# Patient Record
Sex: FEMALE | Race: WHITE | NOT HISPANIC OR LATINO | Employment: OTHER | URBAN - METROPOLITAN AREA
[De-identification: names, ages, dates, MRNs, and addresses within clinical notes are randomized per-mention and may not be internally consistent; named-entity substitution may affect disease eponyms.]

---

## 2017-09-28 ENCOUNTER — OFFICE VISIT (OUTPATIENT)
Dept: FAMILY MEDICINE | Facility: CLINIC | Age: 77
End: 2017-09-28
Payer: MEDICARE

## 2017-09-28 VITALS
HEART RATE: 111 BPM | SYSTOLIC BLOOD PRESSURE: 114 MMHG | TEMPERATURE: 98.1 F | WEIGHT: 205 LBS | DIASTOLIC BLOOD PRESSURE: 70 MMHG | OXYGEN SATURATION: 90 %

## 2017-09-28 DIAGNOSIS — R29.898 WEAKNESS OF BOTH LOWER EXTREMITIES: ICD-10-CM

## 2017-09-28 DIAGNOSIS — Z23 NEED FOR PROPHYLACTIC VACCINATION AND INOCULATION AGAINST INFLUENZA: Primary | ICD-10-CM

## 2017-09-28 PROBLEM — E89.0 POSTOPERATIVE HYPOTHYROIDISM: Status: ACTIVE | Noted: 2017-09-28

## 2017-09-28 PROBLEM — M81.0 OSTEOPOROSIS: Status: ACTIVE | Noted: 2017-09-28

## 2017-09-28 PROBLEM — I10 HYPERTENSION GOAL BP (BLOOD PRESSURE) < 140/90: Status: ACTIVE | Noted: 2017-09-28

## 2017-09-28 PROCEDURE — 99203 OFFICE O/P NEW LOW 30 MIN: CPT | Mod: 25 | Performed by: FAMILY MEDICINE

## 2017-09-28 PROCEDURE — G0008 ADMIN INFLUENZA VIRUS VAC: HCPCS | Performed by: FAMILY MEDICINE

## 2017-09-28 PROCEDURE — 90662 IIV NO PRSV INCREASED AG IM: CPT | Performed by: FAMILY MEDICINE

## 2017-09-28 RX ORDER — ALENDRONATE SODIUM 70 MG/1
70 TABLET ORAL
COMMUNITY
End: 2018-08-30

## 2017-09-28 RX ORDER — ATORVASTATIN CALCIUM 40 MG/1
40 TABLET, FILM COATED ORAL DAILY
COMMUNITY
End: 2018-12-07

## 2017-09-28 RX ORDER — GABAPENTIN 300 MG/1
600 CAPSULE ORAL 2 TIMES DAILY
COMMUNITY
End: 2018-09-28

## 2017-09-28 RX ORDER — LEVOTHYROXINE SODIUM 50 UG/1
50 TABLET ORAL DAILY
COMMUNITY
End: 2019-05-01

## 2017-09-28 RX ORDER — LISINOPRIL 40 MG/1
40 TABLET ORAL DAILY
COMMUNITY
End: 2018-11-05

## 2017-09-28 RX ORDER — TIOTROPIUM BROMIDE 18 UG/1
18 CAPSULE ORAL; RESPIRATORY (INHALATION) DAILY
COMMUNITY
End: 2020-04-06

## 2017-09-28 RX ORDER — FUROSEMIDE 20 MG
20 TABLET ORAL DAILY
COMMUNITY
End: 2019-05-01

## 2017-09-28 RX ORDER — AMLODIPINE BESYLATE 10 MG/1
10 TABLET ORAL DAILY
COMMUNITY
End: 2018-10-29

## 2017-09-28 RX ORDER — TRAZODONE HYDROCHLORIDE 100 MG/1
100 TABLET ORAL AT BEDTIME
COMMUNITY
End: 2018-12-14

## 2017-09-28 RX ORDER — DULOXETIN HYDROCHLORIDE 30 MG/1
90 CAPSULE, DELAYED RELEASE ORAL DAILY
COMMUNITY
End: 2018-09-28

## 2017-09-28 RX ORDER — BUSPIRONE HYDROCHLORIDE 15 MG/1
15 TABLET ORAL DAILY
COMMUNITY
End: 2018-08-27

## 2017-09-28 NOTE — NURSING NOTE
Chief Complaint   Patient presents with     Fatigue       Initial /70  Pulse 111  Temp 98.1  F (36.7  C) (Oral)  Wt 205 lb (93 kg)  SpO2 90% There is no height or weight on file to calculate BMI.  Medication Reconciliation: niranjan Howard M.A.

## 2017-09-28 NOTE — PROGRESS NOTES
Injectable Influenza Immunization Documentation    1.  Is the person to be vaccinated sick today?   No    2. Does the person to be vaccinated have an allergy to a component   of the vaccine?   No    3. Has the person to be vaccinated ever had a serious reaction   to influenza vaccine in the past?   No    4. Has the person to be vaccinated ever had Guillain-Barré syndrome?   No    Form completed by Janis Howard M.A.      --------------------------------------------------------------------------------------------------------------------------------------  SUBJECTIVE:  Gilma Pace is a 77 year old female who scheduled an appointment to discuss the following issues:  She lived in [place] and just moved up here with in the last year.    She had a compression fracture in 2010 at T11. It healed without the vertebroplasty.    She has been feeling weak in her legs for about a year.   She had an MRI scan showing some stenosis in the lumbar area in 2012.    She reports that for the last 3-4 month(s). She has been seen at [facility].  She is doing physical therapy for 9 month(s)     She has fallen 6-7 times in the last 3-4 month(s).  She reports that before she falls she has pain in her left lower back, then her legs go numb from the knees down and then her legs give out. This whole process takes about 10 seconds.     She has difficulty getting up from a low position.  She is using a walker.     Past Medical, social, family histories, medications, and allergies reviewed and updated   ROS: other than that noted above all other review of systems was negative    ROS:       Current Outpatient Prescriptions:      levothyroxine (SYNTHROID/LEVOTHROID) 50 MCG tablet, Take 50 mcg by mouth daily, Disp: , Rfl:      alendronate (FOSAMAX) 70 MG tablet, Take 70 mg by mouth every 7 days, Disp: , Rfl:      amLODIPine (NORVASC) 10 MG tablet, Take 10 mg by mouth daily, Disp: , Rfl:      atorvastatin (LIPITOR) 40 MG tablet, Take 40 mg by mouth daily,  Disp: , Rfl:      busPIRone (BUSPAR) 15 MG tablet, Take 15 mg by mouth daily, Disp: , Rfl:      DULoxetine (CYMBALTA) 30 MG EC capsule, Take 90 mg by mouth daily, Disp: , Rfl:      furosemide (LASIX) 20 MG tablet, Take 20 mg by mouth daily, Disp: , Rfl:      lisinopril (PRINIVIL/ZESTRIL) 40 MG tablet, Take 40 mg by mouth daily, Disp: , Rfl:      tiotropium (SPIRIVA HANDIHALER) 18 MCG capsule, Inhale 18 mcg into the lungs daily, Disp: , Rfl:      traZODone (DESYREL) 100 MG tablet, Take 100 mg by mouth At Bedtime, Disp: , Rfl:      gabapentin (NEURONTIN) 300 MG capsule, Take 600 mg by mouth 2 times daily, Disp: , Rfl:     OBJECTIVE:  /70  Pulse 111  Temp 98.1  F (36.7  C) (Oral)  Wt 205 lb (93 kg)  SpO2 90%    EXAM:  GENERAL APPEARANCE: healthy, alert and no distress      DTR's  Right knee 2+  Right ankle Abscent  Left knee 2+  Left ankleAbscent  Strength was +5 globally in the lower extremities except knee extension and hip flexion was 4-4.5.    With my mechanical table I was able to see if she could stand up from a sitting position at different heights.   Lower or equal to 90 she can not get up.     No results found for this or any previous visit.      ASSESSMENT/PLAN:    I think she has lumbar spinal stenosis and needs imaging to further clarify this.   If the imaging is not clear cut would consider a neurology evaluation.       (Z23) Need for prophylactic vaccination and inoculation against influenza  (primary encounter diagnosis)  Comment:   Plan: FLU VACCINE, INCREASED ANTIGEN, PRESV FREE, AGE        65+ [87236], Vaccine Administration, Initial         [17672]          Problem List updated

## 2017-09-28 NOTE — MR AVS SNAPSHOT
"              After Visit Summary   9/28/2017    Gilma Pace    MRN: [id number]           Patient Information     Date Of Birth          1940        Visit Information        Provider Department      9/28/2017 6:30 PM Gucci Wu MD [facility]        Today's Diagnoses     Need for prophylactic vaccination and inoculation against influenza    -  1    Weakness of both lower extremities          Care Instructions    Please call our [facility] Scheduling Line at [phone] to schedule your:  MRI                  Follow-ups after your visit        Future tests that were ordered for you today     Open Future Orders        Priority Expected Expires Ordered    MR Lumbar Spine w/o Contrast Routine  9/28/2018 9/28/2017            Who to contact     If you have questions or need follow up information about today's clinic visit or your schedule please contact [facility] directly at [phone].  Normal or non-critical lab and imaging results will be communicated to you by [email or web address], letter or phone within 4 business days after the clinic has received the results. If you do not hear from us within 7 days, please contact the clinic through [email or web address] or phone. If you have a critical or abnormal lab result, we will notify you by phone as soon as possible.  Submit refill requests through [employer] or call your pharmacy and they will forward the refill request to us. Please allow 3 business days for your refill to be completed.          Additional Information About Your Visit        MyChart Information     [employer] lets you send messages to your doctor, view your test results, renew your prescriptions, schedule appointments and more. To sign up, go to [email or web address] . Click on \"Log in\" on the left side of the screen, which will take you to the Welcome page. Then click on \"Sign up Now\" on the right side of the page.     You will be asked to enter the access code listed below, as " well as some personal information. Please follow the directions to create your username and password.     Your access code is: [email or web address]  Expires: 2017  7:21 PM     Your access code will  in 90 days. If you need help or a new code, please call your [place] clinic or [phone].        Care EveryWhere ID     This is your Care EveryWhere ID. This could be used by other organizations to access your [facility] medical records  [id number]        Your Vitals Were     Pulse Temperature Pulse Oximetry             111 98.1  F (36.7  C) (Oral) 90%          Blood Pressure from Last 3 Encounters:   17 114/70    Weight from Last 3 Encounters:   17 205 lb (93 kg)              We Performed the Following     FLU VACCINE, INCREASED ANTIGEN, PRESV FREE, AGE 65+ [86106]     Vaccine Administration, Initial [[id number]]        Primary Care Provider Office Phone # Fax #    [facility] [phone] [address] [facility] [address]        Equal Access to Services     [facility]: Hadii aad ku hadasho Soomaali, waaxda luqadaha, qaybta kaalmada adeegyada, waxángel way haytate pulido . So [facility] [phone].    ATENCIÓN: Si habla español, tiene a flood disposición servicios gratuitos de asistencia lingüística. Llame al [phone].    We comply with applicable federal civil rights laws and [place] laws. We do not discriminate on the basis of race, color, national origin, age, disability sex, sexual orientation or gender identity.            Thank you!     Thank you for choosing [facility]  [facility] your [facility]. Our goal is always to provide you with excellent care. Hearing back from our patients is one way we can continue to improve our services. Please take a few minutes to complete the written survey that you may receive in the mail after your visit with us. Thank you!             Your Updated Medication List - Protect others around you: Learn how to safely  use, store and throw away your medicines at www.disposemymeds.org.          This list is accurate as of: 9/28/17  7:21 PM.  Always use your most recent med list.                   Brand Name Dispense Instructions for use Diagnosis    alendronate 70 MG tablet    FOSAMAX     Take 70 mg by mouth every 7 days    Need for prophylactic vaccination and inoculation against influenza, Weakness of both lower extremities       amLODIPine 10 MG tablet    NORVASC     Take 10 mg by mouth daily    Need for prophylactic vaccination and inoculation against influenza, Weakness of both lower extremities       atorvastatin 40 MG tablet    LIPITOR     Take 40 mg by mouth daily    Need for prophylactic vaccination and inoculation against influenza, Weakness of both lower extremities       busPIRone 15 MG tablet    BUSPAR     Take 15 mg by mouth daily    Need for prophylactic vaccination and inoculation against influenza, Weakness of both lower extremities       CYMBALTA 30 MG EC capsule   Generic drug:  DULoxetine      Take 90 mg by mouth daily    Need for prophylactic vaccination and inoculation against influenza, Weakness of both lower extremities       furosemide 20 MG tablet    LASIX     Take 20 mg by mouth daily    Need for prophylactic vaccination and inoculation against influenza, Weakness of both lower extremities       gabapentin 300 MG capsule    NEURONTIN     Take 600 mg by mouth 2 times daily    Need for prophylactic vaccination and inoculation against influenza, Weakness of both lower extremities       levothyroxine 50 MCG tablet    SYNTHROID/LEVOTHROID     Take 50 mcg by mouth daily    Need for prophylactic vaccination and inoculation against influenza, Weakness of both lower extremities       lisinopril 40 MG tablet    PRINIVIL/ZESTRIL     Take 40 mg by mouth daily    Need for prophylactic vaccination and inoculation against influenza, Weakness of both lower extremities       SPIRIVA HANDIHALER 18 MCG capsule   Generic  drug:  tiotropium      Inhale 18 mcg into the lungs daily    Need for prophylactic vaccination and inoculation against influenza, Weakness of both lower extremities       traZODone 100 MG tablet    DESYREL     Take 100 mg by mouth At Bedtime    Need for prophylactic vaccination and inoculation against influenza, Weakness of both lower extremities

## 2017-10-04 ENCOUNTER — RADIANT APPOINTMENT (OUTPATIENT)
Dept: MRI IMAGING | Facility: CLINIC | Age: 77
End: 2017-10-04
Attending: FAMILY MEDICINE
Payer: MEDICARE

## 2017-10-04 DIAGNOSIS — R29.898 WEAKNESS OF BOTH LOWER EXTREMITIES: ICD-10-CM

## 2017-10-04 DIAGNOSIS — Z23 NEED FOR PROPHYLACTIC VACCINATION AND INOCULATION AGAINST INFLUENZA: ICD-10-CM

## 2017-10-04 DIAGNOSIS — R29.898 WEAKNESS OF BOTH LOWER EXTREMITIES: Primary | ICD-10-CM

## 2017-10-04 PROCEDURE — 72148 MRI LUMBAR SPINE W/O DYE: CPT | Mod: TC

## 2017-10-31 ENCOUNTER — OFFICE VISIT (OUTPATIENT)
Dept: NEUROLOGY | Facility: CLINIC | Age: 77
End: 2017-10-31
Payer: MEDICARE

## 2017-10-31 VITALS
DIASTOLIC BLOOD PRESSURE: 67 MMHG | BODY MASS INDEX: 38.51 KG/M2 | SYSTOLIC BLOOD PRESSURE: 103 MMHG | HEART RATE: 67 BPM | WEIGHT: 204 LBS | RESPIRATION RATE: 14 BRPM | HEIGHT: 61 IN

## 2017-10-31 DIAGNOSIS — M48.062 SPINAL STENOSIS OF LUMBAR REGION WITH NEUROGENIC CLAUDICATION: Primary | ICD-10-CM

## 2017-10-31 PROBLEM — E53.8 FOLATE DEFICIENCY: Status: ACTIVE | Noted: 2017-06-09

## 2017-10-31 PROBLEM — R29.6 RECURRENT FALLS: Status: ACTIVE | Noted: 2017-06-01

## 2017-10-31 PROBLEM — R73.09 OTHER ABNORMAL GLUCOSE: Status: ACTIVE | Noted: 2017-06-09

## 2017-10-31 PROCEDURE — 99205 OFFICE O/P NEW HI 60 MIN: CPT | Performed by: PSYCHIATRY & NEUROLOGY

## 2017-10-31 ASSESSMENT — PAIN SCALES - GENERAL: PAINLEVEL: MILD PAIN (3)

## 2017-10-31 NOTE — LETTER
10/31/2017         RE: Gilma Pace  [address] [place] [address]        Dear Colleague,    Thank you for referring your patient, Gilma Pace, to the [facility]. Please see a copy of my visit note below.     INITIAL NEUROLOGY CONSULTATION    LOCATION: [facility]  DATE OF VISIT: 10/31/2017  MRN: [id number]  NAME: Ms. Gilma Pace  :  1940 (77 year old)    PRIMARY/REFERRING PROVIDER: Dr. Gucci Wu    REASON FOR CONSULTATION: Weakness of lower limbs    HISTORY OF PRESENT ILLNESS ([place]): Ms. Pace is seen at the request of Dr. Gucci Wu. I may add that details of the history is provided by her .  77-year-old woman relates that she is here because of abnormal MRI spine studies. She relates that her main symptom is her inability to walk more than 20 feet without stopping. It has going on for last 1-1/2 years. Tendency to fall. She is unsteady walking. She has history of chronic low back pain for last 10 years. She relates that she needed to sit down before she starts walking at least for about 15 minutes. She finds difficult to describe the nature  problems occur that she needed to sit down before she can start walking again. She denies any discomfort in the calf muscles.  She discontinued smoking in . She denies any previous diagnosis of peripheral artery disease affecting the lower limbs she does have chronic obstructive lung disease.    She has been using walker for last 4 years. She relates that she is using walker because of the chronic low back pain. Denies tendency to fall 4 years ago.    Chronic low back pain is localized. It does not radiate to the left leg. She has been to the pain clinic in 2016. She has seen 4 spine specialists in [place]. Her  relates that 3 of them indicated that no surgery can be done.. Her  relates that one surgeon mentioned that surgery can be done from the incision over the abdominal wall. She denies seeing any  spine specialist here in [place].    She was in the preoperative room for spine surgery in [place], [place] in 2007. Surgeon canceled the surgery indicating that she has some other problem. It was mentioned to her that  problem with her elevated blood count. Further evaluation showed that she had a thyroid nodule. She was moved to a different hospital later and she had thyroid surgery. She and her  conforms that it was a benign mass.  On direct questioning she denies any feet paresthesias.    Previous Relevant Diagnostic Studies Reviewed:  Imaging: MR LUMBAR SPINE WITHOUT CONTRAST 10/4/2017 10:51 AM     HISTORY:  Encounter for immunization. Other symptoms and signs  involving the musculoskeletal system.     TECHNIQUE: Sagittal T1 and T2, sagittal IR, and transverse proton  density and T2-weighted pulse sequences.     FINDINGS: Five lumbar vertebrae are assumed. There is a T11 vertebral  body fracture with near-complete central loss of vertebral body height  and moderate posterosuperior cortical retropulsion which does not  contact or efface the distal thoracic cord. Given lack of marrow  edema, this is likely subacute or chronic. Fat-containing hemangiomas  noted on the edge of the scan within the T10 vertebral body.  Apophyseal joint degenerative arthrosis with minimal degenerative  grade 1 spondylolisthesis is noted at L4-L5. Degenerative loss of disc  signal is noted throughout the lumbar spine. Disc degeneration is  greatest at L3-L4 where there is moderate loss of disc height. The  conus medullaris is unremarkable in appearance on the sagittal images.        L1-L2: Minimal if any annular bulge. No central or foraminal stenosis.     L2-L3: Diffuse annular bulge with only borderline narrowing of the  central canal and mild foraminal stenosis below the exiting nerve  roots.     L3-L4: Diffuse annular bulge with borderline narrowing of the central  canal and mild bilateral foraminal stenosis below the  exiting nerve  roots.     L4-L5: Mild annular bulge without central stenosis. Mild bilateral  foraminal stenosis, right greater than left.     L5-S1: Mild annular bulge without indentation upon the thecal sac. No  central stenosis or displacement of either S1 nerve root.  Mild-to-moderate left foraminal stenosis. The right neural foramen  appears to be patent.         IMPRESSION:  1. Multilevel degenerative disc disease, most advanced at L3-L4.  2. Apophyseal joint degenerative arthrosis with minimal degenerative  spondylolisthesis at L4-L5. No periarticular reactive marrow edema.  3. Chronic-appearing T11 body fracture.  4. Multilevel foraminal stenosis, greatest on the left at L5-S1.  5. Borderline central stenosis at L2-L3 and L3-L4.     LEANDRO LYNN MD    Review of Systems: All negative except as noted in the [place] and Identifying information.    Allergies   Allergen Reactions     Codeine Unknown     nausea     Sulfa Drugs GI Disturbance     GI upset     Tetracyclines GI Disturbance     Gi upset     Current Outpatient Prescriptions   Medication Sig     IBUPROFEN PO Take 200 mg by mouth once     levothyroxine (SYNTHROID/LEVOTHROID) 50 MCG tablet Take 50 mcg by mouth daily     alendronate (FOSAMAX) 70 MG tablet Take 70 mg by mouth every 7 days     amLODIPine (NORVASC) 10 MG tablet Take 10 mg by mouth daily     atorvastatin (LIPITOR) 40 MG tablet Take 40 mg by mouth daily     busPIRone (BUSPAR) 15 MG tablet Take 15 mg by mouth daily     DULoxetine (CYMBALTA) 30 MG EC capsule Take 90 mg by mouth daily     furosemide (LASIX) 20 MG tablet Take 20 mg by mouth daily     lisinopril (PRINIVIL/ZESTRIL) 40 MG tablet Take 40 mg by mouth daily     tiotropium (SPIRIVA HANDIHALER) 18 MCG capsule Inhale 18 mcg into the lungs daily     traZODone (DESYREL) 100 MG tablet Take 100 mg by mouth At Bedtime     gabapentin (NEURONTIN) 300 MG capsule Take 600 mg by mouth 2 times daily     No current facility-administered medications for  "this visit.      Past Medical History:   Diagnosis Date     Anxiety 10/11/2006    Last visit with Mental health specialist in 2002.      Chronic low back pain without sciatica 10/31/2016     Compression fracture of thoracic vertebra (H) 6/13/2011    Overview:  T  11  compression  fx  on  xrays  from  6/13/2011  . Ongoing pain  x  3  months   Get  eval  and  treat  with  spine  specialists  as discussed  Initially  treated  in [place]  .  Pain  is  controlled   with  advil  as needed  . Reviewed   with  patient      Congestive heart failure (H) 10/11/2006    Overview:  Epic      COPD (chronic obstructive pulmonary disease) (H) 9/28/2017     Folate deficiency 6/9/2017    Overview:  Formatting of this note may be different from the original. Hemoglobin (g/dl)  Date Value  06/05/2017 15.6  10/31/2016 13.8  11/04/2015 11.3 (L)   Iron (mcg/dl)  Date Value  06/05/2017 49 (L)   TIBC, Calculated (mcg/dl)  Date Value  06/05/2017 319   % Saturation, calc. (%)  Date Value  06/05/2017 15   Vitamin B12 (pg/ml)  Date Value  06/05/2017 253   Folate (ng/ml)  Date Value  06/05/20     Hypertension goal BP (blood pressure) < 140/90 9/28/2017     Mitral regurgitation 2/11/2014    Overview:  Needs dental prophylaxis      Mixed hyperlipidemia 10/11/2006     Osteoporosis 9/28/2017     Postoperative hypothyroidism 9/28/2017     Past Surgical History:   Procedure Laterality Date     APPENDECTOMY OPEN      71 years      CATARACT IOL, RT/LT       Social History   Substance Use Topics     Smoking status: Former Smoker     Quit date: 2007     Smokeless tobacco: Never Used     Alcohol use Yes      Comment: Rarely       GENERAL EXAMINATION:    General appearance: Pleasant female sitting comfortably in a chair  /67 (BP Location: Left arm, Patient Position: Chair, Cuff Size: Adult Large)  Pulse 67  Resp 14  Ht 1.549 m (5' 1\")  Wt 92.5 kg (204 lb)  BMI 38.55 kg/m2    NEUROLOGICAL EXAMINATION:    Head & Neck:  Neck supple  No carotid " bruit  Mental Status:    Alert and oriented to time, place and person    Recent and remote memory intact    Attention span and concentration normal    Adequate fund of knowledge  Speech: Normal  Cranial Nerves:  Cranial Nerve 2:    Visual acuity normal to finger counting    Pupils equal and reacting to light    No field defect by confrontation    Fundus reveals normal disc margins  Cranial Nerves 3, 4 and 6:    Eye movements normal in all directions of gaze  Cranial Nerve 5:     Normal facial sensory and motor functions  Cranial Nerve 7:     Symmetrical face without motor weakness   Cranial Nerve 8:    Normal hearing to whispered sounds  Cranial Nerves 9, 10:    Normal palate and uvula movements  Cranial Nerve 11:    Shoulder shrug symmetrical  Cranial Nerve 12:    Tongue midline with normal movements  Motor:    Tone and bulk: Normal in both upper and lower limbs    Power: No drift of the outstretched arms          Normal strength in all muscle groups of both upper and lower limbs   Coordination:    Finger nose test and rapid alternate movements normal bilaterally    Heel-shin test normal bilaterally  Deep Tendon Reflexes:    Upper limbs: Equal and symmetrical    Lower limbs: Equal and symmetrical with Right ankle jerk absent, Left ankle jerk present.    Down going plantars  Sensations:    Touch/Pin prick: Normal at both and upper and lower limbs    Vibration (128 Hz): Diminished at both ankles    Position sense: Normal at both big toes  Gait:    Walks hesitantlywith a normal stride length and a normal arm swing    Finds difficult to walk on heels and toes         Tandem walking not done  Romberg Sign: Negative    IMPRESSION:   Encounter Diagnoses   Name Primary?     Spinal stenosis of lumbar region with neurogenic claudication Yes     COMMENTS: The clinical and imaging features of lumbar canal stenosis with neurogenic claudication over a background of multilevel lumbar degenerative disease seen on MRI lumbar  spine.No definite weakness of lower limbs except for her being limited with walking because of her chronic low back pain and related issues.     PLANS:   Patient Instructions     AFTER VISIT SUMMARY (AVS)   Spine [phone]  Orders Placed This Encounter   Procedures     ORTHOPEDICS ADULT REFERRAL     Diagnostic possibilities reviewed and reasons for work-up explained    Preventive Neurology: Encouraged to keep physically and mentally active with particular emphasis on daily stretching exercises, walking, and healthy eating.    To call us for follow-up neurology appointment in next 6 week(s) or earlier if needed. If any difficulty with having neurology appointment, please coordinate care with Dr. Wu    Thanks to Dr. Gucci Wu for allowing me to participate in Ms. Pace's care. Please feel free to call me with any questions or concerns.     Time with patient 60 minutes, greater than 50% of which was counseling and coordination of care.    *Chart documentation was completed in part with Dragon voice-recognition software. Even though reviewed, some grammatical, spelling, and word errors may remain.     Jai Jennings MD, [facility]  Neurologist    cc: Dr. Gucci Wu                      Again, thank you for allowing me to participate in the care of your patient.        Sincerely,        Jai Jennings MD

## 2017-10-31 NOTE — NURSING NOTE
"Chief Complaint   Patient presents with     Consult     Lumbar pain for about 10 years      Results     MRI LUMBAR SPINE        Initial /67 (BP Location: Left arm, Patient Position: Chair, Cuff Size: Adult Large)  Pulse 67  Resp 14  Ht 1.549 m (5' 1\")  Wt 92.5 kg (204 lb)  BMI 38.55 kg/m2 Estimated body mass index is 38.55 kg/(m^2) as calculated from the following:    Height as of this encounter: 1.549 m (5' 1\").    Weight as of this encounter: 92.5 kg (204 lb).  Medication Reconciliation: complete   Sharan Lester MA      "

## 2017-10-31 NOTE — Clinical Note
Please send her consultation note to take to spine specialist-Dr. Dsouza. Also call [facility] to send CD of MRI lumbar spine directly to her to hand carry it.

## 2017-10-31 NOTE — PROGRESS NOTES
INITIAL NEUROLOGY CONSULTATION    LOCATION: [facility]  DATE OF VISIT: 10/31/2017  MRN: [id number]  NAME: Ms. Gilma Pace  :  1940 (77 year old)    PRIMARY/REFERRING PROVIDER: Dr. Gucci Wu    REASON FOR CONSULTATION: Weakness of lower limbs    HISTORY OF PRESENT ILLNESS ([place]): Ms. Pace is seen at the request of Dr. Gucci Wu. I may add that details of the history is provided by her .  77-year-old woman relates that she is here because of abnormal MRI spine studies. She relates that her main symptom is her inability to walk more than 20 feet without stopping. It has going on for last 1-1/2 years. Tendency to fall. She is unsteady walking. She has history of chronic low back pain for last 10 years. She relates that she needed to sit down before she starts walking at least for about 15 minutes. She finds difficult to describe the nature  problems occur that she needed to sit down before she can start walking again. She denies any discomfort in the calf muscles.  She discontinued smoking in . She denies any previous diagnosis of peripheral artery disease affecting the lower limbs she does have chronic obstructive lung disease.    She has been using walker for last 4 years. She relates that she is using walker because of the chronic low back pain. Denies tendency to fall 4 years ago.    Chronic low back pain is localized. It does not radiate to the left leg. She has been to the pain clinic in 2016. She has seen 4 spine specialists in [place]. Her  relates that 3 of them indicated that no surgery can be done.. Her  relates that one surgeon mentioned that surgery can be done from the incision over the abdominal wall. She denies seeing any spine specialist here in [place].    She was in the preoperative room for spine surgery in [place], [place] in . Surgeon canceled the surgery indicating that she has some other problem. It was mentioned to her that  problem with  her elevated blood count. Further evaluation showed that she had a thyroid nodule. She was moved to a different hospital later and she had thyroid surgery. She and her  conforms that it was a benign mass.  On direct questioning she denies any feet paresthesias.    Previous Relevant Diagnostic Studies Reviewed:  Imaging: MR LUMBAR SPINE WITHOUT CONTRAST 10/4/2017 10:51 AM     HISTORY:  Encounter for immunization. Other symptoms and signs  involving the musculoskeletal system.     TECHNIQUE: Sagittal T1 and T2, sagittal IR, and transverse proton  density and T2-weighted pulse sequences.     FINDINGS: Five lumbar vertebrae are assumed. There is a T11 vertebral  body fracture with near-complete central loss of vertebral body height  and moderate posterosuperior cortical retropulsion which does not  contact or efface the distal thoracic cord. Given lack of marrow  edema, this is likely subacute or chronic. Fat-containing hemangiomas  noted on the edge of the scan within the T10 vertebral body.  Apophyseal joint degenerative arthrosis with minimal degenerative  grade 1 spondylolisthesis is noted at L4-L5. Degenerative loss of disc  signal is noted throughout the lumbar spine. Disc degeneration is  greatest at L3-L4 where there is moderate loss of disc height. The  conus medullaris is unremarkable in appearance on the sagittal images.        L1-L2: Minimal if any annular bulge. No central or foraminal stenosis.     L2-L3: Diffuse annular bulge with only borderline narrowing of the  central canal and mild foraminal stenosis below the exiting nerve  roots.     L3-L4: Diffuse annular bulge with borderline narrowing of the central  canal and mild bilateral foraminal stenosis below the exiting nerve  roots.     L4-L5: Mild annular bulge without central stenosis. Mild bilateral  foraminal stenosis, right greater than left.     L5-S1: Mild annular bulge without indentation upon the thecal sac. No  central stenosis or  displacement of either S1 nerve root.  Mild-to-moderate left foraminal stenosis. The right neural foramen  appears to be patent.         IMPRESSION:  1. Multilevel degenerative disc disease, most advanced at L3-L4.  2. Apophyseal joint degenerative arthrosis with minimal degenerative  spondylolisthesis at L4-L5. No periarticular reactive marrow edema.  3. Chronic-appearing T11 body fracture.  4. Multilevel foraminal stenosis, greatest on the left at L5-S1.  5. Borderline central stenosis at L2-L3 and L3-L4.     LEANDRO LYNN MD    Review of Systems: All negative except as noted in the [employer] and Identifying information.    Allergies   Allergen Reactions     Codeine Unknown     nausea     Sulfa Drugs GI Disturbance     GI upset     Tetracyclines GI Disturbance     Gi upset     Current Outpatient Prescriptions   Medication Sig     IBUPROFEN PO Take 200 mg by mouth once     levothyroxine (SYNTHROID/LEVOTHROID) 50 MCG tablet Take 50 mcg by mouth daily     alendronate (FOSAMAX) 70 MG tablet Take 70 mg by mouth every 7 days     amLODIPine (NORVASC) 10 MG tablet Take 10 mg by mouth daily     atorvastatin (LIPITOR) 40 MG tablet Take 40 mg by mouth daily     busPIRone (BUSPAR) 15 MG tablet Take 15 mg by mouth daily     DULoxetine (CYMBALTA) 30 MG EC capsule Take 90 mg by mouth daily     furosemide (LASIX) 20 MG tablet Take 20 mg by mouth daily     lisinopril (PRINIVIL/ZESTRIL) 40 MG tablet Take 40 mg by mouth daily     tiotropium (SPIRIVA HANDIHALER) 18 MCG capsule Inhale 18 mcg into the lungs daily     traZODone (DESYREL) 100 MG tablet Take 100 mg by mouth At Bedtime     gabapentin (NEURONTIN) 300 MG capsule Take 600 mg by mouth 2 times daily     No current facility-administered medications for this visit.      Past Medical History:   Diagnosis Date     Anxiety 10/11/2006    Last visit with Mental health specialist in 2002.      Chronic low back pain without sciatica 10/31/2016     Compression fracture of thoracic vertebra  "(H) 6/13/2011    Overview:  T  11  compression  fx  on  xrays  from  6/13/2011  . Ongoing pain  x  3  months   Get  eval  and  treat  with  spine  specialists  as discussed  Initially  treated  in [place]  .  Pain  is  controlled   with  advil  as needed  . Reviewed   with  patient      Congestive heart failure (H) 10/11/2006    Overview:  Epic      COPD (chronic obstructive pulmonary disease) (H) 9/28/2017     Folate deficiency 6/9/2017    Overview:  Formatting of this note may be different from the original. Hemoglobin (g/dl)  Date Value  06/05/2017 15.6  10/31/2016 13.8  11/04/2015 11.3 (L)   Iron (mcg/dl)  Date Value  06/05/2017 49 (L)   TIBC, Calculated (mcg/dl)  Date Value  06/05/2017 319   % Saturation, calc. (%)  Date Value  06/05/2017 15   Vitamin B12 (pg/ml)  Date Value  06/05/2017 253   Folate (ng/ml)  Date Value  06/05/20     Hypertension goal BP (blood pressure) < 140/90 9/28/2017     Mitral regurgitation 2/11/2014    Overview:  Needs dental prophylaxis      Mixed hyperlipidemia 10/11/2006     Osteoporosis 9/28/2017     Postoperative hypothyroidism 9/28/2017     Past Surgical History:   Procedure Laterality Date     APPENDECTOMY OPEN      71 years      CATARACT IOL, RT/LT       Social History   Substance Use Topics     Smoking status: Former Smoker     Quit date: 2007     Smokeless tobacco: Never Used     Alcohol use Yes      Comment: Rarely       GENERAL EXAMINATION:    General appearance: Pleasant female sitting comfortably in a chair  /67 (BP Location: Left arm, Patient Position: Chair, Cuff Size: Adult Large)  Pulse 67  Resp 14  Ht 1.549 m (5' 1\")  Wt 92.5 kg (204 lb)  BMI 38.55 kg/m2    NEUROLOGICAL EXAMINATION:    Head & Neck:  Neck supple  No carotid bruit  Mental Status:    Alert and oriented to time, place and person    Recent and remote memory intact    Attention span and concentration normal    Adequate fund of knowledge  Speech: Normal  Cranial Nerves:  Cranial Nerve 2:    Visual " acuity normal to finger counting    Pupils equal and reacting to light    No field defect by confrontation    Fundus reveals normal disc margins  Cranial Nerves 3, 4 and 6:    Eye movements normal in all directions of gaze  Cranial Nerve 5:     Normal facial sensory and motor functions  Cranial Nerve 7:     Symmetrical face without motor weakness   Cranial Nerve 8:    Normal hearing to whispered sounds  Cranial Nerves 9, 10:    Normal palate and uvula movements  Cranial Nerve 11:    Shoulder shrug symmetrical  Cranial Nerve 12:    Tongue midline with normal movements  Motor:    Tone and bulk: Normal in both upper and lower limbs    Power: No drift of the outstretched arms          Normal strength in all muscle groups of both upper and lower limbs   Coordination:    Finger nose test and rapid alternate movements normal bilaterally    Heel-shin test normal bilaterally  Deep Tendon Reflexes:    Upper limbs: Equal and symmetrical    Lower limbs: Equal and symmetrical with Right ankle jerk absent, Left ankle jerk present.    Down going plantars  Sensations:    Touch/Pin prick: Normal at both and upper and lower limbs    Vibration (128 Hz): Diminished at both ankles    Position sense: Normal at both big toes  Gait:    Walks hesitantlywith a normal stride length and a normal arm swing    Finds difficult to walk on heels and toes         Tandem walking not done  Romberg Sign: Negative    IMPRESSION:   Encounter Diagnoses   Name Primary?     Spinal stenosis of lumbar region with neurogenic claudication Yes     COMMENTS: The clinical and imaging features of lumbar canal stenosis with neurogenic claudication over a background of multilevel lumbar degenerative disease seen on MRI lumbar spine.No definite weakness of lower limbs except for her being limited with walking because of her chronic low back pain and related issues.     PLANS:   Patient Instructions     AFTER VISIT SUMMARY (AVS)   Spine [phone]  Orders  Placed This Encounter   Procedures     ORTHOPEDICS ADULT REFERRAL     Diagnostic possibilities reviewed and reasons for work-up explained    Preventive Neurology: Encouraged to keep physically and mentally active with particular emphasis on daily stretching exercises, walking, and healthy eating.    To call us for follow-up neurology appointment in next 6 week(s) or earlier if needed. If any difficulty with having neurology appointment, please coordinate care with Dr. Wu    Thanks to Dr. Gucci Wu for allowing me to participate in Ms. Pace's care. Please feel free to call me with any questions or concerns.     Time with patient 60 minutes, greater than 50% of which was counseling and coordination of care.    *Chart documentation was completed in part with Dragon voice-recognition software. Even though reviewed, some grammatical, spelling, and word errors may remain.     Jai Jennings MD, [facility]  Neurologist    cc: Dr. Gucci Wu

## 2017-10-31 NOTE — MR AVS SNAPSHOT
After Visit Summary   10/31/2017    Gilma Pace    MRN: [id number]           Patient Information     Date Of Birth          1940        Visit Information        Provider Department      10/31/2017 9:00 AM Jai Jennings MD [facility]        Today's Diagnoses     Spinal stenosis of lumbar region with neurogenic claudication    -  1      Care Instructions    AFTER VISIT SUMMARY (AVS)   Spine [phone]  Orders Placed This Encounter   Procedures     ORTHOPEDICS ADULT REFERRAL     Diagnostic possibilities reviewed and reasons for work-up explained    Preventive Neurology: Encouraged to keep physically and mentally active with particular emphasis on daily stretching exercises, walking, and healthy eating.    To call us for follow-up neurology appointment in next 6 week(s) or earlier if needed. If any difficulty with having neurology appointment, please coordinate care with Dr. Wu                    Follow-ups after your visit        Additional Services     ORTHOPEDICS ADULT REFERRAL       Please call to Schedule Appointment:  Javier Dsouza MD  Spine specialist  Orthopedics Clinic  Tel. [phone]    REASON: Lumbar canal stenosis with neurogenic claudication.       Would appreciate your help with the diagnosis and further management.    Please send your consultation note to Clinic, Dr. Gucci Wu also.    Thanks,    Jai Jennings MD, [facility]  Neurologist        Coverage of these services is subject to the terms and limitations of your health insurance plan.  Please call  member services at your health plan with any benefit or coverage questions.        [facility] referral to Dr. Dsouza.  Any CT, MRI or procedures ordered by your specialist must be performed at a [place] facility OR coordinated by your clinic referral office.    If X-rays, CTs or MRIs have been performed, please contact the facility where they were done, to arrange for  prior to  "your scheduled appointment.  Please bring this referral request to your appointment and present it to your specialist.                  Follow-up notes from your care team     Return in about 6 weeks (around 12/12/2017) for Follow-up.      Who to contact     If you have questions or need follow up information about today's clinic visit or your schedule please contact [facility] directly at [phone].  Normal or non-critical lab and imaging results will be communicated to you by [employer], letter or phone within 4 business days after the clinic has received the results. If you do not hear from us within 7 days, please contact the clinic through [employer] or phone. If you have a critical or abnormal lab result, we will notify you by phone as soon as possible.  Submit refill requests through [employer] or call your pharmacy and they will forward the refill request to us. Please allow 3 business days for your refill to be completed.          Additional Information About Your Visit        [employer] Information     [employer] gives you secure access to your electronic health record. If you see a primary care provider, you can also send messages to your care team and make appointments. If you have questions, please call your primary care clinic.  If you do not have a primary care provider, please call [phone] and they will assist you.        Care EveryWhere ID     This is your Care EveryWhere ID. This could be used by other organizations to access your [place] medical records  [id number]        Your Vitals Were     Pulse Respirations Height BMI (Body Mass Index)          67 14 1.549 m (5' 1\") 38.55 kg/m2         Blood Pressure from Last 3 Encounters:   10/31/17 103/67   09/28/17 114/70    Weight from Last 3 Encounters:   10/31/17 92.5 kg (204 lb)   09/28/17 93 kg (205 lb)              We Performed the Following     ORTHOPEDICS ADULT REFERRAL        Primary Care Provider Office Phone # Fax #    Paola Segura " [facility] [phone]       [address] [facility] [address]        Equal Access to Services     LEMUEL CARLIN : Hadii aad ku hadfabricio Sokvng, wajanda luqadaha, qaybta kaalmada kalya, vandana lopezmacey marty. So [facility] [phone].    ATENCIÓN: Si habla español, tiene a flood disposición servicios gratuitos de asistencia lingüística. Llame al [phone].    We comply with applicable federal civil rights laws and [place] laws. We do not discriminate on the basis of race, color, national origin, age, disability, sex, sexual orientation, or gender identity.            Thank you!     Thank you for choosing [facility] [place]  for your care. Our goal is always to provide you with excellent care. Hearing back from our patients is one way we can continue to improve our services. Please take a few minutes to complete the written survey that you may receive in the mail after your visit with us. Thank you!             Your Updated Medication List - Protect others around you: Learn how to safely use, store and throw away your medicines at www.disposemymeds.org.          This list is accurate as of: 10/31/17 11:12 AM.  Always use your most recent med list.                   Brand Name Dispense Instructions for use Diagnosis    alendronate 70 MG tablet    FOSAMAX     Take 70 mg by mouth every 7 days    Need for prophylactic vaccination and inoculation against influenza, Weakness of both lower extremities       amLODIPine 10 MG tablet    NORVASC     Take 10 mg by mouth daily    Need for prophylactic vaccination and inoculation against influenza, Weakness of both lower extremities       atorvastatin 40 MG tablet    LIPITOR     Take 40 mg by mouth daily    Need for prophylactic vaccination and inoculation against influenza, Weakness of both lower extremities       busPIRone 15 MG tablet    BUSPAR     Take 15 mg by mouth daily    Need for prophylactic vaccination and inoculation against  influenza, Weakness of both lower extremities       CYMBALTA 30 MG EC capsule   Generic drug:  DULoxetine      Take 90 mg by mouth daily    Need for prophylactic vaccination and inoculation against influenza, Weakness of both lower extremities       furosemide 20 MG tablet    LASIX     Take 20 mg by mouth daily    Need for prophylactic vaccination and inoculation against influenza, Weakness of both lower extremities       gabapentin 300 MG capsule    NEURONTIN     Take 600 mg by mouth 2 times daily    Need for prophylactic vaccination and inoculation against influenza, Weakness of both lower extremities       IBUPROFEN PO      Take 200 mg by mouth once        levothyroxine 50 MCG tablet    SYNTHROID/LEVOTHROID     Take 50 mcg by mouth daily    Need for prophylactic vaccination and inoculation against influenza, Weakness of both lower extremities       lisinopril 40 MG tablet    PRINIVIL/ZESTRIL     Take 40 mg by mouth daily    Need for prophylactic vaccination and inoculation against influenza, Weakness of both lower extremities       SPIRIVA HANDIHALER 18 MCG capsule   Generic drug:  tiotropium      Inhale 18 mcg into the lungs daily    Need for prophylactic vaccination and inoculation against influenza, Weakness of both lower extremities       traZODone 100 MG tablet    DESYREL     Take 100 mg by mouth At Bedtime    Need for prophylactic vaccination and inoculation against influenza, Weakness of both lower extremities

## 2017-10-31 NOTE — PATIENT INSTRUCTIONS
AFTER VISIT SUMMARY (AVS)   Spine [phone]  Orders Placed This Encounter   Procedures     ORTHOPEDICS ADULT REFERRAL     Diagnostic possibilities reviewed and reasons for work-up explained    Preventive Neurology: Encouraged to keep physically and mentally active with particular emphasis on daily stretching exercises, walking, and healthy eating.    To call us for follow-up neurology appointment in next 6 week(s) or earlier if needed. If any difficulty with having neurology appointment, please coordinate care with Dr. Wu

## 2017-11-03 NOTE — PROGRESS NOTES
Patient consultation note send today at her home address.   Also call [facility] and spoke with Haylee to send CD of MRI lumbar spine directly to patient home so she can hand carry to her appointment with Dr. Dsouza. Thank you    Sharan Lester MA

## 2017-11-13 ENCOUNTER — TRANSFERRED RECORDS (OUTPATIENT)
Dept: HEALTH INFORMATION MANAGEMENT | Facility: CLINIC | Age: 77
End: 2017-11-13

## 2017-11-13 LAB — PHQ9 SCORE: 3

## 2017-11-15 ENCOUNTER — OFFICE VISIT (OUTPATIENT)
Dept: FAMILY MEDICINE | Facility: CLINIC | Age: 77
End: 2017-11-15
Payer: MEDICARE

## 2017-11-15 VITALS
BODY MASS INDEX: 37.79 KG/M2 | HEART RATE: 74 BPM | TEMPERATURE: 96.6 F | WEIGHT: 200 LBS | OXYGEN SATURATION: 74 % | DIASTOLIC BLOOD PRESSURE: 63 MMHG | SYSTOLIC BLOOD PRESSURE: 103 MMHG

## 2017-11-15 DIAGNOSIS — R29.898 WEAKNESS OF BOTH LOWER EXTREMITIES: Primary | ICD-10-CM

## 2017-11-15 PROCEDURE — 99214 OFFICE O/P EST MOD 30 MIN: CPT | Performed by: FAMILY MEDICINE

## 2017-11-15 NOTE — PROGRESS NOTES
SUBJECTIVE:  Gilma Pace is a 77 year old female who scheduled an appointment to discuss the following issues:  #1 - Weakness of both lower extremities      She has had 4 falls in the last week.     She reports that today as she was getting into the car her legs leg gave out and she fell onto her buttocks.   She denies any injury,   She is unable to get into the care without help    She can not get up the 3 steps into the house any longer. She also has fallen multiple times trying to do this.     She fell last week in the kitchen and was unable to get back up.     Please refer to my last note regarding the details of her falls.       Past Medical, social, family histories, medications, and allergies reviewed and updated   ROS: other than that noted above all other review of systems was negative    ROS:       Current Outpatient Prescriptions:      tiZANidine (ZANAFLEX) 4 MG tablet, Take 4 mg by mouth At Bedtime, Disp: , Rfl:      IBUPROFEN PO, Take 200 mg by mouth once, Disp: , Rfl:      levothyroxine (SYNTHROID/LEVOTHROID) 50 MCG tablet, Take 50 mcg by mouth daily, Disp: , Rfl:      alendronate (FOSAMAX) 70 MG tablet, Take 70 mg by mouth every 7 days, Disp: , Rfl:      amLODIPine (NORVASC) 10 MG tablet, Take 10 mg by mouth daily, Disp: , Rfl:      atorvastatin (LIPITOR) 40 MG tablet, Take 40 mg by mouth daily, Disp: , Rfl:      busPIRone (BUSPAR) 15 MG tablet, Take 15 mg by mouth daily, Disp: , Rfl:      DULoxetine (CYMBALTA) 30 MG EC capsule, Take 90 mg by mouth daily, Disp: , Rfl:      furosemide (LASIX) 20 MG tablet, Take 20 mg by mouth daily, Disp: , Rfl:      lisinopril (PRINIVIL/ZESTRIL) 40 MG tablet, Take 40 mg by mouth daily, Disp: , Rfl:      tiotropium (SPIRIVA HANDIHALER) 18 MCG capsule, Inhale 18 mcg into the lungs daily, Disp: , Rfl:      traZODone (DESYREL) 100 MG tablet, Take 100 mg by mouth At Bedtime, Disp: , Rfl:      gabapentin (NEURONTIN) 300 MG capsule, Take 600 mg by mouth 2 times daily,  Disp: , Rfl:     OBJECTIVE:  /63  Pulse 74  Temp 96.6  F (35.9  C) (Oral)  Wt 200 lb (90.7 kg)  SpO2 (!) 74%  BMI 37.79 kg/m2    EXAM:  GENERAL APPEARANCE: healthy, alert and no distress      UPPER EXTREMITY(IES): +5/5 strength    LOWER EXTREMITY(IES):   RIGHT LOWER EXTREMITY strength +5/5    Left lower extremity:+4/5 dorsi flexion, knee extension, hip flexion    DP pulse +1/2 bilateral(ly), capillary refill was <2 seconds in the toes bilateral(ly)     Results for orders placed or performed in visit on 11/13/17   PHQ-9 DEPRESSION SCREENING ORDER   Result Value Ref Range    PHQ9 SCORE 3     [facility] ORTHOPEDICS     MR LUMBAR SPINE WITHOUT CONTRAST 10/4/2017 10:51 AM     HISTORY:  Encounter for immunization. Other symptoms and signs  involving the musculoskeletal system.     TECHNIQUE: Sagittal T1 and T2, sagittal IR, and transverse proton  density and T2-weighted pulse sequences.     FINDINGS: Five lumbar vertebrae are assumed. There is a T11 vertebral  body fracture with near-complete central loss of vertebral body height  and moderate posterosuperior cortical retropulsion which does not  contact or efface the distal thoracic cord. Given lack of marrow  edema, this is likely subacute or chronic. Fat-containing hemangiomas  noted on the edge of the scan within the T10 vertebral body.  Apophyseal joint degenerative arthrosis with minimal degenerative  grade 1 spondylolisthesis is noted at L4-L5. Degenerative loss of disc  signal is noted throughout the lumbar spine. Disc degeneration is  greatest at L3-L4 where there is moderate loss of disc height. The  conus medullaris is unremarkable in appearance on the sagittal images.        L1-L2: Minimal if any annular bulge. No central or foraminal stenosis.     L2-L3: Diffuse annular bulge with only borderline narrowing of the  central canal and mild foraminal stenosis below the exiting nerve  roots.     L3-L4: Diffuse annular bulge with borderline  narrowing of the central  canal and mild bilateral foraminal stenosis below the exiting nerve  roots.     L4-L5: Mild annular bulge without central stenosis. Mild bilateral  foraminal stenosis, right greater than left.     L5-S1: Mild annular bulge without indentation upon the thecal sac. No  central stenosis or displacement of either S1 nerve root.  Mild-to-moderate left foraminal stenosis. The right neural foramen  appears to be patent.         IMPRESSION:  1. Multilevel degenerative disc disease, most advanced at L3-L4.  2. Apophyseal joint degenerative arthrosis with minimal degenerative  spondylolisthesis at L4-L5. No periarticular reactive marrow edema.  3. Chronic-appearing T11 body fracture.  4. Multilevel foraminal stenosis, greatest on the left at L5-S1.  5. Borderline central stenosis at L2-L3 and L3-L4.    ASSESSMENT/PLAN:    Spells of lower extremity(ies) weakness in the setting of lumbar spinal stenosis.     I am going to have the patient seen by one of the [facility] neurologists for further evaluation.     I introduced our  Celsa to the patient and her family. They discussed short term placement options as the patient is really struggling to get around her home.

## 2017-11-15 NOTE — MR AVS SNAPSHOT
After Visit Summary   11/15/2017    Gilma Pace    MRN: [id number]           Patient Information     Date Of Birth          1940        Visit Information        Provider Department      11/15/2017 1:30 PM Gucci Wu MD [facility]        Today's Diagnoses     Weakness of both lower extremities    -  1      Care Instructions    Consult at [facility]: [facility] [phone]   [email or web address]/  [facility]: Neurology [facility] [phone]   [email or web address]/neurology-[email or web address]/  General Neurology            Follow-ups after your visit        Additional Services     NEUROLOGY ADULT REFERRAL       Your provider has referred you for the following:   Consult at [facility]: [facility] [phone]   [email or web address]/  [facility]: Neurology [facility] [phone]   [email or web address]/  General Neurology    Please be aware that coverage of these services is subject to the terms and limitations of your health insurance plan.  Call member services at your health plan with any benefit or coverage questions.      Please bring the following with you to your appointment:    (1) Any X-Rays, CTs or MRIs which have been performed.  Contact the facility where they were done to arrange for  prior to your scheduled appointment.    (2) List of current medications  (3) This referral request   (4) Any documents/labs given to you for this referral                  Who to contact     If you have questions or need follow up information about today's clinic visit or your schedule please contact [facility] directly at [phone].  Normal or non-critical lab and imaging results will be communicated to you by MyChart, letter or phone within 4 business days after  the clinic has received the results. If you do not hear from us within 7 days, please contact the clinic through [employer] or phone. If you have a critical or abnormal lab result, we will notify you by phone as soon as possible.  Submit refill requests through [employer] or call your pharmacy and they will forward the refill request to us. Please allow 3 business days for your refill to be completed.          Additional Information About Your Visit        [employer] Information     [employer] gives you secure access to your electronic health record. If you see a primary care provider, you can also send messages to your care team and make appointments. If you have questions, please call your primary care clinic.  If you do not have a primary care provider, please call [phone] and they will assist you.        Care EveryWhere ID     This is your Care EveryWhere ID. This could be used by other organizations to access your [place] medical records  [id number]        Your Vitals Were     Pulse Temperature Pulse Oximetry BMI (Body Mass Index)          74 96.6  F (35.9  C) (Oral) 74% 37.79 kg/m2         Blood Pressure from Last 3 Encounters:   11/15/17 103/63   10/31/17 103/67   09/28/17 114/70    Weight from Last 3 Encounters:   11/15/17 200 lb (90.7 kg)   10/31/17 204 lb (92.5 kg)   09/28/17 205 lb (93 kg)              We Performed the Following     NEUROLOGY ADULT REFERRAL        Primary Care Provider Office Phone # Fax #    [facility] [phone] [address] [facility] [address]        Equal Access to Services     LEMUEL CARLIN : Hadii aad ku hadasho Soomaali, waaxda luqadaha, qaybta kaalmada adeegyada, vandana ferguson. So [facility] [phone].    ATENCIÓN: Si habla español, tiene a flood disposición servicios gratuitos de asistencia lingüística. Llame al [phone].    We comply with applicable federal civil rights laws and [place] laws. We do not discriminate  on the basis of race, color, national origin, age, disability, sex, sexual orientation, or gender identity.            Thank you!     Thank you for choosing [facility]  for your [facility]. Our goal is always to provide you with excellent care. Hearing back from our patients is one way we can continue to improve our services. Please take a few minutes to complete the written survey that you may receive in the mail after your visit with us. Thank you!             Your Updated Medication List - Protect others around you: Learn how to safely use, store and throw away your medicines at www.disposemymeds.org.          This list is accurate as of: 11/15/17  2:20 PM.  Always use your most recent med list.                   Brand Name Dispense Instructions for use Diagnosis    alendronate 70 MG tablet    FOSAMAX     Take 70 mg by mouth every 7 days    Need for prophylactic vaccination and inoculation against influenza, Weakness of both lower extremities       amLODIPine 10 MG tablet    NORVASC     Take 10 mg by mouth daily    Need for prophylactic vaccination and inoculation against influenza, Weakness of both lower extremities       atorvastatin 40 MG tablet    LIPITOR     Take 40 mg by mouth daily    Need for prophylactic vaccination and inoculation against influenza, Weakness of both lower extremities       busPIRone 15 MG tablet    BUSPAR     Take 15 mg by mouth daily    Need for prophylactic vaccination and inoculation against influenza, Weakness of both lower extremities       CYMBALTA 30 MG EC capsule   Generic drug:  DULoxetine      Take 90 mg by mouth daily    Need for prophylactic vaccination and inoculation against influenza, Weakness of both lower extremities       furosemide 20 MG tablet    LASIX     Take 20 mg by mouth daily    Need for prophylactic vaccination and inoculation against influenza, Weakness of both lower extremities       gabapentin 300 MG capsule    NEURONTIN     Take 600 mg by mouth 2  times daily    Need for prophylactic vaccination and inoculation against influenza, Weakness of both lower extremities       IBUPROFEN PO      Take 200 mg by mouth once        levothyroxine 50 MCG tablet    SYNTHROID/LEVOTHROID     Take 50 mcg by mouth daily    Need for prophylactic vaccination and inoculation against influenza, Weakness of both lower extremities       lisinopril 40 MG tablet    PRINIVIL/ZESTRIL     Take 40 mg by mouth daily    Need for prophylactic vaccination and inoculation against influenza, Weakness of both lower extremities       SPIRIVA HANDIHALER 18 MCG capsule   Generic drug:  tiotropium      Inhale 18 mcg into the lungs daily    Need for prophylactic vaccination and inoculation against influenza, Weakness of both lower extremities       tiZANidine 4 MG tablet    ZANAFLEX     Take 4 mg by mouth At Bedtime    Weakness of both lower extremities       traZODone 100 MG tablet    DESYREL     Take 100 mg by mouth At Bedtime    Need for prophylactic vaccination and inoculation against influenza, Weakness of both lower extremities

## 2017-11-15 NOTE — PATIENT INSTRUCTIONS
Consult at [facility]: [facility] [place] [facility] [phone]   [email or web address].org/Clinics/[facility]/  [facility] [place] [phone]   [email or web address]  General Neurology

## 2017-11-15 NOTE — NURSING NOTE
"Chief Complaint   Patient presents with     Fall     frequent falls, looking for rehab placement       Initial /63  Pulse 74  Temp 96.6  F (35.9  C) (Oral)  Wt 200 lb (90.7 kg)  SpO2 (!) 74%  BMI 37.79 kg/m2 Estimated body mass index is 37.79 kg/(m^2) as calculated from the following:    Height as of 10/31/17: 5' 1\" (1.549 m).    Weight as of this encounter: 200 lb (90.7 kg).  Medication Reconciliation: complete     Pebbles Posadas, chai    "

## 2017-11-20 ENCOUNTER — TELEPHONE (OUTPATIENT)
Dept: FAMILY MEDICINE | Facility: CLINIC | Age: 77
End: 2017-11-20

## 2017-11-20 NOTE — TELEPHONE ENCOUNTER
I have discussed this patient's issue with the RN involved and we have come up with this plan.  Gucci Wu MD

## 2017-11-20 NOTE — TELEPHONE ENCOUNTER
Information below is reviewed with  Dr Wu, this will be okay.  [facility] is given Verbal Orders.  Verbalized good understanding.     Per protocol, will route encounter to be cosigned by provider for Verbal Orders.  Shantelle Harris RN

## 2017-11-20 NOTE — TELEPHONE ENCOUNTER
Requesting a delay in start of Home Care services due to staffing. Start of service for tomorrow 11/21/17. Ok to leave a message.

## 2017-11-21 ENCOUNTER — DOCUMENTATION ONLY (OUTPATIENT)
Dept: HOME HOSPICE | Facility: OTHER | Age: 77
End: 2017-11-21

## 2017-11-24 ENCOUNTER — TELEPHONE (OUTPATIENT)
Dept: FAMILY MEDICINE | Facility: CLINIC | Age: 77
End: 2017-11-24

## 2017-11-24 ENCOUNTER — DOCUMENTATION ONLY (OUTPATIENT)
Dept: CARE COORDINATION | Facility: CLINIC | Age: 77
End: 2017-11-24

## 2017-11-24 PROBLEM — I51.7 MILD CONCENTRIC LEFT VENTRICULAR HYPERTROPHY (LVH): Status: ACTIVE | Noted: 2017-11-24

## 2017-11-24 NOTE — TELEPHONE ENCOUNTER
Called DAVID Boswell back and gave her providers message.   She will call patient now and inform her of this.     Zoila Whittaker RN

## 2017-11-24 NOTE — PROGRESS NOTES
[facility] now requests orders and shares plan of care/discharge summaries for some patients through [employer].  Please REPLY TO THIS MESSAGE in order to give authorization for orders when needed.  This is considered a verbal order, you will still receive a faxed copy of orders for signature.  Thank you for your assistance in improving collaboration for our patients.    ORDER   Requesting to cont home PT for gait training and therapeutic exercise, to reduce falls risk and improve mobility. Once a week for 5 weeks.    Thank you, Julisa Hughes, PT  [phone]

## 2017-11-24 NOTE — TELEPHONE ENCOUNTER
That is not really a good idea. Her oxygen is not at a safe level. We need to have her assessed at the emergency department as to why the Oxygen level is so low.   Gucci Wu MD

## 2017-11-24 NOTE — TELEPHONE ENCOUNTER
[facility] is calling to let provider know fyi: Discontinued lasix, patient oesn't want to take it, O2 was 84,87, Thank you.

## 2017-11-29 ENCOUNTER — TELEPHONE (OUTPATIENT)
Dept: FAMILY MEDICINE | Facility: CLINIC | Age: 77
End: 2017-11-29

## 2017-11-29 NOTE — TELEPHONE ENCOUNTER
home care is calling to let provider know: Patient had a fall yesterday, onto her back and 12cm by 5cm bruise to lower back, did not hit head, denies any pain. Thank you.

## 2017-11-30 ENCOUNTER — DOCUMENTATION ONLY (OUTPATIENT)
Dept: CARE COORDINATION | Facility: CLINIC | Age: 77
End: 2017-11-30

## 2017-11-30 NOTE — PROGRESS NOTES
[facility] now requests orders and shares plan of care/discharge summaries for some patients through [employer].  Please REPLY TO THIS MESSAGE in order to give authorization for orders when needed.  This is considered a verbal order, you will still receive a faxed copy of orders for signature.  Thank you for your assistance in improving collaboration for our patients.    ORDER    OT eval and treat 2w3  for UE function, home safety and equipment needs.  The plan will also include falls prevention plan, monitor and treat pain,  continence management, to achieve the following goals,  in 3 weeks  Pt will demo progressive aerobic Home Exercise Program up to 10 minutes to increase functional endurance for performing ADLs/IADLs    Pt will demo safety with tub/shower transfer using appropriate equipment w/stand by assist     Pt will demo correct positioning of body and coordination of breathing with hip rotation for maximum benefit of muscle strengthening and increased continence control for safe and independent toileting.  Pt will demo ability to dress L/E w/o difficulty

## 2017-12-01 NOTE — PROGRESS NOTES
I have reviewed the message from Home Care/Hospice and I authorize these orders.  Gucci Wu MD

## 2017-12-07 ENCOUNTER — OFFICE VISIT (OUTPATIENT)
Dept: NEUROLOGY | Facility: CLINIC | Age: 77
End: 2017-12-07
Attending: FAMILY MEDICINE
Payer: MEDICARE

## 2017-12-07 ENCOUNTER — PRE VISIT (OUTPATIENT)
Dept: NEUROLOGY | Facility: CLINIC | Age: 77
End: 2017-12-07

## 2017-12-07 VITALS
OXYGEN SATURATION: 94 % | BODY MASS INDEX: 36.57 KG/M2 | SYSTOLIC BLOOD PRESSURE: 116 MMHG | HEIGHT: 61 IN | HEART RATE: 115 BPM | WEIGHT: 193.7 LBS | DIASTOLIC BLOOD PRESSURE: 77 MMHG

## 2017-12-07 DIAGNOSIS — R29.898 LIMB WEAKNESS: Primary | ICD-10-CM

## 2017-12-07 DIAGNOSIS — E03.9 HYPOTHYROIDISM, UNSPECIFIED TYPE: ICD-10-CM

## 2017-12-07 LAB
CK SERPL-CCNC: 152 U/L (ref 30–225)
TSH SERPL DL<=0.005 MIU/L-ACNC: 2.1 MU/L (ref 0.4–4)

## 2017-12-07 PROCEDURE — 83520 IMMUNOASSAY QUANT NOS NONAB: CPT | Mod: 90 | Performed by: PSYCHIATRY & NEUROLOGY

## 2017-12-07 PROCEDURE — 83519 RIA NONANTIBODY: CPT | Mod: 90 | Performed by: PSYCHIATRY & NEUROLOGY

## 2017-12-07 PROCEDURE — 86255 FLUORESCENT ANTIBODY SCREEN: CPT | Mod: 90 | Performed by: PSYCHIATRY & NEUROLOGY

## 2017-12-07 PROCEDURE — 82550 ASSAY OF CK (CPK): CPT | Performed by: PSYCHIATRY & NEUROLOGY

## 2017-12-07 PROCEDURE — 36415 COLL VENOUS BLD VENIPUNCTURE: CPT | Performed by: PSYCHIATRY & NEUROLOGY

## 2017-12-07 PROCEDURE — 99000 SPECIMEN HANDLING OFFICE-LAB: CPT | Performed by: PSYCHIATRY & NEUROLOGY

## 2017-12-07 PROCEDURE — 84443 ASSAY THYROID STIM HORMONE: CPT | Performed by: PSYCHIATRY & NEUROLOGY

## 2017-12-07 PROCEDURE — 99203 OFFICE O/P NEW LOW 30 MIN: CPT | Performed by: PSYCHIATRY & NEUROLOGY

## 2017-12-07 PROCEDURE — 83516 IMMUNOASSAY NONANTIBODY: CPT | Mod: 90 | Performed by: PSYCHIATRY & NEUROLOGY

## 2017-12-07 ASSESSMENT — PAIN SCALES - GENERAL: PAINLEVEL: NO PAIN (0)

## 2017-12-07 NOTE — LETTER
12/7/2017         RE: Marce Pace  [id number] [address] [place] [id number]        Dear Colleague,    Thank you for referring your patient, Marce Pace, to the [facility]. Please see a copy of my visit note below.    HISTORY OF PRESENT ILLNESS:  Marce Pace is a 77-year-old female, here for evaluation of leg weakness and falls.      She tells me about 10 years ago she suffered a T11 vertebral fracture while she was living in [place].  It does not sound like she developed any acute leg weakness or numbness subsequent to that fall.  At some point, she began losing her ability to ambulate normally.  This has particularly gotten worse over the last month or so.  She tells me her legs feel very weak and will collapse.  She does not feel dizzy or faint when this happens, it is just that her legs can not hold her up.  There is no pain in her legs.  She denies any upper extremity symptoms.  She denies any cranial nerve symptoms, but does report a dry mouth.  She has chronic back pain, but denies neck pain.      She does have COPD and was hospitalized at [facility] from 11/24-11/25 with low O2 saturations.  On blood gas, her O2 saturation was 75%.  She has been started on supplemental oxygen.  This has not really helped her weakness.  While hospitalized she was also found to have a small pneumonia.  CBC was done and her white count was 13,800.  Her basic metabolic panel was within normal limits.  BNP was normal at 25.      She has been on Lipitor for about 15 years.  She denies myalgias.      In June of this year she had a normal B12 level, TSH, T4 and liver functions.      PAST MEDICAL HISTORY:  Notable for COPD, hypertension, hypothyroidism, hyperlipidemia and anxiety.      CURRENT MEDICATIONS:  Tizanidine 4 mg at bedtime, Levothyroxine, Fosamax, amlodipine, Lipitor, BuSpar, Cymbalta, furosemide, lisinopril, Spiriva, trazodone and Gabapentin 600 mg twice a day.      ALLERGIES:  She is allergic to  codeine, sulfa and tetracycline.      FAMILY HISTORY:  There is no family history of neuromuscular disease.      SOCIAL HISTORY:  She is a former smoker and now uses E-cigarettes.  She does not use alcohol.      PHYSICAL EXAMINATION:   GENERAL:  Reveals a patient who had to be examined in a chair because she could not step up on to the exam table.   VITAL SIGNS:  Heart rate 115.  Blood pressure 116/77.   CRANIAL NERVES:  Pupils are equal, round and react well to light.  She has full extraocular motility.  Her facial strength is normal.  Speech is clear.   MOTOR:  Examination of the neck reveals mild neck flexor weakness.  Motor examination of the extremities reveals proximal weakness in the arms with shoulder abduction 4 and elbow flexion 4.  In the lower extremities while seated hip flexion is 3-, knee flexion 4, ankle dorsiflexion 4.  Knee extension and ankle plantar flexion are normal.   SENSORY:  She has a reduction of vibratory sense in the toes.  Pinprick is intact, as is position sense.   Finger-to-nose is done well.   REFLEXES:  2 to -3+ in the upper extremities with slightly brisk finger flexor reflexes.  Knee jerks 2+ (checked after muscle testing) and ankle jerks absent.  Plantar responses are flexor.      IMPRESSION:  Limb weakness and falls.      PLAN:  The patient did have a lumbar MRI scan done on 10/4/17 that I reviewed with her.  There is no significant central spinal canal stenosis and although there is some foraminal stenosis I do not think it is sufficient to explain her weakness.  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      For further evaluation, I am going to check a cervical MRI scan to make certain we were not dealing with a myelopathy, although my suspicion is this may be a peripheral neuromuscular process.      She is going to have a CK checked as well as a TSH, free T4, acetylcholine receptor antibodies, and a MUSK     antibody.      She is a former smoker.  She reports a dry mouth.  Lambert-Eaton syndrome is a possibility.  She is going to have a paraneoplastic antibody panel checked through the [facility], so I can see the results of P/Q voltage gated calcium channel antibody test.      I am going to set her up for EMG and nerve conduction studies as well as repetitive nerve stimulation.      I have asked her to hold the Lipitor for now as that can cause a progressive myopathy and weakness.      I will communicate test results to her as they become available and also plan to see her back following the completion of her electrodiagnostic testing.         RAJ VIGIL MD             D: 2017 14:09   T: 2017 22:57   MT: [id number]      Name:     ALEKS CORONEL   MRN:      [id number]        Account:      [id number]   :      1940           Visit Date:   2017      Document: [id number]        Again, thank you for allowing me to participate in the care of your patient.        Sincerely,        Raj Vigil MD

## 2017-12-07 NOTE — MR AVS SNAPSHOT
After Visit Summary   12/7/2017    Gilma Pace    MRN: [id number]           Patient Information     Date Of Birth          1940        Visit Information        Provider Department      12/7/2017 1:00 PM Raj Vigil MD [facility]        Today's Diagnoses     Limb weakness    -  1    Hypothyroidism, unspecified type          Care Instructions    Hold on taking Lipitor (atorvastatin)      Having EMG and NCS Tests  You will be having electromyography (EMG) and nerve conduction studies (NCS) to measure muscle and nerve function. In most cases, both tests are done. NCS is most often done first. You will be asked to lie on an exam table with a blanket over you. You may have one or both of the following:    Nerve conduction study (NCS)  During NCS, mild electrical currents are used to test how fast impulses move along your nerves. The healthcare provider will put small metal disks (electrodes) on your skin on the area of your body being tested. This will be done using water-based gel or paste. A doctor or technologist will apply mild electrical currents to your skin. Your muscles will twitch, but the test won t harm you. Currents are usually applied to the same area several times. Usually the intensity of the electrical stimulation is increased on each body part. Despite some increasing discomfort that varies from person to person, the electrical shock is not dangerous. The test may continue on other parts of your body unless the reason for doing the test is limited to a small part of the body.  Electromyography (EMG)  Most of the electrodes will be removed for EMG. The doctor will clean the area being tested with alcohol. A very fine needle will be put into the muscles in this region. When the needle is inserted, you may feel as if your skin is being pinched. Try to relax and do as instructed, since you will be asked to relax and contract the muscle being tested. Following  instructions will allow your doctor to interpret the test results.  Let the technologist know  For your safety and for the success of your test, tell the technologist if you:    Have any bleeding problems.    Take blood thinners (anticoagulants) or other medications, including aspirin.    Have any immune system problems.    Have had neck or back surgery.  You may also be asked questions about your overall health.  Before the test  Prepare for your test as instructed. Shower or bathe, but don't use powder, oil, or lotion. Your skin should be clean and free of excess oil. Wear loose clothes. But know that you may be asked to change into a hospital gown. The entire test will take about 60 minutes. Be sure to allow extra time to check in.  After your test  Before you leave, all electrodes will be removed. You can then get right back to your normal routine. If you feel tired or have some discomfort, take it easy. If you were told to stop taking any medicines for your test, ask when you can start taking them again. Your doctor will let you know when your test results are ready.  Date Last Reviewed: 9/12/2015 2000-2017 [employer]. [address] [place] [address]. All rights reserved. This information is not intended as a substitute for professional medical care. Always follow your healthcare professional's instructions.                Follow-ups after your visit        Follow-up notes from your care team     Discussed this visit Return in about 4 weeks (around 1/4/2018).      Your next 10 appointments already scheduled     Dec 12, 2017  5:45 PM CST   (Arrive by 5:30 PM)   MR CERVICAL SPINE W/O & W CONTRAST with MGMR1   [facility] ([facility])    [id number] [place] [address] [phone]           Take your medicines as usual, unless your doctor tells you not to. Bring a list of your current medicines to your exam (including vitamins,  minerals and over-the-counter drugs).  You will be given intravenous contrast for this exam. To prepare:   The day before your exam, drink extra fluids at least six 8-ounce glasses (unless your doctor tells you to restrict your fluids).   Have a blood test (creatinine test) within 30 days of your exam. Go to your clinic or Diagnostic Imaging Department for this test.  The MRI machine uses a strong magnet. Please wear clothes without metal (snaps, zippers). A sweatsuit works well, or we may give you a hospital gown.  Please remove any body piercings and hair extensions before you arrive. You will also remove watches, jewelry, hairpins, wallets, dentures, partial dental plates and hearing aids. You may wear contact lenses, and you may be able to wear your rings. We have a safe place to keep your personal items, but it is safer to leave them at home.   **IMPORTANT** THE INSTRUCTIONS BELOW ARE ONLY FOR THOSE PATIENTS WHO HAVE BEEN TOLD THEY WILL RECEIVE SEDATION OR GENERAL ANESTHESIA DURING THEIR MRI PROCEDURE:  IF YOU WILL RECEIVE SEDATION (take medicine to help you relax during your exam):   You must get the medicine from your doctor before you arrive. Bring the medicine to the exam. Do not take it at home.   Arrive one hour early. Bring someone who can take you home after the test. Your medicine will make you sleepy. After the exam, you may not drive, take a bus or take a taxi by yourself.   No eating 8 hours before your exam. You may have clear liquids up until 4 hours before your exam. (Clear liquids include water, clear tea, black coffee and fruit juice without pulp.)  IF YOU WILL RECEIVE ANESTHESIA (be asleep for your exam):   Arrive 1 1/2 hours early. Bring someone who can take you home after the test. You may not drive, take a bus or take a taxi by yourself.   No eating 8 hours before your exam. You may have clear liquids up until 4 hours before your exam. (Clear liquids include water, clear tea, black coffee  and fruit juice without pulp.)  Please call the Imaging Department at your exam site with any questions.            Jan 08, 2018  3:00 PM CST   (Arrive by 2:45 PM)   EMG with Melvin Agosto MD   [facility] ([facility])    [address] [facility] [address]  [address] [facility] [address] [phone]           Do not use lotions or creams on the area to be tested. If you are on blood thinners (Warfarin, Coumadin, Lovenox, etc), please contact your primary care physician to check if it is safe to stop them 3 days prior to testing. If you have anxiety, please check with your referring physician to obtain anti-anxiety medication for the procedure.            Vince 10, 2018  4:00 PM CST   Return Visit with Raj Vigil MD   [facility]    [id number] [place] [address] [phone]              Future tests that were ordered for you today     Open Future Orders        Priority Expected Expires Ordered    EMG Routine 12/18/2017 1/29/2018 12/7/2017    MR Cervical Spine w/o & w Contrast Routine  12/7/2018 12/7/2017            Who to contact     If you have questions or need follow up information about today's clinic visit or your schedule please contact [facility] directly at [phone].  Normal or non-critical lab and imaging results will be communicated to you by MyChart, letter or phone within 4 business days after the clinic has received the results. If you do not hear from us within 7 days, please contact the clinic through "[employer]hart or phone. If you have a critical or abnormal lab result, we will notify you by phone as soon as possible.  Submit refill requests through [employer] or call your pharmacy and they will forward the refill request to us. Please allow 3 business days for your refill to be completed.          Additional Information About Your Visit        [employer] Information     [employer] gives you secure  "access to your electronic health record. If you see a primary care provider, you can also send messages to your care team and make appointments. If you have questions, please call your primary care clinic.  If you do not have a primary care provider, please call [phone] and they will assist you.      [employer] is an electronic gateway that provides easy, online access to your medical records. With [employer], you can request a clinic appointment, read your test results, renew a prescription or communicate with your care team.     To access your existing account, please contact your [facility] or call [phone] for assistance.        Care EveryWhere ID     This is your Care EveryWhere ID. This could be used by other organizations to access your [facility] medical records  [id number]        Your Vitals Were     Pulse Height Pulse Oximetry BMI (Body Mass Index)          115 1.549 m (5' 1\") 94% 36.6 kg/m2         Blood Pressure from Last 3 Encounters:   12/07/17 116/77   11/15/17 103/63   10/31/17 103/67    Weight from Last 3 Encounters:   12/07/17 87.9 kg (193 lb 11.2 oz)   11/15/17 90.7 kg (200 lb)   10/31/17 92.5 kg (204 lb)              We Performed the Following     ACETYLCHOLINE MODULATING ANTIBODY     ACETYLCHOLINE RECEPTOR BINDING     ACETYLCHOLINE RECEPTOR BLOCKING ENA     CK total     MuSK Antibody     Paraneoplastic antibody     TSH with free T4 reflex        Primary Care Provider Office Phone # Fax #    Gucci Wu [phone] [address] [facility] [address]        Equal Access to Services     LEMUEL CARLIN : Hadii aad ku hadasho Soomaali, waaxda luqadaha, qaybta kaalmada adeegyada, vandana ferguson. So [facility] [phone].    ATENCIÓN: Si habla español, tiene a flood disposición servicios gratuitos de asistencia lingüística. Llame al [phone].    We comply with applicable federal civil rights laws and [place] " laws. We do not discriminate on the basis of race, color, national origin, age, disability, sex, sexual orientation, or gender identity.            Thank you!     Thank you for choosing [facility]  for your care. Our goal is always to provide you with excellent care. Hearing back from our patients is one way we can continue to improve our services. Please take a few minutes to complete the written survey that you may receive in the mail after your visit with us. Thank you!             Your Updated Medication List - Protect others around you: Learn how to safely use, store and throw away your medicines at www.disposemymeds.org.          This list is accurate as of: 12/7/17  2:04 PM.  Always use your most recent med list.                   Brand Name Dispense Instructions for use Diagnosis    alendronate 70 MG tablet    FOSAMAX     Take 70 mg by mouth every 7 days    Need for prophylactic vaccination and inoculation against influenza, Weakness of both lower extremities       amLODIPine 10 MG tablet    NORVASC     Take 10 mg by mouth daily    Need for prophylactic vaccination and inoculation against influenza, Weakness of both lower extremities       atorvastatin 40 MG tablet    LIPITOR     Take 40 mg by mouth daily    Need for prophylactic vaccination and inoculation against influenza, Weakness of both lower extremities       busPIRone 15 MG tablet    BUSPAR     Take 15 mg by mouth daily    Need for prophylactic vaccination and inoculation against influenza, Weakness of both lower extremities       CYMBALTA 30 MG EC capsule   Generic drug:  DULoxetine      Take 90 mg by mouth daily    Need for prophylactic vaccination and inoculation against influenza, Weakness of both lower extremities       furosemide 20 MG tablet    LASIX     Take 20 mg by mouth daily    Need for prophylactic vaccination and inoculation against influenza, Weakness of both lower extremities       gabapentin 300 MG capsule     NEURONTIN     Take 600 mg by mouth 2 times daily    Need for prophylactic vaccination and inoculation against influenza, Weakness of both lower extremities       IBUPROFEN PO      Take 200 mg by mouth as needed        levothyroxine 50 MCG tablet    SYNTHROID/LEVOTHROID     Take 50 mcg by mouth daily    Need for prophylactic vaccination and inoculation against influenza, Weakness of both lower extremities       lisinopril 40 MG tablet    PRINIVIL/ZESTRIL     Take 40 mg by mouth daily    Need for prophylactic vaccination and inoculation against influenza, Weakness of both lower extremities       SPIRIVA HANDIHALER 18 MCG capsule   Generic drug:  tiotropium      Inhale 18 mcg into the lungs daily    Need for prophylactic vaccination and inoculation against influenza, Weakness of both lower extremities       tiZANidine 4 MG tablet    ZANAFLEX     Take 4 mg by mouth At Bedtime    Weakness of both lower extremities       traZODone 100 MG tablet    DESYREL     Take 100 mg by mouth At Bedtime    Need for prophylactic vaccination and inoculation against influenza, Weakness of both lower extremities

## 2017-12-07 NOTE — TELEPHONE ENCOUNTER
PREVISIT INFORMATION                                                    Gilma Pace scheduled for future visit at [facility] specialty clinics.    Patient is scheduled to see Dr. Vigil on 12/7  Reason for visit: Hx of T11 fracture 10 years ago, has been loosing strength since, in the last month has been falling frequently and says she doesn't have any strength in her lower legs, 2 weeks ago was hospitalized for lack of oxygen, is now on oxygen. Is getting PT.   Referring provider Dr. Wu   Has patient seen previous specialist? No  Medical Records:  Available in chart.  Patient was previously seen at a [place] or [facility] facility. Lumbar MRI done in Oct 2017     REVIEW                                                      New patient packet mailed to patient: Yes  Medication reconciliation complete: Yes      Current Outpatient Prescriptions   Medication Sig Dispense Refill     tiZANidine (ZANAFLEX) 4 MG tablet Take 4 mg by mouth At Bedtime       IBUPROFEN PO Take 200 mg by mouth as needed        levothyroxine (SYNTHROID/LEVOTHROID) 50 MCG tablet Take 50 mcg by mouth daily       alendronate (FOSAMAX) 70 MG tablet Take 70 mg by mouth every 7 days       amLODIPine (NORVASC) 10 MG tablet Take 10 mg by mouth daily       atorvastatin (LIPITOR) 40 MG tablet Take 40 mg by mouth daily       busPIRone (BUSPAR) 15 MG tablet Take 15 mg by mouth daily       DULoxetine (CYMBALTA) 30 MG EC capsule Take 90 mg by mouth daily       furosemide (LASIX) 20 MG tablet Take 20 mg by mouth daily       lisinopril (PRINIVIL/ZESTRIL) 40 MG tablet Take 40 mg by mouth daily       tiotropium (SPIRIVA HANDIHALER) 18 MCG capsule Inhale 18 mcg into the lungs daily       traZODone (DESYREL) 100 MG tablet Take 100 mg by mouth At Bedtime       gabapentin (NEURONTIN) 300 MG capsule Take 600 mg by mouth 2 times daily         Allergies: Codeine; Sulfa drugs; and Tetracyclines        PLAN/FOLLOW-UP NEEDED                                                       Previsit review complete.  Patient will see provider at future scheduled appointment.     Patient Reminders Given:  Please, make sure you bring an updated list of your medications.   If you are having a procedure, please, present 15 minutes early.  If you need to cancel or reschedule,please call [phone].    Silvia Ojeda

## 2017-12-07 NOTE — NURSING NOTE
"Gilma Pace's goals for this visit include: consult  She requests these members of her care team be copied on today's visit information:     PCP: Gucci Wu    Referring Provider:  Gucci Wu MD  [address] [place] [address]    No chief complaint on file.      Initial /77  Pulse 115  Ht 1.549 m (5' 1\")  Wt 87.9 kg (193 lb 11.2 oz)  SpO2 94%  BMI 36.6 kg/m2 Estimated body mass index is 36.6 kg/(m^2) as calculated from the following:    Height as of this encounter: 1.549 m (5' 1\").    Weight as of this encounter: 87.9 kg (193 lb 11.2 oz).  Medication Reconciliation: complete    Do you need any medication refills at today's visit? n  "

## 2017-12-07 NOTE — PATIENT INSTRUCTIONS
Hold on taking Lipitor (atorvastatin)      Having EMG and NCS Tests  You will be having electromyography (EMG) and nerve conduction studies (NCS) to measure muscle and nerve function. In most cases, both tests are done. NCS is most often done first. You will be asked to lie on an exam table with a blanket over you. You may have one or both of the following:    Nerve conduction study (NCS)  During NCS, mild electrical currents are used to test how fast impulses move along your nerves. The healthcare provider will put small metal disks (electrodes) on your skin on the area of your body being tested. This will be done using water-based gel or paste. A doctor or technologist will apply mild electrical currents to your skin. Your muscles will twitch, but the test won t harm you. Currents are usually applied to the same area several times. Usually the intensity of the electrical stimulation is increased on each body part. Despite some increasing discomfort that varies from person to person, the electrical shock is not dangerous. The test may continue on other parts of your body unless the reason for doing the test is limited to a small part of the body.  Electromyography (EMG)  Most of the electrodes will be removed for EMG. The doctor will clean the area being tested with alcohol. A very fine needle will be put into the muscles in this region. When the needle is inserted, you may feel as if your skin is being pinched. Try to relax and do as instructed, since you will be asked to relax and contract the muscle being tested. Following instructions will allow your doctor to interpret the test results.  Let the technologist know  For your safety and for the success of your test, tell the technologist if you:    Have any bleeding problems.    Take blood thinners (anticoagulants) or other medications, including aspirin.    Have any immune system problems.    Have had neck or back surgery.  You may also be asked questions about  your overall health.  Before the test  Prepare for your test as instructed. Shower or bathe, but don't use powder, oil, or lotion. Your skin should be clean and free of excess oil. Wear loose clothes. But know that you may be asked to change into a hospital gown. The entire test will take about 60 minutes. Be sure to allow extra time to check in.  After your test  Before you leave, all electrodes will be removed. You can then get right back to your normal routine. If you feel tired or have some discomfort, take it easy. If you were told to stop taking any medicines for your test, ask when you can start taking them again. Your doctor will let you know when your test results are ready.  Date Last Reviewed: 9/12/2015 2000-2017 [employer]. [address] [place] [address], [place] [address]. All rights reserved. This information is not intended as a substitute for professional medical care. Always follow your healthcare professional's instructions.

## 2017-12-08 ENCOUNTER — DOCUMENTATION ONLY (OUTPATIENT)
Dept: CARE COORDINATION | Facility: CLINIC | Age: 77
End: 2017-12-08

## 2017-12-08 NOTE — PROGRESS NOTES
[facility] utilizes an encounter to take the place of a direct phone call to your office. Please take a moment to review the below request. Your reply to this encounter will act as a verbal OK of orders if requested below. Thank you.    ORDER    SN 1 x week for 3 weeks with 2 prn    For ongoing oxygen assessment/teaching, home safety, medication management.    Char Antonio RN    [phone]  [email or web address]

## 2017-12-09 LAB — ACHR BIND AB SER-SCNC: 0 NMOL/L (ref 0–0.4)

## 2017-12-09 NOTE — PROGRESS NOTES
HISTORY OF PRESENT ILLNESS:  Marce Pace is a 77-year-old female, here for evaluation of leg weakness and falls.      She tells me about 10 years ago she suffered a T11 vertebral fracture while she was living in [place].  It does not sound like she developed any acute leg weakness or numbness subsequent to that fall.  At some point, she began losing her ability to ambulate normally.  This has particularly gotten worse over the last month or so.  She tells me her legs feel very weak and will collapse.  She does not feel dizzy or faint when this happens, it is just that her legs can not hold her up.  There is no pain in her legs.  She denies any upper extremity symptoms.  She denies any cranial nerve symptoms, but does report a dry mouth.  She has chronic back pain, but denies neck pain.      She does have COPD and was hospitalized at [facility] from 11/24-11/25 with low O2 saturations.  On blood gas, her O2 saturation was 75%.  She has been started on supplemental oxygen.  This has not really helped her weakness.  While hospitalized she was also found to have a small pneumonia.  CBC was done and her white count was 13,800.  Her basic metabolic panel was within normal limits.  BNP was normal at 25.      She has been on Lipitor for about 15 years.  She denies myalgias.      In June of this year she had a normal B12 level, TSH, T4 and liver functions.      PAST MEDICAL HISTORY:  Notable for COPD, hypertension, hypothyroidism, hyperlipidemia and anxiety.      CURRENT MEDICATIONS:  Tizanidine 4 mg at bedtime, Levothyroxine, Fosamax, amlodipine, Lipitor, BuSpar, Cymbalta, furosemide, lisinopril, Spiriva, trazodone and Gabapentin 600 mg twice a day.      ALLERGIES:  She is allergic to codeine, sulfa and tetracycline.      FAMILY HISTORY:  There is no family history of neuromuscular disease.      SOCIAL HISTORY:  She is a former smoker and now uses E-cigarettes.  She does not use alcohol.      PHYSICAL EXAMINATION:   GENERAL:   Reveals a patient who had to be examined in a chair because she could not step up on to the exam table.   VITAL SIGNS:  Heart rate 115.  Blood pressure 116/77.   CRANIAL NERVES:  Pupils are equal, round and react well to light.  She has full extraocular motility.  Her facial strength is normal.  Speech is clear.   MOTOR:  Examination of the neck reveals mild neck flexor weakness.  Motor examination of the extremities reveals proximal weakness in the arms with shoulder abduction 4 and elbow flexion 4.  In the lower extremities while seated hip flexion is 3-, knee flexion 4, ankle dorsiflexion 4.  Knee extension and ankle plantar flexion are normal.   SENSORY:  She has a reduction of vibratory sense in the toes.  Pinprick is intact, as is position sense.   Finger-to-nose is done well.   REFLEXES:  2 to -3+ in the upper extremities with slightly brisk finger flexor reflexes.  Knee jerks 2+ (checked after muscle testing) and ankle jerks absent.  Plantar responses are flexor.      IMPRESSION:  Limb weakness and falls.      PLAN:  The patient did have a lumbar MRI scan done on 10/4/17 that I reviewed with her.  There is no significant central spinal canal stenosis and although there is some foraminal stenosis I do not think it is sufficient to explain her weakness.  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      For further evaluation, I am going to check a cervical MRI scan to make certain we were not dealing with a myelopathy, although my suspicion is this may be a peripheral neuromuscular process.      She is going to have a CK checked as well as a TSH, free T4, acetylcholine receptor antibodies, and a MUSK    antibody.      She is a former smoker.  She reports a dry mouth.  Lambert-Eaton syndrome is a possibility.  She is going to have a paraneoplastic antibody panel checked through the [facility], so I can see the results of P/Q voltage gated calcium  channel antibody test.      I am going to set her up for EMG and nerve conduction studies as well as repetitive nerve stimulation.      I have asked her to hold the Lipitor for now as that can cause a progressive myopathy and weakness.      I will communicate test results to her as they become available and also plan to see her back following the completion of her electrodiagnostic testing.     ADDENDUM 12/15/17: Above labs normal/negative. Cervical MRI unremarkable-cord normal. Discussed with patient. EMG pending        YON TEMPLE MD             D: 2017 14:09   T: 2017 22:57   MT: EM#[id number]      Name:     ALEKS CORONEL   MRN:      [id number]        Account:      [id number]   :      1940           Visit Date:   2017      Document: [id number]

## 2017-12-10 LAB — ACHR BLOCK AB/ACHR TOTAL SFR SER: 22 % (ref 0–26)

## 2017-12-11 LAB — ACHR MOD AB/ACHR TOTAL SFR SER: 3 %

## 2017-12-12 ENCOUNTER — DOCUMENTATION ONLY (OUTPATIENT)
Dept: CARE COORDINATION | Facility: CLINIC | Age: 77
End: 2017-12-12

## 2017-12-12 DIAGNOSIS — B37.0 THRUSH: Primary | ICD-10-CM

## 2017-12-12 LAB — MUSK AB SER-SCNC: 0 NMOL/L (ref 0–0.02)

## 2017-12-12 RX ORDER — NYSTATIN 100000/ML
500000 SUSPENSION, ORAL (FINAL DOSE FORM) ORAL 4 TIMES DAILY
Qty: 60 ML | Refills: 0 | Status: SHIPPED | OUTPATIENT
Start: 2017-12-12 | End: 2018-08-27

## 2017-12-12 NOTE — PROGRESS NOTES
[facility] utilizes an encounter to take the place of a direct phone call to your office. Please take a moment to review the below request. Your reply to this encounter will act as a verbal OK of orders if requested below. Thank you.    Client reporting onset of thrush to mouth/tongue after starting the Breo inhaler. She has stopped this inhaler and pulmonology has been notified. Client continues to have the thrush present. Can an rx for Nystatin be sent to [facility] in [place]?    Please Advise.    Char Antonio RN Case Manager  [phone]  [email or web address]

## 2017-12-15 ENCOUNTER — RADIANT APPOINTMENT (OUTPATIENT)
Dept: MRI IMAGING | Facility: CLINIC | Age: 77
End: 2017-12-15
Attending: PSYCHIATRY & NEUROLOGY
Payer: MEDICARE

## 2017-12-15 DIAGNOSIS — E03.9 HYPOTHYROIDISM, UNSPECIFIED TYPE: ICD-10-CM

## 2017-12-15 DIAGNOSIS — R29.898 LIMB WEAKNESS: ICD-10-CM

## 2017-12-15 LAB
CREAT BLD-MCNC: 0.7 MG/DL (ref 0.52–1.04)
GFR SERPL CREATININE-BSD FRML MDRD: 81 ML/MIN/1.7M2
PNP ABY SERUM: NORMAL

## 2017-12-15 PROCEDURE — 82565 ASSAY OF CREATININE: CPT | Performed by: INTERNAL MEDICINE

## 2017-12-15 PROCEDURE — A9585 GADOBUTROL INJECTION: HCPCS | Mod: JW | Performed by: PSYCHIATRY & NEUROLOGY

## 2017-12-15 PROCEDURE — 72156 MRI NECK SPINE W/O & W/DYE: CPT | Performed by: RADIOLOGY

## 2017-12-15 PROCEDURE — 36415 COLL VENOUS BLD VENIPUNCTURE: CPT | Performed by: INTERNAL MEDICINE

## 2017-12-15 RX ORDER — GADOBUTROL 604.72 MG/ML
10 INJECTION INTRAVENOUS ONCE
Status: COMPLETED | OUTPATIENT
Start: 2017-12-15 | End: 2017-12-15

## 2017-12-15 RX ADMIN — GADOBUTROL 9 ML: 604.72 INJECTION INTRAVENOUS at 12:43

## 2018-01-08 ENCOUNTER — OFFICE VISIT (OUTPATIENT)
Dept: NEUROLOGY | Facility: CLINIC | Age: 78
End: 2018-01-08
Payer: MEDICARE

## 2018-01-08 DIAGNOSIS — R29.898 LIMB WEAKNESS: ICD-10-CM

## 2018-01-08 DIAGNOSIS — E03.9 HYPOTHYROIDISM, UNSPECIFIED TYPE: ICD-10-CM

## 2018-01-08 NOTE — LETTER
2018       RE: Gilma Pace  [id number] [address] [place] [address]     Dear Colleague,    Thank you for referring your patient, Gilma Pace, to the [facility] EMG at [facility]. Please see a copy of my visit note below.        [facility]  Electrodiagnostic Laboratory    Nerve Conduction & EMG Report          Patient:       Gilma Pace  Patient ID:    [id number]  Gender:        Female  YOB: 1940  Age:           77 Years 6 Months        History & Examination:  77 year old woman with about a year of gait decline and leg weakness. Denies weakness in arms. Eval for neuropathic or myopathic disorder. Also eval for neuromuscular junction disorder.     Techniques: Motor and sensory conduction studies were done with surface recording electrodes. EMG was done with a concentric needle electrode.     Results:  Nerve conduction studies:  1. Right SP sensory response is absent.   2. Bilateral sural and right ulnar-D5 sensory responses are normal.   3. Right peroneal-EDB motor response shows normal DL, normal amplitude, and CV slowing across the elbow.   4. Bilateral tibial-AH and right ulnar-ADM motor responses are normal.   5. Right ulnar-ADM and facial nasalis RNS are normal.     Needle EM. Fibrillation potentials and positive sharp waves were seen in the right VL and gastrocnemius muscles.   2. CRDs were seen in the right gastrocnemius and thoracic PS muscles.   3. A mixture of motor unit potentials with increased polyphasia and long durations and normal appearing MUPs were seen in the right VL, gastrocnemius, biceps, and FPL.  4. Early recruitment of MUPs was seen in the right VL, gastrocnemius, FPL, biceps, and FPL.    Interpretation:  This is an abnormal but non-diagnostic study. 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  Although no definite  "\"myopathic\" appearing motor unit potentials were appreciated, the recruitment pattern most favored muscle localization. Furthermore, chronic myopathies (for example, inclusion body myositis) can sometimes have neurogenic appearing motor unit potential morphology when muscle fiber hypertrophy and fiber splitting has occurred. Alternatively, these electrophysiologic findings could be seen in the context of co-existing cervical and lumbosacral polyradiculopathies. Generalized and evolving disorders of motor neurons also can not be excluded on the basis of this study. Finally, note is made of a right-sided peroneal neuropathy across the knee superimposed upon the generalized myopathic, or less likely, neuropathic process. There is no evidence of a disorder of neuromuscular junction transmission on this study. Clinical correlation is recommended.     Melvin Agosto MD  Department of Neurology          Sensory NCS      Nerve / Sites Rec. Site Onset Peak NP Amp Ref. PP Amp Dist Javier Ref. Temp     ms ms  V  V  V cm m/s m/s  C   R ULNAR - Dig V Anti      Wrist Dig V 2.24 3.07 25.9 8.0 48.4 12.5 55.8 48.0 32   R SURAL - Lat Mall 60      Calf Ankle 2.40 3.23 13.0 5.0 12.2 14 58.4 38.0 31.5   L SURAL - Lat Mall 60      Calf Ankle 2.86 3.75 11.5 5.0 10.3 14 48.9 38.0 30.6   R SUP PERONEAL      Lat Leg Briones NR NR NR  NR 12.5 NR 38.0 29.9       Motor NCS      Nerve / Sites Rec. Site Lat Ref. Amp Ref. Rel Amp Dist Javier Ref. Dur. Area Temp.     ms ms mV mV % cm m/s m/s ms %  C   R ULNAR - ADM      Wrist ADM 3.07 3.50 9.7 5.0 100 8   6.20 100 32      B.Elbow ADM 6.72  9.1  93.2 21 57.6 48.0 6.46 94 32      A.Elbow ADM 8.13  8.9  91.3 8 56.9 48.0 6.46 91 32   R DEEP PERONEAL - EDB 60      Ankle EDB 5.57 6.00 2.2 2.0 100 8   7.66 100 32.7      FibHead EDB 11.25  1.7  76.8 30 52.8 38.0 7.40 91.7 32.4      Pop Fos EDB 14.27  1.5  67.4 8 26.5 38.0 7.55 91.5 32   R TIBIAL - AH      Ankle AH 5.26 6.00 5.9 4.0 100 8   4.84 100 30.4      Pop Fos " AH 13.91  3.7  62.9 40 46.3 38.0 5.68 397 30.3   L TIBIAL - AH      Ankle AH 4.27 6.00 4.3 4.0 100 8   4.64 100 30.8      Pop Fos AH 13.85  2.4  54.8 38 39.7 38.0 5.68 257 30.8   R PERONEAL - Tib Ant      Fib Head Tib Ant 3.39  2.4  100 14   14.38 100 30.1      Knee Tib Ant 5.42  2.5  102 8 39.4  13.70 52.9 30.1       F  Wave      Nerve Min F Lat Max F Lat Mean FLat Temp.    ms ms ms  C   R TIBIAL 50.94 57.50 52.92 30.2       Rep Nerve Stim 6      Muscle / Train Time Rate Amp 4-1 Fac Ampl Area 4-1 Fac Area     pps mV % % mVms % %   L ABD DIG MIN (UL)   Baseline  3 8.6 2.2 100 29.9 -8 100   60 sec exercise 0 min 3 9.6 -6.6 111 31.3 -7.4 105   60 sec exercise 1 min 3 8.4 -0.6 97.8 33.9 -5.6 113   R NASALIS   Baseline  3 0.6 -2.6 100 1.5 -3.1 100   60 sec exercise 0 min 3 0.5 -2.7 86.1 1.1 10 74.4   60 sec exercise 1 min 3 0.4 2.8 72.9 2.1 -32.3 143   60 sec exercise 2 min 3 0.6 -3 102 1.8 -1.1 126       EMG Summary Table     Spontaneous MUAP Recruitment    IA Fib PSW Fasc H.F. Amp Dur. PPP Pattern   R. VAST LATERALIS Inc 2+ 2+ None None N 1+ 2+ Early   R. TIB ANTERIOR N None None None None N N N N   R. GASTROCN (MED) Inc 1+ 1+ None CRD N 1+ 1+ Early   R. GLUT MED N None None None None N N N N   R. LS PSP (Low) N None None None None       R. DELTOID N None None None None N N N N   R. BICEPS N None None None None N 1+ 1+ Early   R. FLEX CARPI RAD N None None None None N 1+ 1+ Early   R. FLEX POLL LONG N None None None None N N N Early   R. FIRST D INTEROSS N None None None None N N N N   R. FIGUEROA PSP (M) N None None None CRD                                          Again, thank you for allowing me to participate in the care of your patient.      Sincerely,    Melvin Agosto MD

## 2018-01-08 NOTE — MR AVS SNAPSHOT
After Visit Summary   1/8/2018    Gilma Pace    MRN: [id number]           Patient Information     Date Of Birth          1940        Visit Information        Provider Department      1/8/2018 3:00 PM Melvin Agosto MD [facility] EMG        Today's Diagnoses     Limb weakness        Hypothyroidism, unspecified type           Follow-ups after your visit        Your next 10 appointments already scheduled     Vince 10, 2018  4:00 PM CST   Return Visit with Raj Vigil MD   [facility] ([facility])    [address] [place] [id number] [phone]              Who to contact     Please call your clinic at [phone] to:    Ask questions about your health    Make or cancel appointments    Discuss your medicines    Learn about your test results    Speak to your doctor   If you have compliments or concerns about an experience at your clinic, or if you wish to file a complaint, please contact [facility] Patient Relations at [phone] or email us at [email or web address]         Additional Information About Your Visit        MyChart Information     [employer] gives you secure access to your electronic health record. If you see a primary care provider, you can also send messages to your care team and make appointments. If you have questions, please call your primary care clinic.  If you do not have a primary care provider, please call [phone] and they will assist you.      Suite101 is an electronic gateway that provides easy, online access to your medical records. With Suite[employer], you can request a clinic appointment, read your test results, renew a prescription or communicate with your care team.     To access your existing account, please contact your [facility] or call [phone] for assistance.        Care EveryWhere ID     This is your Care EveryWhere ID. This could be used  by other organizations to access your [facility] medical records  [id number]         Blood Pressure from Last 3 Encounters:   12/07/17 116/77   11/15/17 103/63   10/31/17 103/67    Weight from Last 3 Encounters:   12/07/17 87.9 kg (193 lb 11.2 oz)   11/15/17 90.7 kg (200 lb)   10/31/17 92.5 kg (204 lb)              We Performed the Following     EMG Thoracic Paraspinal Muscles (476[address])     EMG     HC NCS MOTOR W OR W/O F-WAVE, 9 OR 10     HC NEEDLE EMG EA EXTREMTY W/PARASPINAL AREA COMPLETE     NEUROMUSCULAR JUNCTION TEST, EACH NERVE        Primary Care Provider Office Phone # Fax #    Gucci Wu [phone] [address] [facility] [address]        Equal Access to Services     [facility] RAEGAN : Hadii lydia sosao Sokvng, waaxda luqadaha, qaybta kaalmada kayla, vandana pulido . So [facility] [phone].    ATENCIÓN: Si habla español, tiene a flood disposición servicios gratuitos de asistencia lingüística. [facility] [phone].    We comply with applicable federal civil rights laws and [place] laws. We do not discriminate on the basis of race, color, national origin, age, disability, sex, sexual orientation, or gender identity.            Thank you!     Thank you for choosing [facility]  for your care. Our goal is always to provide you with excellent care. Hearing back from our patients is one way we can continue to improve our services. Please take a few minutes to complete the written survey that you may receive in the mail after your visit with us. Thank you!             Your Updated Medication List - Protect others around you: Learn how to safely use, store and throw away your medicines at www.disposemymeds.org.          This list is accurate as of: 1/8/18  4:55 PM.  Always use your most recent med list.                   Brand Name Dispense Instructions for use Diagnosis    alendronate 70 MG tablet    FOSAMAX     Take 70 mg by mouth every 7 days     Need for prophylactic vaccination and inoculation against influenza, Weakness of both lower extremities       amLODIPine 10 MG tablet    NORVASC     Take 10 mg by mouth daily    Need for prophylactic vaccination and inoculation against influenza, Weakness of both lower extremities       atorvastatin 40 MG tablet    LIPITOR     Take 40 mg by mouth daily    Need for prophylactic vaccination and inoculation against influenza, Weakness of both lower extremities       busPIRone 15 MG tablet    BUSPAR     Take 15 mg by mouth daily    Need for prophylactic vaccination and inoculation against influenza, Weakness of both lower extremities       CYMBALTA 30 MG EC capsule   Generic drug:  DULoxetine      Take 90 mg by mouth daily    Need for prophylactic vaccination and inoculation against influenza, Weakness of both lower extremities       furosemide 20 MG tablet    LASIX     Take 20 mg by mouth daily    Need for prophylactic vaccination and inoculation against influenza, Weakness of both lower extremities       gabapentin 300 MG capsule    NEURONTIN     Take 600 mg by mouth 2 times daily    Need for prophylactic vaccination and inoculation against influenza, Weakness of both lower extremities       IBUPROFEN PO      Take 200 mg by mouth as needed        levothyroxine 50 MCG tablet    SYNTHROID/LEVOTHROID     Take 50 mcg by mouth daily    Need for prophylactic vaccination and inoculation against influenza, Weakness of both lower extremities       lisinopril 40 MG tablet    PRINIVIL/ZESTRIL     Take 40 mg by mouth daily    Need for prophylactic vaccination and inoculation against influenza, Weakness of both lower extremities       nystatin 477886 UNIT/ML suspension    MYCOSTATIN    60 mL    Take 5 mLs (500,000 Units) by mouth 4 times daily    Thrush       SPIRIVA HANDIHALER 18 MCG capsule   Generic drug:  tiotropium      Inhale 18 mcg into the lungs daily    Need for prophylactic vaccination and inoculation against influenza,  Weakness of both lower extremities       tiZANidine 4 MG tablet    ZANAFLEX     Take 4 mg by mouth At Bedtime    Weakness of both lower extremities       traZODone 100 MG tablet    DESYREL     Take 100 mg by mouth At Bedtime    Need for prophylactic vaccination and inoculation against influenza, Weakness of both lower extremities

## 2018-01-08 NOTE — PROGRESS NOTES
"    [facility]  Electrodiagnostic Laboratory    Nerve Conduction & EMG Report          Patient:       Gilma Pace  Patient ID:    [id number]  Gender:        Female  YOB: 1940  Age:           77 Years 6 Months        History & Examination:  77 year old woman with about a year of gait decline and leg weakness. Denies weakness in arms. Eval for neuropathic or myopathic disorder. Also eval for neuromuscular junction disorder.     Techniques: Motor and sensory conduction studies were done with surface recording electrodes. EMG was done with a concentric needle electrode.     Results:  Nerve conduction studies:  1. Right SP sensory response is absent.   2. Bilateral sural and right ulnar-D5 sensory responses are normal.   3. Right peroneal-EDB motor response shows normal DL, normal amplitude, and CV slowing across the elbow.   4. Bilateral tibial-AH and right ulnar-ADM motor responses are normal.   5. Right ulnar-ADM and facial nasalis RNS are normal.     Needle EM. Fibrillation potentials and positive sharp waves were seen in the right VL and gastrocnemius muscles.   2. CRDs were seen in the right gastrocnemius and thoracic PS muscles.   3. A mixture of motor unit potentials with increased polyphasia and long durations and normal appearing MUPs were seen in the right VL, gastrocnemius, biceps, and FPL.  4. Early recruitment of MUPs was seen in the right VL, gastrocnemius, FPL, biceps, and FPL.    Interpretation:  This is an abnormal but non-diagnostic study. 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  Although no definite \"myopathic\" appearing motor unit potentials were appreciated, the recruitment pattern most favored muscle localization. Furthermore, chronic myopathies (for example, inclusion body myositis) can sometimes have neurogenic appearing motor unit potential morphology when muscle " fiber hypertrophy and fiber splitting has occurred. Alternatively, these electrophysiologic findings could be seen in the context of co-existing cervical and lumbosacral polyradiculopathies. Generalized and evolving disorders of motor neurons also can not be excluded on the basis of this study. Finally, note is made of a right-sided peroneal neuropathy across the knee superimposed upon the generalized myopathic, or less likely, neuropathic process. There is no evidence of a disorder of neuromuscular junction transmission on this study. Clinical correlation is recommended.     Melvin Agosto MD  Department of Neurology          Sensory NCS      Nerve / Sites Rec. Site Onset Peak NP Amp Ref. PP Amp Dist Javier Ref. Temp     ms ms  V  V  V cm m/s m/s  C   R ULNAR - Dig V Anti      Wrist Dig V 2.24 3.07 25.9 8.0 48.4 12.5 55.8 48.0 32   R SURAL - Lat Mall 60      Calf Ankle 2.40 3.23 13.0 5.0 12.2 14 58.4 38.0 31.5   L SURAL - Lat Mall 60      Calf Ankle 2.86 3.75 11.5 5.0 10.3 14 48.9 38.0 30.6   R SUP PERONEAL      Lat Leg Briones NR NR NR  NR 12.5 NR 38.0 29.9       Motor NCS      Nerve / Sites Rec. Site Lat Ref. Amp Ref. Rel Amp Dist Javier Ref. Dur. Area Temp.     ms ms mV mV % cm m/s m/s ms %  C   R ULNAR - ADM      Wrist ADM 3.07 3.50 9.7 5.0 100 8   6.20 100 32      B.Elbow ADM 6.72  9.1  93.2 21 57.6 48.0 6.46 94 32      A.Elbow ADM 8.13  8.9  91.3 8 56.9 48.0 6.46 91 32   R DEEP PERONEAL - EDB 60      Ankle EDB 5.57 6.00 2.2 2.0 100 8   7.66 100 32.7      FibHead EDB 11.25  1.7  76.8 30 52.8 38.0 7.40 91.7 32.4      Pop Fos EDB 14.27  1.5  67.4 8 26.5 38.0 7.55 91.5 32   R TIBIAL - AH      Ankle AH 5.26 6.00 5.9 4.0 100 8   4.84 100 30.4      Pop Fos AH 13.91  3.7  62.9 40 46.3 38.0 5.68 397 30.3   L TIBIAL - AH      Ankle AH 4.27 6.00 4.3 4.0 100 8   4.64 100 30.8      Pop Fos AH 13.85  2.4  54.8 38 39.7 38.0 5.68 257 30.8   R PERONEAL - Tib Ant      Fib Head Tib Ant 3.39  2.4  100 14   14.38 100 30.1      Knee Tib Ant  5.42  2.5  102 8 39.4  13.70 52.9 30.1       F  Wave      Nerve Min F Lat Max F Lat Mean FLat Temp.    ms ms ms  C   R TIBIAL 50.94 57.50 52.92 30.2       Rep Nerve Stim 6      Muscle / Train Time Rate Amp 4-1 Fac Ampl Area 4-1 Fac Area     pps mV % % mVms % %   L ABD DIG MIN (UL)   Baseline  3 8.6 2.2 100 29.9 -8 100   60 sec exercise 0 min 3 9.6 -6.6 111 31.3 -7.4 105   60 sec exercise 1 min 3 8.4 -0.6 97.8 33.9 -5.6 113   R NASALIS   Baseline  3 0.6 -2.6 100 1.5 -3.1 100   60 sec exercise 0 min 3 0.5 -2.7 86.1 1.1 10 74.4   60 sec exercise 1 min 3 0.4 2.8 72.9 2.1 -32.3 143   60 sec exercise 2 min 3 0.6 -3 102 1.8 -1.1 126       EMG Summary Table     Spontaneous MUAP Recruitment    IA Fib PSW Fasc H.F. Amp Dur. PPP Pattern   R. VAST LATERALIS Inc 2+ 2+ None None N 1+ 2+ Early   R. TIB ANTERIOR N None None None None N N N N   R. GASTROCN (MED) Inc 1+ 1+ None CRD N 1+ 1+ Early   R. GLUT MED N None None None None N N N N   R. LS PSP (Low) N None None None None       R. DELTOID N None None None None N N N N   R. BICEPS N None None None None N 1+ 1+ Early   R. FLEX CARPI RAD N None None None None N 1+ 1+ Early   R. FLEX POLL LONG N None None None None N N N Early   R. FIRST D INTEROSS N None None None None N N N N   R. THOR PSP (M) N None None None CRD

## 2018-01-10 ENCOUNTER — OFFICE VISIT (OUTPATIENT)
Dept: NEUROLOGY | Facility: CLINIC | Age: 78
End: 2018-01-10
Payer: MEDICARE

## 2018-01-10 VITALS
HEART RATE: 108 BPM | DIASTOLIC BLOOD PRESSURE: 65 MMHG | OXYGEN SATURATION: 90 % | HEIGHT: 61 IN | SYSTOLIC BLOOD PRESSURE: 104 MMHG

## 2018-01-10 DIAGNOSIS — G72.9 MYOPATHY: Primary | ICD-10-CM

## 2018-01-10 PROCEDURE — 99213 OFFICE O/P EST LOW 20 MIN: CPT | Performed by: PSYCHIATRY & NEUROLOGY

## 2018-01-10 ASSESSMENT — PAIN SCALES - GENERAL: PAINLEVEL: NO PAIN (0)

## 2018-01-10 NOTE — LETTER
1/10/2018         RE: Gilma Pace  [id number] [address] [place] [id number]        Dear Colleague,    Thank you for referring your patient, Gilma Pace, to the [facility]. Please see a copy of my visit note below.    Kaylah Pace was seen back.  She is a patient who has developed progressive weakness.  She is here today to review testing.      Laboratory work included a normal CK, TSH.  She had negative acetylcholine receptor antibodies negative MuSK antibodies.  She had a negative paraneoplastic antibody panel which included a P/Q voltage-gated calcium channel antibody test.      A cervical MRI scan revealed some minor spondylitic changes but no high-grade central canal or foraminal stenosis, and the spinal cord looked normal.  She previously had had a lumbar MRI scan that revealed some degenerative changes, but I did not think they were sufficient to explain her leg weakness.  She also has upper extremity weakness.      EMG examination was done, and the results were reviewed with the patient and her .  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  This could be seen for example in conditions such as inclusion body myositis.      I did reexamine the patient again today.  She does have mild neck flexor weakness.  She has normal facial strength.  She has 4 range weakness of shoulder abduction and elbow flexion as well as less than antigravity hip flexion.  She has 4 range weakness of knee flexion and ankle dorsiflexion.  This is a symmetric process.  On close examination today, she does have some mild finger flexor weakness bilaterally, and I am able to overcome her knee extensors.      IMPRESSION:  Irritable myopathy.      PLAN:  I now want her to get an opinion from one of the myopathy experts at the [facility].  This could be [employer].  I am going to ask her to see Dr. Dey or   Sarahy.      While I doubt that this is a myopathy related to Lipitor, I have asked her to hold on restarting the drug for now.         RAJ VIGIL MD             D: 01/10/2018 16:30   T: 2018 21:16   MT:       Name:     ALEKS CORONEL   MRN:      [id number]        Account:      [id number]   :      1940           Visit Date:   01/10/2018      Document: [id number]       cc: Gucci Wu MD       Again, thank you for allowing me to participate in the care of your patient.        Sincerely,        Raj Vigil MD

## 2018-01-10 NOTE — MR AVS SNAPSHOT
After Visit Summary   1/10/2018    Gilma Pace    MRN: [id number]           Patient Information     Date Of Birth          1940        Visit Information        Provider Department      1/10/2018 4:00 PM Raj Vigil MD [facility]        Today's Diagnoses     Myopathy    -  1       Follow-ups after your visit        Additional Services     NEUROLOGY ADULT REFERRAL       Your provider has referred you for the following:   Consult at [facility]: Neurology Clinic - [place] [phone]   [email or web address]  Neuromuscular  Dr Dey or Dr Weathers-Dx: Irritable myopathy  Please be aware that coverage of these services is subject to the terms and limitations of your health insurance plan.  Call member services at your health plan with any benefit or coverage questions.      Please bring the following with you to your appointment:    (1) Any X-Rays, CTs or MRIs which have been performed.  Contact the facility where they were done to arrange for  prior to your scheduled appointment.    (2) List of current medications  (3) This referral request   (4) Any documents/labs given to you for this referral                  Follow-up notes from your care team     Discussed this visit Return if symptoms worsen or fail to improve.      Your next 10 appointments already scheduled     Vince 15, 2018  9:20 AM CST   (Arrive by 9:05 AM)   New Muscular Dystrophy with John Dey MD   [facility] ([facility])    [address] [place] [id number] [phone]              Who to contact     If you have questions or need follow up information about today's clinic visit or your schedule please contact [facility] directly at [phone].  Normal or non-critical lab and imaging results will be communicated to you by MyChart, letter or phone within 4 business days  "after the clinic has received the results. If you do not hear from us within 7 days, please contact the clinic through clipkit or phone. If you have a critical or abnormal lab result, we will notify you by phone as soon as possible.  Submit refill requests through clipkit or call your pharmacy and they will forward the refill request to us. Please allow 3 business days for your refill to be completed.          Additional Information About Your Visit        clipkit Information     clipkit gives you secure access to your electronic health record. If you see a primary care provider, you can also send messages to your care team and make appointments. If you have questions, please call your primary care clinic.  If you do not have a primary care provider, please call [phone] and they will assist you.      clipkit is an electronic gateway that provides easy, online access to your medical records. With clipkit, you can request a clinic appointment, read your test results, renew a prescription or communicate with your care team.     To access your existing account, please contact your [facility] or call [phone] for assistance.        Care EveryWhere ID     This is your Care EveryWhere ID. This could be used by other organizations to access your [facility] medical records  [id number]        Your Vitals Were     Pulse Height Pulse Oximetry             108 1.549 m (5' 1\") 90%          Blood Pressure from Last 3 Encounters:   01/10/18 104/65   12/07/17 116/77   11/15/17 103/63    Weight from Last 3 Encounters:   12/07/17 87.9 kg (193 lb 11.2 oz)   11/15/17 90.7 kg (200 lb)   10/31/17 92.5 kg (204 lb)              We Performed the Following     NEUROLOGY ADULT REFERRAL        Primary Care Provider Office Phone # Fax #    Gucci Wu [phone] [address] [address] [facility] [address]        Equal Access to Services     LEMUEL MCMILLAN: Jessica carballo " Soomaali, waaxda luqadaha, qaybta kaalmada kayla, vandana allen melvinlloyd lopezmacey marty. So [facility] [phone].    ATENCIÓN: Si efrem bhandari, tiene a flood disposición servicios gratuitos de asistencia lingüística. Arvin al [phone].    We comply with applicable federal civil rights laws and [place] laws. We do not discriminate on the basis of race, color, national origin, age, disability, sex, sexual orientation, or gender identity.            Thank you!     Thank you for choosing [facility]  for your care. Our goal is always to provide you with excellent care. Hearing back from our patients is one way we can continue to improve our services. Please take a few minutes to complete the written survey that you may receive in the mail after your visit with us. Thank you!             Your Updated Medication List - Protect others around you: Learn how to safely use, store and throw away your medicines at www.disposemymeds.org.          This list is accurate as of: 1/10/18  4:32 PM.  Always use your most recent med list.                   Brand Name Dispense Instructions for use Diagnosis    alendronate 70 MG tablet    FOSAMAX     Take 70 mg by mouth every 7 days    Need for prophylactic vaccination and inoculation against influenza, Weakness of both lower extremities       amLODIPine 10 MG tablet    NORVASC     Take 10 mg by mouth daily    Need for prophylactic vaccination and inoculation against influenza, Weakness of both lower extremities       atorvastatin 40 MG tablet    LIPITOR     Take 40 mg by mouth daily    Need for prophylactic vaccination and inoculation against influenza, Weakness of both lower extremities       busPIRone 15 MG tablet    BUSPAR     Take 15 mg by mouth daily    Need for prophylactic vaccination and inoculation against influenza, Weakness of both lower extremities       CYMBALTA 30 MG EC capsule   Generic drug:  DULoxetine      Take 90 mg by mouth daily    Need for  prophylactic vaccination and inoculation against influenza, Weakness of both lower extremities       furosemide 20 MG tablet    LASIX     Take 20 mg by mouth daily    Need for prophylactic vaccination and inoculation against influenza, Weakness of both lower extremities       gabapentin 300 MG capsule    NEURONTIN     Take 600 mg by mouth 2 times daily    Need for prophylactic vaccination and inoculation against influenza, Weakness of both lower extremities       IBUPROFEN PO      Take 200 mg by mouth as needed        levothyroxine 50 MCG tablet    SYNTHROID/LEVOTHROID     Take 50 mcg by mouth daily    Need for prophylactic vaccination and inoculation against influenza, Weakness of both lower extremities       lisinopril 40 MG tablet    PRINIVIL/ZESTRIL     Take 40 mg by mouth daily    Need for prophylactic vaccination and inoculation against influenza, Weakness of both lower extremities       nystatin 087531 UNIT/ML suspension    MYCOSTATIN    60 mL    Take 5 mLs (500,000 Units) by mouth 4 times daily    Thrush       SPIRIVA HANDIHALER 18 MCG capsule   Generic drug:  tiotropium      Inhale 18 mcg into the lungs daily    Need for prophylactic vaccination and inoculation against influenza, Weakness of both lower extremities       tiZANidine 4 MG tablet    ZANAFLEX     Take 4 mg by mouth At Bedtime    Weakness of both lower extremities       traZODone 100 MG tablet    DESYREL     Take 100 mg by mouth At Bedtime    Need for prophylactic vaccination and inoculation against influenza, Weakness of both lower extremities

## 2018-01-10 NOTE — NURSING NOTE
"Gilma Pace's goals for this visit include: Return for results  She requests these members of her care team be copied on today's visit information:     PCP: Gucci Wu    Referring Provider:  No referring provider defined for this encounter.    Chief Complaint   Patient presents with     RECHECK       Initial /65  Pulse 108  Ht 5' 1\" (1.549 m)  SpO2 90% Estimated body mass index is 36.6 kg/(m^2) as calculated from the following:    Height as of 12/7/17: 5' 1\" (1.549 m).    Weight as of 12/7/17: 193 lb 11.2 oz (87.9 kg).  Medication Reconciliation: complete  "

## 2018-01-11 NOTE — TELEPHONE ENCOUNTER
APPT INFO    Date /Time: 1/15/18, 9:20am    Reason for Appt: Muscular Dystrophy    Ref Provider/Clinic: YON TEMPLE   Are there internal records? Yes/No?  IF YES, list clinic names: [facility]  [facility]   Are there outside records? Yes/No? no   Patient Contact (Y/N) & Call Details: No, referred    Action:

## 2018-01-12 NOTE — PROGRESS NOTES
Kaylah Pace was seen back.  She is a patient who has developed progressive weakness.  She is here today to review testing.      Laboratory work included a normal CK, TSH.  She had negative acetylcholine receptor antibodies negative MuSK antibodies.  She had a negative paraneoplastic antibody panel which included a P/Q voltage-gated calcium channel antibody test.      A cervical MRI scan revealed some minor spondylitic changes but no high-grade central canal or foraminal stenosis, and the spinal cord looked normal.  She previously had had a lumbar MRI scan that revealed some degenerative changes, but I did not think they were sufficient to explain her leg weakness.  She also has upper extremity weakness.      EMG examination was done, and the results were reviewed with the patient and her .  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  This could be seen for example in conditions such as inclusion body myositis.      I did reexamine the patient again today.  She does have mild neck flexor weakness.  She has normal facial strength.  She has 4 range weakness of shoulder abduction and elbow flexion as well as less than antigravity hip flexion.  She has 4 range weakness of knee flexion and ankle dorsiflexion.  This is a symmetric process.  On close examination today, she does have some mild finger flexor weakness bilaterally, and I am able to overcome her knee extensors.      IMPRESSION:  Irritable myopathy.      PLAN:  I now want her to get an opinion from one of the myopathy experts at the [facility].  This could be [facility].  I am going to ask her to see Dr. Dey or Dr. Weathers.      While I doubt that this is a myopathy related to Lipitor, I have asked her to hold on restarting the drug for now.         YON TEMPLE MD             D: 01/10/2018 16:30   T: 01/11/2018 21:16   MT:       Name:     BERNA  ALEKS   MRN:      [id number]        Account:      [id number]   :      1940           Visit Date:   01/10/2018      Document: [id number]       cc: Gucci Wu MD

## 2018-01-15 ENCOUNTER — PRE VISIT (OUTPATIENT)
Dept: NEUROLOGY | Facility: CLINIC | Age: 78
End: 2018-01-15

## 2018-01-31 DIAGNOSIS — M62.81 GENERALIZED MUSCLE WEAKNESS: Primary | ICD-10-CM

## 2018-03-19 ENCOUNTER — HOSPITAL ENCOUNTER (OUTPATIENT)
Facility: AMBULATORY SURGERY CENTER | Age: 78
End: 2018-03-19
Attending: PSYCHIATRY & NEUROLOGY
Payer: MEDICARE

## 2018-03-19 ENCOUNTER — SURGERY (OUTPATIENT)
Age: 78
End: 2018-03-19

## 2018-03-19 VITALS
DIASTOLIC BLOOD PRESSURE: 83 MMHG | RESPIRATION RATE: 16 BRPM | WEIGHT: 190 LBS | OXYGEN SATURATION: 92 % | HEIGHT: 62 IN | HEART RATE: 111 BPM | SYSTOLIC BLOOD PRESSURE: 131 MMHG | BODY MASS INDEX: 34.96 KG/M2 | TEMPERATURE: 98.4 F

## 2018-03-19 NOTE — DISCHARGE INSTRUCTIONS
Muscle Biopsy Discharge Instructions      1.  Wound care:  Remove the gauze dressing 24 hours after procedure but leave the Steri-Strips in place. You may remove the Steri-Strips after one week.   Please wash your hands before touching your incisions or removing dressings.    2.  You may resume all of your home medications after surgery.  Exception: Please do not start Aspirin or blood thinners until the day after surgery.    3.  You may shower 24 hours after surgery.  Do not submerge yourself in water for 7 days (bath, whirlpool, hot tub, pool, lake, etc).      4.  Rest today. Resume normal activity tomorrow.    5.  Pain management:  Apply ice pack to incision area for 24 hours protecting the skin with a cloth. You may use Tylenol (acetaminophen) every 4 to 6 hours, or other pain medicines as directed by your physician.    6.  Watch for signs of infection:  Redness of the wound, drainage, increasing pain, and/or fever/chills (greater than 101 degrees F).       7.  Diet:  You may resume your regular diet.    8. If you have questions or concerns,  please contact our office Monday through Friday during regular office hours at [phone].  If you are calling during nonbusiness hours, the weekend, or holiday;  please call the hospital  at [phone] and ask for the resident on call for Neurology.    For emergency care, call the:  [facility]: [phone] (TTY for hearing impaired: [phone]).

## 2018-03-19 NOTE — OP NOTE
SURGEON: Melvin Agosto MD      PREOPERATIVE DIAGNOSIS: Weakness      POSTOPERATIVE DIAGNOSIS: Weakness       OPERATION: Right vastus lateralis muscle biopsy      INDICATIONS FOR PROCEDURE: Rule out myopathy      DESCRIPTION OF PROCEDURE:  Informed consent was obtained on the patient to do a right vastus lateralis muscle biopsy. The right thigh was prepped and sterilely draped. Lidocaine 1% about 25 mL total was injected locally during the procedure. A 1.5-inch incision was made over the anterior lateral thigh and the vastus lateralis muscle was exposed. Two small pieces were biopsied for routine histochemistry, electron microscopy, and metabolic studies as needed. The fascia and subcutaneous tissues were closed with 3-0 Vicryl and the skin with 4-0 Vicryl. Steri-Strips and pressure dressing were applied over the wound site. There were no complications. Blood loss was minimal. Wound care instructions were given to the patient. She was told that results of the biopsy would be available in 2-3 weeks.

## 2018-03-19 NOTE — IP AVS SNAPSHOT
[facility]    [address] [place] [id number]    Phone:  [phone]    Fax:  [phone]                                       After Visit Summary   3/19/2018    Gilma Pace    MRN: [id number]           After Visit Summary Signature Page     I have received my discharge instructions, and my questions have been answered. I have discussed any challenges I see with this plan with the nurse or doctor.    ..........................................................................................................................................  Patient/Patient Representative Signature      ..........................................................................................................................................  Patient Representative Print Name and Relationship to Patient    ..................................................               ................................................  Date                                            Time    ..........................................................................................................................................  Reviewed by Signature/Title    ...................................................              ..............................................  Date                                                            Time

## 2018-03-19 NOTE — IP AVS SNAPSHOT
MRN:[id number]                      After Visit Summary   3/19/2018    Gilma Pace    MRN: [id number]           Thank you!     Thank you for choosing [facility] for your care. Our goal is always to provide you with excellent care. Hearing back from our patients is one way we can continue to improve our services. Please take a few minutes to complete the written survey that you may receive in the mail after you visit with us. Thank you!        Patient Information     Date Of Birth          1940        About your hospital stay     You were admitted on:  March 19, 2018 You last received care in the:  [facility]    You were discharged on:  March 19, 2018       Who to Call     For medical emergencies, please call 911.  For non-urgent questions about your medical care, please call your primary care provider or clinic, [phone]  For questions related to your surgery, please call your surgery clinic        Attending Provider     Provider Specialty    Melvin Agosto MD Neurology       Primary Care Provider Office Phone # Fax #    Gucci Wu [phone]      Your next 10 appointments already scheduled     Apr 11, 2018  8:50 AM CDT   (Arrive by 8:35 AM)   Return Neuropathy Visit with John Dey MD   [facility] ([facility])    [address] [place] [address] [phone]              Further instructions from your care team       Muscle Biopsy Discharge Instructions      1.  Wound care:  Remove the gauze dressing 24 hours after procedure but leave the Steri-Strips in place. You may remove the Steri-Strips after one week.   Please wash your hands before touching your incisions or removing dressings.    2.  You may resume all of your home medications after surgery.  Exception: Please do not start Aspirin or blood thinners until the day after surgery.    3.  You may  "shower 24 hours after surgery.  Do not submerge yourself in water for 7 days (bath, whirlpool, hot tub, pool, lake, etc).      4.  Rest today. Resume normal activity tomorrow.    5.  Pain management:  Apply ice pack to incision area for 24 hours protecting the skin with a cloth. You may use Tylenol (acetaminophen) every 4 to 6 hours, or other pain medicines as directed by your physician.    6.  Watch for signs of infection:  Redness of the wound, drainage, increasing pain, and/or fever/chills (greater than 101 degrees F).       7.  Diet:  You may resume your regular diet.    8. If you have questions or concerns,  please contact our office Monday through Friday during regular office hours at [phone].  If you are calling during nonbusiness hours, the weekend, or holiday;  please call the hospital  at [phone] and ask for the resident on call for Neurology.    For emergency care, call the:  [facility]: [phone] (TTY for hearing impaired: [phone]).    Pending Results     No orders found from 3/17/2018 to 3/20/2018.            Admission Information     Date & Time Provider Department Dept. Phone    3/19/2018 Melvin Agosto MD [facility] [phone]      Your Vitals Were     Blood Pressure Pulse Temperature Respirations Height Weight    139/75 111 97.7  F (36.5  C) (Oral) 18 1.575 m (5' 2\") 86.2 kg (190 lb)    Pulse Oximetry BMI (Body Mass Index)                94% 34.75 kg/m2          [facility] Information     [facility] gives you secure access to your electronic health record. If you see a primary care provider, you can also send messages to your care team and make appointments. If you have questions, please call your primary care clinic.  If you do not have a primary care provider, please call [phone] and they will assist you.      [facility] is an electronic gateway that provides easy, online access to your medical records. With [facility], you can " request a clinic appointment, read your test results, renew a prescription or communicate with your care team.     To access your existing account, please contact your [facility] or call [phone] for assistance.        Care EveryWhere ID     This is your Care EveryWhere ID. This could be used by other organizations to access your [facility] medical records  [id number]        Equal Access to Services     LEMUEL CARLIN : Hadii lydia warren hadasho Sorossali, waaxda luqadaha, qaybta kaalmada adeegyada, vandana way haytate charlesliannejoseline pulido . So [facility] [phone].    ATENCIÓN: Si habla español, tiene a flood disposición servicios gratuitos de asistencia lingüística. Llame al [phone].    We comply with applicable federal civil rights laws and [place] laws. We do not discriminate on the basis of race, color, national origin, age, disability, sex, sexual orientation, or gender identity.               Review of your medicines      UNREVIEWED medicines. Ask your doctor about these medicines        Dose / Directions    alendronate 70 MG tablet   Commonly known as:  FOSAMAX   Used for:  Need for prophylactic vaccination and inoculation against influenza, Weakness of both lower extremities        Dose:  70 mg   Take 70 mg by mouth every 7 days   Refills:  0       amLODIPine 10 MG tablet   Commonly known as:  NORVASC   Used for:  Need for prophylactic vaccination and inoculation against influenza, Weakness of both lower extremities        Dose:  10 mg   Take 10 mg by mouth daily   Refills:  0       atorvastatin 40 MG tablet   Commonly known as:  LIPITOR   Used for:  Need for prophylactic vaccination and inoculation against influenza, Weakness of both lower extremities        Dose:  40 mg   Take 40 mg by mouth daily   Refills:  0       busPIRone 15 MG tablet   Commonly known as:  BUSPAR   Used for:  Need for prophylactic vaccination and inoculation against influenza, Weakness of both lower  extremities        Dose:  15 mg   Take 15 mg by mouth daily   Refills:  0       CYMBALTA 30 MG EC capsule   Used for:  Need for prophylactic vaccination and inoculation against influenza, Weakness of both lower extremities   Generic drug:  DULoxetine        Dose:  90 mg   Take 90 mg by mouth daily   Refills:  0       furosemide 20 MG tablet   Commonly known as:  LASIX   Used for:  Need for prophylactic vaccination and inoculation against influenza, Weakness of both lower extremities        Dose:  20 mg   Take 20 mg by mouth daily   Refills:  0       gabapentin 300 MG capsule   Commonly known as:  NEURONTIN   Used for:  Need for prophylactic vaccination and inoculation against influenza, Weakness of both lower extremities        Dose:  600 mg   Take 600 mg by mouth 2 times daily   Refills:  0       IBUPROFEN PO        Dose:  200 mg   Take 200 mg by mouth as needed   Refills:  0       levothyroxine 50 MCG tablet   Commonly known as:  SYNTHROID/LEVOTHROID   Used for:  Need for prophylactic vaccination and inoculation against influenza, Weakness of both lower extremities        Dose:  50 mcg   Take 50 mcg by mouth daily   Refills:  0       lisinopril 40 MG tablet   Commonly known as:  PRINIVIL/ZESTRIL   Used for:  Need for prophylactic vaccination and inoculation against influenza, Weakness of both lower extremities        Dose:  40 mg   Take 40 mg by mouth daily   Refills:  0       nystatin 186673 UNIT/ML suspension   Commonly known as:  MYCOSTATIN   Used for:  Thrush        Dose:  489161 Units   Take 5 mLs (500,000 Units) by mouth 4 times daily   Quantity:  60 mL   Refills:  0       SPIRIVA HANDIHALER 18 MCG capsule   Used for:  Need for prophylactic vaccination and inoculation against influenza, Weakness of both lower extremities   Generic drug:  tiotropium        Dose:  18 mcg   Inhale 18 mcg into the lungs daily   Refills:  0       tiZANidine 4 MG tablet   Commonly known as:  ZANAFLEX   Used for:  Weakness of both  lower extremities        Dose:  4 mg   Take 4 mg by mouth At Bedtime   Refills:  0       traZODone 100 MG tablet   Commonly known as:  DESYREL   Used for:  Need for prophylactic vaccination and inoculation against influenza, Weakness of both lower extremities        Dose:  100 mg   Take 100 mg by mouth At Bedtime   Refills:  0                Protect others around you: Learn how to safely use, store and throw away your medicines at www.disposemymeds.org.             Medication List: This is a list of all your medications and when to take them. Check marks below indicate your daily home schedule. Keep this list as a reference.      Medications           Morning Afternoon Evening Bedtime As Needed    alendronate 70 MG tablet   Commonly known as:  FOSAMAX   Take 70 mg by mouth every 7 days                                amLODIPine 10 MG tablet   Commonly known as:  NORVASC   Take 10 mg by mouth daily                                atorvastatin 40 MG tablet   Commonly known as:  LIPITOR   Take 40 mg by mouth daily                                busPIRone 15 MG tablet   Commonly known as:  BUSPAR   Take 15 mg by mouth daily                                CYMBALTA 30 MG EC capsule   Take 90 mg by mouth daily   Generic drug:  DULoxetine                                furosemide 20 MG tablet   Commonly known as:  LASIX   Take 20 mg by mouth daily                                gabapentin 300 MG capsule   Commonly known as:  NEURONTIN   Take 600 mg by mouth 2 times daily                                IBUPROFEN PO   Take 200 mg by mouth as needed                                levothyroxine 50 MCG tablet   Commonly known as:  SYNTHROID/LEVOTHROID   Take 50 mcg by mouth daily                                lisinopril 40 MG tablet   Commonly known as:  PRINIVIL/ZESTRIL   Take 40 mg by mouth daily                                nystatin [id number] UNIT/ML suspension   Commonly known as:  MYCOSTATIN   Take 5 mLs (500,000 Units) by  mouth 4 times daily                                SPIRIVA HANDIHALER 18 MCG capsule   Inhale 18 mcg into the lungs daily   Generic drug:  tiotropium                                tiZANidine 4 MG tablet   Commonly known as:  ZANAFLEX   Take 4 mg by mouth At Bedtime                                traZODone 100 MG tablet   Commonly known as:  DESYREL   Take 100 mg by mouth At Bedtime

## 2018-03-23 ENCOUNTER — OFFICE VISIT (OUTPATIENT)
Dept: NEUROLOGY | Facility: CLINIC | Age: 78
End: 2018-03-23

## 2018-03-23 DIAGNOSIS — M62.81 PROXIMAL MUSCLE WEAKNESS: Primary | ICD-10-CM

## 2018-03-23 NOTE — MR AVS SNAPSHOT
After Visit Summary   3/23/2018    Gilma Pace    MRN: [id number]           Patient Information     Date Of Birth          1940        Visit Information        Provider Department      3/23/2018 10:50 AM John Dey MD [facility]        Today's Diagnoses     Proximal muscle weakness    -  1       Follow-ups after your visit        Your next 10 appointments already scheduled     Apr 11, 2018  8:50 AM CDT   (Arrive by 8:35 AM)   Return Neuropathy Visit with John Dey MD   [facility] ([facility])    [address] [place] [address] [phone]              Who to contact     Please call your clinic at [phone] to:    Ask questions about your health    Make or cancel appointments    Discuss your medicines    Learn about your test results    Speak to your doctor            Additional Information About Your Visit        MyChart Information     [facility] gives you secure access to your electronic health record. If you see a primary care provider, you can also send messages to your care team and make appointments. If you have questions, please call your primary care clinic.  If you do not have a primary care provider, please call [phone] and they will assist you.      [facility] is an electronic gateway that provides easy, online access to your medical records. With [facility], you can request a clinic appointment, read your test results, renew a prescription or communicate with your care team.     To access your existing account, please contact your [facility] or call [phone] for assistance.        Care EveryWhere ID     This is your Care EveryWhere ID. This could be used by other organizations to access your [facility] medical records  [id number]         Blood Pressure from Last 3 Encounters:   03/19/18 131/83   01/10/18 104/65   12/07/17 116/77    Weight from  Last 3 Encounters:   03/19/18 86.2 kg (190 lb)   12/07/17 87.9 kg (193 lb 11.2 oz)   11/15/17 90.7 kg (200 lb)              Today, you had the following     No orders found for display       Primary Care Provider Office Phone # Fax #    Gucci Wu [phone] [address] [facility] [address]        Equal Access to Services     [facility] Hospital[facility] : Hadii aad ku hadasho Soomaali, waaxda luqadaha, qaybta kaalmada adeegyada, waxay idiin hayaan adeeg kharajoseline lamiko . So [facility] [phone].    ATENCIÓN: Si wilmala elisabet, tiene a flood disposición servicios gratuitos de asistencia lingüística. Llame al [phone].    We comply with applicable federal civil rights laws and [place] laws. We do not discriminate on the basis of race, color, national origin, age, disability, sex, sexual orientation, or gender identity.            Thank you!     Thank you for choosing [facility]  for your care. Our goal is always to provide you with excellent care. Hearing back from our patients is one way we can continue to improve our services. Please take a few minutes to complete the written survey that you may receive in the mail after your visit with us. Thank you!             Your Updated Medication List - Protect others around you: Learn how to safely use, store and throw away your medicines at www.disposemymeds.org.          This list is accurate as of 3/23/18 10:52 AM.  Always use your most recent med list.                   Brand Name Dispense Instructions for use Diagnosis    alendronate 70 MG tablet    FOSAMAX     Take 70 mg by mouth every 7 days    Need for prophylactic vaccination and inoculation against influenza, Weakness of both lower extremities       amLODIPine 10 MG tablet    NORVASC     Take 10 mg by mouth daily    Need for prophylactic vaccination and inoculation against influenza, Weakness of both lower extremities       atorvastatin 40 MG tablet    LIPITOR     Take 40 mg by mouth daily     Need for prophylactic vaccination and inoculation against influenza, Weakness of both lower extremities       busPIRone 15 MG tablet    BUSPAR     Take 15 mg by mouth daily    Need for prophylactic vaccination and inoculation against influenza, Weakness of both lower extremities       CYMBALTA 30 MG EC capsule   Generic drug:  DULoxetine      Take 90 mg by mouth daily    Need for prophylactic vaccination and inoculation against influenza, Weakness of both lower extremities       furosemide 20 MG tablet    LASIX     Take 20 mg by mouth daily    Need for prophylactic vaccination and inoculation against influenza, Weakness of both lower extremities       gabapentin 300 MG capsule    NEURONTIN     Take 600 mg by mouth 2 times daily    Need for prophylactic vaccination and inoculation against influenza, Weakness of both lower extremities       IBUPROFEN PO      Take 200 mg by mouth as needed        levothyroxine 50 MCG tablet    SYNTHROID/LEVOTHROID     Take 50 mcg by mouth daily    Need for prophylactic vaccination and inoculation against influenza, Weakness of both lower extremities       lisinopril 40 MG tablet    PRINIVIL/ZESTRIL     Take 40 mg by mouth daily    Need for prophylactic vaccination and inoculation against influenza, Weakness of both lower extremities       nystatin [id number] UNIT/ML suspension    MYCOSTATIN    60 mL    Take 5 mLs (500,000 Units) by mouth 4 times daily    Thrush       SPIRIVA HANDIHALER 18 MCG capsule   Generic drug:  tiotropium      Inhale 18 mcg into the lungs daily    Need for prophylactic vaccination and inoculation against influenza, Weakness of both lower extremities       tiZANidine 4 MG tablet    ZANAFLEX     Take 4 mg by mouth At Bedtime    Weakness of both lower extremities       traZODone 100 MG tablet    DESYREL     Take 100 mg by mouth At Bedtime    Need for prophylactic vaccination and inoculation against influenza, Weakness of both lower extremities

## 2018-03-23 NOTE — PROGRESS NOTES
[facility] PHYSICIANS   NEUROMUSCULAR PATHOLOGY REPORT     NEUROMUSCULAR LABORATORY [phone] / [phone]  [address] [place] [address],  [place] [address]     MUSCLE BIOPSY LIGHT MICROSCOPY REPORT    NAME: Gilma Pace  MR#: [id number]  : 1940  DATE OF BIOPSY: 2018  DATE OF REPORT: 2018  SPECIMEN NO:   SURGEON: Dr. Agosto  REFERRING PHYSICIAN: Dr Vigil    CLINICAL INFORMATION:  This 77 year-old woman had a muscle biopsy performed to investigate the possibility of having a myopathy. She has a history of proximal muscle weakness for years, myopathic appearing EMG study, and normal CK.    RIGHT QUADRICEPS MUSCLE BIOPSY:  Two pieces of muscle were quick frozen for light microscopy and histochemistry. Additional pieces were quick frozen for biochemical testing.    LIGHT MICROSCOPY:  Frozen sections stained with H&E, PAS, oil red O, Congo red and trichrome, were available for review. There was normal or mildly increased at most fiber size variation with diameters ranging from 20-80 . There were no degenerating or necrotic fibers, and no fibers with vacuoles or unusual aggregates. Muscle fiber nuclei were appropriately peripheral in location with very few fibers showing internal nuclei. Endomysial and perimysial connective tissue and blood vessels were normal. There was no definite perivascular, perimysial or endomysial inflammation. There were no ragged-red fibers, rods, or other characteristic abnormalities identified on the trichrome stain. Glycogen and lipid content were normal. There was no abnormal congophilic staining.    HISTOCHEMISTRY:  Frozen sections stained with ATPase (pH 4.35, 4.5 and 9.4), metachromatic ATPase, NADH, SDH, modified SDH, COLEY, a-GP, Fast Blue B, phosphorylase, myoadenylate deaminase (MAD) and nonspecific esterase were available for review. ATPase staining identified fibers of types 1, 2a, 2b. There was a normal fiber type distribution. There was  perhaps a tendency towards fiber type grouping in 2-3 regions. There was no consistent difference in fiber size between fibers of different types. Oxidative enzyme stain deposition was normal. There were 4-5 ragged blue fibers per high power field, and rather infrequent COLEY negative fibers. Esterase and Fast Blue B staining were normal. Phosphorylase and MAD staining were normal.    DIAGNOSIS:  Normal or non-diagnostic muscle biopsy. Please see comment.    COMMENT:    There was no evidence on this biopsy of any myopathic process. Tendency towards fiber type grouping may suggest mild chronic denervation with reinnervation of muscle. However, this finding was rather subtle, and given the absence of other histological signs of denervation (such as angular atrophic fibers, esterase overreactive fibers, targets, etc) it was considered insufficient to make a diagnosis. Few ragged blue and/or COLEY negative fibers can be seen with normal aging. Tissue remains for biochemical or genetic testing if clinically indicated.    John Dey MD   of Neurology

## 2018-03-23 NOTE — LETTER
3/23/2018       RE: Gilma Pace  [address] [address] [place] [id number]     Dear Colleague,    Thank you for referring your patient, Gilma Pace, to the [facility] EMG at [facility]. Please see a copy of my visit note below.      [facility] PHYSICIANS   NEUROMUSCULAR PATHOLOGY REPORT     NEUROMUSCULAR LABORATORY [phone] / [phone]  [address] [place] [address],  [place] [address]     MUSCLE BIOPSY LIGHT MICROSCOPY REPORT    NAME: Gilma Pace  MR#: [id number]  : 1940  DATE OF BIOPSY: 2018  DATE OF REPORT: 2018  SPECIMEN NO:   SURGEON: Dr. Agosto  REFERRING PHYSICIAN: Dr Vigil    CLINICAL INFORMATION:  This 77 year-old woman had a muscle biopsy performed to investigate the possibility of having a myopathy. She has a history of proximal muscle weakness for years, myopathic appearing EMG study, and normal CK.    RIGHT QUADRICEPS MUSCLE BIOPSY:  Two pieces of muscle were quick frozen for light microscopy and histochemistry. Additional pieces were quick frozen for biochemical testing.    LIGHT MICROSCOPY:  Frozen sections stained with H&E, PAS, oil red O, Congo red and trichrome, were available for review. There was normal or mildly increased at most fiber size variation with diameters ranging from 20-80 . There were no degenerating or necrotic fibers, and no fibers with vacuoles or unusual aggregates. Muscle fiber nuclei were appropriately peripheral in location with very few fibers showing internal nuclei. Endomysial and perimysial connective tissue and blood vessels were normal. There was no definite perivascular, perimysial or endomysial inflammation. There were no ragged-red fibers, rods, or other characteristic abnormalities identified on the trichrome stain. Glycogen and lipid content were normal. There was no abnormal congophilic staining.    HISTOCHEMISTRY:  Frozen sections stained with ATPase (pH 4.35, 4.5 and 9.4),  metachromatic ATPase, NADH, SDH, modified SDH, COLEY, a-GP, Fast Blue B, phosphorylase, myoadenylate deaminase (MAD) and nonspecific esterase were available for review. ATPase staining identified fibers of types 1, 2a, 2b. There was a normal fiber type distribution. There was perhaps a tendency towards fiber type grouping in 2-3 regions. There was no consistent difference in fiber size between fibers of different types. Oxidative enzyme stain deposition was normal. There were 4-5 ragged blue fibers per high power field, and rather infrequent COLEY negative fibers. Esterase and Fast Blue B staining were normal. Phosphorylase and MAD staining were normal.    DIAGNOSIS:  Normal or non-diagnostic muscle biopsy. Please see comment.    COMMENT:    There was no evidence on this biopsy of any myopathic process. Tendency towards fiber type grouping may suggest mild chronic denervation with reinnervation of muscle. However, this finding was rather subtle, and given the absence of other histological signs of denervation (such as angular atrophic fibers, esterase overreactive fibers, targets, etc) it was considered insufficient to make a diagnosis. Few ragged blue and/or COLEY negative fibers can be seen with normal aging. Tissue remains for biochemical or genetic testing if clinically indicated.    Again, thank you for allowing me to participate in the care of your patient.      Sincerely,    John Dey MD

## 2018-03-28 ENCOUNTER — OFFICE VISIT (OUTPATIENT)
Dept: NEUROLOGY | Facility: CLINIC | Age: 78
End: 2018-03-28
Payer: MEDICARE

## 2018-03-28 ENCOUNTER — RADIANT APPOINTMENT (OUTPATIENT)
Dept: CT IMAGING | Facility: CLINIC | Age: 78
End: 2018-03-28
Attending: PSYCHIATRY & NEUROLOGY
Payer: MEDICARE

## 2018-03-28 VITALS
SYSTOLIC BLOOD PRESSURE: 115 MMHG | BODY MASS INDEX: 37.37 KG/M2 | OXYGEN SATURATION: 93 % | TEMPERATURE: 99.1 F | HEART RATE: 108 BPM | DIASTOLIC BLOOD PRESSURE: 65 MMHG | HEIGHT: 61 IN | RESPIRATION RATE: 24 BRPM | WEIGHT: 197.9 LBS

## 2018-03-28 DIAGNOSIS — G72.9 MYOPATHY: Primary | ICD-10-CM

## 2018-03-28 DIAGNOSIS — G72.9 MYOPATHY: ICD-10-CM

## 2018-03-28 DIAGNOSIS — J43.9 PULMONARY EMPHYSEMA, UNSPECIFIED EMPHYSEMA TYPE (H): ICD-10-CM

## 2018-03-28 PROBLEM — J18.9 PNEUMONIA: Status: ACTIVE | Noted: 2017-11-24

## 2018-03-28 PROBLEM — J96.90 RESPIRATORY FAILURE (H): Status: ACTIVE | Noted: 2017-11-24

## 2018-03-28 PROBLEM — J96.11 CHRONIC RESPIRATORY FAILURE WITH HYPOXIA (H): Status: ACTIVE | Noted: 2017-11-28

## 2018-03-28 PROBLEM — R29.898 LEG WEAKNESS: Status: ACTIVE | Noted: 2017-11-24

## 2018-03-28 PROBLEM — E66.9 OBESITY (BMI 35.0-39.9 WITHOUT COMORBIDITY): Status: ACTIVE | Noted: 2017-11-28

## 2018-03-28 PROBLEM — J44.9 COPD (CHRONIC OBSTRUCTIVE PULMONARY DISEASE) (H): Status: ACTIVE | Noted: 2017-09-28

## 2018-03-28 PROBLEM — J96.00 ACUTE RESPIRATORY FAILURE (H): Status: ACTIVE | Noted: 2017-11-25

## 2018-03-28 LAB
CREAT BLD-MCNC: 0.7 MG/DL (ref 0.52–1.04)
GFR SERPL CREATININE-BSD FRML MDRD: 81 ML/MIN/1.7M2
TSH SERPL DL<=0.005 MIU/L-ACNC: 2.1 MU/L (ref 0.4–4)

## 2018-03-28 PROCEDURE — 82565 ASSAY OF CREATININE: CPT | Performed by: RADIOLOGY

## 2018-03-28 PROCEDURE — 82784 ASSAY IGA/IGD/IGG/IGM EACH: CPT | Performed by: PSYCHIATRY & NEUROLOGY

## 2018-03-28 PROCEDURE — 71260 CT THORAX DX C+: CPT | Performed by: RADIOLOGY

## 2018-03-28 PROCEDURE — 86334 IMMUNOFIX E-PHORESIS SERUM: CPT | Performed by: PSYCHIATRY & NEUROLOGY

## 2018-03-28 RX ORDER — IOPAMIDOL 755 MG/ML
96 INJECTION, SOLUTION INTRAVASCULAR ONCE
Status: COMPLETED | OUTPATIENT
Start: 2018-03-28 | End: 2018-03-28

## 2018-03-28 RX ADMIN — IOPAMIDOL 96 ML: 755 INJECTION, SOLUTION INTRAVASCULAR at 15:40

## 2018-03-28 ASSESSMENT — PAIN SCALES - GENERAL: PAINLEVEL: MODERATE PAIN (5)

## 2018-03-28 NOTE — PROGRESS NOTES
Service Date: 2018      Raj Vigil MD   [facility]    [address]   [place] [address]      RE: Marce Pace   MRN: [id number]   : 1940      Dear Doctors:      I had the pleasure to see Ms. Pace at the [facility] for an evaluation of the cause of her weakness.    She is a 77-year-old woman who has a long-standing history of what appears like COPD although I do not have adequate documentation about this diagnosis available on the chart. She tells me that about 7-8 years ago she broke her T11 vertebra.  This was probably an osteoporotic fracture while she was living in [place].  She gradually lost her ability to ambulate normally after this, and the problem has been accentuated in the last 2 years.  Her right leg intermittently will go numb and weak.  She does not have similar numbness in the left leg.  She does not acknowledge any weakness of her upper extremities such as raising her arms overhead, reaching, grasping or manipulating things with her fingers.  She does, however, notice some weakness of her neck. Especially later during her day her head will drop and she will have to manually lift it.  She does not acknowledge any ptosis, diplopia, dysphagia, difficulty chewing, sialorrhea, dysphonia or other cranial nerve symptoms except for intermittent head drop.  She does not describe any problem with muscle cramps or pain and never had any myoglobinuria.  She does not have any family history of a similar condition.  She has chronic back pain.  She has been at times hospitalized for low oxygen saturations down to 75% related to her COPD.  Her  told me that she had discontinued her oxygen supply last year, and when she resumed it 4 months ago, he noticed a gradual improvement of her ability to arise from a chair again, although the patient herself is not so sure about it.   Her lung doctor is Dr. Pace from [facility] in [place].   She has been on Lipitor for many years; it was stopped by Dr Vigil 2 months ago but no change in her strength was observed. She had a CK that was normal at 152 a few months ago and a negative paraneoplastic antibody panel including calcium channel antibodies, negative acetylcholine receptor binding, blocking and modulating and MuSK antibodies. Cervical and lumbar spine MRI showed some arthritic changes but there was nothing that would explain this degree and pattern of weakness.  An EMG done by Dr. Agosto 01/08/2018 showed myopathic changes, especially at the right vastus and medial gastrocnemius with membrane irritability and early recruitment of small short duration polyphasic MUPS.  There was also some early recruitment of polyphasic long duration MAPS at the right biceps, flexor carpi radialis and FPL without abnormal spontaneous activity.  Sensory nerve conduction studies were overall normal.  Right superficial peroneal sensory response was absent but sural was normal.  Motor nerve conduction studies showed normal CMAP amplitudes, latencies and velocities except for slow right peroneal motor nerve conduction study across the knee, possibly consistent with a peroneal entrapment there.  Repetitive stimulation from the left ulnar and right facial nerves was normal making myasthenia unlikely.      Unfortunately, a muscle biopsy from the vastus lateralis (the same muscle that showed 2+ fibrillations and early recruitment on EMG), which I read a week ago, was nondiagnostic. There was no definite myopathic process.  There was possibly a tendency towards fiber type grouping, maybe suggestive of mild chronic denervation with reinnervation, but this finding was extremely subtle to be diagnostic.  There were a few mitochondrial abnormalities that may be consistent with normal aging and did not impress me.        PAST MEDICAL HISTORY, ALLERGIES, FAMILY HISTORY, SOCIAL HISTORY, REVIEW OF SYSTEMS AND CURRENT MEDICATIONS:   As  outlined in Dr. Vigil's note.       PHYSICAL EXAMINATION:   VITAL SIGNS:   Blood pressure is 115/65.  Pulse 108 and regular.  Respiratory rate 24.  Temperature 99.1 Fahrenheit.  O2 sat 93% on room air.  Weight 89.8 kilos.  Height is 154.  She endorsed moderate pain in her upper leg, 5/10.  BMI 37.39.     Overall, she is a frail woman sitting in a chair, who seems to be breathing comfortably while on oxygen.  There is no ptosis or ophthalmoparesis.  Pupil reactions to light and accommodation are normal.  There is no weakness of orbicularis oculi on the left.  There is very minimal weakness on the right.  Orbicularis oris, uvula, jaw, palate and tongue strength are normal.  There is no dysphonia or dysarthria.  Her neck flexion is mildly weak at 4/5.  Neck extension is probably normal.  Shoulder shrug is normal. There are no facial or tongue fasciculations.  Strength is 4/5 for deltoid, biceps and triceps symmetrically.  Wrist extensors are normal.  Finger extensors are bilaterally 4.  FDI and APB and  are 5.  Hip flexion is 3 to 4- bilaterally.  Knee extension and knee flexion are 5.  Foot dorsiflexion in my opinion was 5.  Tone was normal.  Reflexes absent at the ankles, 2+ at the knees and 2+ and symmetric at the upper extremities.  Plantar responses were neutral or flexor.  Vibration was absent at the toes.  It was about 4 seconds at the medial malleoli, normal at the upper extremities.  Position sensation was normal.  She could not rise easily from a chair without assistance.  She a little wide base and unsteady gait. I see some waddling suggestive of hip abductor weakness.  There was no hand  myotonia.      IMPRESSION:   Proximal muscle weakness of unknown cause.        I spent about 35-40 minutes with the patient, of which more than half was counseling.  She is a challenging case.  Her history sounds like a person who is suffering from muscle deconditioning/disuse, following vertebral fractures, chronic  hypoxemia related to COPD, etc.  However, her needle EMG studies do not suggest deconditioning since there was  spontaneous activity (fibrillations) on the vastus and gastrocnemius and early recruitment there.  We were suspecting initially myositis but this was definitely not seen on the muscle biopsy. Inclusion body myositis, in particular, is out of the question-she does not have the typical striking weakness of finger flexors and quadriceps that one should see with this disease.     I wonder what we may be missing here.  One possibility is a sampling error (which means that the biopsy would have to be repeated). Another possibility is type 2 myotonic dystrophy, although her EMG and clinical exam did not show any myotonia, and there is no family history, but the presence of neck flexor weakness is somewhat suggestive. Another possibility is a paraneoplastic condition (if she has lung cancer, given her chronic smoking history). Myasthenia has been effectively ruled out here (negative antibodies and repetitive stimulation).   I will check a serum immunofixation to make sure we are not dealing with amyloidosis myopathy. This is a difficult diagnosis that can sometimes be missed on the biopsy, and CK can be normal. I will repeat her EMG; if there are myopathic changes in other muscles besides the quadriceps, a repeat biopsy may be necessary. I also would like to have her to have a chest CT with contrast to make sure she does not have lung cancer. If none of the above are helpful, I would consider getting genetic testing for type 2 myotonic dystrophy.     I do not think this is necrotizing myopathy related to chronic statin use because her CK was normal and the biopsy did not show necrotizing myopathy.    Lastly, I would like to repeat her PFTs.  I am actually curious whether the results will be indicative of an obstructive process like COPD or whether there is a coexisting restrictive component/diaphragmatic weakness  which could be related to underlying myopathy.  This could help in the differential diagnosis.  I will see the patient in 3 months for follow up.      Sincerely,       John Foy MD      cc:   Gucci Wu MD   [facility]    [address] [place] [address]      Melvin Agosto MD   [facility]    [address] [place] [id number]         JOHN FOY MD             D: 2018   T: 2018   MT: CRIS      Name:     ALEKS CORONEL   MRN:      [id number]        Account:      [id number]   :      1940           Service Date: 2018      Document: [id number]

## 2018-03-28 NOTE — PATIENT INSTRUCTIONS
I still do not know the cause of your weakness with certainty.  We will have to repeat your EMG test, get a couple of blood tests today, repeat your breathing tests, and get a chest CT to rule out lung cancer.    I will see you back in 3 months but will let you know about the blood test results sooner. Please call  with any questions.

## 2018-03-28 NOTE — LETTER
3/28/2018       RE: Marce Pace  [id number] [address] [place] [id number]     Dear Colleague,    Thank you for referring your patient, Marce Pace, to the [facility] at [facility]. Please see a copy of my visit note below.    Service Date: 2018        RE: Marce Pace   MRN: [id number]   : 1940      Dear Doctors:      I had the pleasure to see Ms. Pace at the [facility] for an evaluation of the cause of her weakness.    She is a 77-year-old woman who has a long-standing history of what appears like COPD although I do not have adequate documentation about this diagnosis available on the chart. She tells me that about 7-8 years ago she broke her T11 vertebra.  This was probably an osteoporotic fracture while she was living in [place].  She gradually lost her ability to ambulate normally after this, and the problem has been accentuated in the last 2 years.  Her right leg intermittently will go numb and weak.  She does not have similar numbness in the left leg.  She does not acknowledge any weakness of her upper extremities such as raising her arms overhead, reaching, grasping or manipulating things with her fingers.  She does, however, notice some weakness of her neck. Especially later during her day her head will drop and she will have to manually lift it.  She does not acknowledge any ptosis, diplopia, dysphagia, difficulty chewing, sialorrhea, dysphonia or other cranial nerve symptoms except for intermittent head drop.  She does not describe any problem with muscle cramps or pain and never had any myoglobinuria.  She does not have any family history of a similar condition.  She has chronic back pain.  She has been at times hospitalized for low oxygen saturations down to 75% related to her COPD.  Her  told me that she had discontinued her oxygen supply last year, and when she resumed it 4 months ago, he  noticed a gradual improvement of her ability to arise from a chair again, although the patient herself is not so sure about it.   Her lung doctor is Dr. Pace from [facility] in [place].  She has been on Lipitor for many years; it was stopped by Dr Vigil 2 months ago but no change in her strength was observed. She had a CK that was normal at 152 a few months ago and a negative paraneoplastic antibody panel including calcium channel antibodies, negative acetylcholine receptor binding, blocking and modulating and MuSK antibodies. Cervical and lumbar spine MRI showed some arthritic changes but there was nothing that would explain this degree and pattern of weakness.  An EMG done by Dr. Agosto 01/08/2018 showed myopathic changes, especially at the right vastus and medial gastrocnemius with membrane irritability and early recruitment of small short duration polyphasic MUPS.  There was also some early recruitment of polyphasic long duration MAPS at the right biceps, flexor carpi radialis and FPL without abnormal spontaneous activity.  Sensory nerve conduction studies were overall normal.  Right superficial peroneal sensory response was absent but sural was normal.  Motor nerve conduction studies showed normal CMAP amplitudes, latencies and velocities except for slow right peroneal motor nerve conduction study across the knee, possibly consistent with a peroneal entrapment there.  Repetitive stimulation from the left ulnar and right facial nerves was normal making myasthenia unlikely.      Unfortunately, a muscle biopsy from the vastus lateralis (the same muscle that showed 2+ fibrillations and early recruitment on EMG), which I read a week ago, was nondiagnostic. There was no definite myopathic process.  There was possibly a tendency towards fiber type grouping, maybe suggestive of mild chronic denervation with reinnervation, but this finding was extremely subtle to be diagnostic.  There were a few mitochondrial  abnormalities that may be consistent with normal aging and did not impress me.        PAST MEDICAL HISTORY, ALLERGIES, FAMILY HISTORY, SOCIAL HISTORY, REVIEW OF SYSTEMS AND CURRENT MEDICATIONS:   As outlined in Dr. Vigil's note.       PHYSICAL EXAMINATION:   VITAL SIGNS:   Blood pressure is 115/65.  Pulse 108 and regular.  Respiratory rate 24.  Temperature 99.1 Fahrenheit.  O2 sat 93% on room air.  Weight 89.8 kilos.  Height is 154.  She endorsed moderate pain in her upper leg, 5/10.  BMI 37.39.     Overall, she is a frail woman sitting in a chair, who seems to be breathing comfortably while on oxygen.  There is no ptosis or ophthalmoparesis.  Pupil reactions to light and accommodation are normal.  There is no weakness of orbicularis oculi on the left.  There is very minimal weakness on the right.  Orbicularis oris, uvula, jaw, palate and tongue strength are normal.  There is no dysphonia or dysarthria.  Her neck flexion is mildly weak at 4/5.  Neck extension is probably normal.  Shoulder shrug is normal. There are no facial or tongue fasciculations.  Strength is 4/5 for deltoid, biceps and triceps symmetrically.  Wrist extensors are normal.  Finger extensors are bilaterally 4.  FDI and APB and  are 5.  Hip flexion is 3 to 4- bilaterally.  Knee extension and knee flexion are 5.  Foot dorsiflexion in my opinion was 5.  Tone was normal.  Reflexes absent at the ankles, 2+ at the knees and 2+ and symmetric at the upper extremities.  Plantar responses were neutral or flexor.  Vibration was absent at the toes.  It was about 4 seconds at the medial malleoli, normal at the upper extremities.  Position sensation was normal.  She could not rise easily from a chair without assistance.  She a little wide base and unsteady gait. I see some waddling suggestive of hip abductor weakness.  There was no hand  myotonia.      IMPRESSION:   Proximal muscle weakness of unknown cause.        I spent about 35-40 minutes with the  patient, of which more than half was counseling.  She is a challenging case.  Her history sounds like a person who is suffering from muscle deconditioning/disuse, following vertebral fractures, chronic hypoxemia related to COPD, etc.  However, her needle EMG studies do not suggest deconditioning since there was  spontaneous activity (fibrillations) on the vastus and gastrocnemius and early recruitment there.  We were suspecting initially myositis but this was definitely not seen on the muscle biopsy. Inclusion body myositis, in particular, is out of the question-she does not have the typical striking weakness of finger flexors and quadriceps that one should see with this disease.     I wonder what we may be missing here.  One possibility is a sampling error (which means that the biopsy would have to be repeated). Another possibility is type 2 myotonic dystrophy, although her EMG and clinical exam did not show any myotonia, and there is no family history, but the presence of neck flexor weakness is somewhat suggestive. Another possibility is a paraneoplastic condition (if she has lung cancer, given her chronic smoking history). Myasthenia has been effectively ruled out here (negative antibodies and repetitive stimulation).   I will check a serum immunofixation to make sure we are not dealing with amyloidosis myopathy. This is a difficult diagnosis that can sometimes be missed on the biopsy, and CK can be normal. I will repeat her EMG; if there are myopathic changes in other muscles besides the quadriceps, a repeat biopsy may be necessary. I also would like to have her to have a chest CT with contrast to make sure she does not have lung cancer. If none of the above are helpful, I would consider getting genetic testing for type 2 myotonic dystrophy.     I do not think this is necrotizing myopathy related to chronic statin use because her CK was normal and the biopsy did not show necrotizing myopathy.    Lastly, I would  like to repeat her PFTs.  I am actually curious whether the results will be indicative of an obstructive process like COPD or whether there is a coexisting restrictive component/diaphragmatic weakness which could be related to underlying myopathy.  This could help in the differential diagnosis.  I will see the patient in 3 months for follow up.         D: 2018   T: 2018   MT: CRIS      Name:     ALEKS CORONEL   MRN:      [id number]        Account:      [id number]   :      1940           Service Date: 2018      Document: [id number]        Again, thank you for allowing me to participate in the care of your patient.      Sincerely,    John Dey MD    cc:   Gucci Wu MD   [facility]    [address] [place] [address]      Melvin Agosto MD   [facility]    [address] [place] [address]       Raj Vigil MD   [facility]    [address]   [place] [address]

## 2018-03-28 NOTE — MR AVS SNAPSHOT
After Visit Summary   3/28/2018    Gilma Pace    MRN: [id number]           Patient Information     Date Of Birth          1940        Visit Information        Provider Department      3/28/2018 11:40 AM John Dey MD [facility] Neurology        Today's Diagnoses     Myopathy    -  1      Care Instructions    I still do not know the cause of your weakness with certainty.  We will have to repeat your EMG test, get a couple of blood tests today, repeat your breathing tests, and get a chest CT to rule out lung cancer.    I will see you back in 3 months but will let you know about the blood test results sooner. Please call  with any questions.                 Follow-ups after your visit        Your next 10 appointments already scheduled     Mar 28, 2018 12:45 PM CDT   LAB with  LAB   [facility] ([facility])    [address] [facility] [address] [phone]           Please do not eat 10-12 hours before your appointment if you are coming in fasting for labs on lipids, cholesterol, or glucose (sugar). This does not apply to pregnant women. Water, hot tea and black coffee (with nothing added) are okay. Do not drink other fluids, diet soda or chew gum.            Mar 28, 2018  1:30 PM CDT   PFT VISIT with  PFL NATHANIEL   [facility] Pulmonary Function Testing ([facility])    [address] [facility] [address] [phone]            Mar 28, 2018  3:30 PM CDT   (Arrive by 3:15 PM)   CT CHEST W CONTRAST with MGCT1   [facility]    [address] [place] [address] [phone]           Please bring any scans or X-rays taken at other hospitals, if similar tests were done. Also bring a list of your medicines, including vitamins, minerals and over-the-counter drugs. It is safest to leave personal items at home.  Be  sure to tell your doctor:   If you have any allergies.   If there s any chance you are pregnant.   If you are breastfeeding.    If you have diabetes as your medication may need to be adjusted for this exam.  You will have contrast for this exam. To prepare:   Do not eat or drink for 2 hours before your exam. If you need to take medicine, you may take it with small sips of water. (We may ask you to take liquid medicine as well.)   The day before your exam, drink extra fluids at least six 8-ounce glasses (unless your doctor tells you to restrict your fluids).  Patients over 70 or patients with diabetes or kidney problems:   If you haven t had a blood test (creatinine test) within the last 30 days, the Cardiologist/Radiologist may require you to get this test prior to your exam.  Please wear loose clothing, such as a sweat suit or jogging clothes. Avoid snaps, zippers and other metal. We may ask you to undress and put on a hospital gown.  If you have any questions, please call the Imaging Department where you will have your exam.            Jun 27, 2018 10:20 AM CDT   (Arrive by 10:05 AM)   Return Neuropathy Visit with John Dey MD   [facility]rology ([facility])    [address] [place], [place] [address] [phone]              Future tests that were ordered for you today     Open Future Orders        Priority Expected Expires Ordered    CT Chest w Contrast Routine  3/28/2019 3/28/2018    PFT General Lab Testing Routine  3/28/2019 3/28/2018    TSH with free T4 reflex Routine  3/28/2019 3/28/2018    EMG Routine  3/28/2019 3/28/2018    Protein Immunofixation Serum Routine  3/29/2019 3/28/2018            Who to contact     Please call your clinic at [phone] to:    Ask questions about your health    Make or cancel appointments    Discuss your medicines    Learn about your test results    Speak to your doctor            Additional Information About  "Your Visit        MyChart Information     [employer] gives you secure access to your electronic health record. If you see a primary care provider, you can also send messages to your care team and make appointments. If you have questions, please call your primary care clinic.  If you do not have a primary care provider, please call [phone] and they will assist you.      [employer] is an electronic gateway that provides easy, online access to your medical records. With [employer], you can request a clinic appointment, read your test results, renew a prescription or communicate with your care team.     To access your existing account, please contact your [facility] or call [phone] for assistance.        Care EveryWhere ID     This is your Care EveryWhere ID. This could be used by other organizations to access your [place] medical records  [id number]        Your Vitals Were     Pulse Temperature Respirations Height Pulse Oximetry Breastfeeding?    108 99.1  F (37.3  C) (Oral) 24 1.549 m (5' 1\") 93% No    BMI (Body Mass Index)                   37.39 kg/m2            Blood Pressure from Last 3 Encounters:   03/28/18 115/65   03/19/18 131/83   01/10/18 104/65    Weight from Last 3 Encounters:   03/28/18 89.8 kg (197 lb 14.4 oz)   03/19/18 86.2 kg (190 lb)   12/07/17 87.9 kg (193 lb 11.2 oz)               Primary Care Provider Office Phone # Fax #    Gucci Wu [phone]       [address] [facility] [address]        Equal Access to Services     LEMUEL CARLIN : Hadii aad ku hadasho Soomaali, waaxda luqadaha, qaybta kaalmada adeegyasonja, vandana pulido . So [facility] [phone].    ATENCIÓN: Si habla español, tiene a flood disposición servicios gratuitos de asistencia lingüística. Llame al [phone].    We comply with applicable federal civil rights laws and [place] laws. We do not discriminate on the basis of race, color, national " origin, age, disability, sex, sexual orientation, or gender identity.            Thank you!     Thank you for choosing [facility] NEUROLOG[facility]  for your care. Our goal is always to provide you with excellent care. Hearing back from our patients is one way we can continue to improve our services. Please take a few minutes to complete the written survey that you may receive in the mail after your visit with us. Thank you!             Your Updated Medication List - Protect others around you: Learn how to safely use, store and throw away your medicines at www.disposemymeds.org.          This list is accurate as of 3/28/18 12:41 PM.  Always use your most recent med list.                   Brand Name Dispense Instructions for use Diagnosis    alendronate 70 MG tablet    FOSAMAX     Take 70 mg by mouth every 7 days    Need for prophylactic vaccination and inoculation against influenza, Weakness of both lower extremities       amLODIPine 10 MG tablet    NORVASC     Take 10 mg by mouth daily    Need for prophylactic vaccination and inoculation against influenza, Weakness of both lower extremities       atorvastatin 40 MG tablet    LIPITOR     Take 40 mg by mouth daily    Need for prophylactic vaccination and inoculation against influenza, Weakness of both lower extremities       busPIRone 15 MG tablet    BUSPAR     Take 15 mg by mouth daily    Need for prophylactic vaccination and inoculation against influenza, Weakness of both lower extremities       CYMBALTA 30 MG EC capsule   Generic drug:  DULoxetine      Take 90 mg by mouth daily    Need for prophylactic vaccination and inoculation against influenza, Weakness of both lower extremities       furosemide 20 MG tablet    LASIX     Take 20 mg by mouth daily    Need for prophylactic vaccination and inoculation against influenza, Weakness of both lower extremities       gabapentin 300 MG capsule    NEURONTIN     Take 600 mg by mouth 2 times daily    Need for prophylactic  vaccination and inoculation against influenza, Weakness of both lower extremities       IBUPROFEN PO      Take 200 mg by mouth as needed        levothyroxine 50 MCG tablet    SYNTHROID/LEVOTHROID     Take 50 mcg by mouth daily    Need for prophylactic vaccination and inoculation against influenza, Weakness of both lower extremities       lisinopril 40 MG tablet    PRINIVIL/ZESTRIL     Take 40 mg by mouth daily    Need for prophylactic vaccination and inoculation against influenza, Weakness of both lower extremities       nystatin 490110 UNIT/ML suspension    MYCOSTATIN    60 mL    Take 5 mLs (500,000 Units) by mouth 4 times daily    Thrush       SPIRIVA HANDIHALER 18 MCG capsule   Generic drug:  tiotropium      Inhale 18 mcg into the lungs daily    Need for prophylactic vaccination and inoculation against influenza, Weakness of both lower extremities       tiZANidine 4 MG tablet    ZANAFLEX     Take 4 mg by mouth At Bedtime    Weakness of both lower extremities       traZODone 100 MG tablet    DESYREL     Take 100 mg by mouth At Bedtime    Need for prophylactic vaccination and inoculation against influenza, Weakness of both lower extremities

## 2018-03-28 NOTE — NURSING NOTE
Chief Complaint   Patient presents with     RECHECK     [facility]- NEUROPATHY F/U     Sandip Bryson, CMA

## 2018-03-29 LAB
IGA SERPL-MCNC: 347 MG/DL (ref 70–380)
IGG SERPL-MCNC: 826 MG/DL (ref 695–1620)
IGM SERPL-MCNC: 46 MG/DL (ref 60–265)
PROT PATTERN SERPL IFE-IMP: ABNORMAL

## 2018-04-17 LAB
EXPTIME-PRE: 8.35 SEC
FEF2575-%PRED-PRE: 47 %
FEF2575-PRE: 0.73 L/SEC
FEF2575-PRED: 1.54 L/SEC
FEFMAX-%PRED-PRE: 77 %
FEFMAX-PRE: 3.6 L/SEC
FEFMAX-PRED: 4.64 L/SEC
FEV1-%PRED-PRE: 57 %
FEV1-PRE: 1.05 L
FEV1FEV6-PRE: 73 %
FEV1FEV6-PRED: 78 %
FEV1FVC-PRE: 74 %
FEV1FVC-PRED: 74 %
FIFMAX-PRE: 2.9 L/SEC
FVC-%PRED-PRE: 60 %
FVC-PRE: 1.42 L
FVC-PRED: 2.37 L
MEP-PRE: 50 CMH2O
MIP-PRE: -55 CMH2O

## 2018-05-01 ENCOUNTER — OFFICE VISIT (OUTPATIENT)
Dept: NEUROLOGY | Facility: CLINIC | Age: 78
End: 2018-05-01
Payer: MEDICARE

## 2018-05-01 DIAGNOSIS — G72.9 MYOPATHY: Primary | ICD-10-CM

## 2018-05-01 NOTE — LETTER
5/1/2018       RE: Gilma Pace  [address] [address] [place] [address]     Dear Colleague,    Thank you for referring your patient, Gilma Pace, to the [facility] EMG at [facility]. Please see a copy of my visit note below.        [facility]  Electrodiagnostic Laboratory    Nerve Conduction & EMG Report          Patient:       Gilma Pace  Patient ID:    [id number]  Gender:        Female  YOB: 1940  Age:           77 Years 10 Months      History & Examination:    77 year old woman with slowly progressive generalized muscle weakness over the last few years, generally worse in proximal muscles of the legs (hip flexion/extension). CK is normal. 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 In addition, fibrillations/PSWs indicative of membrane irritability were appreciated at the right vastus lateralis and gastrocnemius. However, a recently done muscle biopsy of the right vastus lateralis showed no diagnostic abnormalities. Because of this discordance between the EMG and the biopsy findings, a repeat needle EMG study was requested.     Techniques: EMG was done with a concentric needle electrode.     Results:    EMG of right medial gastrocnemius showed 1+ fibs/PSWs and a CRD. EMG of right tibialis anterior (TA) showed increased insertional, but no sustained abnormal spontaneous activity. EMG of right vastus lateralis, iliopsoas, FDI, triceps, deltoid, and biceps showed no abnormal spontaneous activity. Right vastus, TA, deltoid, and biceps, showed several small, short duration, and frequently polyphasic MUPs, with either early (deltoid, biceps) or normal (vastus, TA) recruitment patterns. All other muscles sampled showed normal MUP morphology and recruitment patterns as  tabulated.    Interpretation:    Mildly abnormal study. There is electrodiagnostic evidence of myopathic changes, overall WITHOUT significant membrane irritability.This pattern can be seen in cases of severe disuse, or non-inflammatory myopathies (such as metabolic, endocrine, etc). It is generally not consistent with untreated active inflammatory or necrotizing myopathy. The only muscle with abnormal spontaneous activity was the right medial gastrocnemius; this finding is more commonly related to neurogenic conditions, such as S1 radiculopathy, tibial neuropathy, etc, rather than myopathy. Clinical correlation is recommended.    EMG Physician:    John Dey MD         EMG Summary Table     Spontaneous MUAP Recruitment    IA Fib PSW Fasc H.F. Amp Dur. PPP Pattern   R. VAST LATERALIS N None None None None 1- 1- 2+ N   R. TIB ANTERIOR Increased None None None None 1- N 2+ N   R. GASTROCN (MED) N 1+ 1+ None 1+ N N N N   R. ILIOPSOAS N None None None None N N N N   R. FIRST D INTEROSS N None None None None N N N N   R. TRICEPS N None None None None N N N N   R. DELTOID N None None None None 1- 1- 2+ 1+   R. BICEPS N None None None None 1- 1- 2+ 1+            Again, thank you for allowing me to participate in the care of your patient.      Sincerely,    John Dey MD

## 2018-05-01 NOTE — PROGRESS NOTES
[facility]  Electrodiagnostic Laboratory    Nerve Conduction & EMG Report          Patient:       Gilma Pace  Patient ID:    [id number]  Gender:        Female  YOB: 1940  Age:           77 Years 10 Months      History & Examination:    77 year old woman with slowly progressive generalized muscle weakness over the last few years, generally worse in proximal muscles of the legs (hip flexion/extension). CK is normal. 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 In addition, fibrillations/PSWs indicative of membrane irritability were appreciated at the right vastus lateralis and gastrocnemius. However, a recently done muscle biopsy of the right vastus lateralis showed no diagnostic abnormalities. Because of this discordance between the EMG and the biopsy findings, a repeat needle EMG study was requested.     Techniques: EMG was done with a concentric needle electrode.     Results:    EMG of right medial gastrocnemius showed 1+ fibs/PSWs and a CRD. EMG of right tibialis anterior (TA) showed increased insertional, but no sustained abnormal spontaneous activity. EMG of right vastus lateralis, iliopsoas, FDI, triceps, deltoid, and biceps showed no abnormal spontaneous activity. Right vastus, TA, deltoid, and biceps, showed several small, short duration, and frequently polyphasic MUPs, with either early (deltoid, biceps) or normal (vastus, TA) recruitment patterns. All other muscles sampled showed normal MUP morphology and recruitment patterns as tabulated.    Interpretation:    Mildly abnormal study. There is electrodiagnostic evidence of myopathic changes, overall WITHOUT significant membrane irritability.This pattern can be seen in cases of severe disuse, or non-inflammatory myopathies (such as metabolic, endocrine, etc). It is  generally not consistent with untreated active inflammatory or necrotizing myopathy. The only muscle with abnormal spontaneous activity was the right medial gastrocnemius; this finding is more commonly related to neurogenic conditions, such as S1 radiculopathy, tibial neuropathy, etc, rather than myopathy. Clinical correlation is recommended.    EMG Physician:    John Dey MD         EMG Summary Table     Spontaneous MUAP Recruitment    IA Fib PSW Fasc H.F. Amp Dur. PPP Pattern   R. VAST LATERALIS N None None None None 1- 1- 2+ N   R. TIB ANTERIOR Increased None None None None 1- N 2+ N   R. GASTROCN (MED) N 1+ 1+ None 1+ N N N N   R. ILIOPSOAS N None None None None N N N N   R. FIRST D INTEROSS N None None None None N N N N   R. TRICEPS N None None None None N N N N   R. DELTOID N None None None None 1- 1- 2+ 1+   R. BICEPS N None None None None 1- 1- 2+ 1+

## 2018-05-01 NOTE — MR AVS SNAPSHOT
After Visit Summary   5/1/2018    Gilma Pace    MRN: [id number]           Patient Information     Date Of Birth          1940        Visit Information        Provider Department      5/1/2018 11:00 AM John Dey MD [facility] EMG        Today's Diagnoses     Myopathy    -  1       Follow-ups after your visit        Your next 10 appointments already scheduled     Jun 27, 2018 10:20 AM CDT   (Arrive by 10:05 AM)   Return Neuropathy Visit with John Dey MD   [facility] Neurology ([facility])    [address] [place] [address] [phone]              Who to contact     Please call your clinic at [phone] to:    Ask questions about your health    Make or cancel appointments    Discuss your medicines    Learn about your test results    Speak to your doctor            Additional Information About Your Visit        MyChart Information     [employer] gives you secure access to your electronic health record. If you see a primary care provider, you can also send messages to your care team and make appointments. If you have questions, please call your primary care clinic.  If you do not have a primary care provider, please call [phone] and they will assist you.      [employer] is an electronic gateway that provides easy, online access to your medical records. With [employer], you can request a clinic appointment, read your test results, renew a prescription or communicate with your care team.     To access your existing account, please contact your [facility] or call [phone] for assistance.        Care EveryWhere ID     This is your Care EveryWhere ID. This could be used by other organizations to access your [facility] medical records  [id number]         Blood Pressure from Last 3 Encounters:   03/28/18 115/65   03/19/18 131/83   01/10/18 104/65    Weight from Last 3 Encounters:    03/28/18 89.8 kg (197 lb 14.4 oz)   03/19/18 86.2 kg (190 lb)   12/07/17 87.9 kg (193 lb 11.2 oz)              We Performed the Following     NEEDLE EMG LIMITED 1 EXTREMITY/NON-LIMB MUSCLES ([address])        Primary Care Provider Office Phone # Fax #    Gucci Wu [phone] [address] [facility] [address]        Equal Access to Services     [facility] Park[facility]UN[facility] : Hadii aad ku hadasho Soomaali, waaxda luqadaha, qaybta kaalmada adeegyada, waxay idiin hayaan adeeg kharajoseline lamiko . So [facility] [phone].    ATENCIÓN: Si habla español, tiene a flood disposición servicios gratuitos de asistencia lingüística. [facility] [phone].    We comply with applicable federal civil rights laws and [place] laws. We do not discriminate on the basis of race, color, national origin, age, disability, sex, sexual orientation, or gender identity.            Thank you!     Thank you for choosing [facility]  for your care. Our goal is always to provide you with excellent care. Hearing back from our patients is one way we can continue to improve our services. Please take a few minutes to complete the written survey that you may receive in the mail after your visit with us. Thank you!             Your Updated Medication List - Protect others around you: Learn how to safely use, store and throw away your medicines at www.disposemymeds.org.          This list is accurate as of 5/1/18 12:25 PM.  Always use your most recent med list.                   Brand Name Dispense Instructions for use Diagnosis    alendronate 70 MG tablet    FOSAMAX     Take 70 mg by mouth every 7 days    Need for prophylactic vaccination and inoculation against influenza, Weakness of both lower extremities       amLODIPine 10 MG tablet    NORVASC     Take 10 mg by mouth daily    Need for prophylactic vaccination and inoculation against influenza, Weakness of both lower extremities       atorvastatin 40 MG tablet    LIPITOR     Take 40 mg by  mouth daily    Need for prophylactic vaccination and inoculation against influenza, Weakness of both lower extremities       busPIRone 15 MG tablet    BUSPAR     Take 15 mg by mouth daily    Need for prophylactic vaccination and inoculation against influenza, Weakness of both lower extremities       CYMBALTA 30 MG EC capsule   Generic drug:  DULoxetine      Take 90 mg by mouth daily    Need for prophylactic vaccination and inoculation against influenza, Weakness of both lower extremities       furosemide 20 MG tablet    LASIX     Take 20 mg by mouth daily    Need for prophylactic vaccination and inoculation against influenza, Weakness of both lower extremities       gabapentin 300 MG capsule    NEURONTIN     Take 600 mg by mouth 2 times daily    Need for prophylactic vaccination and inoculation against influenza, Weakness of both lower extremities       IBUPROFEN PO      Take 200 mg by mouth as needed        levothyroxine 50 MCG tablet    SYNTHROID/LEVOTHROID     Take 50 mcg by mouth daily    Need for prophylactic vaccination and inoculation against influenza, Weakness of both lower extremities       lisinopril 40 MG tablet    PRINIVIL/ZESTRIL     Take 40 mg by mouth daily    Need for prophylactic vaccination and inoculation against influenza, Weakness of both lower extremities       nystatin [id number] UNIT/ML suspension    MYCOSTATIN    60 mL    Take 5 mLs (500,000 Units) by mouth 4 times daily    Thrush       SPIRIVA HANDIHALER 18 MCG capsule   Generic drug:  tiotropium      Inhale 18 mcg into the lungs daily    Need for prophylactic vaccination and inoculation against influenza, Weakness of both lower extremities       tiZANidine 4 MG tablet    ZANAFLEX     Take 4 mg by mouth At Bedtime    Weakness of both lower extremities       traZODone 100 MG tablet    DESYREL     Take 100 mg by mouth At Bedtime    Need for prophylactic vaccination and inoculation against influenza, Weakness of both lower extremities

## 2018-08-27 ENCOUNTER — OFFICE VISIT (OUTPATIENT)
Dept: FAMILY MEDICINE | Facility: CLINIC | Age: 78
End: 2018-08-27
Payer: MEDICARE

## 2018-08-27 ENCOUNTER — OFFICE VISIT (OUTPATIENT)
Dept: BEHAVIORAL HEALTH | Facility: CLINIC | Age: 78
End: 2018-08-27

## 2018-08-27 VITALS
HEART RATE: 109 BPM | DIASTOLIC BLOOD PRESSURE: 64 MMHG | BODY MASS INDEX: 39.11 KG/M2 | RESPIRATION RATE: 22 BRPM | TEMPERATURE: 97.1 F | OXYGEN SATURATION: 92 % | WEIGHT: 207 LBS | SYSTOLIC BLOOD PRESSURE: 97 MMHG

## 2018-08-27 DIAGNOSIS — Z63.0 MARITAL PROBLEM: ICD-10-CM

## 2018-08-27 DIAGNOSIS — R29.898 WEAKNESS OF BOTH LOWER EXTREMITIES: ICD-10-CM

## 2018-08-27 DIAGNOSIS — K13.0 ANGULAR CHEILITIS: ICD-10-CM

## 2018-08-27 DIAGNOSIS — E66.01 MORBID OBESITY (H): ICD-10-CM

## 2018-08-27 DIAGNOSIS — Z78.0 ASYMPTOMATIC POSTMENOPAUSAL STATUS: Primary | ICD-10-CM

## 2018-08-27 DIAGNOSIS — E03.9 HYPOTHYROIDISM, UNSPECIFIED TYPE: ICD-10-CM

## 2018-08-27 DIAGNOSIS — Z23 NEED FOR PROPHYLACTIC VACCINATION AND INOCULATION AGAINST INFLUENZA: ICD-10-CM

## 2018-08-27 DIAGNOSIS — F41.1 GAD (GENERALIZED ANXIETY DISORDER): ICD-10-CM

## 2018-08-27 DIAGNOSIS — F43.23 ADJUSTMENT DISORDER WITH MIXED ANXIETY AND DEPRESSED MOOD: Primary | ICD-10-CM

## 2018-08-27 DIAGNOSIS — F32.1 MODERATE MAJOR DEPRESSION (H): ICD-10-CM

## 2018-08-27 LAB — TSH SERPL DL<=0.005 MIU/L-ACNC: 2.33 MU/L (ref 0.4–4)

## 2018-08-27 PROCEDURE — 36415 COLL VENOUS BLD VENIPUNCTURE: CPT | Performed by: FAMILY MEDICINE

## 2018-08-27 PROCEDURE — 99214 OFFICE O/P EST MOD 30 MIN: CPT | Performed by: FAMILY MEDICINE

## 2018-08-27 PROCEDURE — 84443 ASSAY THYROID STIM HORMONE: CPT | Performed by: FAMILY MEDICINE

## 2018-08-27 RX ORDER — BUSPIRONE HYDROCHLORIDE 15 MG/1
15 TABLET ORAL 2 TIMES DAILY
Qty: 60 TABLET | Refills: 1 | Status: SHIPPED | OUTPATIENT
Start: 2018-08-27 | End: 2018-12-10

## 2018-08-27 RX ORDER — CLOTRIMAZOLE AND BETAMETHASONE DIPROPIONATE 10; .64 MG/G; MG/G
CREAM TOPICAL 2 TIMES DAILY
Qty: 15 G | Refills: 1 | Status: SHIPPED | OUTPATIENT
Start: 2018-08-27 | End: 2020-01-14

## 2018-08-27 RX ORDER — BUSPIRONE HYDROCHLORIDE 15 MG/1
15 TABLET ORAL 2 TIMES DAILY
Qty: 60 TABLET | Refills: 1 | Status: SHIPPED | OUTPATIENT
Start: 2018-08-27 | End: 2018-08-27

## 2018-08-27 SDOH — SOCIAL STABILITY - SOCIAL INSECURITY: PROBLEMS IN RELATIONSHIP WITH SPOUSE OR PARTNER: Z63.0

## 2018-08-27 ASSESSMENT — PATIENT HEALTH QUESTIONNAIRE - PHQ9: 5. POOR APPETITE OR OVEREATING: NEARLY EVERY DAY

## 2018-08-27 ASSESSMENT — ANXIETY QUESTIONNAIRES
6. BECOMING EASILY ANNOYED OR IRRITABLE: MORE THAN HALF THE DAYS
2. NOT BEING ABLE TO STOP OR CONTROL WORRYING: NEARLY EVERY DAY
3. WORRYING TOO MUCH ABOUT DIFFERENT THINGS: NEARLY EVERY DAY
5. BEING SO RESTLESS THAT IT IS HARD TO SIT STILL: SEVERAL DAYS
GAD7 TOTAL SCORE: 15
7. FEELING AFRAID AS IF SOMETHING AWFUL MIGHT HAPPEN: NOT AT ALL
IF YOU CHECKED OFF ANY PROBLEMS ON THIS QUESTIONNAIRE, HOW DIFFICULT HAVE THESE PROBLEMS MADE IT FOR YOU TO DO YOUR WORK, TAKE CARE OF THINGS AT HOME, OR GET ALONG WITH OTHER PEOPLE: VERY DIFFICULT
1. FEELING NERVOUS, ANXIOUS, OR ON EDGE: NEARLY EVERY DAY

## 2018-08-27 ASSESSMENT — PAIN SCALES - GENERAL: PAINLEVEL: NO PAIN (0)

## 2018-08-27 NOTE — LETTER
My COPD Action Plan     Name: Gilma Pace    YOB: 1940   Date: 8/27/2018    My doctor: Gucci Wu MD   My clinic: [address] [address] [phone]  My Controller Medicine: { :273405}   Dose: ***     My Rescue Medicine: { :444011}   Dose: ***     My Flare Up Medicine: { :048366}   Dose: ***     My COPD Severity: { :396345}      Use of Oxygen: { :836902}     Make sure you've had your pneumonia   vaccines.          GREEN ZONE       Doing well today      Usual level of activity and exercise    Usual amount of cough and mucus    No shortness of breath    Usual level of health (thinking clearly, sleeping well, feel like eating) Actions:      Take daily medicines    Use oxygen as prescribed    Follow regular exercise and diet plan    Avoid cigarette smoke and other irritants that harm the lungs           YELLOW ZONE          Having a bad day or flare up      Short of breath more than usual    A lot more sputum (mucus) than usual    Sputum looks yellow, green, tan, brown or bloody    More coughing or wheezing    Fever or chills    Less energy; trouble completing activities    Trouble thinking or focusing    Using quick relief inhaler or nebulizer more often    Poor sleep; symptoms wake me up    Do not feel like eating Actions:      Get plenty of rest    Take daily medicines    Use quick relief inhaler every *** hours    If you use oxygen, call you doctor to see if you should adjust your oxygen    Do breathing exercises or other things to help you relax    Let a loved one, friend or neighbor know you are feeling worse    Call your care team if you have 2 or more symptoms.  Start taking steroids or antibiotics if directed by your care team           RED ZONE       Need medical care now      Severe shortness of breath (feel you can't breathe)    Fever, chills    Not enough breath to do any activity    Trouble coughing up mucus, walking or  talking    Blood in mucus    Frequent coughing   Rescue medicines are not working    Not able to sleep because of breathing    Feel confused or drowsy    Chest pain    Actions:      Call your health care team.  If you cannot reach your care team, call 911 or go to the emergency room.        Annual Reminders:  Meet with Care Team, Flu Shot every Fall  Pharmacy:    [facility] [address] [place], [place] - [address] [address] AT [place] [address] [address]  [facility] [address] [place], [place] - [address] [facility]

## 2018-08-27 NOTE — LETTER
"My Heart Failure Action Plan   Name: Gilma Pace    YOB: 1940   Date: 8/27/2018    My doctor: Gucci Wu     [place] [address] [address] [facility] [address] [phone]  My Diagnosis: {HF TYPE:577933::\"Systolic Heart Failure\"}   My Ejection Fraction: {EF%:208585}    My Exercise Goal: 30 minutes daily  .     My Weight Goal: ***  Wt Readings from Last 2 Encounters:   03/28/18 197 lb 14.4 oz (89.8 kg)   03/19/18 190 lb (86.2 kg)     Weigh yourself daily using the same scale. If you gain more than 2 pounds in 24 hours or 5 pounds in a week {HF WEIGHT GOAL:354261}    My Diet Goal: {HF DIET GOAL:058150::\"No added salt\"}    Emergency Room Visits:    Our goal is to improve your quality of life and help you avoid a visit to the emergency room or hospital.  If we work together, we can achieve this goal. But, if you feel you need to call 911 or go to the emergency room, please do so.  If you go to the emergency room, please bring your list of medicines and your daily weight chart with you.       GREEN ZONE     Doing well today    Weight gained is no more than 2 pounds a day or 5 pounds a week.    No swelling in feet, ankles, legs or stomach.    No more swelling than usual.    No more trouble breathing than usual.    No change in my sleep.    No other problems. Actions:    I am doing fine.  I will take my medicine, follow my diet, see my doctor, exercise, and watch for symptoms.           YELLOW ZONE         Having a bad day or flare up    Weight gain of more than 2 pounds in one day or 5 pounds in one week.    New swelling in ankle, leg, knee or thigh.    Bloating in belly, pants feel tighter.    Swelling in hands or face.    Coughing or trouble breathing while walking or talking.    Harder to breathe last night.    Have trouble sleeping, wake up short of breath.    Much more tired than usual.    Not eating.    Pain in my chest or bad leg cramps.    Feel weak or dizzy. " Actions:    I need to take action and call my doctor or nurse today.                 RED ZONE         Need medical care now    Weight gain of 5 pounds overnight.    Chest pain or pressure that does not go away.    Feel less alert.    Wheezing or have trouble breathing when at rest.    Cannot sleep lying down.    Cannot take my water pill.    Pass out or faint. Actions:    I need to call my doctor or nurse now!    Call 911 if I have chest pain or cannot breathe.

## 2018-08-27 NOTE — MR AVS SNAPSHOT
After Visit Summary   8/27/2018    Gilma Pace    MRN: [id number]           Patient Information     Date Of Birth          1940        Visit Information        Provider Department      8/27/2018 1:45 PM Gucci Wu MD [facility]        Today's Diagnoses     Asymptomatic postmenopausal status    -  1    Need for prophylactic vaccination and inoculation against influenza        Weakness of both lower extremities        Morbid obesity (H)        Moderate major depression (H)        ARISTIDES (generalized anxiety disorder)        Hypothyroidism, unspecified type           Follow-ups after your visit        Follow-up notes from your care team     Return in about 3 weeks (around 9/17/2018).      Who to contact     If you have questions or need follow up information about today's clinic visit or your schedule please contact [facility] directly at [phone].  Normal or non-critical lab and imaging results will be communicated to you by MyChart, letter or phone within 4 business days after the clinic has received the results. If you do not hear from us within 7 days, please contact the clinic through [employer] or phone. If you have a critical or abnormal lab result, we will notify you by phone as soon as possible.  Submit refill requests through [employer] or call your pharmacy and they will forward the refill request to us. Please allow 3 business days for your refill to be completed.          Additional Information About Your Visit        MyChart Information     [employer] gives you secure access to your electronic health record. If you see a primary care provider, you can also send messages to your care team and make appointments. If you have questions, please call your primary care clinic.  If you do not have a primary care provider, please call [phone] and they will assist you.        Care EveryWhere ID     This is your Care EveryWhere ID. This could be used by other  organizations to access your [place] medical records  [id number]        Your Vitals Were     Pulse Temperature Respirations Pulse Oximetry BMI (Body Mass Index)       109 97.1  F (36.2  C) (Oral) 22 92% 39.11 kg/m2        Blood Pressure from Last 3 Encounters:   08/27/18 97/64   03/28/18 115/65   03/19/18 131/83    Weight from Last 3 Encounters:   08/27/18 207 lb (93.9 kg)   03/28/18 197 lb 14.4 oz (89.8 kg)   03/19/18 190 lb (86.2 kg)              We Performed the Following     TSH with free T4 reflex          Today's Medication Changes          These changes are accurate as of 8/27/18  2:14 PM.  If you have any questions, ask your nurse or doctor.               These medicines have changed or have updated prescriptions.        Dose/Directions    busPIRone 15 MG tablet   Commonly known as:  BUSPAR   This may have changed:  when to take this   Used for:  ARISTIDES (generalized anxiety disorder)   Changed by:  Gucci Wu MD        Dose:  15 mg   Take 1 tablet (15 mg) by mouth 2 times daily   Quantity:  60 tablet   Refills:  1         Stop taking these medicines if you haven't already. Please contact your care team if you have questions.     nystatin 888320 UNIT/ML suspension   Commonly known as:  MYCOSTATIN   Stopped by:  Gucci Wu MD                Where to get your medicines      These medications were sent to [facility] [address] [place] [address] [facility] AT [facility] [address]  [address] [facility], [facility] [address]     Phone:  [phone]     busPIRone 15 MG tablet                Primary Care Provider Office Phone # Fax #    Gucci Wu [phone] [address] [facility] [address]        Equal Access to Services     SULTANA CARLIN AH: Hadii lydia Breen, waaxda luqadaha, qaybta kaalmada adejavieryada, vandana ferguson. So [facility] [phone].    ATENCIÓN: Si habla español, tiene a flood disposición servicios  mila de asistencia lingüística. Arvin rodriguez [phone].    We comply with applicable federal civil rights laws and [place] laws. We do not discriminate on the basis of race, color, national origin, age, disability, sex, sexual orientation, or gender identity.            Thank you!     Thank you for choosing [facility]  [facility]. Our goal is always to provide you with excellent care. Hearing back from our patients is one way we can continue to improve our services. Please take a few minutes to complete the written survey that you may receive in the mail after your visit with us. Thank you!             Your Updated Medication List - Protect others around you: Learn how to safely use, store and throw away your medicines at www.disposemymeds.org.          This list is accurate as of 8/27/18  2:14 PM.  Always use your most recent med list.                   Brand Name Dispense Instructions for use Diagnosis    alendronate 70 MG tablet    FOSAMAX     Take 70 mg by mouth every 7 days    Need for prophylactic vaccination and inoculation against influenza, Weakness of both lower extremities       amLODIPine 10 MG tablet    NORVASC     Take 10 mg by mouth daily    Need for prophylactic vaccination and inoculation against influenza, Weakness of both lower extremities       atorvastatin 40 MG tablet    LIPITOR     Take 40 mg by mouth daily    Need for prophylactic vaccination and inoculation against influenza, Weakness of both lower extremities       busPIRone 15 MG tablet    BUSPAR    60 tablet    Take 1 tablet (15 mg) by mouth 2 times daily    ARISTIDES (generalized anxiety disorder)       CYMBALTA 30 MG EC capsule   Generic drug:  DULoxetine      Take 90 mg by mouth daily    Need for prophylactic vaccination and inoculation against influenza, Weakness of both lower extremities       furosemide 20 MG tablet    LASIX     Take 20 mg by mouth daily    Need for prophylactic vaccination and inoculation against  influenza, Weakness of both lower extremities       gabapentin 300 MG capsule    NEURONTIN     Take 600 mg by mouth 2 times daily    Need for prophylactic vaccination and inoculation against influenza, Weakness of both lower extremities       IBUPROFEN PO      Take 200 mg by mouth as needed        levothyroxine 50 MCG tablet    SYNTHROID/LEVOTHROID     Take 50 mcg by mouth daily    Need for prophylactic vaccination and inoculation against influenza, Weakness of both lower extremities       lisinopril 40 MG tablet    PRINIVIL/ZESTRIL     Take 40 mg by mouth daily    Need for prophylactic vaccination and inoculation against influenza, Weakness of both lower extremities       SPIRIVA HANDIHALER 18 MCG capsule   Generic drug:  tiotropium      Inhale 18 mcg into the lungs daily    Need for prophylactic vaccination and inoculation against influenza, Weakness of both lower extremities       tiZANidine 4 MG tablet    ZANAFLEX     Take 4 mg by mouth At Bedtime    Weakness of both lower extremities       traZODone 100 MG tablet    DESYREL     Take 100 mg by mouth At Bedtime    Need for prophylactic vaccination and inoculation against influenza, Weakness of both lower extremities

## 2018-08-27 NOTE — LETTER
My COPD Action Plan     Name: Gilma Pace    YOB: 1940   Date: 8/27/2018    My doctor: Gucci Wu MD   My clinic: [address] [address] [phone]  My Controller Medicine: { :475688}   Dose: ***     My Rescue Medicine: { :405166}   Dose: ***     My Flare Up Medicine: { :250838}   Dose: ***     My COPD Severity: { :690864}      Use of Oxygen: { :617750}     Make sure you've had your pneumonia   vaccines.          GREEN ZONE       Doing well today      Usual level of activity and exercise    Usual amount of cough and mucus    No shortness of breath    Usual level of health (thinking clearly, sleeping well, feel like eating) Actions:      Take daily medicines    Use oxygen as prescribed    Follow regular exercise and diet plan    Avoid cigarette smoke and other irritants that harm the lungs           YELLOW ZONE          Having a bad day or flare up      Short of breath more than usual    A lot more sputum (mucus) than usual    Sputum looks yellow, green, tan, brown or bloody    More coughing or wheezing    Fever or chills    Less energy; trouble completing activities    Trouble thinking or focusing    Using quick relief inhaler or nebulizer more often    Poor sleep; symptoms wake me up    Do not feel like eating Actions:      Get plenty of rest    Take daily medicines    Use quick relief inhaler every *** hours    If you use oxygen, call you doctor to see if you should adjust your oxygen    Do breathing exercises or other things to help you relax    Let a loved one, friend or neighbor know you are feeling worse    Call your care team if you have 2 or more symptoms.  Start taking steroids or antibiotics if directed by your care team           RED ZONE       Need medical care now      Severe shortness of breath (feel you can't breathe)    Fever, chills    Not enough breath to do any activity    Trouble coughing up mucus, walking or  talking    Blood in mucus    Frequent coughing   Rescue medicines are not working    Not able to sleep because of breathing    Feel confused or drowsy    Chest pain    Actions:      Call your health care team.  If you cannot reach your care team, call 911 or go to the emergency room.        Annual Reminders:  Meet with Care Team, Flu Shot every Fall  Pharmacy:    [facility] [address] [place], [place] - [address] [address] AT [place] [address] [address]  [facility] [address] [place], [place] - [address] [facility]

## 2018-08-27 NOTE — PROGRESS NOTES
Patient states he has a family history of diabetes so he checks his blood sugar at home occasionally.  The other day he checked glucose 1 hr after a meal and it was 252.  He then checked it fasting the next three mornings for 152, 149, and today glucose of 156. States that is why he wants his HGBA1C checked. He is fine with other labs being ordered as well if he is due ( last labs 9/9/16 so is shy a few days and wants HGBA1C checked as soon as possible - wanted to have it done today ) but does not wish to schedule an appointment at this time.  Patient aware this will be addressed tomorrow when RYAN Rose is back in office.  OK to leave a detailed message.   SUBJECTIVE:  Gilma Pace is a 78 year old female who presents for an evaluation of possible depression.   She reports that she has not been doing well for about a month(s).   She just started remembering things about her  and has brought these up.  He has cheated multiple times.   She is struggling with her girls now too as they are not backing her up.  She needs help to get through this.   He has never physically harmed her but he does yell a lot.     She is dependent on him for a lot of things because of her physical problems.     The patient reports that her current symptoms include depressed mood, anxiety and fatigue.    Last PHQ-9 score on record= No Value exists for the : HP#PHQ9    Depression Symptoms Unchanged See comments: See comments:   S-Disruption of Sleep  Falling asleep Staying asleep    I-  Lack of normal Interests X Yes No   G- Feelings of Guilt  Yes    E- Change in Energy level   Decreased   C- Difficulty Concentrating x Poor    A- Change in Appetite   Decreased   P- Psychomotor Symptoms   Retardation   S- Suicide Ideation   No       She reports that it might be better for everybody if she wasn't around but she denies any thoughts of harming herself.        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 No     Social History Narrative     Patient Active Problem List   Diagnosis     COPD (chronic obstructive pulmonary  disease) (H)     Hypertension goal BP (blood pressure) < 140/90     Postoperative hypothyroidism     Mitral valve disorders(424.0)     Osteoporosis     Anxiety     Chronic low back pain     Chronic low back pain without sciatica     Compression fracture of thoracic vertebra (H)     Essential hypertension     Folate deficiency     Hypothyroidism     Mitral regurgitation     Mixed hyperlipidemia     Other abnormal glucose     Recurrent falls     Smoker     Mild concentric left ventricular hypertrophy (LVH)     Myopathy     Acute respiratory failure (H)     Chronic respiratory failure with hypoxia (H)     Leg weakness     Obesity (BMI 35.0-39.9 without comorbidity)     Pneumonia     Respiratory failure (H)     Current Outpatient Prescriptions   Medication Sig Dispense Refill     alendronate (FOSAMAX) 70 MG tablet Take 70 mg by mouth every 7 days       amLODIPine (NORVASC) 10 MG tablet Take 10 mg by mouth daily       atorvastatin (LIPITOR) 40 MG tablet Take 40 mg by mouth daily       busPIRone (BUSPAR) 15 MG tablet Take 15 mg by mouth daily       DULoxetine (CYMBALTA) 30 MG EC capsule Take 90 mg by mouth daily       furosemide (LASIX) 20 MG tablet Take 20 mg by mouth daily       gabapentin (NEURONTIN) 300 MG capsule Take 600 mg by mouth 2 times daily       IBUPROFEN PO Take 200 mg by mouth as needed        levothyroxine (SYNTHROID/LEVOTHROID) 50 MCG tablet Take 50 mcg by mouth daily       lisinopril (PRINIVIL/ZESTRIL) 40 MG tablet Take 40 mg by mouth daily       tiotropium (SPIRIVA HANDIHALER) 18 MCG capsule Inhale 18 mcg into the lungs daily       tiZANidine (ZANAFLEX) 4 MG tablet Take 4 mg by mouth At Bedtime       traZODone (DESYREL) 100 MG tablet Take 100 mg by mouth At Bedtime             OBJECTIVE:  Affect and mood: Gilma's affect is described as sad/depressed and tearful and her emotional attitude was open and cooperative.     Eyes: Eyes grossly normal to inspection and conjunctivae and sclerae normal  Neck:  supple without adenopathy or thyromegaly.  Lungs: Clear to auscultation with normal respiratory effort.   Heart: Regular rate and rhythm with normal S1, S2.  No murmurs, clicks, or gallops.  Extremities: No edema.   Neuro: DTR symmetrically normal in lower extremities    ASSESSMENT:  Chronic Depression  Generalized Anxiety Disorder    Symptoms which may be diagnostic of depression: Depressed mood  Markedly diminished interest or pleasure in all or almost all activities  Fatigue or loss of energy       PLAN:  We will check a TSH.    I had our [facility] talk with the patient and she has got her in for a therpay appointment(s) with our [facility] provider on We will have the patient continue(s) on her current dose of her  Cymbalta (duloxetine) 90 mg daily but I want her to take the buspar 15 mg bid instead of every day.    I asked the patient to return to clinic for appointment in 3 weeks for reevaluation.

## 2018-08-27 NOTE — PROGRESS NOTES
Warm-handoff    Patient experiencing anxiety and depression symptoms related to adjustment and relational distress with marriage relationship.   Patient needs referral to Medicare approved mental health psycho-therapist, placed referral to [facility] therapist VONN[facility]

## 2018-08-27 NOTE — MR AVS SNAPSHOT
After Visit Summary   8/27/2018    Gilma Pace    MRN: [id number]           Patient Information     Date Of Birth          1940        Visit Information        Provider Department      8/27/2018 2:13 PM [facility]        Today's Diagnoses     Adjustment disorder with mixed anxiety and depressed mood    -  1    Marital problem           Follow-ups after your visit        Additional Services     MENTAL HEALTH REFERRAL  - Adult; Outpatient Treatment; Individual/Couples/Family/Group Therapy/Health Psychology; FMG: [facility] [phone]; We will contact you to schedule the appointment or please call with any questions       All scheduling is subject to the client's specific insurance plan & benefits, provider/location availability, and provider clinical specialities.  Please arrive 15 minutes early for your first appointment and bring your completed paperwork.    Please be aware that coverage of these services is subject to the terms and limitations of your health insurance plan.  Call member services at your health plan with any benefit or coverage questions.                            Who to contact     If you have questions or need follow up information about today's clinic visit or your schedule please contact [facility] directly at [phone].  Normal or non-critical lab and imaging results will be communicated to you by MyChart, letter or phone within 4 business days after the clinic has received the results. If you do not hear from us within 7 days, please contact the clinic through MyChart or phone. If you have a critical or abnormal lab result, we will notify you by phone as soon as possible.  Submit refill requests through [employer] or call your pharmacy and they will forward the refill request to us. Please allow 3 business days for your refill to be completed.          Additional Information About Your Visit         [employer] Information     [employer] gives you secure access to your electronic health record. If you see a primary care provider, you can also send messages to your care team and make appointments. If you have questions, please call your primary care clinic.  If you do not have a primary care provider, please call [phone] and they will assist you.        Care EveryWhere ID     This is your Care EveryWhere ID. This could be used by other organizations to access your [place] medical records  [id number]         Blood Pressure from Last 3 Encounters:   08/27/18 97/64   03/28/18 115/65   03/19/18 131/83    Weight from Last 3 Encounters:   08/27/18 93.9 kg (207 lb)   03/28/18 89.8 kg (197 lb 14.4 oz)   03/19/18 86.2 kg (190 lb)              We Performed the Following     MENTAL HEALTH REFERRAL  - Adult; Outpatient Treatment; Individual/Couples/Family/Group Therapy/Health Psychology; G: [facility] [phone]; We will contact you to schedule the appointment or please call with any questions          Today's Medication Changes          These changes are accurate as of 8/27/18  2:29 PM.  If you have any questions, ask your nurse or doctor.               Start taking these medicines.        Dose/Directions    busPIRone 15 MG tablet   Commonly known as:  BUSPAR   Used for:  ARISTIDES (generalized anxiety disorder)   Started by:  Gucci Wu MD        Dose:  15 mg   Take 1 tablet (15 mg) by mouth 2 times daily   Quantity:  60 tablet   Refills:  1       clotrimazole-betamethasone cream   Commonly known as:  LOTRISONE   Used for:  Angular cheilitis   Started by:  Gucci Wu MD        Apply topically 2 times daily To the corners of the mouth   Quantity:  15 g   Refills:  1         Stop taking these medicines if you haven't already. Please contact your care team if you have questions.     nystatin 828369 UNIT/ML suspension   Commonly known as:  MYCOSTATIN   Stopped by:  Gucci Wu MD                 Where to get your medicines      These medications were sent to [id number] - [place], [place] - [address] [facility]  [address] [facility] [address]     Phone:  [phone]     busPIRone 15 MG tablet    clotrimazole-betamethasone cream                Primary Care Provider Office Phone # Fax #    Gucci Wu [phone] [address] [facility] [address]        Equal Access to Services     [facility] RAEGAN : Hadii aad ku hadasho Soomaali, waaxda luqadaha, qaybta kaalmada adeegyada, waxay idiin hayaan adeeg kharajoseline lamiko . So [facility] [phone].    ATENCIÓN: Si habla español, tiene a flood disposición servicios gratuitos de asistencia lingüística. [facility] [phone].    We comply with applicable federal civil rights laws and [place] laws. We do not discriminate on the basis of race, color, national origin, age, disability, sex, sexual orientation, or gender identity.            Thank you!     Thank you for choosing [facility]  for your care. Our goal is always to provide you with excellent care. Hearing back from our patients is one way we can continue to improve our services. Please take a few minutes to complete the written survey that you may receive in the mail after your visit with us. Thank you!             Your Updated Medication List - Protect others around you: Learn how to safely use, store and throw away your medicines at www.disposemymeds.org.          This list is accurate as of 8/27/18  2:29 PM.  Always use your most recent med list.                   Brand Name Dispense Instructions for use Diagnosis    alendronate 70 MG tablet    FOSAMAX     Take 70 mg by mouth every 7 days    Need for prophylactic vaccination and inoculation against influenza, Weakness of both lower extremities       amLODIPine 10 MG tablet    NORVASC     Take 10 mg by mouth daily    Need for prophylactic vaccination and inoculation against influenza, Weakness of  both lower extremities       atorvastatin 40 MG tablet    LIPITOR     Take 40 mg by mouth daily    Need for prophylactic vaccination and inoculation against influenza, Weakness of both lower extremities       busPIRone 15 MG tablet    BUSPAR    60 tablet    Take 1 tablet (15 mg) by mouth 2 times daily    ARISTIDES (generalized anxiety disorder)       clotrimazole-betamethasone cream    LOTRISONE    15 g    Apply topically 2 times daily To the corners of the mouth    Angular cheilitis       CYMBALTA 30 MG EC capsule   Generic drug:  DULoxetine      Take 90 mg by mouth daily    Need for prophylactic vaccination and inoculation against influenza, Weakness of both lower extremities       furosemide 20 MG tablet    LASIX     Take 20 mg by mouth daily    Need for prophylactic vaccination and inoculation against influenza, Weakness of both lower extremities       gabapentin 300 MG capsule    NEURONTIN     Take 600 mg by mouth 2 times daily    Need for prophylactic vaccination and inoculation against influenza, Weakness of both lower extremities       IBUPROFEN PO      Take 200 mg by mouth as needed        levothyroxine 50 MCG tablet    SYNTHROID/LEVOTHROID     Take 50 mcg by mouth daily    Need for prophylactic vaccination and inoculation against influenza, Weakness of both lower extremities       lisinopril 40 MG tablet    PRINIVIL/ZESTRIL     Take 40 mg by mouth daily    Need for prophylactic vaccination and inoculation against influenza, Weakness of both lower extremities       SPIRIVA HANDIHALER 18 MCG capsule   Generic drug:  tiotropium      Inhale 18 mcg into the lungs daily    Need for prophylactic vaccination and inoculation against influenza, Weakness of both lower extremities       tiZANidine 4 MG tablet    ZANAFLEX     Take 4 mg by mouth At Bedtime    Weakness of both lower extremities       traZODone 100 MG tablet    DESYREL     Take 100 mg by mouth At Bedtime    Need for prophylactic vaccination and inoculation  against influenza, Weakness of both lower extremities

## 2018-08-28 ASSESSMENT — PATIENT HEALTH QUESTIONNAIRE - PHQ9: SUM OF ALL RESPONSES TO PHQ QUESTIONS 1-9: 12

## 2018-08-28 ASSESSMENT — ANXIETY QUESTIONNAIRES: GAD7 TOTAL SCORE: 15

## 2018-08-28 NOTE — PROGRESS NOTES
Gilma,  I have reviewed the results of the laboratory tests that we recently ordered. All of the lab work performed was normal or considered normal for you.  Sincerely,   Gucci Wu

## 2018-08-30 DIAGNOSIS — M81.0 OSTEOPOROSIS, UNSPECIFIED OSTEOPOROSIS TYPE, UNSPECIFIED PATHOLOGICAL FRACTURE PRESENCE: Primary | ICD-10-CM

## 2018-08-30 DIAGNOSIS — Z23 NEED FOR PROPHYLACTIC VACCINATION AND INOCULATION AGAINST INFLUENZA: ICD-10-CM

## 2018-08-30 DIAGNOSIS — R29.898 WEAKNESS OF BOTH LOWER EXTREMITIES: ICD-10-CM

## 2018-08-31 RX ORDER — ALENDRONATE SODIUM 70 MG/1
70 TABLET ORAL
Qty: 12 TABLET | Refills: 3 | Status: SHIPPED | OUTPATIENT
Start: 2018-08-31 | End: 2019-06-14

## 2018-08-31 NOTE — TELEPHONE ENCOUNTER
Routing refill request to provider for review/approval because:  Medication is reported/historical  Dexa Scan is not identified.  Please advise on refill.  Shantelle Harris RN

## 2018-09-04 DIAGNOSIS — R29.898 WEAKNESS OF BOTH LOWER EXTREMITIES: ICD-10-CM

## 2018-09-28 ENCOUNTER — OFFICE VISIT (OUTPATIENT)
Dept: FAMILY MEDICINE | Facility: CLINIC | Age: 78
End: 2018-09-28
Payer: MEDICARE

## 2018-09-28 VITALS
TEMPERATURE: 96.7 F | HEART RATE: 105 BPM | BODY MASS INDEX: 40.06 KG/M2 | DIASTOLIC BLOOD PRESSURE: 70 MMHG | WEIGHT: 212 LBS | SYSTOLIC BLOOD PRESSURE: 119 MMHG | RESPIRATION RATE: 22 BRPM

## 2018-09-28 DIAGNOSIS — M54.50 CHRONIC MIDLINE LOW BACK PAIN WITHOUT SCIATICA: ICD-10-CM

## 2018-09-28 DIAGNOSIS — Z23 NEED FOR PROPHYLACTIC VACCINATION AND INOCULATION AGAINST INFLUENZA: ICD-10-CM

## 2018-09-28 DIAGNOSIS — G89.29 CHRONIC MIDLINE LOW BACK PAIN WITHOUT SCIATICA: ICD-10-CM

## 2018-09-28 DIAGNOSIS — Z78.0 ASYMPTOMATIC POSTMENOPAUSAL STATUS: Primary | ICD-10-CM

## 2018-09-28 DIAGNOSIS — R29.898 WEAKNESS OF BOTH LOWER EXTREMITIES: ICD-10-CM

## 2018-09-28 PROCEDURE — G0008 ADMIN INFLUENZA VIRUS VAC: HCPCS | Performed by: FAMILY MEDICINE

## 2018-09-28 PROCEDURE — 90662 IIV NO PRSV INCREASED AG IM: CPT | Performed by: FAMILY MEDICINE

## 2018-09-28 PROCEDURE — 99214 OFFICE O/P EST MOD 30 MIN: CPT | Mod: 25 | Performed by: FAMILY MEDICINE

## 2018-09-28 RX ORDER — DULOXETIN HYDROCHLORIDE 60 MG/1
120 CAPSULE, DELAYED RELEASE ORAL DAILY
Qty: 60 CAPSULE | Refills: 1 | Status: SHIPPED | OUTPATIENT
Start: 2018-09-28 | End: 2019-01-08

## 2018-09-28 RX ORDER — GABAPENTIN 600 MG/1
600 TABLET ORAL 2 TIMES DAILY
Qty: 180 TABLET | Refills: 3 | Status: SHIPPED | OUTPATIENT
Start: 2018-09-28 | End: 2019-12-13

## 2018-09-28 ASSESSMENT — ANXIETY QUESTIONNAIRES
IF YOU CHECKED OFF ANY PROBLEMS ON THIS QUESTIONNAIRE, HOW DIFFICULT HAVE THESE PROBLEMS MADE IT FOR YOU TO DO YOUR WORK, TAKE CARE OF THINGS AT HOME, OR GET ALONG WITH OTHER PEOPLE: VERY DIFFICULT
2. NOT BEING ABLE TO STOP OR CONTROL WORRYING: MORE THAN HALF THE DAYS
3. WORRYING TOO MUCH ABOUT DIFFERENT THINGS: NEARLY EVERY DAY
6. BECOMING EASILY ANNOYED OR IRRITABLE: MORE THAN HALF THE DAYS
GAD7 TOTAL SCORE: 14
7. FEELING AFRAID AS IF SOMETHING AWFUL MIGHT HAPPEN: MORE THAN HALF THE DAYS
1. FEELING NERVOUS, ANXIOUS, OR ON EDGE: NEARLY EVERY DAY
5. BEING SO RESTLESS THAT IT IS HARD TO SIT STILL: NOT AT ALL

## 2018-09-28 ASSESSMENT — PAIN SCALES - GENERAL: PAINLEVEL: NO PAIN (0)

## 2018-09-28 ASSESSMENT — PATIENT HEALTH QUESTIONNAIRE - PHQ9: 5. POOR APPETITE OR OVEREATING: MORE THAN HALF THE DAYS

## 2018-09-28 NOTE — PROGRESS NOTES
Injectable Influenza Immunization Documentation    1.  Is the person to be vaccinated sick today?   No    2. Does the person to be vaccinated have an allergy to a component   of the vaccine?   No  Egg Allergy Algorithm Link    3. Has the person to be vaccinated ever had a serious reaction   to influenza vaccine in the past?   No    4. Has the person to be vaccinated ever had Guillain-Barré syndrome?   No    Form completed by Pebbles Posadas cma         Prior to injection verified patient identity using patient's name and date of birth.  Due to injection administration, patient instructed to remain in clinic for 15 minutes  afterwards, and to report any adverse reaction to me immediately.

## 2018-09-28 NOTE — Clinical Note
The patient never connected for the therapy session with the provider you reffered her to. I think she needs a good dose of therapy. Can you help facilitate this?

## 2018-09-28 NOTE — LETTER
My Depression Action Plan  Name: Gilma Pace   Date of Birth 1940  Date: 9/26/2018    My doctor: Gucci Wu   My clinic: [address] [address] [phone]          GREEN    ZONE   Good Control    What it looks like:     Things are going generally well. You have normal up s and down s. You may even feel depressed from time to time, but bad moods usually last less than a day.   What you need to do:  1. Continue to care for yourself (see self care plan)  2. Check your depression survival kit and update it as needed  3. Follow your physician s recommendations including any medication.  4. Do not stop taking medication unless you consult with your physician first.           YELLOW         ZONE Getting Worse    What it looks like:     Depression is starting to interfere with your life.     It may be hard to get out of bed; you may be starting to isolate yourself from others.    Symptoms of depression are starting to last most all day and this has happened for several days.     You may have suicidal thoughts but they are not constant.   What you need to do:     1. Call your care team, your response to treatment will improve if you keep your care team informed of your progress. Yellow periods are signs an adjustment may need to be made.     2. Continue your self-care, even if you have to fake it!    3. Talk to someone in your support network    4. Open up your depression survival kit           RED    ZONE Medical Alert - Get Help    What it looks like:     Depression is seriously interfering with your life.     You may experience these or other symptoms: You can t get out of bed most days, can t work or engage in other necessary activities, you have trouble taking care of basic hygiene, or basic responsibilities, thoughts of suicide or death that will not go away, self-injurious behavior.     What you need to do:  1. Call your care team and request  a same-day appointment. If they are not available (weekends or after hours) call your local crisis line, emergency room or 911.            Depression Self Care Plan / Survival Kit    Self-Care for Depression  Here s the deal. Your body and mind are really not as separate as most people think.  What you do and think affects how you feel and how you feel influences what you do and think. This means if you do things that people who feel good do, it will help you feel better.  Sometimes this is all it takes.  There is also a place for medication and therapy depending on how severe your depression is, so be sure to consult with your medical provider and/ or Behavioral Health Consultant if your symptoms are worsening or not improving.     In order to better manage my stress, I will:    Exercise  Get some form of exercise, every day. This will help reduce pain and release endorphins, the  feel good  chemicals in your brain. This is almost as good as taking antidepressants!  This is not the same as joining a gym and then never going! (they count on that by the way ) It can be as simple as just going for a walk or doing some gardening, anything that will get you moving.      Hygiene   Maintain good hygiene (Get out of bed in the morning, Make your bed, Brush your teeth, Take a shower, and Get dressed like you were going to work, even if you are unemployed).  If your clothes don't fit try to get ones that do.    Diet  I will strive to eat foods that are good for me, drink plenty of water, and avoid excessive sugar, caffeine, alcohol, and other mood-altering substances.  Some foods that are helpful in depression are: complex carbohydrates, B vitamins, flaxseed, fish or fish oil, fresh fruits and vegetables.    Psychotherapy  I agree to participate in Individual Therapy (if recommended).    Medication  If prescribed medications, I agree to take them.  Missing doses can result in serious side effects.  I understand that drinking  alcohol, or other illicit drug use, may cause potential side effects.  I will not stop my medication abruptly without first discussing it with my provider.    Staying Connected With Others  I will stay in touch with my friends, family members, and my primary care provider/team.    Use your imagination  Be creative.  We all have a creative side; it doesn t matter if it s oil painting, sand castles, or mud pies! This will also kick up the endorphins.    Witness Beauty  (AKA stop and smell the roses) Take a look outside, even in mid-winter. Notice colors, textures. Watch the squirrels and birds.     Service to others  Be of service to others.  There is always someone else in need.  By helping others we can  get out of ourselves  and remember the really important things.  This also provides opportunities for practicing all the other parts of the program.    Humor  Laugh and be silly!  Adjust your TV habits for less news and crime-drama and more comedy.    Control your stress  Try breathing deep, massage therapy, biofeedback, and meditation. Find time to relax each day.     My support system    Clinic Contact:  Phone number:    Contact 1:  Phone number:    Contact 2:  Phone number:    Restoration/:  Phone number:    Therapist:  Phone number:    Local crisis center:    Phone number:    Other community support:  Phone number:

## 2018-09-28 NOTE — NURSING NOTE
"Chief Complaint   Patient presents with     Depression     Health Maintenance     orders pended, DAP, COPD Plan, HF Plan       Initial /70  Pulse 105  Temp 96.7  F (35.9  C) (Oral)  Resp 22  Wt 212 lb (96.2 kg)  BMI 40.06 kg/m2 Estimated body mass index is 40.06 kg/(m^2) as calculated from the following:    Height as of 3/28/18: 5' 1\" (1.549 m).    Weight as of this encounter: 212 lb (96.2 kg).  Medication Reconciliation: complete  Pebbles Posadas, MAGALI    "

## 2018-09-28 NOTE — LETTER
My COPD Action Plan     Name: Gilma Pace    YOB: 1940   Date: 9/26/2018    My doctor: Gucci Wu MD   My clinic: [address] [address] [phone]  My Controller Medicine: { :800328}   Dose: ***     My Rescue Medicine: { :685353}   Dose: ***     My Flare Up Medicine: { :423057}   Dose: ***     My COPD Severity: { :757272}      Use of Oxygen: { :546626}     Make sure you've had your pneumonia   vaccines.          GREEN ZONE       Doing well today      Usual level of activity and exercise    Usual amount of cough and mucus    No shortness of breath    Usual level of health (thinking clearly, sleeping well, feel like eating) Actions:      Take daily medicines    Use oxygen as prescribed    Follow regular exercise and diet plan    Avoid cigarette smoke and other irritants that harm the lungs           YELLOW ZONE          Having a bad day or flare up      Short of breath more than usual    A lot more sputum (mucus) than usual    Sputum looks yellow, green, tan, brown or bloody    More coughing or wheezing    Fever or chills    Less energy; trouble completing activities    Trouble thinking or focusing    Using quick relief inhaler or nebulizer more often    Poor sleep; symptoms wake me up    Do not feel like eating Actions:      Get plenty of rest    Take daily medicines    Use quick relief inhaler every *** hours    If you use oxygen, call you doctor to see if you should adjust your oxygen    Do breathing exercises or other things to help you relax    Let a loved one, friend or neighbor know you are feeling worse    Call your care team if you have 2 or more symptoms.  Start taking steroids or antibiotics if directed by your care team           RED ZONE       Need medical care now      Severe shortness of breath (feel you can't breathe)    Fever, chills    Not enough breath to do any activity    Trouble coughing up mucus, walking or  talking    Blood in mucus    Frequent coughing   Rescue medicines are not working    Not able to sleep because of breathing    Feel confused or drowsy    Chest pain    Actions:      Call your health care team.  If you cannot reach your care team, call 911 or go to the emergency room.        Annual Reminders:  Meet with Care Team, Flu Shot every Fall  Pharmacy:    [facility] [address] [place], [place] - [address] [place] [address] [address]  [facility] [address] [place], [place] - [address] [facility]

## 2018-09-28 NOTE — MR AVS SNAPSHOT
After Visit Summary   9/28/2018    Gilma Pace    MRN: [id number]           Patient Information     Date Of Birth          1940        Visit Information        Provider Department      9/28/2018 2:15 PM Gucci Wu MD [facility]        Today's Diagnoses     Asymptomatic postmenopausal status    -  1    Need for prophylactic vaccination and inoculation against influenza        Weakness of both lower extremities          Care Instructions    I will have [place] try to get a therapy appointment(s) for you.   If you do not hear from us by Wenesday October 3rd please call us and ask.          Follow-ups after your visit        Follow-up notes from your care team     Return in about 4 weeks (around 10/26/2018) for recheck on depression and anxiety.      Who to contact     If you have questions or need follow up information about today's clinic visit or your schedule please contact [facility] directly at [phone].  Normal or non-critical lab and imaging results will be communicated to you by MyChart, letter or phone within 4 business days after the clinic has received the results. If you do not hear from us within 7 days, please contact the clinic through MyChart or phone. If you have a critical or abnormal lab result, we will notify you by phone as soon as possible.  Submit refill requests through [employer] or call your pharmacy and they will forward the refill request to us. Please allow 3 business days for your refill to be completed.          Additional Information About Your Visit        MyChart Information     [employer] gives you secure access to your electronic health record. If you see a primary care provider, you can also send messages to your care team and make appointments. If you have questions, please call your primary care clinic.  If you do not have a primary care provider, please call [phone] and they will assist you.        Care EveryWhere ID      This is your Care EveryWhere ID. This could be used by other organizations to access your [facility] medical records  [id number]        Your Vitals Were     Pulse Temperature Respirations BMI (Body Mass Index)          105 96.7  F (35.9  C) (Oral) 22 40.06 kg/m2         Blood Pressure from Last 3 Encounters:   09/28/18 119/70   08/27/18 97/64   03/28/18 115/65    Weight from Last 3 Encounters:   09/28/18 212 lb (96.2 kg)   08/27/18 207 lb (93.9 kg)   03/28/18 197 lb 14.4 oz (89.8 kg)              We Performed the Following     ADMIN INFLUENZA (For MEDICARE Patients ONLY) []     FLU VACCINE, INCREASED ANTIGEN, PRESV FREE, AGE 65+ [20389]          Today's Medication Changes          These changes are accurate as of 9/28/18  2:55 PM.  If you have any questions, ask your nurse or doctor.               These medicines have changed or have updated prescriptions.        Dose/Directions    DULoxetine 60 MG EC capsule   Commonly known as:  CYMBALTA   This may have changed:    - medication strength  - how much to take   Used for:  Need for prophylactic vaccination and inoculation against influenza, Weakness of both lower extremities   Changed by:  Gucci Wu MD        Dose:  120 mg   Take 2 capsules (120 mg) by mouth daily   Quantity:  60 capsule   Refills:  1            Where to get your medicines      These medications were sent to [facility] [address] [place] [address] [facility]  [address] [facility] [address]     Phone:  [phone]     DULoxetine 60 MG EC capsule                Primary Care Provider Office Phone # Fax #    Gucci Wu [phone] [address] [facility] [address]        Equal Access to Services     SULTANA CARLIN : Hadaury Breen, jaime lunicoadaha, robert kaalmada kayla, vandana ferguson. So [facility] [phone].    ATENCIÓN: Si habla español, tiene a flood disposición servicios gratuitos de asistencia  lingüística. Arvin al [phone].    We comply with applicable federal civil rights laws and [place] laws. We do not discriminate on the basis of race, color, national origin, age, disability, sex, sexual orientation, or gender identity.            Thank you!     [facility] you for [facility]  [facility]. Our goal is always to provide you with excellent care. Hearing back from our patients is one way we can continue to improve our services. Please take a few minutes to complete the written survey that you may receive in the mail after your visit with us. Thank you!             Your Updated Medication List - Protect others around you: Learn how to safely use, store and throw away your medicines at www.disposemymeds.org.          This list is accurate as of 9/28/18  2:55 PM.  Always use your most recent med list.                   Brand Name Dispense Instructions for use Diagnosis    alendronate 70 MG tablet    FOSAMAX    12 tablet    Take 1 tablet (70 mg) by mouth every 7 days    Osteoporosis, unspecified osteoporosis type, unspecified pathological fracture presence       amLODIPine 10 MG tablet    NORVASC     Take 10 mg by mouth daily    Need for prophylactic vaccination and inoculation against influenza, Weakness of both lower extremities       atorvastatin 40 MG tablet    LIPITOR     Take 40 mg by mouth daily    Need for prophylactic vaccination and inoculation against influenza, Weakness of both lower extremities       busPIRone 15 MG tablet    BUSPAR    60 tablet    Take 1 tablet (15 mg) by mouth 2 times daily    ARISTIDES (generalized anxiety disorder)       clotrimazole-betamethasone cream    LOTRISONE    15 g    Apply topically 2 times daily To the corners of the mouth    Angular cheilitis       DULoxetine 60 MG EC capsule    CYMBALTA    60 capsule    Take 2 capsules (120 mg) by mouth daily    Need for prophylactic vaccination and inoculation against influenza, Weakness of both lower  extremities       furosemide 20 MG tablet    LASIX     Take 20 mg by mouth daily    Need for prophylactic vaccination and inoculation against influenza, Weakness of both lower extremities       gabapentin 300 MG capsule    NEURONTIN     Take 600 mg by mouth 2 times daily    Need for prophylactic vaccination and inoculation against influenza, Weakness of both lower extremities       IBUPROFEN PO      Take 200 mg by mouth as needed        levothyroxine 50 MCG tablet    SYNTHROID/LEVOTHROID     Take 50 mcg by mouth daily    Need for prophylactic vaccination and inoculation against influenza, Weakness of both lower extremities       lisinopril 40 MG tablet    PRINIVIL/ZESTRIL     Take 40 mg by mouth daily    Need for prophylactic vaccination and inoculation against influenza, Weakness of both lower extremities       SPIRIVA HANDIHALER 18 MCG capsule   Generic drug:  tiotropium      Inhale 18 mcg into the lungs daily    Need for prophylactic vaccination and inoculation against influenza, Weakness of both lower extremities       tiZANidine 4 MG tablet    ZANAFLEX    90 tablet    Take 1 tablet (4 mg) by mouth At Bedtime    Weakness of both lower extremities       traZODone 100 MG tablet    DESYREL     Take 100 mg by mouth At Bedtime    Need for prophylactic vaccination and inoculation against influenza, Weakness of both lower extremities
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
## 2018-09-28 NOTE — PATIENT INSTRUCTIONS
I will have [place] try to get a therapy appointment(s) for you.   If you do not hear from us by Wenesday October 3rd please call us and ask.

## 2018-09-28 NOTE — LETTER
"My Heart Failure Action Plan   Name: Gilma Pace    YOB: 1940   Date: 9/26/2018    My doctor: Gucci Wu     [facility]     [address] [facility] [address] [phone]  My Diagnosis: {HF TYPE:209805::\"Systolic Heart Failure\"}   My Ejection Fraction: {EF%:207695}    My Exercise Goal: 30 minutes daily  .     My Weight Goal: ***  Wt Readings from Last 2 Encounters:   08/27/18 207 lb (93.9 kg)   03/28/18 197 lb 14.4 oz (89.8 kg)     Weigh yourself daily using the same scale. If you gain more than 2 pounds in 24 hours or 5 pounds in a week {HF WEIGHT GOAL:214730}    My Diet Goal: {HF DIET GOAL:446474::\"No added salt\"}    Emergency Room Visits:    Our goal is to improve your quality of life and help you avoid a visit to the emergency room or hospital.  If we work together, we can achieve this goal. But, if you feel you need to call 911 or go to the emergency room, please do so.  If you go to the emergency room, please bring your list of medicines and your daily weight chart with you.       GREEN ZONE     Doing well today    Weight gained is no more than 2 pounds a day or 5 pounds a week.    No swelling in feet, ankles, legs or stomach.    No more swelling than usual.    No more trouble breathing than usual.    No change in my sleep.    No other problems. Actions:    I am doing fine.  I will take my medicine, follow my diet, see my doctor, exercise, and watch for symptoms.           YELLOW ZONE         Having a bad day or flare up    Weight gain of more than 2 pounds in one day or 5 pounds in one week.    New swelling in ankle, leg, knee or thigh.    Bloating in belly, pants feel tighter.    Swelling in hands or face.    Coughing or trouble breathing while walking or talking.    Harder to breathe last night.    Have trouble sleeping, wake up short of breath.    Much more tired than usual.    Not eating.    Pain in my chest or bad leg cramps.    Feel weak or dizzy. " Actions:    I need to take action and call my doctor or nurse today.                 RED ZONE         Need medical care now    Weight gain of 5 pounds overnight.    Chest pain or pressure that does not go away.    Feel less alert.    Wheezing or have trouble breathing when at rest.    Cannot sleep lying down.    Cannot take my water pill.    Pass out or faint. Actions:    I need to call my doctor or nurse now!    Call 911 if I have chest pain or cannot breathe.

## 2018-09-29 ASSESSMENT — ANXIETY QUESTIONNAIRES: GAD7 TOTAL SCORE: 14

## 2018-09-29 ASSESSMENT — PATIENT HEALTH QUESTIONNAIRE - PHQ9: SUM OF ALL RESPONSES TO PHQ QUESTIONS 1-9: 16

## 2018-10-03 ENCOUNTER — DOCUMENTATION ONLY (OUTPATIENT)
Dept: BEHAVIORAL HEALTH | Facility: CLINIC | Age: 78
End: 2018-10-03

## 2018-10-03 DIAGNOSIS — F32.A DEPRESSION: ICD-10-CM

## 2018-10-03 DIAGNOSIS — F41.9 ANXIETY: Primary | ICD-10-CM

## 2018-10-04 ENCOUNTER — DOCUMENTATION ONLY (OUTPATIENT)
Dept: BEHAVIORAL HEALTH | Facility: CLINIC | Age: 78
End: 2018-10-04

## 2018-10-04 NOTE — PROGRESS NOTES
Patient was contacted by [facility]. Patient was scheduled for therapy services with [facility] on 10/10/18 at 2:30pm.   Koko Mcarthur,   , [facility]

## 2018-10-24 ENCOUNTER — TELEPHONE (OUTPATIENT)
Dept: BEHAVIORAL HEALTH | Facility: CLINIC | Age: 78
End: 2018-10-24

## 2018-10-24 NOTE — TELEPHONE ENCOUNTER
Phone Encounter   Patient confirmed she did attend therapy appointment on 10/10/18 with [facility]. Patient reports she had second appointment scheduled, but she canceled it, and does not want to return to that therapist. Patient reports the psycho-therapist was a  and not a psychologist, she felt therapist was lecturing her, and did not like that the therapist talked to her about the health issues with using alcohol after patient disclosed she drinks alcohol in the evenings to help her sleep.    Offered patient to try a different therapist, or support group/ therapy group, patient declined, states she feels she is managing well on her own at this time. Patient agreed to contact clinic/[facility] [place] if she changes her mind.

## 2018-10-29 DIAGNOSIS — I10 HYPERTENSION GOAL BP (BLOOD PRESSURE) < 140/90: ICD-10-CM

## 2018-10-29 DIAGNOSIS — E03.9 HYPOTHYROIDISM, UNSPECIFIED TYPE: Primary | ICD-10-CM

## 2018-10-29 RX ORDER — LEVOTHYROXINE SODIUM 88 UG/1
88 TABLET ORAL DAILY
Qty: 90 TABLET | Refills: 3 | Status: SHIPPED | OUTPATIENT
Start: 2018-10-29 | End: 2019-05-01

## 2018-10-29 RX ORDER — AMLODIPINE BESYLATE 10 MG/1
10 TABLET ORAL DAILY
Qty: 90 TABLET | Refills: 3 | Status: SHIPPED | OUTPATIENT
Start: 2018-10-29 | End: 2019-11-01

## 2018-10-29 NOTE — TELEPHONE ENCOUNTER
Routing refill request to provider for review/approval because:  Medication is reported/historical  Different dose then levothyroxine is listed.  Kaia Us BSN, RN

## 2018-11-05 DIAGNOSIS — I10 HYPERTENSION GOAL BP (BLOOD PRESSURE) < 140/90: Primary | ICD-10-CM

## 2018-11-05 RX ORDER — LISINOPRIL 40 MG/1
40 TABLET ORAL DAILY
Qty: 90 TABLET | Refills: 1 | Status: SHIPPED | OUTPATIENT
Start: 2018-11-05 | End: 2019-05-01

## 2018-11-05 NOTE — TELEPHONE ENCOUNTER
Routing refill request to provider for review/approval because:  Medication is reported/historical  Kaia Us BSN, RN

## 2018-11-30 ENCOUNTER — OFFICE VISIT (OUTPATIENT)
Dept: FAMILY MEDICINE | Facility: CLINIC | Age: 78
End: 2018-11-30
Payer: MEDICARE

## 2018-11-30 ENCOUNTER — MEDICAL CORRESPONDENCE (OUTPATIENT)
Dept: HEALTH INFORMATION MANAGEMENT | Facility: CLINIC | Age: 78
End: 2018-11-30

## 2018-11-30 VITALS
SYSTOLIC BLOOD PRESSURE: 104 MMHG | RESPIRATION RATE: 22 BRPM | WEIGHT: 209 LBS | BODY MASS INDEX: 39.49 KG/M2 | DIASTOLIC BLOOD PRESSURE: 60 MMHG | TEMPERATURE: 97.5 F | HEART RATE: 96 BPM | OXYGEN SATURATION: 84 %

## 2018-11-30 DIAGNOSIS — Z23 NEED FOR PROPHYLACTIC VACCINATION AGAINST STREPTOCOCCUS PNEUMONIAE (PNEUMOCOCCUS): ICD-10-CM

## 2018-11-30 DIAGNOSIS — Z23 NEED FOR PROPHYLACTIC VACCINATION WITH TETANUS-DIPHTHERIA (TD): ICD-10-CM

## 2018-11-30 DIAGNOSIS — B37.2 CANDIDAL INTERTRIGO: ICD-10-CM

## 2018-11-30 DIAGNOSIS — I49.9 IRREGULAR HEART BEAT: ICD-10-CM

## 2018-11-30 DIAGNOSIS — J44.1 CHRONIC OBSTRUCTIVE PULMONARY DISEASE WITH ACUTE EXACERBATION (H): ICD-10-CM

## 2018-11-30 DIAGNOSIS — J01.90 ACUTE SINUSITIS WITH COEXISTING CONDITION, NEED PROPHYLACTIC TREATMENT: ICD-10-CM

## 2018-11-30 DIAGNOSIS — Z78.0 ASYMPTOMATIC POSTMENOPAUSAL STATUS: ICD-10-CM

## 2018-11-30 DIAGNOSIS — D50.9 IRON DEFICIENCY ANEMIA, UNSPECIFIED IRON DEFICIENCY ANEMIA TYPE: Primary | ICD-10-CM

## 2018-11-30 PROCEDURE — 93000 ELECTROCARDIOGRAM COMPLETE: CPT | Performed by: FAMILY MEDICINE

## 2018-11-30 PROCEDURE — 99215 OFFICE O/P EST HI 40 MIN: CPT | Performed by: FAMILY MEDICINE

## 2018-11-30 RX ORDER — QUINIDINE GLUCONATE 324 MG
1 TABLET, EXTENDED RELEASE ORAL DAILY
Qty: 90 TABLET | Refills: 3 | Status: SHIPPED | OUTPATIENT
Start: 2018-11-30 | End: 2019-06-14

## 2018-11-30 RX ORDER — NYSTATIN 100000 [USP'U]/G
POWDER TOPICAL 2 TIMES DAILY
Qty: 60 G | Refills: 11 | Status: SHIPPED | OUTPATIENT
Start: 2018-11-30 | End: 2019-12-03

## 2018-11-30 RX ORDER — AZITHROMYCIN 250 MG/1
TABLET, FILM COATED ORAL
Qty: 6 TABLET | Refills: 0 | Status: SHIPPED | OUTPATIENT
Start: 2018-11-30 | End: 2018-12-07
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
## 2018-11-30 NOTE — MR AVS SNAPSHOT
After Visit Summary   11/30/2018    Gilma Pace    MRN: [id number]           Patient Information     Date Of Birth          1940        Visit Information        Provider Department      11/30/2018 11:45 AM Gucci Wu MD [facility]        Today's Diagnoses     Iron deficiency anemia, unspecified iron deficiency anemia type    -  1    Asymptomatic postmenopausal status        Need for prophylactic vaccination against Streptococcus pneumoniae (pneumococcus)        Need for prophylactic vaccination with tetanus-diphtheria (Td)        Irregular heart beat        Candidal intertrigo        Acute sinusitis with coexisting condition, need prophylactic treatment        Chronic obstructive pulmonary disease with acute exacerbation (H)           Follow-ups after your visit        Follow-up notes from your care team     Return in about 1 week (around 12/7/2018) for recheck on oxygen and sinuses.      Who to contact     If you have questions or need follow up information about today's clinic visit or your schedule please contact [facility] directly at [phone].  Normal or non-critical lab and imaging results will be communicated to you by Keeckerhart, letter or phone within 4 business days after the clinic has received the results. If you do not hear from us within 7 days, please contact the clinic through Keeckerhart or phone. If you have a critical or abnormal lab result, we will notify you by phone as soon as possible.  Submit refill requests through Flextrip or call your pharmacy and they will forward the refill request to us. Please allow 3 business days for your refill to be completed.          Additional Information About Your Visit        Keeckerhart Information     Flextrip gives you secure access to your electronic health record. If you see a primary care provider, you can also send messages to your care team and make appointments. If you have questions, please call your  primary care clinic.  If you do not have a primary care provider, please call [phone] and they will assist you.        Care EveryWhere ID     This is your Care EveryWhere ID. This could be used by other organizations to access your [facility] medical records  [id number]        Your Vitals Were     Pulse Temperature Respirations Pulse Oximetry BMI (Body Mass Index)       96 97.5  F (36.4  C) (Oral) 22 84% 39.49 kg/m2        Blood Pressure from Last 3 Encounters:   11/30/18 104/60   09/28/18 119/70   08/27/18 97/64    Weight from Last 3 Encounters:   11/30/18 209 lb (94.8 kg)   09/28/18 212 lb (96.2 kg)   08/27/18 207 lb (93.9 kg)              We Performed the Following     EKG 12-lead complete w/read - Clinics          Today's Medication Changes          These changes are accurate as of 11/30/18  1:07 PM.  If you have any questions, ask your nurse or doctor.               Start taking these medicines.        Dose/Directions    azithromycin 250 MG tablet   Commonly known as:  ZITHROMAX   Used for:  Acute sinusitis with coexisting condition, need prophylactic treatment, Chronic obstructive pulmonary disease with acute exacerbation (H)   Started by:  Gucci Wu MD        Take 2 tablets on the 1st day and one tablet daily for the next 4 days   Quantity:  6 tablet   Refills:  0       Ferrous Gluconate 240 (27 Fe) MG Tabs   Used for:  Iron deficiency anemia, unspecified iron deficiency anemia type   Started by:  Gucci Wu MD        Dose:  1 tablet   Take 1 tablet by mouth daily   Quantity:  90 tablet   Refills:  3       nystatin 311335 UNIT/GM external powder   Commonly known as:  MYCOSTATIN   Used for:  Candidal intertrigo   Started by:  Gucci Wu MD        Apply topically 2 times daily   Quantity:  60 g   Refills:  11            Where to get your medicines      These medications were sent to [facility] [address] [place] [address] [facility]  [address] [facility] [address]      Phone:  [phone]     azithromycin 250 MG tablet    Ferrous Gluconate 240 (27 Fe) MG Tabs    nystatin 000664 UNIT/GM external powder                Primary Care Provider Office Phone # Fax #    Gucci Wu [phone] [address] [facility] [address]        Equal Access to Services     [facility]: Hadii aad ku hadasho Soomaali, waaxda luqadaha, qaybta kaalmada adeegyada, waxay anneliesein hayaan adeeg khliannesh lalyndseyn . So [facility] [phone].    ATENCIÓN: Si habla español, tiene a flood disposición servicios gratuitos de asistencia lingüística. Llame al [phone].    We comply with applicable federal civil rights laws and [place] laws. We do not discriminate on the basis of race, color, national origin, age, disability, sex, sexual orientation, or gender identity.            Thank you!     Thank you for choosing [facility]  for your care. Our goal is always to provide you with excellent care. Hearing back from our patients is one way we can continue to improve our services. Please take a few minutes to complete the written survey that you may receive in the mail after your visit with us. Thank you!             Your Updated Medication List - Protect others around you: Learn how to safely use, store and throw away your medicines at www.disposemymeds.org.          This list is accurate as of 11/30/18  1:07 PM.  Always use your most recent med list.                   Brand Name Dispense Instructions for use Diagnosis    alendronate 70 MG tablet    FOSAMAX    12 tablet    Take 1 tablet (70 mg) by mouth every 7 days    Osteoporosis, unspecified osteoporosis type, unspecified pathological fracture presence       amLODIPine 10 MG tablet    NORVASC    90 tablet    Take 1 tablet (10 mg) by mouth daily    Hypertension goal BP (blood pressure) < 140/90       atorvastatin 40 MG tablet    LIPITOR     Take 40 mg by mouth daily    Need for prophylactic vaccination and inoculation  against influenza, Weakness of both lower extremities       azithromycin 250 MG tablet    ZITHROMAX    6 tablet    Take 2 tablets on the 1st day and one tablet daily for the next 4 days    Acute sinusitis with coexisting condition, need prophylactic treatment, Chronic obstructive pulmonary disease with acute exacerbation (H)       busPIRone 15 MG tablet    BUSPAR    60 tablet    Take 1 tablet (15 mg) by mouth 2 times daily    ARISTIDES (generalized anxiety disorder)       clotrimazole-betamethasone 1-0.05 % external cream    LOTRISONE    15 g    Apply topically 2 times daily To the corners of the mouth    Angular cheilitis       DULoxetine 60 MG capsule    CYMBALTA    60 capsule    Take 2 capsules (120 mg) by mouth daily    Need for prophylactic vaccination and inoculation against influenza, Weakness of both lower extremities       Ferrous Gluconate 240 (27 Fe) MG Tabs     90 tablet    Take 1 tablet by mouth daily    Iron deficiency anemia, unspecified iron deficiency anemia type       FOLIC ACID PO      Take 1 mg by mouth daily        furosemide 20 MG tablet    LASIX     Take 20 mg by mouth daily    Need for prophylactic vaccination and inoculation against influenza, Weakness of both lower extremities       gabapentin 600 MG tablet    NEURONTIN    180 tablet    Take 1 tablet (600 mg) by mouth 2 times daily    Need for prophylactic vaccination and inoculation against influenza, Weakness of both lower extremities       IBUPROFEN PO      Take 200 mg by mouth as needed        * levothyroxine 50 MCG tablet    SYNTHROID/LEVOTHROID     Take 50 mcg by mouth daily    Need for prophylactic vaccination and inoculation against influenza, Weakness of both lower extremities       * levothyroxine 88 MCG tablet    SYNTHROID/LEVOTHROID    90 tablet    Take 1 tablet (88 mcg) by mouth daily    Hypothyroidism, unspecified type       lisinopril 40 MG tablet    PRINIVIL/ZESTRIL    90 tablet    Take 1 tablet (40 mg) by mouth daily     Hypertension goal BP (blood pressure) < 140/90       nystatin 223272 UNIT/GM external powder    MYCOSTATIN    60 g    Apply topically 2 times daily    Candidal intertrigo       SPIRIVA HANDIHALER 18 MCG inhaled capsule   Generic drug:  tiotropium      Inhale 18 mcg into the lungs daily    Need for prophylactic vaccination and inoculation against influenza, Weakness of both lower extremities       tiZANidine 4 MG tablet    ZANAFLEX    90 tablet    Take 1 tablet (4 mg) by mouth At Bedtime    Weakness of both lower extremities       traZODone 100 MG tablet    DESYREL     Take 100 mg by mouth At Bedtime    Need for prophylactic vaccination and inoculation against influenza, Weakness of both lower extremities       * Notice:  This list has 2 medication(s) that are the same as other medications prescribed for you. Read the directions carefully, and ask your doctor or other care provider to review them with you.

## 2018-11-30 NOTE — NURSING NOTE
"Chief Complaint   Patient presents with     Anxiety     Depression     Health Maintenance     Pended orders, HF Plan, COPD Plan, Pneumo 23, Tdap       Initial BP (!) 85/56  Pulse 113  Temp 97.5  F (36.4  C) (Oral)  Resp 22  Wt 209 lb (94.8 kg)  BMI 39.49 kg/m2 Estimated body mass index is 39.49 kg/(m^2) as calculated from the following:    Height as of 3/28/18: 5' 1\" (1.549 m).    Weight as of this encounter: 209 lb (94.8 kg).  Medication Reconciliation: complete  Pebbles Posadas, MAGALI  "

## 2018-11-30 NOTE — LETTER
My COPD Action Plan     Name: Gilma Pace    YOB: 1940   Date: 11/28/2018    My doctor: Gucci Wu MD   My clinic: [address] [address] [phone]  My Controller Medicine: { :828856}   Dose: ***     My Rescue Medicine: { :437855}   Dose: ***     My Flare Up Medicine: { :916915}   Dose: ***     My COPD Severity: { :084993}      Use of Oxygen: { :548362}     Make sure you've had your pneumonia   vaccines.          GREEN ZONE       Doing well today      Usual level of activity and exercise    Usual amount of cough and mucus    No shortness of breath    Usual level of health (thinking clearly, sleeping well, feel like eating) Actions:      Take daily medicines    Use oxygen as prescribed    Follow regular exercise and diet plan    Avoid cigarette smoke and other irritants that harm the lungs           YELLOW ZONE          Having a bad day or flare up      Short of breath more than usual    A lot more sputum (mucus) than usual    Sputum looks yellow, green, tan, brown or bloody    More coughing or wheezing    Fever or chills    Less energy; trouble completing activities    Trouble thinking or focusing    Using quick relief inhaler or nebulizer more often    Poor sleep; symptoms wake me up    Do not feel like eating Actions:      Get plenty of rest    Take daily medicines    Use quick relief inhaler every *** hours    If you use oxygen, call you doctor to see if you should adjust your oxygen    Do breathing exercises or other things to help you relax    Let a loved one, friend or neighbor know you are feeling worse    Call your care team if you have 2 or more symptoms.  Start taking steroids or antibiotics if directed by your care team           RED ZONE       Need medical care now      Severe shortness of breath (feel you can't breathe)    Fever, chills    Not enough breath to do any activity    Trouble coughing up mucus, walking or  talking    Blood in mucus    Frequent coughing   Rescue medicines are not working    Not able to sleep because of breathing    Feel confused or drowsy    Chest pain    Actions:      Call your health care team.  If you cannot reach your care team, call 911 or go to the emergency room.        Annual Reminders:  Meet with Care Team, Flu Shot every Fall  Pharmacy:    [facility] [address] [place], [place] - [address] [address] AT [place] [address] [address]  [facility] [address] [place], [place] - [address] [facility]

## 2018-11-30 NOTE — LETTER
"My Heart Failure Action Plan   Name: Gilma Pace    YOB: 1940   Date: 11/28/2018    My doctor: Gucci Wu     [place] [address] [facility] [address] [phone]  My Diagnosis: {HF TYPE:301756::\"Systolic Heart Failure\"}   My Ejection Fraction: {EF%:471909}    My Exercise Goal: 30 minutes daily  .     My Weight Goal: ***  Wt Readings from Last 2 Encounters:   09/28/18 212 lb (96.2 kg)   08/27/18 207 lb (93.9 kg)     Weigh yourself daily using the same scale. If you gain more than 2 pounds in 24 hours or 5 pounds in a week {HF WEIGHT GOAL:908149}    My Diet Goal: {HF DIET GOAL:014297::\"No added salt\"}    Emergency Room Visits:    Our goal is to improve your quality of life and help you avoid a visit to the emergency room or hospital.  If we work together, we can achieve this goal. But, if you feel you need to call 911 or go to the emergency room, please do so.  If you go to the emergency room, please bring your list of medicines and your daily weight chart with you.       GREEN ZONE     Doing well today    Weight gained is no more than 2 pounds a day or 5 pounds a week.    No swelling in feet, ankles, legs or stomach.    No more swelling than usual.    No more trouble breathing than usual.    No change in my sleep.    No other problems. Actions:    I am doing fine.  I will take my medicine, follow my diet, see my doctor, exercise, and watch for symptoms.           YELLOW ZONE         Having a bad day or flare up    Weight gain of more than 2 pounds in one day or 5 pounds in one week.    New swelling in ankle, leg, knee or thigh.    Bloating in belly, pants feel tighter.    Swelling in hands or face.    Coughing or trouble breathing while walking or talking.    Harder to breathe last night.    Have trouble sleeping, wake up short of breath.    Much more tired than usual.    Not eating.    Pain in my chest or bad leg cramps.    Feel weak or dizzy. Actions:    I " need to take action and call my doctor or nurse today.                 RED ZONE         Need medical care now    Weight gain of 5 pounds overnight.    Chest pain or pressure that does not go away.    Feel less alert.    Wheezing or have trouble breathing when at rest.    Cannot sleep lying down.    Cannot take my water pill.    Pass out or faint. Actions:    I need to call my doctor or nurse now!    Call 911 if I have chest pain or cannot breathe.

## 2018-11-30 NOTE — Clinical Note
Wilmar, I need some help with this patient again. She is better but still struggling with her relationship/marriage and I recommend(ed) starting counseling again. Can you help facilitate that please?

## 2018-12-03 ASSESSMENT — PATIENT HEALTH QUESTIONNAIRE - PHQ9
5. POOR APPETITE OR OVEREATING: NOT AT ALL
SUM OF ALL RESPONSES TO PHQ QUESTIONS 1-9: 13

## 2018-12-03 ASSESSMENT — ANXIETY QUESTIONNAIRES
IF YOU CHECKED OFF ANY PROBLEMS ON THIS QUESTIONNAIRE, HOW DIFFICULT HAVE THESE PROBLEMS MADE IT FOR YOU TO DO YOUR WORK, TAKE CARE OF THINGS AT HOME, OR GET ALONG WITH OTHER PEOPLE: VERY DIFFICULT
7. FEELING AFRAID AS IF SOMETHING AWFUL MIGHT HAPPEN: SEVERAL DAYS
GAD7 TOTAL SCORE: 8
5. BEING SO RESTLESS THAT IT IS HARD TO SIT STILL: NOT AT ALL
3. WORRYING TOO MUCH ABOUT DIFFERENT THINGS: MORE THAN HALF THE DAYS
2. NOT BEING ABLE TO STOP OR CONTROL WORRYING: MORE THAN HALF THE DAYS
1. FEELING NERVOUS, ANXIOUS, OR ON EDGE: MORE THAN HALF THE DAYS
6. BECOMING EASILY ANNOYED OR IRRITABLE: SEVERAL DAYS

## 2018-12-04 ENCOUNTER — DOCUMENTATION ONLY (OUTPATIENT)
Dept: BEHAVIORAL HEALTH | Facility: CLINIC | Age: 78
End: 2018-12-04

## 2018-12-04 DIAGNOSIS — F32.A DEPRESSION: Primary | ICD-10-CM

## 2018-12-04 ASSESSMENT — ANXIETY QUESTIONNAIRES: GAD7 TOTAL SCORE: 8

## 2018-12-07 ENCOUNTER — TELEPHONE (OUTPATIENT)
Dept: BEHAVIORAL HEALTH | Facility: CLINIC | Age: 78
End: 2018-12-07

## 2018-12-07 ENCOUNTER — OFFICE VISIT (OUTPATIENT)
Dept: FAMILY MEDICINE | Facility: CLINIC | Age: 78
End: 2018-12-07
Payer: MEDICARE

## 2018-12-07 VITALS
DIASTOLIC BLOOD PRESSURE: 81 MMHG | WEIGHT: 204.8 LBS | HEART RATE: 98 BPM | OXYGEN SATURATION: 92 % | BODY MASS INDEX: 38.7 KG/M2 | SYSTOLIC BLOOD PRESSURE: 129 MMHG | TEMPERATURE: 97.8 F

## 2018-12-07 DIAGNOSIS — Z78.0 ASYMPTOMATIC POSTMENOPAUSAL STATUS: Primary | ICD-10-CM

## 2018-12-07 PROCEDURE — 99213 OFFICE O/P EST LOW 20 MIN: CPT | Performed by: FAMILY MEDICINE

## 2018-12-07 NOTE — PROGRESS NOTES
SUBJECTIVE:   Gilma Pace is a 78 year old female presenting with a chief complaint of follow up on her visit from last week where she was noticed to be hypoxic and have low blood pressure.  She reports that she is doing better. She finished the antibiotics in 5 day(s).    Her nose is not running as much. The mucous is still a light green color.  There are little flecks of blood.   She is wearing her oxygen all the time now.     She (or parent) denies: recent fever.  She (or parent) denies: sinus pressure.  She (or parent) denies: wheezing.    The patient has the following predisposing factors for infection:HX of COPD.    She is wearing her oxygen all the time now. She did not wear it last time because she does not like carrying it around with her as it is heavy.    She has been on oxygen 24/7 for the last 6 month(s)    She usually uses 4 lpm.         She see a pulmonologist at [facility] (Dr Pace)      Patient Active Problem List   Diagnosis     COPD (chronic obstructive pulmonary disease) (H)     Hypertension goal BP (blood pressure) < 140/90     Postoperative hypothyroidism     Mitral valve disorders(424.0)     Osteoporosis     Anxiety     Chronic low back pain     Chronic low back pain without sciatica     Compression fracture of thoracic vertebra (H)     Essential hypertension     Folate deficiency     Hypothyroidism     Mitral regurgitation     Mixed hyperlipidemia     Other abnormal glucose     Recurrent falls     Smoker     Mild concentric left ventricular hypertrophy (LVH)     Myopathy     Acute respiratory failure (H)     Chronic respiratory failure with hypoxia (H)     Leg weakness     Obesity (BMI 35.0-39.9 without comorbidity)     Pneumonia     Respiratory failure (H)     Morbid obesity (H)     Hypothyroidism, unspecified type     Current Outpatient Prescriptions   Medication Sig Dispense Refill     alendronate (FOSAMAX) 70 MG tablet Take 1 tablet (70 mg) by mouth every 7 days 12 tablet 3     amLODIPine  (NORVASC) 10 MG tablet Take 1 tablet (10 mg) by mouth daily 90 tablet 3     busPIRone (BUSPAR) 15 MG tablet Take 1 tablet (15 mg) by mouth 2 times daily 60 tablet 1     clotrimazole-betamethasone (LOTRISONE) cream Apply topically 2 times daily To the corners of the mouth 15 g 1     DULoxetine (CYMBALTA) 60 MG EC capsule Take 2 capsules (120 mg) by mouth daily 60 capsule 1     Ferrous Gluconate 240 (27 Fe) MG TABS Take 1 tablet by mouth daily 90 tablet 3     FOLIC ACID PO Take 1 mg by mouth daily       gabapentin (NEURONTIN) 600 MG tablet Take 1 tablet (600 mg) by mouth 2 times daily 180 tablet 3     IBUPROFEN PO Take 200 mg by mouth as needed        lisinopril (PRINIVIL/ZESTRIL) 40 MG tablet Take 1 tablet (40 mg) by mouth daily 90 tablet 1     nystatin (MYCOSTATIN) 403636 UNIT/GM external powder Apply topically 2 times daily 60 g 11     tiotropium (SPIRIVA HANDIHALER) 18 MCG capsule Inhale 18 mcg into the lungs daily       tiZANidine (ZANAFLEX) 4 MG tablet Take 1 tablet (4 mg) by mouth At Bedtime 90 tablet 1     traZODone (DESYREL) 100 MG tablet Take 100 mg by mouth At Bedtime       furosemide (LASIX) 20 MG tablet Take 20 mg by mouth daily       levothyroxine (SYNTHROID/LEVOTHROID) 50 MCG tablet Take 50 mcg by mouth daily       levothyroxine (SYNTHROID/LEVOTHROID) 88 MCG tablet Take 1 tablet (88 mcg) by mouth daily 90 tablet 3     Social History   Substance Use Topics     Smoking status: Former Smoker     Quit date: 2007     Smokeless tobacco: Never Used     Alcohol use Yes      Comment: Rarely           OBJECTIVE  :/81  Pulse 98  Temp 97.8  F (36.6  C) (Oral)  Wt 204 lb 12.8 oz (92.9 kg)  SpO2 92%  BMI 38.7 kg/m2  GENERAL APPEARANCE: healthy, alert and no distress  EYES: EOMI,  PERRL, conjunctiva clear  HENT: ear canals and TM's normal.  Nose and mouth without ulcers, erythema or lesions  HENT: mild  maxillary sinus tenderness   NECK: supple, nontender, no lymphadenopathy  RESP: lungs clear to  auscultation - no rales, rhonchi or wheezes  CV: regular rates and rhythm, normal S1 S2, no murmur noted  ABDOMEN:  soft, nontender, no HSM or masses and bowel sounds normal  NEURO: Normal strength and tone, sensory exam grossly normal,  normal speech and mentation  SKIN: no suspicious lesions or rashes    ASSESSMENT:  Sinusitis and COPD exacerbation which has improved. She took zithromax so that should be active in her body until the 10th.           PLAN:    Patient Instructions   I want to hear from you on Tuesday December 11th either by a phone message or a adBrite message. Please let me know if you are continuing to have sinus symptom(s) like sinus pressure nasal discharge runny nose or if they are significant(ly) better.     Please call [phone] for help with any problems related to using your Jumpztert           I discussed the patient with our [facility] regarding her depression and she was already planning to reach out to the patient again.

## 2018-12-07 NOTE — MR AVS SNAPSHOT
After Visit Summary   12/7/2018    Gilma Pace    MRN: [id number]           Patient Information     Date Of Birth          1940        Visit Information        Provider Department      12/7/2018 12:15 PM Gucci Wu MD [facility]        Today's Diagnoses     Asymptomatic postmenopausal status    -  1      Care Instructions    I want to hear from you on Tuesday December 11th either by a phone message or a Ze [employer] Gameshart message. Please let me know if you are continuing to have sinus symptom(s) like sinus pressure nasal discharge runny nose or if they are significant(ly) better.               Follow-ups after your visit        Who to contact     If you have questions or need follow up information about today's clinic visit or your schedule please contact [facility] directly at [phone].  Normal or non-critical lab and imaging results will be communicated to you by MyChart, letter or phone within 4 business days after the clinic has received the results. If you do not hear from us within 7 days, please contact the clinic through MyChart or phone. If you have a critical or abnormal lab result, we will notify you by phone as soon as possible.  Submit refill requests through [employer] or call your pharmacy and they will forward the refill request to us. Please allow 3 business days for your refill to be completed.          Additional Information About Your Visit        MyChart Information     [employer] gives you secure access to your electronic health record. If you see a primary care provider, you can also send messages to your care team and make appointments. If you have questions, please call your primary care clinic.  If you do not have a primary care provider, please call [phone] and they will assist you.        Care EveryWhere ID     This is your Care EveryWhere ID. This could be used by other organizations to access your [place] medical records  [id number]         Your Vitals Were     Pulse Temperature Pulse Oximetry BMI (Body Mass Index)          98 97.8  F (36.6  C) (Oral) 92% 38.7 kg/m2         Blood Pressure from Last 3 Encounters:   12/07/18 129/81   11/30/18 104/60   09/28/18 119/70    Weight from Last 3 Encounters:   12/07/18 204 lb 12.8 oz (92.9 kg)   11/30/18 209 lb (94.8 kg)   09/28/18 212 lb (96.2 kg)              Today, you had the following     No orders found for display       Primary Care Provider Office Phone # Fax #    Gucci Wu [phone] [address] [facility] [address]        Equal Access to Services     LEMUEL CARLIN : Hadii aad ku hadasho Soomaali, waaxda luqadaha, qaybta kaalmada adeegyada, vandana pulido . So [facility] [phone].    ATENCIÓN: Si habla español, tiene a flood disposición servicios gratuitos de asistencia lingüística. [facility] [phone].    We comply with applicable federal civil rights laws and [place] laws. We do not discriminate on the basis of race, color, national origin, age, disability, sex, sexual orientation, or gender identity.            Thank you!     Thank you for choosing [facility]  for your care. Our goal is always to provide you with excellent care. Hearing back from our patients is one way we can continue to improve our services. Please take a few minutes to complete the written survey that you may receive in the mail after your visit with us. Thank you!             Your Updated Medication List - Protect others around you: Learn how to safely use, store and throw away your medicines at www.disposemymeds.org.          This list is accurate as of 12/7/18 12:58 PM.  Always use your most recent med list.                   Brand Name Dispense Instructions for use Diagnosis    alendronate 70 MG tablet    FOSAMAX    12 tablet    Take 1 tablet (70 mg) by mouth every 7 days    Osteoporosis, unspecified osteoporosis type, unspecified pathological  fracture presence       amLODIPine 10 MG tablet    NORVASC    90 tablet    Take 1 tablet (10 mg) by mouth daily    Hypertension goal BP (blood pressure) < 140/90       busPIRone 15 MG tablet    BUSPAR    60 tablet    Take 1 tablet (15 mg) by mouth 2 times daily    ARISTIDES (generalized anxiety disorder)       clotrimazole-betamethasone 1-0.05 % external cream    LOTRISONE    15 g    Apply topically 2 times daily To the corners of the mouth    Angular cheilitis       DULoxetine 60 MG capsule    CYMBALTA    60 capsule    Take 2 capsules (120 mg) by mouth daily    Need for prophylactic vaccination and inoculation against influenza, Weakness of both lower extremities       Ferrous Gluconate 240 (27 Fe) MG Tabs     90 tablet    Take 1 tablet by mouth daily    Iron deficiency anemia, unspecified iron deficiency anemia type       FOLIC ACID PO      Take 1 mg by mouth daily        furosemide 20 MG tablet    LASIX     Take 20 mg by mouth daily    Need for prophylactic vaccination and inoculation against influenza, Weakness of both lower extremities       gabapentin 600 MG tablet    NEURONTIN    180 tablet    Take 1 tablet (600 mg) by mouth 2 times daily    Need for prophylactic vaccination and inoculation against influenza, Weakness of both lower extremities       IBUPROFEN PO      Take 200 mg by mouth as needed        * levothyroxine 50 MCG tablet    SYNTHROID/LEVOTHROID     Take 50 mcg by mouth daily    Need for prophylactic vaccination and inoculation against influenza, Weakness of both lower extremities       * levothyroxine 88 MCG tablet    SYNTHROID/LEVOTHROID    90 tablet    Take 1 tablet (88 mcg) by mouth daily    Hypothyroidism, unspecified type       lisinopril 40 MG tablet    PRINIVIL/ZESTRIL    90 tablet    Take 1 tablet (40 mg) by mouth daily    Hypertension goal BP (blood pressure) < 140/90       nystatin 299991 UNIT/GM external powder    MYCOSTATIN    60 g    Apply topically 2 times daily    Candidal intertrigo        SPIRIVA HANDIHALER 18 MCG inhaled capsule   Generic drug:  tiotropium      Inhale 18 mcg into the lungs daily    Need for prophylactic vaccination and inoculation against influenza, Weakness of both lower extremities       tiZANidine 4 MG tablet    ZANAFLEX    90 tablet    Take 1 tablet (4 mg) by mouth At Bedtime    Weakness of both lower extremities       traZODone 100 MG tablet    DESYREL     Take 100 mg by mouth At Bedtime    Need for prophylactic vaccination and inoculation against influenza, Weakness of both lower extremities       * Notice:  This list has 2 medication(s) that are the same as other medications prescribed for you. Read the directions carefully, and ask your doctor or other care provider to review them with you.

## 2018-12-07 NOTE — PATIENT INSTRUCTIONS
I want to hear from you on Tuesday December 11th either by a phone message or a Novitaz message. Please let me know if you are continuing to have sinus symptom(s) like sinus pressure nasal discharge runny nose or if they are significant(ly) better.     Please call [phone] for help with any problems related to using your Novitaz

## 2018-12-07 NOTE — LETTER
"My Heart Failure Action Plan   Name: Gilma Pace    YOB: 1940   Date: 12/7/2018    My doctor: Gucci Wu     [place] [address] [address] [facility] [address] [phone]  My Diagnosis: {HF TYPE:468440::\"Systolic Heart Failure\"}   My Ejection Fraction: {EF%:329297}    My Exercise Goal: 30 minutes daily  .     My Weight Goal: ***  Wt Readings from Last 2 Encounters:   11/30/18 209 lb (94.8 kg)   09/28/18 212 lb (96.2 kg)     Weigh yourself daily using the same scale. If you gain more than 2 pounds in 24 hours or 5 pounds in a week {HF WEIGHT GOAL:764331}    My Diet Goal: {HF DIET GOAL:759766::\"No added salt\"}    Emergency Room Visits:    Our goal is to improve your quality of life and help you avoid a visit to the emergency room or hospital.  If we work together, we can achieve this goal. But, if you feel you need to call 911 or go to the emergency room, please do so.  If you go to the emergency room, please bring your list of medicines and your daily weight chart with you.       GREEN ZONE     Doing well today    Weight gained is no more than 2 pounds a day or 5 pounds a week.    No swelling in feet, ankles, legs or stomach.    No more swelling than usual.    No more trouble breathing than usual.    No change in my sleep.    No other problems. Actions:    I am doing fine.  I will take my medicine, follow my diet, see my doctor, exercise, and watch for symptoms.           YELLOW ZONE         Having a bad day or flare up    Weight gain of more than 2 pounds in one day or 5 pounds in one week.    New swelling in ankle, leg, knee or thigh.    Bloating in belly, pants feel tighter.    Swelling in hands or face.    Coughing or trouble breathing while walking or talking.    Harder to breathe last night.    Have trouble sleeping, wake up short of breath.    Much more tired than usual.    Not eating.    Pain in my chest or bad leg cramps.    Feel weak or dizzy. Actions:    I " need to take action and call my doctor or nurse today.                 RED ZONE         Need medical care now    Weight gain of 5 pounds overnight.    Chest pain or pressure that does not go away.    Feel less alert.    Wheezing or have trouble breathing when at rest.    Cannot sleep lying down.    Cannot take my water pill.    Pass out or faint. Actions:    I need to call my doctor or nurse now!    Call 911 if I have chest pain or cannot breathe.

## 2018-12-07 NOTE — TELEPHONE ENCOUNTER
Phone Encounter   Spoke with patient. Patient willing to try [facility] Therapist Zeyad Oliveros in [place] [facility].   Explained to patient important to establish therapy/counseling services to help manage the symptoms and recommended by Primary Care Provider.   Patient reports [place] location was too far. Informed patient [facility] intake/scheduling will contact patient directly to schedule her with Zeyad Oliveros ([facility] therapist).   Provided patient with [facility] scheduling phone number. Patient understood and agreed to plan.     Contacted [facility] intake and requested they contact patient directly to schedule with [facility] therapist. They will contact patient today; they will also offer two other providers in [place] and [place].       Patient was referred to [facility] for therapy scheduling 12/4/18. [facility] contacted and spoke to the patient, and the patient declined services at the time of the call, looking for a location closer to her home. Koko Mcarthur,   , [facility]

## 2018-12-07 NOTE — LETTER
My COPD Action Plan     Name: Gilma Pace    YOB: 1940   Date: 12/7/2018    My doctor: Gucci Wu MD   My clinic: [address] [address] [phone]  My Controller Medicine: { :653214}   Dose: ***     My Rescue Medicine: { :343820}   Dose: ***     My Flare Up Medicine: { :567076}   Dose: ***     My COPD Severity: { :901087}      Use of Oxygen: { :771028}     Make sure you've had your pneumonia   vaccines.          GREEN ZONE       Doing well today      Usual level of activity and exercise    Usual amount of cough and mucus    No shortness of breath    Usual level of health (thinking clearly, sleeping well, feel like eating) Actions:      Take daily medicines    Use oxygen as prescribed    Follow regular exercise and diet plan    Avoid cigarette smoke and other irritants that harm the lungs           YELLOW ZONE          Having a bad day or flare up      Short of breath more than usual    A lot more sputum (mucus) than usual    Sputum looks yellow, green, tan, brown or bloody    More coughing or wheezing    Fever or chills    Less energy; trouble completing activities    Trouble thinking or focusing    Using quick relief inhaler or nebulizer more often    Poor sleep; symptoms wake me up    Do not feel like eating Actions:      Get plenty of rest    Take daily medicines    Use quick relief inhaler every *** hours    If you use oxygen, call you doctor to see if you should adjust your oxygen    Do breathing exercises or other things to help you relax    Let a loved one, friend or neighbor know you are feeling worse    Call your care team if you have 2 or more symptoms.  Start taking steroids or antibiotics if directed by your care team           RED ZONE       Need medical care now      Severe shortness of breath (feel you can't breathe)    Fever, chills    Not enough breath to do any activity    Trouble coughing up mucus, walking or  talking    Blood in mucus    Frequent coughing   Rescue medicines are not working    Not able to sleep because of breathing    Feel confused or drowsy    Chest pain    Actions:      Call your health care team.  If you cannot reach your care team, call 911 or go to the emergency room.        Annual Reminders:  Meet with Care Team, Flu Shot every Fall  Pharmacy:    [facility] [address] [place], [place] - [address] [address] AT [place] [address] [address]  [facility] [address] [place], [place] - [address] [facility]

## 2018-12-10 DIAGNOSIS — F41.1 GAD (GENERALIZED ANXIETY DISORDER): ICD-10-CM

## 2018-12-11 ENCOUNTER — MYC MEDICAL ADVICE (OUTPATIENT)
Dept: FAMILY MEDICINE | Facility: CLINIC | Age: 78
End: 2018-12-11

## 2018-12-11 NOTE — TELEPHONE ENCOUNTER
Routed to provider to review. Pt message below appears to be in response to provider request from last office visit on 12/7/18.  Halina Garcia RN

## 2018-12-12 RX ORDER — BUSPIRONE HYDROCHLORIDE 15 MG/1
TABLET ORAL
Qty: 60 TABLET | Refills: 1 | Status: SHIPPED | OUTPATIENT
Start: 2018-12-12 | End: 2019-02-09

## 2018-12-12 NOTE — TELEPHONE ENCOUNTER
Refill request received within 30 days of last office visit with pcp.  Prescription is routed to the provider to please address refill.    Kaia KURTZN, RN

## 2018-12-13 ENCOUNTER — MEDICAL CORRESPONDENCE (OUTPATIENT)
Dept: HEALTH INFORMATION MANAGEMENT | Facility: CLINIC | Age: 78
End: 2018-12-13

## 2018-12-14 DIAGNOSIS — R29.898 WEAKNESS OF BOTH LOWER EXTREMITIES: ICD-10-CM

## 2018-12-14 DIAGNOSIS — Z23 NEED FOR PROPHYLACTIC VACCINATION AND INOCULATION AGAINST INFLUENZA: ICD-10-CM

## 2018-12-14 RX ORDER — TRAZODONE HYDROCHLORIDE 100 MG/1
100 TABLET ORAL AT BEDTIME
Qty: 90 TABLET | Refills: 1 | Status: SHIPPED | OUTPATIENT
Start: 2018-12-14 | End: 2019-08-28

## 2018-12-14 NOTE — TELEPHONE ENCOUNTER
Refill request states SIG: Take 2 tablets by mouth at bedtime **due for follow up**    I did not change Sig on pending order, please change if needed.

## 2019-01-08 DIAGNOSIS — Z23 NEED FOR PROPHYLACTIC VACCINATION AND INOCULATION AGAINST INFLUENZA: ICD-10-CM

## 2019-01-08 DIAGNOSIS — R29.898 WEAKNESS OF BOTH LOWER EXTREMITIES: ICD-10-CM

## 2019-01-09 ENCOUNTER — TELEPHONE (OUTPATIENT)
Dept: FAMILY MEDICINE | Facility: CLINIC | Age: 79
End: 2019-01-09

## 2019-01-09 RX ORDER — DULOXETIN HYDROCHLORIDE 60 MG/1
CAPSULE, DELAYED RELEASE ORAL
Qty: 180 CAPSULE | Refills: 2 | Status: SHIPPED | OUTPATIENT
Start: 2019-01-09 | End: 2019-10-20

## 2019-01-09 NOTE — TELEPHONE ENCOUNTER
Patient is scheduled for 2/13/19 to meet with behavioral health.   Postponing this encounter to after this appointment as PHQ-9 will likely be done at the appointment.       JERARDO MistryN RN.

## 2019-01-09 NOTE — TELEPHONE ENCOUNTER
PHQ-9 due now for patient ( 4-8 months from index date)  Index date:       8/27/18  Index PHQ9 :   12  FU start date:   12/27/18  FU End date :   4/27/19    RN- Contact patient for PHQ-9. Remission considered if follow up PHQ-9 less than 5.  If greater than 5 consider follow up appointment, e-visit for medication follow up and evaluation.     *Depression diagnosis is not on problem list - please add

## 2019-01-11 DIAGNOSIS — F41.1 GAD (GENERALIZED ANXIETY DISORDER): ICD-10-CM

## 2019-01-11 RX ORDER — BUSPIRONE HYDROCHLORIDE 15 MG/1
15 TABLET ORAL 2 TIMES DAILY
Qty: 60 TABLET | Refills: 1 | OUTPATIENT
Start: 2019-01-11

## 2019-02-09 DIAGNOSIS — F41.1 GAD (GENERALIZED ANXIETY DISORDER): ICD-10-CM

## 2019-02-11 RX ORDER — BUSPIRONE HYDROCHLORIDE 15 MG/1
TABLET ORAL
Qty: 90 TABLET | Refills: 2 | Status: SHIPPED | OUTPATIENT
Start: 2019-02-11 | End: 2019-07-13

## 2019-02-22 ENCOUNTER — OFFICE VISIT (OUTPATIENT)
Dept: BEHAVIORAL HEALTH | Facility: CLINIC | Age: 79
End: 2019-02-22
Payer: MEDICARE

## 2019-02-22 DIAGNOSIS — F32.A DEPRESSION: Primary | ICD-10-CM

## 2019-02-22 PROCEDURE — 90791 PSYCH DIAGNOSTIC EVALUATION: CPT | Performed by: SOCIAL WORKER

## 2019-02-22 ASSESSMENT — ANXIETY QUESTIONNAIRES
3. WORRYING TOO MUCH ABOUT DIFFERENT THINGS: SEVERAL DAYS
GAD7 TOTAL SCORE: 7
7. FEELING AFRAID AS IF SOMETHING AWFUL MIGHT HAPPEN: SEVERAL DAYS
6. BECOMING EASILY ANNOYED OR IRRITABLE: SEVERAL DAYS
1. FEELING NERVOUS, ANXIOUS, OR ON EDGE: SEVERAL DAYS
IF YOU CHECKED OFF ANY PROBLEMS ON THIS QUESTIONNAIRE, HOW DIFFICULT HAVE THESE PROBLEMS MADE IT FOR YOU TO DO YOUR WORK, TAKE CARE OF THINGS AT HOME, OR GET ALONG WITH OTHER PEOPLE: SOMEWHAT DIFFICULT
5. BEING SO RESTLESS THAT IT IS HARD TO SIT STILL: SEVERAL DAYS
2. NOT BEING ABLE TO STOP OR CONTROL WORRYING: SEVERAL DAYS

## 2019-02-22 ASSESSMENT — PATIENT HEALTH QUESTIONNAIRE - PHQ9
5. POOR APPETITE OR OVEREATING: SEVERAL DAYS
SUM OF ALL RESPONSES TO PHQ QUESTIONS 1-9: 8

## 2019-02-22 NOTE — PROGRESS NOTES
"                                                                                                                                                                        Adult Intake Structured Interview  Standard Diagnostic Assessment      CLIENT'S NAME: Gilma Pace  MRN:   [id number]  :   1940  ACCT. NUMBER: [id number]  DATE OF SERVICE: 19  VIDEO VISIT: No    Identifying Information:  Client is a 78 year old, ,  female. Client was referred for counseling by pcp. Client is currently retired. Client attended the session with daughter.       Client's Statement of Presenting Concern:  Client reports the reason for seeking therapy at this time as \"depression.\"  Client stated that her symptoms have resulted in the following functional impairments: relationship(s)      History of Presenting Concern:    Sounds like depression started when client was a young adult.  Has been on medicine since that time.  Two hospitalizations for depression in the s, but none since.  Sounds like things worsened this summer in the context of marital stress, which persists per her report.  Takes Cymbalta and has been on this for years, pcp is prescribing and takes BusPar for the anxiety.       Social History:  Born and raised in [place], raised by bio mom and stepshanice.  Client has two brothers and a sister.  Brother was diagnosed with schizophrenia in his 20's.  Growing up things were \"fine, I was active in school, liked high school.\"  Graduated HS and went to college and studied general courses for a year.  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     Retired and living with .  Two kids, 7 grand kids, and one great grandchild.        Client identified some stable and meaningful social connections. Client reported that she has not been involved with the legal system.  Client's highest education level was some college. Client did not identify any learning " problems. There are no ethnic, cultural or Rastafari factors that may be relevant for therapy. Client identified her preferred language to be English. Client reported she does not need the assistance of an  or other support involved in therapy. Modifications will not be used to assist communication in therapy. Client did not serve in the .     Client reports family history includes Breast Cancer in her maternal grandmother; Other Cancer in an other family member.    Mental Health History:  Client reported the following biological family members or relatives with mental health issues: brother with schizophrenia.  Client previously received the following mental health diagnosis: Anxiety and Depression.  Client has received the following mental health services in the past: counseling, inpatient mental health services and medication(s) from physician / PCP.  Hospitalizations: Twice in [place] back in the 1970's.  Client is currently receiving the following services: medication(s) from physician / PCP.      Chemical Health History:  Client reported no family history of chemical health issues. Client has not received chemical dependency treatment in the past. Client is not currently receiving any chemical dependency treatment. Client reports no problems as a result of their drinking / drug use.      Client Reports:  Client reports using alcohol 1 times per day and has 1 mixed drinks at a time. Client first started drinking at age -.  Client denies using tobacco.  Client denies using marijuana.  Client denies using caffeine.  Client denies using street drugs.  Client denies the non-medical use of prescription or over the counter drugs.    CAGE: None of the patient's responses to the CAGE screening were positive / Negative CAGE score   Based on the negative Cage-Aid score and clinical interview there  are not indications of drug or alcohol abuse.    Discussed the general effects of drugs and alcohol on  health and well-being. Therapist gave client printed information about the effects of chemical use on her health and well being.      Significant Losses / Trauma / Abuse / Neglect Issues:  There are no indications or report of: significant losses, trauma, abuse or neglect.    Issues of possible neglect are not present.      Medical Issues:  Client has had a physical exam to rule out medical causes for current symptoms. Date of last physical exam was within the past year. Client was encouraged to follow up with PCP if symptoms were to develop. The client has a [facility] Provider, who is named Gucci Wu. The client reports not having a psychiatrist. Client reports the following current medical concerns: per EMR. The client reports the presence of chronic or episodic pain in the form of back pain. The pain level is moderate and has a frequency of daily.. There are not significant nutritional concerns.     Client reports current meds as:   Current Outpatient Medications   Medication Sig     alendronate (FOSAMAX) 70 MG tablet Take 1 tablet (70 mg) by mouth every 7 days     amLODIPine (NORVASC) 10 MG tablet Take 1 tablet (10 mg) by mouth daily     azithromycin (ZITHROMAX) 250 MG tablet Take 2 tablets on the 1st day and one tablet daily for the next 4 days     busPIRone (BUSPAR) 15 MG tablet TAKE 1 TABLET BY MOUTH TWICE DAILY     clotrimazole-betamethasone (LOTRISONE) cream Apply topically 2 times daily To the corners of the mouth     DULoxetine (CYMBALTA) 60 MG capsule TAKE 2 CAPSULES BY MOUTH ONCE DAILY     Ferrous Gluconate 240 (27 Fe) MG TABS Take 1 tablet by mouth daily     FOLIC ACID PO Take 1 mg by mouth daily     furosemide (LASIX) 20 MG tablet Take 20 mg by mouth daily     gabapentin (NEURONTIN) 600 MG tablet Take 1 tablet (600 mg) by mouth 2 times daily     IBUPROFEN PO Take 200 mg by mouth as needed      levothyroxine (SYNTHROID/LEVOTHROID) 50 MCG tablet Take 50 mcg by mouth daily      levothyroxine (SYNTHROID/LEVOTHROID) 88 MCG tablet Take 1 tablet (88 mcg) by mouth daily     lisinopril (PRINIVIL/ZESTRIL) 40 MG tablet Take 1 tablet (40 mg) by mouth daily     nystatin (MYCOSTATIN) 589245 UNIT/GM external powder Apply topically 2 times daily     tiotropium (SPIRIVA HANDIHALER) 18 MCG capsule Inhale 18 mcg into the lungs daily     tiZANidine (ZANAFLEX) 4 MG tablet Take 1 tablet (4 mg) by mouth At Bedtime     traZODone (DESYREL) 100 MG tablet Take 1 tablet (100 mg) by mouth At Bedtime     No current facility-administered medications for this visit.        Client Allergies:  Allergies   Allergen Reactions     Codeine Unknown     nausea     Sulfa Drugs GI Disturbance     GI upset     Tetracyclines GI Disturbance     Gi upset         Medical History:  Past Medical History:   Diagnosis Date     Anxiety 10/11/2006    Last visit with Mental health specialist in 2002.      Chronic low back pain without sciatica 10/31/2016     Compression fracture of thoracic vertebra (H) 6/13/2011    Overview:  T  11  compression  fx  on  xrays  from  6/13/2011  . Ongoing pain  x  3  months   Get  eval  and  treat  with  spine  specialists  as discussed  Initially  treated  in [place]  .  Pain  is  controlled   with  advil  as needed  . Reviewed   with  patient      Congestive heart failure (H) 10/11/2006    Overview:  Epic      COPD (chronic obstructive pulmonary disease) (H) 9/28/2017     Folate deficiency 6/9/2017    Overview:  Formatting of this note may be different from the original. Hemoglobin (g/dl)  Date Value  06/05/2017 15.6  10/31/2016 13.8  11/04/2015 11.3 (L)   Iron (mcg/dl)  Date Value  06/05/2017 49 (L)   TIBC, Calculated (mcg/dl)  Date Value  06/05/2017 319   % Saturation, calc. (%)  Date Value  06/05/2017 15   Vitamin B12 (pg/ml)  Date Value  06/05/2017 253   Folate (ng/ml)  Date Value  06/05/20     Hypertension goal BP (blood pressure) < 140/90 9/28/2017     Mitral regurgitation 2/11/2014    Overview:   Needs dental prophylaxis      Mixed hyperlipidemia 10/11/2006     Osteoporosis 9/28/2017     Postoperative hypothyroidism 9/28/2017         Medication Adherence:  Client reports taking prescribed medications as prescribed.    Client was provided recommendation to follow-up with prescribing physician.    Mental Status Assessment:  Appearance:   Appropriate   Eye Contact:   Good   Psychomotor Behavior: Normal   Attitude:   Cooperative   Orientation:   All  Speech   Rate / Production: Normal    Volume:  Normal   Mood:    Anxious  Depressed   Affect:    Appropriate   Thought Content:  Clear   Thought Form:  Coherent  Logical   Insight:    Good       Review of Symptoms:  Depression: PHQ-9 score of 8  Irena:  No symptoms  Psychosis: No symptoms  Anxiety: ARISTIDES-7 score of 7  Panic:  No symptoms  Post Traumatic Stress Disorder: No symptoms  Obsessive Compulsive Disorder: No symptoms  Eating Disorder: No symptoms  Oppositional Defiant Disorder: No symptoms  ADD / ADHD: No symptoms  Conduct Disorder: No symptoms      Safety Assessment:    History of Safety Concerns:   Client denied a history of suicidal ideation.    Client denied a history of suicide attempts.    Client denied a history of homicidal ideation.    Client denied a history of self-injurious ideation and behaviors.    Client denied a history of personal safety concerns.    Client denied a history of assaultive behaviors.        Current Safety Concerns:  Client denies current suicidal ideation.    Client denies current homicidal ideation and behaviors.  Client denies current self-injurious ideation and behaviors.    Client denies current concerns for personal safety.    Client reports the following protective factors: effectively controls impulses, adherence with prescribed medication, living with other people, daily obligations, structured day, uses community crisis resources and effective problem-solving skills    Client reports there are no firearms in the house.      Plan for Safety and Risk Management:  A safety and risk management plan has not been developed at this time, however client was given the after-hours number / 911 should there be a change in any of these risk factors.    Client's Strengths and Limitations:  Client identified the following strengths or resources that will help her succeed in counseling: family support. Client identified the following supports: family. Things that may interfere with the client's success in counseling include: -.      Diagnostic Criteria:  CRITERIA (A-C) REPRESENT A MAJOR DEPRESSIVE EPISODE - SELECT THESE CRITERIA  A) Recurrent episode(s) - symptoms have been present during the same 2-week period and represent a change from previous functioning 5 or more symptoms (required for diagnosis)   - Depressed mood. Note: In children and adolescents, can be irritable mood.     - Diminished interest or pleasure in all, or almost all, activities.    - Significant weight gainincrease in appetite.    - Decreased sleep.    - Psychomotor activity agitation.    - Fatigue or loss of energy.    - Feelings of worthlessness or inappropriate guilt.    - Diminished ability to think or concentrate, or indecisiveness.    - Recurrent thoughts of death (not just fear of dying), recurrent suicidal ideation without a specific plan, or a suicide attempt or a specific plan for committing suicide.   B) The symptoms cause clinically significant distress or impairment in social, occupational, or other important areas of functioning  C) The episode is not attributable to the physiological effects of a substance or to another medical condition  D) The occurence of major depressive episode is not better explained by other thought / psychotic disorders  E) There has never been a manic episode or hypomanic episode      Functional Status:  Client's symptoms have caused and are causing reduced functional status in the following areas: Social / Relational - -      DSM5  Diagnoses: (Sustained by DSM5 Criteria Listed Above)  Diagnoses: 296.32 (F33.1) Major Depressive Disorder, Recurrent Episode, Moderate _  300.02 (F41.1) Generalized Anxiety Disorder  Psychosocial & Contextual Factors: some stress with   WHODAS 2.0 (12 item)    DECLINES WHODAS TODAY            This questionnaire asks about difficulties due to health conditions. Health conditions  include  disease or illnesses, other health problems that may be short or long lasting,  injuries, mental health or emotional problems, and problems with alcohol or drugs.                     Think back over the past 30 days and answer these questions, thinking about how much  difficulty you had doing the following activities. For each question, please [place] only  one response.      Attendance Agreement:  Client has signed Attendance Agreement:Yes      Collaboration:  The client is receiving treatment / structured support from the following professional(s) / service and treatment. Collaboration will be initiated with: primary care physician.      Preliminary Treatment Plan:  The client reports no currently identified Sikh, ethnic or cultural issues relevant to therapy.     services will be used for therapy.    Modifications to assist communication will be used for therapy.    The concerns identified by the client will be addressed in therapy.    Initial Treatment will focus on: Depressed Mood - -.    As a preliminary treatment goal, client will develop more effective coping skills to manage depressive symptoms.    The focus of initial interventions will be to teach CBT skills.    Referral to another professional/service is not indicated at this time..    A Release of Information is not needed at this time.    Report to child / adult protection services was NA.    Client will have access to their [facility]' medical record.    William Dickson, Mid Coast HospitalSW  February 22, 2019

## 2019-02-23 ASSESSMENT — ANXIETY QUESTIONNAIRES: GAD7 TOTAL SCORE: 7

## 2019-03-01 ENCOUNTER — OFFICE VISIT (OUTPATIENT)
Dept: BEHAVIORAL HEALTH | Facility: CLINIC | Age: 79
End: 2019-03-01
Payer: MEDICARE

## 2019-03-01 DIAGNOSIS — F41.9 ANXIETY: Primary | ICD-10-CM

## 2019-03-01 PROCEDURE — 90834 PSYTX W PT 45 MINUTES: CPT | Performed by: SOCIAL WORKER

## 2019-03-01 NOTE — PROGRESS NOTES
"                                             Progress Note    Client Name: Gilma Pace  Date: 3/1/2019           Service Type: Individual  Video Visit: No     Session Start Time: 1230  Session End Time: 115     Session Length: 38-52    Session #: 2    Attendees: Client and daughter     Treatment Plan Last Reviewed: 3/1/2019   PHQ-9 / ARISTIDES-7 :     DATA  Interactive Complexity: No  Crisis: No       Progress Since Last Session (Related to Symptoms / Goals / Homework):   Symptoms: No change -    Homework: Did not complete      Episode of Care Goals: Minimal progress - CONTEMPLATION (Considering change and yet undecided); Intervened by assessing the negative and positive thinking (ambivalence) about behavior change     Current / Ongoing Stressors and Concerns:   Client notes that she has been \"ok.\"  Still very concerned about affairs she believes that her  had many years ago.  Feels like he has \"made a fool out of me\" and is upset most about this, more so than the actual affair.  Also she reports that he has never been a good  to her and she wishes that she had gotten a divorce 20 years ago.  Daughter is here today in session and relates that client spends a lot of time watching romantic foreign films and she wonders whether these ideas are on her mothers mind so much lately because of her focus on these movies.  Sounds like she has given up exercising and knitting.  Does not do much other than watch movies at home.          Treatment Objective(s) Addressed in This Session:       Goal- Anxiety: Client will decrease anxiety    I will know I've met my goal when I am less anxious.      Objective #A (Client Action)      Client will use cognitive strategies identified in therapy to challenge anxious thoughts.      Objective #B    Client will use at least 3 coping skills for anxiety management in the next 12 weeks.     -behavioral activation: join the [facility]    Objective #C    Client will identify three " distraction and diversion activities and use those activities to decrease level of anxiety.       Intervention:   CBT: - Long discussion today about behavioral activation.  Reviewed basic tracking options, identified differences between long-term v short-term planning, and discussed setting and achieving SMART goals as an effective method for dealing with depression, anxiety, and many other mental health concerns.  Set behavioral goals in session today.               ASSESSMENT: Current Emotional / Mental Status (status of significant symptoms):   Risk status (Self / Other harm or suicidal ideation)   Client denies current fears or concerns for personal safety.   Client denies current or recent suicidal ideation or behaviors.   Client denies current or recent homicidal ideation or behaviors.   Client denies current or recent self injurious behavior or ideation.   Client denies other safety concerns.   Client Client reports there has been no change in risk factors since their last session.     Client Client reports there has been no change in protective factors since their last session.     A safety and risk management plan has not been developed at this time, however client was given the after-hours number / 911 should there be a change in any of these risk factors.     Appearance:   Appropriate    Eye Contact:   Good    Psychomotor Behavior: Agitated    Attitude:   Cooperative    Orientation:   All   Speech    Rate / Production: Pressured     Volume:  Normal    Mood:    Anxious  Depressed    Affect:    Appropriate    Thought Content:  Clear    Thought Form:  Coherent  Logical    Insight:    Good      Medication Review:   No changes to current psychiatric medication(s)     Medication Compliance:   Yes     Changes in Health Issues:   None reported     Chemical Use Review:   Substance Use: Chemical use reviewed, no active concerns identified      Tobacco Use: No current tobacco use.      Diagnosis:  1. Anxiety         Collateral Reports Completed:   Not Applicable    PLAN: (Client Tasks / Therapist Tasks / Other)  1.  Client will log activities of Achievement, Closeness, and Enjoyment (ACEs) using ACE rating scale and bring to next session.           2.  Meet in two weeks        VONNIE Alcantara                                                         ______________________________________________________________________    Treatment Plan    Client's Name: Gilma Pace  YOB: 1940    Date: 3/1/2019     DSM-V Diagnoses: Diagnoses: 296.32 (F33.1) Major Depressive Disorder, Recurrent Episode, Moderate _  300.02 (F41.1) Generalized Anxiety Disorder  Psychosocial & Contextual Factors: some stress with   WHODAS 2.0 (12 item)    DECLINES WHODAS TODAY            This questionnaire asks about difficulties due to health conditions. Health conditions  include  disease or illnesses, other health problems that may be short or long lasting,  injuries, mental health or emotional problems, and problems with alcohol or drugs.                     Think back over the past 30 days and answer these questions, thinking about how much  difficulty you had doing the following activities. For each question, please [place] only  one response.    Referral / Collaboration:  Referral to another professional/service is not indicated at this time..    Anticipated number of session or this episode of care: 18-24      MeasurableTreatment Goal(s) related to diagnosis / functional impairment(s)      Goal- Anxiety: Client will decrease anxiety    I will know I've met my goal when I am less anxious.      Objective #A (Client Action)    Status: New - Date: 3/1/2019    Client will use cognitive strategies identified in therapy to challenge anxious thoughts.    Intervention(s)  Therapist will provide psychoeducation, behavioral activation, and cognitive restructuring.    Objective #B  Client will use at least 3 coping skills for anxiety  management in the next 12 weeks.    Status: New - Date: 3/1/2019    Intervention(s)  Therapist will provide psychoeducation, behavioral activation, and cognitive restructuring.      Objective #C  Client will identify three distraction and diversion activities and use those activities to decrease level of anxiety.  Status: New - Date: 3/1/2019    Intervention(s)  Therapist will provide psychoeducation, behavioral activation, and cognitive restructuring.                  Client has reviewed and agreed to the above plan.      William Dickson, VONNIE  March 1, 2019

## 2019-04-12 ENCOUNTER — OFFICE VISIT (OUTPATIENT)
Dept: BEHAVIORAL HEALTH | Facility: CLINIC | Age: 79
End: 2019-04-12
Payer: MEDICARE

## 2019-04-12 DIAGNOSIS — F41.9 ANXIETY: Primary | ICD-10-CM

## 2019-04-12 PROCEDURE — 90834 PSYTX W PT 45 MINUTES: CPT | Performed by: SOCIAL WORKER

## 2019-04-12 NOTE — PROGRESS NOTES
"                                             Progress Note    Client Name: Gilma Pace  Date: 4/12/2019           Service Type: Individual  Video Visit: No     Session Start Time: 1230  Session End Time: 115     Session Length: 38-52    Session #: 3    Attendees: Client and Spouse / Significant Other \"Deke\"     Treatment Plan Last Reviewed: 3/1/2019   PHQ-9 / ARISTIDES-7 :     DATA  Interactive Complexity: No  Crisis: No       Progress Since Last Session (Related to Symptoms / Goals / Homework):   Symptoms: No change -    Homework: Did not complete      Episode of Care Goals: Minimal progress - CONTEMPLATION (Considering change and yet undecided); Intervened by assessing the negative and positive thinking (ambivalence) about behavior change     Current / Ongoing Stressors and Concerns:      Client notes that she has been \"ok.\"   is here today and client wishes to discuss him cheating on her when they were young.   reports that about 6 months ago client started accusing him of this and pressing him for information about when, where, etc.  He also states that she began having trouble with her memory, attention, concentration, and also started getting more irritable.  Sounds like they have had some heated exchanges and today we contract for client and  to not raise their voices when they disagree.   reports today that he did not cheat on her in the past, but client does not believe him.  Current stressors include financial trouble,  had surgery last year and they are still paying bills from this.  Also  works part time as a  but has not been able to work this winter so their financial problems have been worse.             Treatment Objective(s) Addressed in This Session:       Goal- Anxiety: Client will decrease anxiety    I will know I've met my goal when I am less anxious.      Objective #A (Client Action)      Client will use cognitive strategies identified in " therapy to challenge anxious thoughts.      Objective #B    Client will use at least 3 coping skills for anxiety management in the next 12 weeks.     -behavioral activation: join the [facility]    Objective #C    Client will identify three distraction and diversion activities and use those activities to decrease level of anxiety.       Intervention:   CBT: - Long discussion today about behavioral activation.  Reviewed basic tracking options, identified differences between long-term v short-term planning, and discussed setting and achieving SMART goals as an effective method for dealing with depression, anxiety, and many other mental health concerns.  Set behavioral goals in session today.               ASSESSMENT: Current Emotional / Mental Status (status of significant symptoms):   Risk status (Self / Other harm or suicidal ideation)   Client denies current fears or concerns for personal safety.   Client denies current or recent suicidal ideation or behaviors.   Client denies current or recent homicidal ideation or behaviors.   Client denies current or recent self injurious behavior or ideation.   Client denies other safety concerns.   Client Client reports there has been no change in risk factors since their last session.     Client Client reports there has been no change in protective factors since their last session.     A safety and risk management plan has not been developed at this time, however client was given the after-hours number / 911 should there be a change in any of these risk factors.     Appearance:   Appropriate    Eye Contact:   Good    Psychomotor Behavior: Agitated    Attitude:   Cooperative    Orientation:   All   Speech    Rate / Production: Pressured     Volume:  Normal    Mood:    Anxious  Depressed    Affect:    Appropriate    Thought Content:  Clear    Thought Form:  Coherent  Logical    Insight:    Good      Medication Review:   No changes to current psychiatric medication(s)     Medication  Compliance:   Yes     Changes in Health Issues:   None reported     Chemical Use Review:   Substance Use: Chemical use reviewed, no active concerns identified      Tobacco Use: No current tobacco use.      Diagnosis:  1. Anxiety        Collateral Reports Completed:   Not Applicable    PLAN: (Client Tasks / Therapist Tasks / Other)  1.  Client will log activities of Achievement, Closeness, and Enjoyment (ACEs) using ACE rating scale and bring to next session.           2.  Client will log productive v. unproductive worry at least once by next session.  Worksheet given in session to facilitate this.      3.  Client will call to make next appt        VONNIE Alcantara                                                         ______________________________________________________________________    Treatment Plan    Client's Name: Gilma Pace  YOB: 1940    Date: 3/1/2019     DSM-V Diagnoses: Diagnoses: 296.32 (F33.1) Major Depressive Disorder, Recurrent Episode, Moderate _  300.02 (F41.1) Generalized Anxiety Disorder  Psychosocial & Contextual Factors: some stress with   WHODAS 2.0 (12 item)    DECLINES WHODAS TODAY            This questionnaire asks about difficulties due to health conditions. Health conditions  include  disease or illnesses, other health problems that may be short or long lasting,  injuries, mental health or emotional problems, and problems with alcohol or drugs.                     Think back over the past 30 days and answer these questions, thinking about how much  difficulty you had doing the following activities. For each question, please [place] only  one response.    Referral / Collaboration:  Referral to another professional/service is not indicated at this time..    Anticipated number of session or this episode of care: 18-24      MeasurableTreatment Goal(s) related to diagnosis / functional impairment(s)      Goal- Anxiety: Client will decrease anxiety    I will  know I've met my goal when I am less anxious.      Objective #A (Client Action)    Status: New - Date: 3/1/2019    Client will use cognitive strategies identified in therapy to challenge anxious thoughts.    Intervention(s)  Therapist will provide psychoeducation, behavioral activation, and cognitive restructuring.    Objective #B  Client will use at least 3 coping skills for anxiety management in the next 12 weeks.    Status: New - Date: 3/1/2019    Intervention(s)  Therapist will provide psychoeducation, behavioral activation, and cognitive restructuring.      Objective #C  Client will identify three distraction and diversion activities and use those activities to decrease level of anxiety.  Status: New - Date: 3/1/2019    Intervention(s)  Therapist will provide psychoeducation, behavioral activation, and cognitive restructuring.                  Client has reviewed and agreed to the above plan.      William Dickson, VONNIE  March 1, 2019

## 2019-04-19 ENCOUNTER — OFFICE VISIT (OUTPATIENT)
Dept: BEHAVIORAL HEALTH | Facility: CLINIC | Age: 79
End: 2019-04-19
Payer: MEDICARE

## 2019-04-19 DIAGNOSIS — F41.9 ANXIETY: Primary | ICD-10-CM

## 2019-04-19 PROCEDURE — 90834 PSYTX W PT 45 MINUTES: CPT | Performed by: SOCIAL WORKER

## 2019-04-19 NOTE — PROGRESS NOTES
"                                             Progress Note    Client Name: Gilma Pace  Date: 4/12/2019           Service Type: Individual  Video Visit: No     Session Start Time: 1230  Session End Time: 115     Session Length: 38-52    Session #: 3    Attendees: Client and daughter      Treatment Plan Last Reviewed: 3/1/2019   PHQ-9 / ARISTIDES-7 :     DATA  Interactive Complexity: No  Crisis: No       Progress Since Last Session (Related to Symptoms / Goals / Homework):   Symptoms: No change -    Homework: Did not complete      Episode of Care Goals: Minimal progress - CONTEMPLATION (Considering change and yet undecided); Intervened by assessing the negative and positive thinking (ambivalence) about behavior change     Current / Ongoing Stressors and Concerns:      Client notes that she has been \"has been physically sick, couldn't eat for three days and had the chills.\"             Treatment Objective(s) Addressed in This Session:       Goal- Anxiety: Client will decrease anxiety    I will know I've met my goal when I am less anxious.      Objective #A (Client Action)      Client will use cognitive strategies identified in therapy to challenge anxious thoughts.      Objective #B    Client will use at least 3 coping skills for anxiety management in the next 12 weeks.     -behavioral activation: ACE    Objective #C    Client will identify three distraction and diversion activities and use those activities to decrease level of anxiety.       Intervention:   CBT: - Long discussion today about behavioral activation.  Reviewed basic tracking options, identified differences between long-term v short-term planning, and discussed setting and achieving SMART goals as an effective method for dealing with depression, anxiety, and many other mental health concerns.  Set behavioral goals in session today.               ASSESSMENT: Current Emotional / Mental Status (status of significant symptoms):   Risk status (Self / Other harm " or suicidal ideation)   Client denies current fears or concerns for personal safety.   Client denies current or recent suicidal ideation or behaviors.   Client denies current or recent homicidal ideation or behaviors.   Client denies current or recent self injurious behavior or ideation.   Client denies other safety concerns.   Client Client reports there has been no change in risk factors since their last session.     Client Client reports there has been no change in protective factors since their last session.     A safety and risk management plan has not been developed at this time, however client was given the after-hours number / 911 should there be a change in any of these risk factors.     Appearance:   Appropriate    Eye Contact:   Good    Psychomotor Behavior: Agitated    Attitude:   Cooperative    Orientation:   All   Speech    Rate / Production: Pressured     Volume:  Normal    Mood:    Anxious  Depressed    Affect:    Appropriate    Thought Content:  Clear    Thought Form:  Coherent  Logical    Insight:    Good      Medication Review:   No changes to current psychiatric medication(s)     Medication Compliance:   Yes     Changes in Health Issues:   None reported     Chemical Use Review:   Substance Use: Chemical use reviewed, no active concerns identified      Tobacco Use: No current tobacco use.      Diagnosis:  1. Anxiety        Collateral Reports Completed:   Not Applicable    PLAN: (Client Tasks / Therapist Tasks / Other)  1.  Client will log activities of Achievement, Closeness, and Enjoyment (ACEs) using ACE rating scale and bring to next session.           2.  Client will log productive v. unproductive worry at least once by next session.  Worksheet given in session to facilitate this.      3.  Client will call to make next appt        VONNIE Alcantara                                                         ______________________________________________________________________    Treatment  Plan    Client's Name: Gilma Pace  YOB: 1940    Date: 3/1/2019     DSM-V Diagnoses: Diagnoses: 296.32 (F33.1) Major Depressive Disorder, Recurrent Episode, Moderate _  300.02 (F41.1) Generalized Anxiety Disorder  Psychosocial & Contextual Factors: some stress with   WHODAS 2.0 (12 item)    DECLINES WHODAS TODAY            This questionnaire asks about difficulties due to health conditions. Health conditions  include  disease or illnesses, other health problems that may be short or long lasting,  injuries, mental health or emotional problems, and problems with alcohol or drugs.                     Think back over the past 30 days and answer these questions, thinking about how much  difficulty you had doing the following activities. For each question, please [place] only  one response.    Referral / Collaboration:  Referral to another professional/service is not indicated at this time..    Anticipated number of session or this episode of care: 18-24      MeasurableTreatment Goal(s) related to diagnosis / functional impairment(s)      Goal- Anxiety: Client will decrease anxiety    I will know I've met my goal when I am less anxious.      Objective #A (Client Action)    Status: New - Date: 3/1/2019    Client will use cognitive strategies identified in therapy to challenge anxious thoughts.    Intervention(s)  Therapist will provide psychoeducation, behavioral activation, and cognitive restructuring.    Objective #B  Client will use at least 3 coping skills for anxiety management in the next 12 weeks.    Status: New - Date: 3/1/2019    Intervention(s)  Therapist will provide psychoeducation, behavioral activation, and cognitive restructuring.      Objective #C  Client will identify three distraction and diversion activities and use those activities to decrease level of anxiety.  Status: New - Date: 3/1/2019    Intervention(s)  Therapist will provide psychoeducation, behavioral activation, and  cognitive restructuring.                  Client has reviewed and agreed to the above plan.      William Dickson, [facility] Hospital[facility]  March 1, 2019

## 2019-05-01 ENCOUNTER — OFFICE VISIT (OUTPATIENT)
Dept: FAMILY MEDICINE | Facility: CLINIC | Age: 79
End: 2019-05-01
Payer: MEDICARE

## 2019-05-01 VITALS
TEMPERATURE: 97.6 F | WEIGHT: 193 LBS | HEART RATE: 125 BPM | HEIGHT: 63 IN | SYSTOLIC BLOOD PRESSURE: 74 MMHG | OXYGEN SATURATION: 86 % | RESPIRATION RATE: 24 BRPM | DIASTOLIC BLOOD PRESSURE: 49 MMHG | BODY MASS INDEX: 34.2 KG/M2

## 2019-05-01 DIAGNOSIS — R29.898 WEAKNESS OF BOTH LOWER EXTREMITIES: ICD-10-CM

## 2019-05-01 DIAGNOSIS — N39.41 URGE INCONTINENCE OF URINE: ICD-10-CM

## 2019-05-01 DIAGNOSIS — E03.9 HYPOTHYROIDISM, UNSPECIFIED TYPE: Primary | ICD-10-CM

## 2019-05-01 DIAGNOSIS — Z23 NEED FOR PROPHYLACTIC VACCINATION AND INOCULATION AGAINST INFLUENZA: ICD-10-CM

## 2019-05-01 DIAGNOSIS — L29.9 SCALP PRURITUS: ICD-10-CM

## 2019-05-01 DIAGNOSIS — F32.1 MODERATE MAJOR DEPRESSION (H): ICD-10-CM

## 2019-05-01 DIAGNOSIS — Z78.0 ASYMPTOMATIC POSTMENOPAUSAL STATUS: Primary | ICD-10-CM

## 2019-05-01 DIAGNOSIS — E89.0 POSTOPERATIVE HYPOTHYROIDISM: ICD-10-CM

## 2019-05-01 LAB
T4 FREE SERPL-MCNC: 1.56 NG/DL (ref 0.76–1.46)
TSH SERPL DL<=0.005 MIU/L-ACNC: 8.87 MU/L (ref 0.4–4)

## 2019-05-01 PROCEDURE — 99214 OFFICE O/P EST MOD 30 MIN: CPT | Performed by: FAMILY MEDICINE

## 2019-05-01 PROCEDURE — 84439 ASSAY OF FREE THYROXINE: CPT | Performed by: FAMILY MEDICINE

## 2019-05-01 PROCEDURE — 36415 COLL VENOUS BLD VENIPUNCTURE: CPT | Performed by: FAMILY MEDICINE

## 2019-05-01 PROCEDURE — 84443 ASSAY THYROID STIM HORMONE: CPT | Performed by: FAMILY MEDICINE

## 2019-05-01 RX ORDER — ARIPIPRAZOLE 2 MG/1
2 TABLET ORAL AT BEDTIME
Qty: 30 TABLET | Refills: 1 | Status: SHIPPED | OUTPATIENT
Start: 2019-05-01 | End: 2019-06-14

## 2019-05-01 RX ORDER — LEVOTHYROXINE SODIUM 75 UG/1
75 TABLET ORAL DAILY
Qty: 90 TABLET | Refills: 0 | Status: SHIPPED | OUTPATIENT
Start: 2019-05-01 | End: 2019-09-18

## 2019-05-01 RX ORDER — LISINOPRIL 40 MG/1
40 TABLET ORAL DAILY
COMMUNITY
End: 2019-06-14

## 2019-05-01 RX ORDER — CLOBETASOL PROPIONATE 0.05 G/100ML
SHAMPOO TOPICAL
Qty: 118 ML | Refills: 2 | Status: SHIPPED | OUTPATIENT
Start: 2019-05-01 | End: 2020-01-29

## 2019-05-01 ASSESSMENT — ANXIETY QUESTIONNAIRES
GAD7 TOTAL SCORE: 16
IF YOU CHECKED OFF ANY PROBLEMS ON THIS QUESTIONNAIRE, HOW DIFFICULT HAVE THESE PROBLEMS MADE IT FOR YOU TO DO YOUR WORK, TAKE CARE OF THINGS AT HOME, OR GET ALONG WITH OTHER PEOPLE: EXTREMELY DIFFICULT
2. NOT BEING ABLE TO STOP OR CONTROL WORRYING: NEARLY EVERY DAY
3. WORRYING TOO MUCH ABOUT DIFFERENT THINGS: NEARLY EVERY DAY
7. FEELING AFRAID AS IF SOMETHING AWFUL MIGHT HAPPEN: SEVERAL DAYS
1. FEELING NERVOUS, ANXIOUS, OR ON EDGE: NEARLY EVERY DAY
5. BEING SO RESTLESS THAT IT IS HARD TO SIT STILL: SEVERAL DAYS
6. BECOMING EASILY ANNOYED OR IRRITABLE: NEARLY EVERY DAY

## 2019-05-01 ASSESSMENT — PAIN SCALES - GENERAL: PAINLEVEL: NO PAIN (0)

## 2019-05-01 ASSESSMENT — PATIENT HEALTH QUESTIONNAIRE - PHQ9
5. POOR APPETITE OR OVEREATING: MORE THAN HALF THE DAYS
SUM OF ALL RESPONSES TO PHQ QUESTIONS 1-9: 19

## 2019-05-01 ASSESSMENT — MIFFLIN-ST. JEOR: SCORE: 1329.57

## 2019-05-01 NOTE — PROGRESS NOTES
"SUBJECTIVE:  Gilma Paec is a 78 year old female who presents for a follow up evaluation of depression. The patient has been on Cymbalta (duloxetine) 120  mg daily since the fall.   She has been seeing a therapist \"Zeyad\" and he had concerns about her lately.    The patient  reports that her symptoms have gotten worse recently.    She reports that she has a lot of arguments with her . She believes that she has cheated on her.    Her daughter reports that her behavior has changed.   -She has paranoid feelings  -She has threatened to call the police.   -She will have good days a few in a row but overall things are worse.  -she thinks that her  is out with other women when he says he is working driving a bus.      She gets angry with her children.   She gets upset when they try to help.   She feels that people are talking about her behind her back.  She denies any concerns about people harming her or harming herself..       She sleeps usually about 8 hrs but awakens frequently.  She usually sleeps in the living room now. She avoids the bedroom because her  sleeps in there.                 The patient reports that she is not having any side effects from her current medication.      Current Outpatient Medications   Medication     alendronate (FOSAMAX) 70 MG tablet     amLODIPine (NORVASC) 10 MG tablet     busPIRone (BUSPAR) 15 MG tablet     clotrimazole-betamethasone (LOTRISONE) cream     DULoxetine (CYMBALTA) 60 MG capsule     Ferrous Gluconate 240 (27 Fe) MG TABS     FOLIC ACID PO     gabapentin (NEURONTIN) 600 MG tablet     IBUPROFEN PO     levothyroxine (SYNTHROID/LEVOTHROID) 50 MCG tablet     lisinopril (PRINIVIL/ZESTRIL) 40 MG tablet     nystatin (MYCOSTATIN) 873610 UNIT/GM external powder     tiotropium (SPIRIVA HANDIHALER) 18 MCG capsule     tiZANidine (ZANAFLEX) 4 MG tablet     traZODone (DESYREL) 100 MG tablet     No current facility-administered medications for this visit.  "     OBJECTIVE:  General: the patient had a sad affect during the visit today and cried several times.    Wt Readings from Last 4 Encounters:   05/01/19 87.5 kg (193 lb)   12/07/18 92.9 kg (204 lb 12.8 oz)   11/30/18 94.8 kg (209 lb)   09/28/18 96.2 kg (212 lb)         ASSESSMENT: Chronic Depression which is Worsening  There may be some paranoid symptom(s) here as well     Depression risk factors: possible organic causes of depression: hypothyroidism.    PLAN:  Check a TSH today   We will continue the patient on the same dose of her antidepressant and add abilify 2 mg have the patient return to clinic for appointment in 1 month(s).   The patient was recommended to seek continue(s) counseling as an adjunct treatment to the prescribed medications.

## 2019-05-01 NOTE — LETTER
My Depression Action Plan  Name: Gilma Pace   Date of Birth 1940  Date: 5/1/2019    My doctor: Gucci Wu   My clinic: [address] [address] [phone]          GREEN    ZONE   Good Control    What it looks like:     Things are going generally well. You have normal up s and down s. You may even feel depressed from time to time, but bad moods usually last less than a day.   What you need to do:  1. Continue to care for yourself (see self care plan)  2. Check your depression survival kit and update it as needed  3. Follow your physician s recommendations including any medication.  4. Do not stop taking medication unless you consult with your physician first.           YELLOW         ZONE Getting Worse    What it looks like:     Depression is starting to interfere with your life.     It may be hard to get out of bed; you may be starting to isolate yourself from others.    Symptoms of depression are starting to last most all day and this has happened for several days.     You may have suicidal thoughts but they are not constant.   What you need to do:     1. Call your care team, your response to treatment will improve if you keep your care team informed of your progress. Yellow periods are signs an adjustment may need to be made.     2. Continue your self-care, even if you have to fake it!    3. Talk to someone in your support network    4. Open up your depression survival kit           RED    ZONE Medical Alert - Get Help    What it looks like:     Depression is seriously interfering with your life.     You may experience these or other symptoms: You can t get out of bed most days, can t work or engage in other necessary activities, you have trouble taking care of basic hygiene, or basic responsibilities, thoughts of suicide or death that will not go away, self-injurious behavior.     What you need to do:  1. Call your care team and request  a same-day appointment. If they are not available (weekends or after hours) call your local crisis line, emergency room or 911.            Depression Self Care Plan / Survival Kit    Self-Care for Depression  Here s the deal. Your body and mind are really not as separate as most people think.  What you do and think affects how you feel and how you feel influences what you do and think. This means if you do things that people who feel good do, it will help you feel better.  Sometimes this is all it takes.  There is also a place for medication and therapy depending on how severe your depression is, so be sure to consult with your medical provider and/ or Behavioral Health Consultant if your symptoms are worsening or not improving.     In order to better manage my stress, I will:    Exercise  Get some form of exercise, every day. This will help reduce pain and release endorphins, the  feel good  chemicals in your brain. This is almost as good as taking antidepressants!  This is not the same as joining a gym and then never going! (they count on that by the way ) It can be as simple as just going for a walk or doing some gardening, anything that will get you moving.      Hygiene   Maintain good hygiene (Get out of bed in the morning, Make your bed, Brush your teeth, Take a shower, and Get dressed like you were going to work, even if you are unemployed).  If your clothes don't fit try to get ones that do.    Diet  I will strive to eat foods that are good for me, drink plenty of water, and avoid excessive sugar, caffeine, alcohol, and other mood-altering substances.  Some foods that are helpful in depression are: complex carbohydrates, B vitamins, flaxseed, fish or fish oil, fresh fruits and vegetables.    Psychotherapy  I agree to participate in Individual Therapy (if recommended).    Medication  If prescribed medications, I agree to take them.  Missing doses can result in serious side effects.  I understand that drinking  alcohol, or other illicit drug use, may cause potential side effects.  I will not stop my medication abruptly without first discussing it with my provider.    Staying Connected With Others  I will stay in touch with my friends, family members, and my primary care provider/team.    Use your imagination  Be creative.  We all have a creative side; it doesn t matter if it s oil painting, sand castles, or mud pies! This will also kick up the endorphins.    Witness Beauty  (AKA stop and smell the roses) Take a look outside, even in mid-winter. Notice colors, textures. Watch the squirrels and birds.     Service to others  Be of service to others.  There is always someone else in need.  By helping others we can  get out of ourselves  and remember the really important things.  This also provides opportunities for practicing all the other parts of the program.    Humor  Laugh and be silly!  Adjust your TV habits for less news and crime-drama and more comedy.    Control your stress  Try breathing deep, massage therapy, biofeedback, and meditation. Find time to relax each day.     My support system    Clinic Contact:  Phone number:    Contact 1:  Phone number:    Contact 2:  Phone number:    Congregation/:  Phone number:    Therapist:  Phone number:    Local crisis center:    Phone number:    Other community support:  Phone number:

## 2019-05-01 NOTE — PATIENT INSTRUCTIONS
[facility]: [facility] [phone]   [email or web address]  Call to make the appointment(s) with urology regarding the bladder issues

## 2019-05-01 NOTE — LETTER
My COPD Action Plan     Name: Gilma Pace    YOB: 1940   Date: 5/1/2019    My doctor: Gucci Wu MD   My clinic: [address] [address] [phone]  My Controller Medicine: { :399486}   Dose: ***     My Rescue Medicine: { :540743}   Dose: ***     My Flare Up Medicine: { :028125}   Dose: ***     My COPD Severity: { :028707}      Use of Oxygen: { :442923}     Make sure you've had your pneumonia   vaccines.          GREEN ZONE       Doing well today      Usual level of activity and exercise    Usual amount of cough and mucus    No shortness of breath    Usual level of health (thinking clearly, sleeping well, feel like eating) Actions:      Take daily medicines    Use oxygen as prescribed    Follow regular exercise and diet plan    Avoid cigarette smoke and other irritants that harm the lungs           YELLOW ZONE          Having a bad day or flare up      Short of breath more than usual    A lot more sputum (mucus) than usual    Sputum looks yellow, green, tan, brown or bloody    More coughing or wheezing    Fever or chills    Less energy; trouble completing activities    Trouble thinking or focusing    Using quick relief inhaler or nebulizer more often    Poor sleep; symptoms wake me up    Do not feel like eating Actions:      Get plenty of rest    Take daily medicines    Use quick relief inhaler every *** hours    If you use oxygen, call you doctor to see if you should adjust your oxygen    Do breathing exercises or other things to help you relax    Let a loved one, friend or neighbor know you are feeling worse    Call your care team if you have 2 or more symptoms.  Start taking steroids or antibiotics if directed by your care team           RED ZONE       Need medical care now      Severe shortness of breath (feel you can't breathe)    Fever, chills    Not enough breath to do any activity    Trouble coughing up mucus, walking or  talking    Blood in mucus    Frequent coughing   Rescue medicines are not working    Not able to sleep because of breathing    Feel confused or drowsy    Chest pain    Actions:      Call your health care team.  If you cannot reach your care team, call 911 or go to the emergency room.        Annual Reminders:  Meet with Care Team, Flu Shot every Fall  Pharmacy:    [facility] [address] [place], [place] - [address] AT [place] [id number] [address]  [facility] [address] [place], [place] - [address] [facility]

## 2019-05-01 NOTE — LETTER
"My Heart Failure Action Plan   Name: Gilma Pace    YOB: 1940   Date: 5/1/2019    My doctor: Gucci Wu     [facility]     [address] [facility] [address] [phone]  My Diagnosis: {HF TYPE:166571::\"Systolic Heart Failure\"}   My Ejection Fraction: {EF%:211326}    My Exercise Goal: 30 minutes daily  .     My Weight Goal: ***  Wt Readings from Last 2 Encounters:   12/07/18 92.9 kg (204 lb 12.8 oz)   11/30/18 94.8 kg (209 lb)     Weigh yourself daily using the same scale. If you gain more than 2 pounds in 24 hours or 5 pounds in a week {HF WEIGHT GOAL:134859}    My Diet Goal: {HF DIET GOAL:305314::\"No added salt\"}    Emergency Room Visits:    Our goal is to improve your quality of life and help you avoid a visit to the emergency room or hospital.  If we work together, we can achieve this goal. But, if you feel you need to call 911 or go to the emergency room, please do so.  If you go to the emergency room, please bring your list of medicines and your daily weight chart with you.       GREEN ZONE     Doing well today    Weight gained is no more than 2 pounds a day or 5 pounds a week.    No swelling in feet, ankles, legs or stomach.    No more swelling than usual.    No more trouble breathing than usual.    No change in my sleep.    No other problems. Actions:    I am doing fine.  I will take my medicine, follow my diet, see my doctor, exercise, and watch for symptoms.           YELLOW ZONE         Having a bad day or flare up    Weight gain of more than 2 pounds in one day or 5 pounds in one week.    New swelling in ankle, leg, knee or thigh.    Bloating in belly, pants feel tighter.    Swelling in hands or face.    Coughing or trouble breathing while walking or talking.    Harder to breathe last night.    Have trouble sleeping, wake up short of breath.    Much more tired than usual.    Not eating.    Pain in my chest or bad leg cramps.    Feel weak or dizzy. " Actions:    I need to take action and call my doctor or nurse today.                 RED ZONE         Need medical care now    Weight gain of 5 pounds overnight.    Chest pain or pressure that does not go away.    Feel less alert.    Wheezing or have trouble breathing when at rest.    Cannot sleep lying down.    Cannot take my water pill.    Pass out or faint. Actions:    I need to call my doctor or nurse now!    Call 911 if I have chest pain or cannot breathe.

## 2019-05-01 NOTE — NURSING NOTE
"Chief Complaint   Patient presents with     Depression     behavior, increase depression     Health Maintenance     orders pended, HF Plan, COPD Plan, ADV dir, Fall Risk, Pneumo 23, Tdap       Initial BP (!) 74/49   Pulse 125   Temp 97.6  F (36.4  C) (Oral)   Resp 24   Ht 1.6 m (5' 3\")   Wt 87.5 kg (193 lb)   SpO2 (!) 86%   BMI 34.19 kg/m   Estimated body mass index is 34.19 kg/m  as calculated from the following:    Height as of this encounter: 1.6 m (5' 3\").    Weight as of this encounter: 87.5 kg (193 lb).  Medication Reconciliation: complete  Pebbles Posadas CMA  "

## 2019-05-02 ENCOUNTER — TELEPHONE (OUTPATIENT)
Dept: FAMILY MEDICINE | Facility: CLINIC | Age: 79
End: 2019-05-02

## 2019-05-02 DIAGNOSIS — L29.9 SCALP PRURITUS: ICD-10-CM

## 2019-05-02 RX ORDER — CLOBETASOL PROPIONATE 0.05 G/100ML
SHAMPOO TOPICAL
Qty: 118 ML | Refills: 2 | Status: CANCELLED | OUTPATIENT
Start: 2019-05-02

## 2019-05-02 ASSESSMENT — ANXIETY QUESTIONNAIRES: GAD7 TOTAL SCORE: 16

## 2019-05-02 NOTE — TELEPHONE ENCOUNTER
Prior Authorization Retail Medication Request    Medication/Dose: clobetasol propionate (CLOBEX) 0.05 % external shampoo  ICD code (if different than what is on RX):    Previously Tried and Failed:    Rationale:      Insurance Name:  Medicare-Rx [employer] [place] [employer] [phone]  Insurance ID:  [id number]      Pharmacy Information (if different than what is on RX)  Name:    Phone:

## 2019-05-08 ENCOUNTER — TELEPHONE (OUTPATIENT)
Dept: FAMILY MEDICINE | Facility: CLINIC | Age: 79
End: 2019-05-08

## 2019-05-08 NOTE — TELEPHONE ENCOUNTER
Prior Authorization Retail Medication Request    Medication/Dose: ARIPiprazole (ABILIFY) 2 MG tablet  ICD code (if different than what is on RX):    Previously Tried and Failed:    Rationale:      Insurance Name:  Cover My Meds  Insurance ID:  KEY: [id number]      Pharmacy Information (if different than what is on RX)  Name:    Phone:

## 2019-05-08 NOTE — TELEPHONE ENCOUNTER
Prior Authorization Approval    Authorization Effective Date: 4/8/2019  Authorization Expiration Date: 5/7/2020  Medication: clobetasol propionate (CLOBEX) 0.05 % external shampoo-APPROVED  Approved Dose/Quantity:   Reference #:     Insurance Company: [employer] - Phone [phone] Fax [phone]  Expected CoPay:       CoPay Card Available:      Foundation Assistance Needed:    Which Pharmacy is filling the prescription (Not needed for infusion/clinic administered): [facility] [address] [place] [address] [facility]  Pharmacy Notified: Yes  Patient Notified: No    Pharmacy will notify patient when medication is ready.

## 2019-05-08 NOTE — TELEPHONE ENCOUNTER
Central Prior Authorization Team   Phone: [phone]      PA Initiation    Medication: clobetasol propionate (CLOBEX) 0.05 % external shampoo-Initiated  Insurance Company: [employer] - Phone [phone] Fax [phone]  Pharmacy Filling the Rx: [facility] [address] [place], [place] - [address] [facility]  Filling Pharmacy Phone: [phone]  Filling Pharmacy Fax:    Start Date: 5/8/2019

## 2019-05-10 DIAGNOSIS — I10 HYPERTENSION GOAL BP (BLOOD PRESSURE) < 140/90: ICD-10-CM

## 2019-05-10 RX ORDER — LISINOPRIL 40 MG/1
TABLET ORAL
Qty: 90 TABLET | Refills: 1 | Status: SHIPPED | OUTPATIENT
Start: 2019-05-10 | End: 2019-10-20

## 2019-05-10 NOTE — TELEPHONE ENCOUNTER
No current labs and med is historical on chart.    Refill request received within 30 days of last office visit with pcp.  Prescription is routed to the provider to please address refill.     Pt does have another appointment scheduled on 5/22/19.  Kaia LINDA, RN

## 2019-05-13 NOTE — TELEPHONE ENCOUNTER
PA Initiation    Medication: ARIPiprazole (ABILIFY) 2 MG tablet  Insurance Company: [employer] - Phone [phone] Fax [phone]  Pharmacy Filling the Rx: [facility] [address] [place] [address] [facility]  Filling Pharmacy Phone: [phone]  Filling Pharmacy Fax:    Start Date: 5/13/2019

## 2019-05-14 NOTE — TELEPHONE ENCOUNTER
Prior Authorization Approval    Authorization Effective Date: 4/13/2019  Authorization Expiration Date: 5/12/2020  Medication: ARIPiprazole (ABILIFY) 2 MG tablet- APPROVED   Approved Dose/Quantity:   Reference #:     Insurance Company: [employer] - Phone [phone] Fax [phone]  Expected CoPay:       CoPay Card Available:      Foundation Assistance Needed:    Which Pharmacy is filling the prescription (Not needed for infusion/clinic administered): [facility] [address] [place] [address] [facility]  Pharmacy Notified: Yes  Patient Notified: Comment:  *Instructed pharmacy to notify patient when script is ready to /ship.**

## 2019-05-24 ENCOUNTER — OFFICE VISIT (OUTPATIENT)
Dept: BEHAVIORAL HEALTH | Facility: CLINIC | Age: 79
End: 2019-05-24
Payer: MEDICARE

## 2019-05-24 DIAGNOSIS — F41.9 ANXIETY: Primary | ICD-10-CM

## 2019-05-24 PROCEDURE — 90834 PSYTX W PT 45 MINUTES: CPT | Performed by: SOCIAL WORKER

## 2019-05-24 NOTE — PROGRESS NOTES
"                                             Progress Note    Client Name: Gilma Pace  Date: 5/24/2019           Service Type: Individual  Video Visit: No       Session Start Time: 130  Session End Time: 215     Session Length: 38-52    Session #: 4    Attendees: Client and daughter      Treatment Plan Last Reviewed: 3/1/2019   PHQ-9 / ARISTIDES-7 :     DATA  Interactive Complexity: No  Crisis: No       Progress Since Last Session (Related to Symptoms / Goals / Homework):   Symptoms: No change -    Homework: Did not complete      Episode of Care Goals: Minimal progress - CONTEMPLATION (Considering change and yet undecided); Intervened by assessing the negative and positive thinking (ambivalence) about behavior change     Current / Ongoing Stressors and Concerns:      Client notes that she has been \"Ok.\"  Saw her pcp and she added a medicine (abilify) for depression.  Not sure that it has made a difference lately, knows that it can take some time for the medicine to work.  Stress continues with her .  Reports that she does not laugh at his jokes, does not enjoy being around him much anymore.  Does not think that she should have  him.               Treatment Objective(s) Addressed in This Session:       Goal- Anxiety: Client will decrease anxiety    I will know I've met my goal when I am less anxious.      Objective #A (Client Action)      Client will use cognitive strategies identified in therapy to challenge anxious thoughts.      Objective #B    Client will use at least 3 coping skills for anxiety management in the next 12 weeks.     -behavioral activation: ACE    Objective #C    Client will identify three distraction and diversion activities and use those activities to decrease level of anxiety.       Intervention:   CBT: - Long discussion today about behavioral activation.  Reviewed basic tracking options, identified differences between long-term v short-term planning, and discussed setting and " achieving SMART goals as an effective method for dealing with depression, anxiety, and many other mental health concerns.  Set behavioral goals in session today.               ASSESSMENT: Current Emotional / Mental Status (status of significant symptoms):   Risk status (Self / Other harm or suicidal ideation)   Client denies current fears or concerns for personal safety.   Client denies current or recent suicidal ideation or behaviors.   Client denies current or recent homicidal ideation or behaviors.   Client denies current or recent self injurious behavior or ideation.   Client denies other safety concerns.   Client Client reports there has been no change in risk factors since their last session.     Client Client reports there has been no change in protective factors since their last session.     A safety and risk management plan has not been developed at this time, however client was given the after-hours number / 911 should there be a change in any of these risk factors.     Appearance:   Appropriate    Eye Contact:   Good    Psychomotor Behavior: Agitated    Attitude:   Cooperative    Orientation:   All   Speech    Rate / Production: Pressured     Volume:  Normal    Mood:    Anxious  Depressed    Affect:    Appropriate    Thought Content:  Clear    Thought Form:  Coherent  Logical    Insight:    Good      Medication Review:   No changes to current psychiatric medication(s)     Medication Compliance:   Yes     Changes in Health Issues:   None reported     Chemical Use Review:   Substance Use: Chemical use reviewed, no active concerns identified      Tobacco Use: No current tobacco use.      Diagnosis:  1. Anxiety        Collateral Reports Completed:   Not Applicable    PLAN: (Client Tasks / Therapist Tasks / Other)  1.  Client will log activities of Achievement, Closeness, and Enjoyment (ACEs) using ACE rating scale and bring to next session.           2.  Client will log productive v. unproductive worry at  least once by next session.  Worksheet given in session to facilitate this.      3.  Client will call to make next appt        William Dickson, LICSW                                                         ______________________________________________________________________    Treatment Plan    Client's Name: Gilma Pace  YOB: 1940    Date: 3/1/2019     DSM-V Diagnoses: Diagnoses: 296.32 (F33.1) Major Depressive Disorder, Recurrent Episode, Moderate _  300.02 (F41.1) Generalized Anxiety Disorder  Psychosocial & Contextual Factors: some stress with   WHODAS 2.0 (12 item)    DECLINES WHODAS TODAY            This questionnaire asks about difficulties due to health conditions. Health conditions  include  disease or illnesses, other health problems that may be short or long lasting,  injuries, mental health or emotional problems, and problems with alcohol or drugs.                     Think back over the past 30 days and answer these questions, thinking about how much  difficulty you had doing the following activities. For each question, please [place] only  one response.    Referral / Collaboration:  Referral to another professional/service is not indicated at this time..    Anticipated number of session or this episode of care: 18-24      MeasurableTreatment Goal(s) related to diagnosis / functional impairment(s)      Goal- Anxiety: Client will decrease anxiety    I will know I've met my goal when I am less anxious.      Objective #A (Client Action)    Status: New - Date: 3/1/2019    Client will use cognitive strategies identified in therapy to challenge anxious thoughts.    Intervention(s)  Therapist will provide psychoeducation, behavioral activation, and cognitive restructuring.    Objective #B  Client will use at least 3 coping skills for anxiety management in the next 12 weeks.    Status: New - Date: 3/1/2019    Intervention(s)  Therapist will provide psychoeducation, behavioral  activation, and cognitive restructuring.      Objective #C  Client will identify three distraction and diversion activities and use those activities to decrease level of anxiety.  Status: New - Date: 3/1/2019    Intervention(s)  Therapist will provide psychoeducation, behavioral activation, and cognitive restructuring.                  Client has reviewed and agreed to the above plan.      William Dickson, [facility] HospitalSW  March 1, 2019

## 2019-06-02 DIAGNOSIS — R29.898 WEAKNESS OF BOTH LOWER EXTREMITIES: ICD-10-CM

## 2019-06-03 NOTE — TELEPHONE ENCOUNTER
Please let the patient know that this is not a good medication for an elderly patient to take because oif potential side effects.   If she is having a problem we should probably check on it.

## 2019-06-03 NOTE — TELEPHONE ENCOUNTER
Patient reports she takes the medication for Sleep.  Patient has   Next 5 appointments (look out 90 days)    Jun 07, 2019 12:30 PM CDT  Return Visit with VONNIE Zamora  [facility] ([facility]) [address] [facility] [address] [phone]   Jun 14, 2019  3:00 PM CDT  Office Visit with Gucci Wu MD  [facility] ([facility] [address] [facility] [address] [phone]        Will discuss this with Dr Wu at time of office visit.  Appointment notes are updated to address this concern.    Denial sent to pharmacy.      Patient/parent verbalized understanding of instructions provided and agreed with the plan of care  Shantelle Harris RN

## 2019-06-07 ENCOUNTER — OFFICE VISIT (OUTPATIENT)
Dept: BEHAVIORAL HEALTH | Facility: CLINIC | Age: 79
End: 2019-06-07
Payer: MEDICARE

## 2019-06-07 DIAGNOSIS — F43.23 ADJUSTMENT DISORDER WITH MIXED ANXIETY AND DEPRESSED MOOD: Primary | ICD-10-CM

## 2019-06-07 PROCEDURE — 90834 PSYTX W PT 45 MINUTES: CPT | Performed by: SOCIAL WORKER

## 2019-06-07 NOTE — PROGRESS NOTES
"                                             Progress Note    Client Name: Gilma Pace  Date: 5/24/2019           Service Type: Individual  Video Visit: No       Session Start Time: 130  Session End Time: 215     Session Length: 38-52    Session #: 4    Attendees: Client and daughter      Treatment Plan Last Reviewed: 3/1/2019   PHQ-9 / ARISTIDES-7 :     DATA  Interactive Complexity: No  Crisis: No       Progress Since Last Session (Related to Symptoms / Goals / Homework):   Symptoms: No change -    Homework: Did not complete      Episode of Care Goals: Minimal progress - CONTEMPLATION (Considering change and yet undecided); Intervened by assessing the negative and positive thinking (ambivalence) about behavior change     Current / Ongoing Stressors and Concerns:      Client notes that she has been \"better, my brain does not go to that same spot.\"  Has been worried less about relationship with her .  Daughter is here today and says that she has noticed her mother is crying less, seems to be less upset than in past weeks.         Treatment Objective(s) Addressed in This Session:       Goal- Anxiety: Client will decrease anxiety    I will know I've met my goal when I am less anxious.      Objective #A (Client Action)      Client will use cognitive strategies identified in therapy to challenge anxious thoughts.      Objective #B    Client will use at least 3 coping skills for anxiety management in the next 12 weeks.     -behavioral activation: ACE    Objective #C    Client will identify three distraction and diversion activities and use those activities to decrease level of anxiety.       Intervention:   CBT: - Long discussion today about behavioral activation.  Reviewed basic tracking options, identified differences between long-term v short-term planning, and discussed setting and achieving SMART goals as an effective method for dealing with depression, anxiety, and many other mental health concerns.  Set " behavioral goals in session today.               ASSESSMENT: Current Emotional / Mental Status (status of significant symptoms):   Risk status (Self / Other harm or suicidal ideation)   Client denies current fears or concerns for personal safety.   Client denies current or recent suicidal ideation or behaviors.   Client denies current or recent homicidal ideation or behaviors.   Client denies current or recent self injurious behavior or ideation.   Client denies other safety concerns.   Client Client reports there has been no change in risk factors since their last session.     Client Client reports there has been no change in protective factors since their last session.     A safety and risk management plan has not been developed at this time, however client was given the after-hours number / 911 should there be a change in any of these risk factors.     Appearance:   Appropriate    Eye Contact:   Good    Psychomotor Behavior: Agitated    Attitude:   Cooperative    Orientation:   All   Speech    Rate / Production: Pressured     Volume:  Normal    Mood:    Anxious  Depressed    Affect:    Appropriate    Thought Content:  Clear    Thought Form:  Coherent  Logical    Insight:    Good      Medication Review:   No changes to current psychiatric medication(s)     Medication Compliance:   Yes     Changes in Health Issues:   None reported     Chemical Use Review:   Substance Use: Chemical use reviewed, no active concerns identified      Tobacco Use: No current tobacco use.      Diagnosis:  No diagnosis found.    Collateral Reports Completed:   Not Applicable    PLAN: (Client Tasks / Therapist Tasks / Other)  1.  Client will log activities of Achievement, Closeness, and Enjoyment (ACEs) using ACE rating scale and bring to next session.           2.  Client will log productive v. unproductive worry at least once by next session.  Worksheet given in session to facilitate this.      3.  MOCA at next session    4.  Client will  call to make next appt        William Dickson, LICSW                                                         ______________________________________________________________________    Treatment Plan    Client's Name: Gilma Pace  YOB: 1940    Date: 3/1/2019     DSM-V Diagnoses: Diagnoses: 296.32 (F33.1) Major Depressive Disorder, Recurrent Episode, Moderate _  300.02 (F41.1) Generalized Anxiety Disorder  Psychosocial & Contextual Factors: some stress with   WHODAS 2.0 (12 item)    DECLINES WHODAS TODAY            This questionnaire asks about difficulties due to health conditions. Health conditions  include  disease or illnesses, other health problems that may be short or long lasting,  injuries, mental health or emotional problems, and problems with alcohol or drugs.                     Think back over the past 30 days and answer these questions, thinking about how much  difficulty you had doing the following activities. For each question, please [place] only  one response.    Referral / Collaboration:  Referral to another professional/service is not indicated at this time..    Anticipated number of session or this episode of care: 18-24      MeasurableTreatment Goal(s) related to diagnosis / functional impairment(s)      Goal- Anxiety: Client will decrease anxiety    I will know I've met my goal when I am less anxious.      Objective #A (Client Action)    Status: New - Date: 3/1/2019    Client will use cognitive strategies identified in therapy to challenge anxious thoughts.    Intervention(s)  Therapist will provide psychoeducation, behavioral activation, and cognitive restructuring.    Objective #B  Client will use at least 3 coping skills for anxiety management in the next 12 weeks.    Status: New - Date: 3/1/2019    Intervention(s)  Therapist will provide psychoeducation, behavioral activation, and cognitive restructuring.      Objective #C  Client will identify three distraction and  diversion activities and use those activities to decrease level of anxiety.  Status: New - Date: 3/1/2019    Intervention(s)  Therapist will provide psychoeducation, behavioral activation, and cognitive restructuring.                  Client has reviewed and agreed to the above plan.      William Dickson, [facility] CenterSW  March 1, 2019

## 2019-06-14 ENCOUNTER — OFFICE VISIT (OUTPATIENT)
Dept: FAMILY MEDICINE | Facility: CLINIC | Age: 79
End: 2019-06-14
Payer: MEDICARE

## 2019-06-14 VITALS
BODY MASS INDEX: 30.82 KG/M2 | TEMPERATURE: 97.9 F | WEIGHT: 174 LBS | DIASTOLIC BLOOD PRESSURE: 79 MMHG | SYSTOLIC BLOOD PRESSURE: 126 MMHG | OXYGEN SATURATION: 87 % | HEART RATE: 109 BPM

## 2019-06-14 DIAGNOSIS — G25.0 ESSENTIAL TREMOR: Primary | ICD-10-CM

## 2019-06-14 DIAGNOSIS — E03.9 HYPOTHYROIDISM, UNSPECIFIED TYPE: ICD-10-CM

## 2019-06-14 DIAGNOSIS — F32.1 MODERATE MAJOR DEPRESSION (H): ICD-10-CM

## 2019-06-14 LAB — TSH SERPL DL<=0.005 MIU/L-ACNC: 3.48 MU/L (ref 0.4–4)

## 2019-06-14 PROCEDURE — 99214 OFFICE O/P EST MOD 30 MIN: CPT | Performed by: FAMILY MEDICINE

## 2019-06-14 PROCEDURE — 84443 ASSAY THYROID STIM HORMONE: CPT | Performed by: FAMILY MEDICINE

## 2019-06-14 PROCEDURE — 36415 COLL VENOUS BLD VENIPUNCTURE: CPT | Performed by: FAMILY MEDICINE

## 2019-06-14 RX ORDER — PRIMIDONE 50 MG/1
12.5 TABLET ORAL AT BEDTIME
Qty: 10 TABLET | Refills: 1 | Status: SHIPPED | OUTPATIENT
Start: 2019-06-14 | End: 2019-08-28

## 2019-06-14 ASSESSMENT — PAIN SCALES - GENERAL: PAINLEVEL: NO PAIN (0)

## 2019-06-14 NOTE — LETTER
"My Heart Failure Action Plan   Name: Gilma Pace    YOB: 1940   Date: 6/14/2019    My doctor: Gucci Wu     [place] [address] [facility] [address] [phone]  My Diagnosis: {HF STAGES:335126}   My Exercise Goal: 30 minutes daily  .     My Weight Plan:   Wt Readings from Last 2 Encounters:   05/01/19 87.5 kg (193 lb)   12/07/18 92.9 kg (204 lb 12.8 oz)     Weigh yourself daily using the same scale. If you gain more than 2 pounds in 24 hours or 5 pounds in a week {HF WEIGHT GOAL:508487}    My Diet Goal: { :525823::\"No added salt\"}    Emergency Room Visits:    Our goal is to improve your quality of life and help you avoid a visit to the emergency room or hospital.  If we work together, we can achieve this goal. But, if you feel you need to call 911 or go to the emergency room, please do so.  If you go to the emergency room, please bring your list of medicines and your daily weight chart with you.       GREEN ZONE     Doing well today    Weight gained is no more than 2 pounds a day or 5 pounds a week.    No swelling in feet, ankles, legs or stomach.    No more swelling than usual.    No more trouble breathing than usual.    No change in my sleep.    No other problems. Actions:    I am doing fine.  I will take my medicine, follow my diet, see my doctor, exercise, and watch for symptoms.           YELLOW ZONE         Having a bad day or flare up    Weight gain of more than 2 pounds in one day or 5 pounds in one week.    New swelling in ankle, leg, knee or thigh.    Bloating in belly, pants feel tighter.    Swelling in hands or face.    Coughing or trouble breathing while walking or talking.    Harder to breathe last night.    Have trouble sleeping, wake up short of breath.    Much more tired than usual.    Not eating.    Pain in my chest or bad leg cramps.    Feel weak or dizzy. Actions:    I need to take action and call my doctor or nurse today.          "        RED ZONE         Need medical care now    Weight gain of 5 pounds overnight.    Chest pain or pressure that does not go away.    Feel less alert.    Wheezing or have trouble breathing when at rest.    Cannot sleep lying down.    Cannot take my water pill.    Pass out or faint. Actions:    I need to call my doctor or nurse now!    Call 911 if I have chest pain or cannot breathe.

## 2019-06-14 NOTE — NURSING NOTE
"Chief Complaint   Patient presents with     Depression     follow up on med change     Health Maintenance     orders pended, HF plan, COPD plan       Initial /79   Pulse 109   Temp 97.9  F (36.6  C) (Oral)   Wt 78.9 kg (174 lb)   SpO2 (!) 87%   BMI 30.82 kg/m   Estimated body mass index is 30.82 kg/m  as calculated from the following:    Height as of 5/1/19: 1.6 m (5' 3\").    Weight as of this encounter: 78.9 kg (174 lb).  Medication Reconciliation: complete  Pebbles Posadas, MAGALI  "

## 2019-06-14 NOTE — LETTER
"My Heart Failure Action Plan   Name: Gilma Pace    YOB: 1940   Date: 6/14/2019    My doctor: Gucci Wu     [place] [address] [address] [facility] [address] [phone]  My Diagnosis: {HF STAGES:732294}   My Exercise Goal: 30 minutes daily  .     My Weight Plan:   Wt Readings from Last 2 Encounters:   05/01/19 87.5 kg (193 lb)   12/07/18 92.9 kg (204 lb 12.8 oz)     Weigh yourself daily using the same scale. If you gain more than 2 pounds in 24 hours or 5 pounds in a week {HF WEIGHT GOAL:554480}    My Diet Goal: { :265754::\"No added salt\"}    Emergency Room Visits:    Our goal is to improve your quality of life and help you avoid a visit to the emergency room or hospital.  If we work together, we can achieve this goal. But, if you feel you need to call 911 or go to the emergency room, please do so.  If you go to the emergency room, please bring your list of medicines and your daily weight chart with you.       GREEN ZONE     Doing well today    Weight gained is no more than 2 pounds a day or 5 pounds a week.    No swelling in feet, ankles, legs or stomach.    No more swelling than usual.    No more trouble breathing than usual.    No change in my sleep.    No other problems. Actions:    I am doing fine.  I will take my medicine, follow my diet, see my doctor, exercise, and watch for symptoms.           YELLOW ZONE         Having a bad day or flare up    Weight gain of more than 2 pounds in one day or 5 pounds in one week.    New swelling in ankle, leg, knee or thigh.    Bloating in belly, pants feel tighter.    Swelling in hands or face.    Coughing or trouble breathing while walking or talking.    Harder to breathe last night.    Have trouble sleeping, wake up short of breath.    Much more tired than usual.    Not eating.    Pain in my chest or bad leg cramps.    Feel weak or dizzy. Actions:    I need to take action and call my doctor or nurse today.          "        RED ZONE         Need medical care now    Weight gain of 5 pounds overnight.    Chest pain or pressure that does not go away.    Feel less alert.    Wheezing or have trouble breathing when at rest.    Cannot sleep lying down.    Cannot take my water pill.    Pass out or faint. Actions:    I need to call my doctor or nurse now!    Call 911 if I have chest pain or cannot breathe.

## 2019-06-14 NOTE — LETTER
My COPD Action Plan     Name: Gilma Pace    YOB: 1940   Date: 6/14/2019    My doctor: Gucci Wu MD   My clinic: [address] [address] [phone]  My Controller Medicine: { :217407}   Dose: ***     My Rescue Medicine: { :692769}   Dose: ***     My Flare Up Medicine: { :761988}   Dose: ***     My COPD Severity: { :647108}      Use of Oxygen: { :375637}     Make sure you've had your pneumonia   vaccines.          GREEN ZONE       Doing well today      Usual level of activity and exercise    Usual amount of cough and mucus    No shortness of breath    Usual level of health (thinking clearly, sleeping well, feel like eating) Actions:      Take daily medicines    Use oxygen as prescribed    Follow regular exercise and diet plan    Avoid cigarette smoke and other irritants that harm the lungs           YELLOW ZONE          Having a bad day or flare up      Short of breath more than usual    A lot more sputum (mucus) than usual    Sputum looks yellow, green, tan, brown or bloody    More coughing or wheezing    Fever or chills    Less energy; trouble completing activities    Trouble thinking or focusing    Using quick relief inhaler or nebulizer more often    Poor sleep; symptoms wake me up    Do not feel like eating Actions:      Get plenty of rest    Take daily medicines    Use quick relief inhaler every *** hours    If you use oxygen, call you doctor to see if you should adjust your oxygen    Do breathing exercises or other things to help you relax    Let a loved one, friend or neighbor know you are feeling worse    Call your care team if you have 2 or more symptoms.  Start taking steroids or antibiotics if directed by your care team           RED ZONE       Need medical care now      Severe shortness of breath (feel you can't breathe)    Fever, chills    Not enough breath to do any activity    Trouble coughing up mucus, walking or  talking    Blood in mucus    Frequent coughing   Rescue medicines are not working    Not able to sleep because of breathing    Feel confused or drowsy    Chest pain    Actions:      Call your health care team.  If you cannot reach your care team, call 911 or go to the emergency room.        Annual Reminders:  Meet with Care Team, Flu Shot every Fall  Pharmacy:    [facility] [address] [place], [place] - [address] [address] AT [place] [id number] [address]  [facility] [address] [place], [place] - [address] [facility]

## 2019-06-14 NOTE — PROGRESS NOTES
SUBJECTIVE:  Gilma Pace is a 78 year old female who presents for a follow up evaluation of depression. The patient was started on 2 mg of abilify at her last appointment(s).   She reports that she is not as irritable and that she does not swear as much.   She is happier than she was previously.  Her daughter is present today and she reports that the family is not as scared for her.     The patient reports that she is not having any side effects from her current medication.    She called for a refill(s) on her tizanidine.  She was given this 1.5 years ago for her back pain and to help her sleep.  It does help her sleep. She takes it at bedtime and she feels tired in 10 minutes.   She denies dizziness or lightheadedness after taking it.  She is still sleeping ok without it.     Her other concern today is her shakiness. It is in both hands. It causes her to spill liquids from a cup at times. It is a little worse lately. She would like medication to help.   She drinks caffiene but only 1 cup of coffee in the am and a coke in the early evening.         Current Outpatient Medications   Medication     amLODIPine (NORVASC) 10 MG tablet     ARIPiprazole (ABILIFY) 2 MG tablet     busPIRone (BUSPAR) 15 MG tablet     clobetasol propionate (CLOBEX) 0.05 % external shampoo     clotrimazole-betamethasone (LOTRISONE) cream     DULoxetine (CYMBALTA) 60 MG capsule     FOLIC ACID PO     gabapentin (NEURONTIN) 600 MG tablet     IBUPROFEN PO     levothyroxine (SYNTHROID/LEVOTHROID) 75 MCG tablet     lisinopril (PRINIVIL/ZESTRIL) 40 MG tablet     nystatin (MYCOSTATIN) 410824 UNIT/GM external powder     tiotropium (SPIRIVA HANDIHALER) 18 MCG capsule     traZODone (DESYREL) 100 MG tablet     No current facility-administered medications for this visit.              Current thoughts of suicide or homicide:No    Last PHQ-9 score on record= 7    PHQ-9 SCORE 12/3/2018 2/22/2019 5/1/2019   PHQ-9 Total Score 13 8 19          OBJECTIVE:  General: the patient had a calm affect during the visit today.    EXTREMITIES: tremor present when hands are in are and significant(ly) less when on her thighs. No pill rolling tremor. The tremor is not worse with finger to nose testing.     ASSESSMENT: Chronic Depression which is Markedly improving    Depression risk factors: possible organic causes of depression: hypothyroidism.    PLAN:  We will continue the patient on the same dose of her antidepressant and raise the abilify to 5 mg.   We will recheck her TSH today.   Follow up in 4 weeks     Regarding her tremor I thought abouyt propranolol but that could worsen depression so I avoided it and will use 12.5 mg of primadone.      Over 25 minutes spent with patient, greater than 50% in face to face counseling and coordination of care.

## 2019-06-17 PROBLEM — I05.9 MITRAL VALVE DISEASE: Status: ACTIVE | Noted: 2017-09-28

## 2019-06-17 ASSESSMENT — PATIENT HEALTH QUESTIONNAIRE - PHQ9
SUM OF ALL RESPONSES TO PHQ QUESTIONS 1-9: 7
5. POOR APPETITE OR OVEREATING: SEVERAL DAYS

## 2019-06-17 ASSESSMENT — ANXIETY QUESTIONNAIRES
7. FEELING AFRAID AS IF SOMETHING AWFUL MIGHT HAPPEN: NOT AT ALL
GAD7 TOTAL SCORE: 8
6. BECOMING EASILY ANNOYED OR IRRITABLE: MORE THAN HALF THE DAYS
2. NOT BEING ABLE TO STOP OR CONTROL WORRYING: SEVERAL DAYS
IF YOU CHECKED OFF ANY PROBLEMS ON THIS QUESTIONNAIRE, HOW DIFFICULT HAVE THESE PROBLEMS MADE IT FOR YOU TO DO YOUR WORK, TAKE CARE OF THINGS AT HOME, OR GET ALONG WITH OTHER PEOPLE: SOMEWHAT DIFFICULT
5. BEING SO RESTLESS THAT IT IS HARD TO SIT STILL: SEVERAL DAYS
3. WORRYING TOO MUCH ABOUT DIFFERENT THINGS: SEVERAL DAYS
1. FEELING NERVOUS, ANXIOUS, OR ON EDGE: MORE THAN HALF THE DAYS

## 2019-06-18 ASSESSMENT — ANXIETY QUESTIONNAIRES: GAD7 TOTAL SCORE: 8

## 2019-07-13 DIAGNOSIS — F41.1 GAD (GENERALIZED ANXIETY DISORDER): ICD-10-CM

## 2019-07-16 ENCOUNTER — OFFICE VISIT (OUTPATIENT)
Dept: BEHAVIORAL HEALTH | Facility: CLINIC | Age: 79
End: 2019-07-16
Payer: MEDICARE

## 2019-07-16 DIAGNOSIS — F43.23 ADJUSTMENT DISORDER WITH MIXED ANXIETY AND DEPRESSED MOOD: Primary | ICD-10-CM

## 2019-07-16 PROCEDURE — 90834 PSYTX W PT 45 MINUTES: CPT | Performed by: SOCIAL WORKER

## 2019-07-16 RX ORDER — BUSPIRONE HYDROCHLORIDE 15 MG/1
TABLET ORAL
Qty: 180 TABLET | Refills: 0 | Status: SHIPPED | OUTPATIENT
Start: 2019-07-16 | End: 2019-10-20

## 2019-07-16 NOTE — PROGRESS NOTES
"                                             Progress Note    Client Name: Gilma Pace  Date: 7/16/2019           Service Type: Individual  Video Visit: No       Session Start Time: 130  Session End Time: 215     Session Length: 38-52    Session #: 4    Attendees: Client and daughter      Treatment Plan Last Reviewed: 7/16/2019    PHQ-9 / ARISTIDES-7 :     DATA  Interactive Complexity: No  Crisis: No       Progress Since Last Session (Related to Symptoms / Goals / Homework):   Symptoms: No change -    Homework: Did not complete   1.  Client will log activities of Achievement, Closeness, and Enjoyment (ACEs) using ACE rating scale and bring to next session.           2.  Client will log productive v. unproductive worry at least once by next session.  Worksheet given in session to facilitate this.      3.  MOCA at next session    4.  Client will call to make next appt     Episode of Care Goals: Minimal progress - CONTEMPLATION (Considering change and yet undecided); Intervened by assessing the negative and positive thinking (ambivalence) about behavior change     Current / Ongoing Stressors and Concerns:      Client notes that she has been \"ok.\"  Had her 60th high school reunion and enjoyed talking to her friends and former classmates there.  Also her  had a reunion of some [employer] friends too.  Completed the MOCA in session today and client scored 26/30, suggesting normal functioning based on this test.              Treatment Objective(s) Addressed in This Session:       Goal- Anxiety: Client will decrease anxiety    I will know I've met my goal when I am less anxious.      Objective #A (Client Action)      Client will use cognitive strategies identified in therapy to challenge anxious thoughts.      Objective #B    Client will use at least 3 coping skills for anxiety management in the next 12 weeks.     -behavioral activation: ACE    Objective #C    Client will identify three distraction and diversion " activities and use those activities to decrease level of anxiety.       Intervention:   CBT: - Long discussion today about behavioral activation.  Reviewed basic tracking options, identified differences between long-term v short-term planning, and discussed setting and achieving SMART goals as an effective method for dealing with depression, anxiety, and many other mental health concerns.  Set behavioral goals in session today.               ASSESSMENT: Current Emotional / Mental Status (status of significant symptoms):   Risk status (Self / Other harm or suicidal ideation)   Client denies current fears or concerns for personal safety.   Client denies current or recent suicidal ideation or behaviors.   Client denies current or recent homicidal ideation or behaviors.   Client denies current or recent self injurious behavior or ideation.   Client denies other safety concerns.   Client Client reports there has been no change in risk factors since their last session.     Client Client reports there has been no change in protective factors since their last session.     A safety and risk management plan has not been developed at this time, however client was given the after-hours number / 911 should there be a change in any of these risk factors.     Appearance:   Appropriate    Eye Contact:   Good    Psychomotor Behavior: Agitated    Attitude:   Cooperative    Orientation:   All   Speech    Rate / Production: Pressured     Volume:  Normal    Mood:    Anxious  Depressed    Affect:    Appropriate    Thought Content:  Clear    Thought Form:  Coherent  Logical    Insight:    Good      Medication Review:   No changes to current psychiatric medication(s)     Medication Compliance:   Yes     Changes in Health Issues:   None reported     Chemical Use Review:   Substance Use: Chemical use reviewed, no active concerns identified      Tobacco Use: No current tobacco use.      Diagnosis:  1. Adjustment disorder with mixed anxiety  and depressed mood        Collateral Reports Completed:   Not Applicable    PLAN: (Client Tasks / Therapist Tasks / Other)  1.  Client will log activities of Achievement, Closeness, and Enjoyment (ACEs) using ACE rating scale and bring to next session.           2.  Client will log productive v. unproductive worry at least once by next session.  Worksheet given in session to facilitate this.      3.  MOCA at next session    4.  Client will call to make next appt        William Dickson, LICSW                                                         ______________________________________________________________________    Treatment Plan    Client's Name: Gilma Pace  YOB: 1940    Date: 3/1/2019     DSM-V Diagnoses: Diagnoses: 296.32 (F33.1) Major Depressive Disorder, Recurrent Episode, Moderate _  300.02 (F41.1) Generalized Anxiety Disorder  Psychosocial & Contextual Factors: some stress with   WHODAS 2.0 (12 item)    DECLINES WHODAS TODAY            This questionnaire asks about difficulties due to health conditions. Health conditions  include  disease or illnesses, other health problems that may be short or long lasting,  injuries, mental health or emotional problems, and problems with alcohol or drugs.                     Think back over the past 30 days and answer these questions, thinking about how much  difficulty you had doing the following activities. For each question, please [place] only  one response.    Referral / Collaboration:  Referral to another professional/service is not indicated at this time..    Anticipated number of session or this episode of care: 18-24      MeasurableTreatment Goal(s) related to diagnosis / functional impairment(s)      Goal- Anxiety: Client will decrease anxiety    I will know I've met my goal when I am less anxious.      Objective #A (Client Action)    Status: cont- Date: 7/16/2019     Client will use cognitive strategies identified in therapy to  challenge anxious thoughts.    Intervention(s)  Therapist will provide psychoeducation, behavioral activation, and cognitive restructuring.    Objective #B  Client will use at least 3 coping skills for anxiety management in the next 12 weeks.    Status: cont- Date: 7/16/2019     Intervention(s)  Therapist will provide psychoeducation, behavioral activation, and cognitive restructuring.      Objective #C  Client will identify three distraction and diversion activities and use those activities to decrease level of anxiety.  Status: cont- Date: 7/16/2019     Intervention(s)  Therapist will provide psychoeducation, behavioral activation, and cognitive restructuring.                  Client has reviewed and agreed to the above plan.      William Dickson, Southern [facility] Health CareSW  March 1, 2019

## 2019-07-16 NOTE — TELEPHONE ENCOUNTER
Refill request received within 30 days of last office visit with pcp.  Prescription is routed to the provider to please address refill.   Shantelle Harris RN

## 2019-07-18 ENCOUNTER — OFFICE VISIT (OUTPATIENT)
Dept: FAMILY MEDICINE | Facility: CLINIC | Age: 79
End: 2019-07-18
Payer: MEDICARE

## 2019-07-18 VITALS
SYSTOLIC BLOOD PRESSURE: 115 MMHG | DIASTOLIC BLOOD PRESSURE: 73 MMHG | RESPIRATION RATE: 20 BRPM | OXYGEN SATURATION: 93 % | HEART RATE: 93 BPM | WEIGHT: 195 LBS | TEMPERATURE: 97.7 F | BODY MASS INDEX: 34.54 KG/M2

## 2019-07-18 DIAGNOSIS — F32.1 MODERATE MAJOR DEPRESSION (H): ICD-10-CM

## 2019-07-18 DIAGNOSIS — R32 URINARY INCONTINENCE, UNSPECIFIED TYPE: Primary | ICD-10-CM

## 2019-07-18 PROCEDURE — 99214 OFFICE O/P EST MOD 30 MIN: CPT | Performed by: FAMILY MEDICINE

## 2019-07-18 ASSESSMENT — PAIN SCALES - GENERAL: PAINLEVEL: NO PAIN (0)

## 2019-07-18 NOTE — LETTER
"My Heart Failure Action Plan   Name: Gilma Pace    YOB: 1940   Date: 7/17/2019    My doctor: Gucci Wu     [place] [address] [address] [facility] [address] [phone]  My Diagnosis: {HF STAGES:291000}   My Exercise Goal: 30 minutes daily  .     My Weight Plan:   Wt Readings from Last 2 Encounters:   06/14/19 78.9 kg (174 lb)   05/01/19 87.5 kg (193 lb)     Weigh yourself daily using the same scale. If you gain more than 2 pounds in 24 hours or 5 pounds in a week {HF WEIGHT GOAL:323174}    My Diet Goal: { :760310::\"No added salt\"}    Emergency Room Visits:    Our goal is to improve your quality of life and help you avoid a visit to the emergency room or hospital.  If we work together, we can achieve this goal. But, if you feel you need to call 911 or go to the emergency room, please do so.  If you go to the emergency room, please bring your list of medicines and your daily weight chart with you.       GREEN ZONE     Doing well today    Weight gained is no more than 2 pounds a day or 5 pounds a week.    No swelling in feet, ankles, legs or stomach.    No more swelling than usual.    No more trouble breathing than usual.    No change in my sleep.    No other problems. Actions:    I am doing fine.  I will take my medicine, follow my diet, see my doctor, exercise, and watch for symptoms.           YELLOW ZONE         Having a bad day or flare up    Weight gain of more than 2 pounds in one day or 5 pounds in one week.    New swelling in ankle, leg, knee or thigh.    Bloating in belly, pants feel tighter.    Swelling in hands or face.    Coughing or trouble breathing while walking or talking.    Harder to breathe last night.    Have trouble sleeping, wake up short of breath.    Much more tired than usual.    Not eating.    Pain in my chest or bad leg cramps.    Feel weak or dizzy. Actions:    I need to take action and call my doctor or nurse today.                 RED " ZONE         Need medical care now    Weight gain of 5 pounds overnight.    Chest pain or pressure that does not go away.    Feel less alert.    Wheezing or have trouble breathing when at rest.    Cannot sleep lying down.    Cannot take my water pill.    Pass out or faint. Actions:    I need to call my doctor or nurse now!    Call 911 if I have chest pain or cannot breathe.

## 2019-07-18 NOTE — PROGRESS NOTES
"SUBJECTIVE:  Gilma Pace is a 79 year old female who presents for a follow up evaluation of depression. The patient was started on 5 mg of Abilify up from 2 mg  at the last visit which was 4 weeks ago.  She reports that she feels better on this dose. She reports that she feels more like herself.   Her daughter is present and noticed a lot of improvement.       The patient reports that she is having the following side effects from her current medication:  She has been having urinary incontinence. It started about 2 years with dribbling at first. It has been worse recently.      She is still not sleeping well. She reports that she worries about some minor things       She is taking the primidone and that has helped significant(ly) with her tremor . She does not have any trouble getting food or drinks to her mouth without spilling anymore.              Current Outpatient Medications   Medication     amLODIPine (NORVASC) 10 MG tablet     ARIPiprazole 5 MG TABS     busPIRone (BUSPAR) 15 MG tablet     clobetasol propionate (CLOBEX) 0.05 % external shampoo     clotrimazole-betamethasone (LOTRISONE) cream     DULoxetine (CYMBALTA) 60 MG capsule     FOLIC ACID PO     gabapentin (NEURONTIN) 600 MG tablet     IBUPROFEN PO     levothyroxine (SYNTHROID/LEVOTHROID) 75 MCG tablet     lisinopril (PRINIVIL/ZESTRIL) 40 MG tablet     nystatin (MYCOSTATIN) 554058 UNIT/GM external powder     primidone (MYSOLINE) 50 MG tablet     tiotropium (SPIRIVA HANDIHALER) 18 MCG capsule     traZODone (DESYREL) 100 MG tablet     No current facility-administered medications for this visit.              Current thoughts of suicide or homicide:No    Last PHQ-9 score on record= 5    PHQ-9 SCORE 2/22/2019 5/1/2019 6/17/2019   PHQ-9 Total Score 8 19 7         ARISTIDES-7    Over the last 2 weeks, how often have you been bothered by the following problems?  (Use an \"x\" to indicate your answer) Not at all                (0) Several days                (1) More " than half the days        (2) Nearly every day          (3)   1. Feeling nervous, anxious or on edge  x     2. Not being able to stop or control worrying  x     3. Worrying too much about different things  x     4. Trouble relaxing x      5. Being so restless that it is hard to sit still x      6. Becoming easily annoyed or irritable x      7. Feeling afraid as if something awful might happen x            Total___3____    Cut points for:   Mild Anxiety =  5  Moderate= 10  Severe=  15         OBJECTIVE:  General: the patient had a calm and pleasant affect during the visit today.    ASSESSMENT: Chronic Depression which has markedly improved    Essential tremor which is well controlled on primodone     PLAN:  We will continue the patient on the same dose of her antidepressant and have the patient return to clinic for appointment in 6 month(s). She was instructed to return earlier if her depression symptoms return.    She was referred to urology     Patient Instructions   Your provider has referred you to: [facility]: [facility] [place] [phone]   [email or web address]  Dr Bernstein         I recommended that she return to clinic for an appointment in 1-2 month(s)  for her yearly complete physical exam and we will get all of her preventative medical needs taken care of at that time. I also recommended that he have a laboratory appointment 1 week prior to that to do her fasting laboratory work.

## 2019-07-18 NOTE — PATIENT INSTRUCTIONS
Your provider has referred you to: [facility]: [place] [facility] [place] [phone]   [email or web address]  Dr Bernstein         I recommended that she return to clinic for an appointment in 1-2 month(s)  for her yearly complete physical exam and we will get all of her preventative medical needs taken care of at that time. I also recommended that he have a laboratory appointment 1 week prior to that to do her fasting laboratory work.

## 2019-07-18 NOTE — LETTER
My COPD Action Plan     Name: Gilma Pace    YOB: 1940   Date: 7/17/2019    My doctor: Gucci Wu MD   My clinic: [address] [address] [phone]  My Controller Medicine: { :608038}   Dose: ***     My Rescue Medicine: { :384216}   Dose: ***     My Flare Up Medicine: { :980436}   Dose: ***     My COPD Severity: { :883735}      Use of Oxygen: { :912498}     Make sure you've had your pneumonia   vaccines.          GREEN ZONE       Doing well today      Usual level of activity and exercise    Usual amount of cough and mucus    No shortness of breath    Usual level of health (thinking clearly, sleeping well, feel like eating) Actions:      Take daily medicines    Use oxygen as prescribed    Follow regular exercise and diet plan    Avoid cigarette smoke and other irritants that harm the lungs           YELLOW ZONE          Having a bad day or flare up      Short of breath more than usual    A lot more sputum (mucus) than usual    Sputum looks yellow, green, tan, brown or bloody    More coughing or wheezing    Fever or chills    Less energy; trouble completing activities    Trouble thinking or focusing    Using quick relief inhaler or nebulizer more often    Poor sleep; symptoms wake me up    Do not feel like eating Actions:      Get plenty of rest    Take daily medicines    Use quick relief inhaler every *** hours    If you use oxygen, call you doctor to see if you should adjust your oxygen    Do breathing exercises or other things to help you relax    Let a loved one, friend or neighbor know you are feeling worse    Call your care team if you have 2 or more symptoms.  Start taking steroids or antibiotics if directed by your care team           RED ZONE       Need medical care now      Severe shortness of breath (feel you can't breathe)    Fever, chills    Not enough breath to do any activity    Trouble coughing up mucus, walking or  talking    Blood in mucus    Frequent coughing   Rescue medicines are not working    Not able to sleep because of breathing    Feel confused or drowsy    Chest pain    Actions:      Call your health care team.  If you cannot reach your care team, call 911 or go to the emergency room.        Annual Reminders:  Meet with Care Team, Flu Shot every Fall  Pharmacy:    [facility] [address] [place], [place] - [address] [address] [place] [phone] [address]  [facility] [address] [place], [place] - [address] [facility]

## 2019-07-18 NOTE — NURSING NOTE
"Chief Complaint   Patient presents with     Depression     Health Maintenance     order pended, HF Plan, COPD Plan, wellness visit, Tdap       Initial /73   Pulse 93   Temp 97.7  F (36.5  C) (Oral)   Resp 20   Wt 88.5 kg (195 lb)   SpO2 93%   BMI 34.54 kg/m   Estimated body mass index is 34.54 kg/m  as calculated from the following:    Height as of 5/1/19: 1.6 m (5' 3\").    Weight as of this encounter: 88.5 kg (195 lb).  Medication Reconciliation: complete  Pebbles Posadas, MAGALI  "

## 2019-07-31 DIAGNOSIS — Z23 NEED FOR PROPHYLACTIC VACCINATION AND INOCULATION AGAINST INFLUENZA: ICD-10-CM

## 2019-07-31 DIAGNOSIS — R29.898 WEAKNESS OF BOTH LOWER EXTREMITIES: ICD-10-CM

## 2019-07-31 RX ORDER — LEVOTHYROXINE SODIUM 75 UG/1
TABLET ORAL
Qty: 90 TABLET | Refills: 0 | OUTPATIENT
Start: 2019-07-31

## 2019-08-04 DIAGNOSIS — E53.8 FOLATE DEFICIENCY: Primary | ICD-10-CM

## 2019-08-04 RX ORDER — FOLIC ACID 1 MG/1
1 TABLET ORAL DAILY
Qty: 90 TABLET | Refills: 1 | Status: SHIPPED | OUTPATIENT
Start: 2019-08-04 | End: 2020-03-16

## 2019-08-07 ENCOUNTER — OFFICE VISIT (OUTPATIENT)
Dept: UROLOGY | Facility: CLINIC | Age: 79
End: 2019-08-07
Payer: MEDICARE

## 2019-08-07 VITALS — HEART RATE: 99 BPM | DIASTOLIC BLOOD PRESSURE: 68 MMHG | SYSTOLIC BLOOD PRESSURE: 104 MMHG | RESPIRATION RATE: 16 BRPM

## 2019-08-07 DIAGNOSIS — R39.15 URINARY URGENCY: Primary | ICD-10-CM

## 2019-08-07 LAB
ALBUMIN UR-MCNC: 100 MG/DL
APPEARANCE UR: ABNORMAL
BACTERIA #/AREA URNS HPF: ABNORMAL /HPF
BILIRUB UR QL STRIP: ABNORMAL
CAOX CRY #/AREA URNS HPF: ABNORMAL /HPF
COLOR UR AUTO: YELLOW
GLUCOSE UR STRIP-MCNC: NEGATIVE MG/DL
HGB UR QL STRIP: ABNORMAL
KETONES UR STRIP-MCNC: 15 MG/DL
LEUKOCYTE ESTERASE UR QL STRIP: ABNORMAL
NITRATE UR QL: NEGATIVE
NON-SQ EPI CELLS #/AREA URNS LPF: ABNORMAL /LPF
PH UR STRIP: 5.5 PH (ref 5–7)
RBC #/AREA URNS AUTO: ABNORMAL /HPF
SOURCE: ABNORMAL
SP GR UR STRIP: 1.02 (ref 1–1.03)
TRANS CELLS #/AREA URNS HPF: ABNORMAL /HPF
UROBILINOGEN UR STRIP-ACNC: 0.2 EU/DL (ref 0.2–1)
WBC #/AREA URNS AUTO: >100 /HPF

## 2019-08-07 PROCEDURE — 99205 OFFICE O/P NEW HI 60 MIN: CPT | Mod: 25 | Performed by: UROLOGY

## 2019-08-07 PROCEDURE — 52000 CYSTOURETHROSCOPY: CPT | Performed by: UROLOGY

## 2019-08-07 PROCEDURE — 81001 URINALYSIS AUTO W/SCOPE: CPT | Performed by: UROLOGY

## 2019-08-07 RX ORDER — TOLTERODINE 4 MG/1
4 CAPSULE, EXTENDED RELEASE ORAL DAILY
Qty: 30 CAPSULE | Refills: 1 | Status: SHIPPED | OUTPATIENT
Start: 2019-08-07 | End: 2020-01-29

## 2019-08-07 NOTE — PROGRESS NOTES
Gilma Pace is a 79 year old female seen in consultation for incont. Consult from Gucci Wu  (Presents today with her dtr)      Pt reports several yr hx of urge incont, progressive, now wears several layers of pad. Seems to be episodic; has been somewhat better the last couple of days, pt cannot recall any precipitating fx.    Also some FERNIE; denies insensate loss.    Denies dysuria, gross hematuria, frequency; nocturia x 2.    Denies significant UTI's, prior  eval, hx bladder surgery, use of bladder meds, success with Kegel's.     Hx 2 vag deliveries, no hyster. Not on HRT.    Denies constipation and fecal incontinence.     Moderate fluids and caffeine. (Did not complete voiding diary).    Retired teaching paraprofessional.      Past Medical History:   Diagnosis Date     Anxiety 10/11/2006    Last visit with Mental health specialist in 2002.      Chronic low back pain without sciatica 10/31/2016     Compression fracture of thoracic vertebra (H) 6/13/2011    Overview:  T  11  compression  fx  on  xrays  from  6/13/2011  . Ongoing pain  x  3  months   Get  eval  and  treat  with  spine  specialists  as discussed  Initially  treated  in [place]  .  Pain  is  controlled   with  advil  as needed  . Reviewed   with  patient      Congestive heart failure (H) 10/11/2006    Overview:  Epic      COPD (chronic obstructive pulmonary disease) (H) 9/28/2017     Folate deficiency 6/9/2017    Overview:  Formatting of this note may be different from the original. Hemoglobin (g/dl)  Date Value  06/05/2017 15.6  10/31/2016 13.8  11/04/2015 11.3 (L)   Iron (mcg/dl)  Date Value  06/05/2017 49 (L)   TIBC, Calculated (mcg/dl)  Date Value  06/05/2017 319   % Saturation, calc. (%)  Date Value  06/05/2017 15   Vitamin B12 (pg/ml)  Date Value  06/05/2017 253   Folate (ng/ml)  Date Value  06/05/20     Hypertension goal BP (blood pressure) < 140/90 9/28/2017     Mitral regurgitation 2/11/2014    Overview:  Needs dental prophylaxis       Mixed hyperlipidemia 10/11/2006     Osteoporosis 2017     Postoperative hypothyroidism 2017       Past Surgical History:   Procedure Laterality Date     APPENDECTOMY OPEN      71 years      BIOPSY MUSCLE DIAGNOSTIC (LOCATION) Right 3/19/2018    Procedure: BIOPSY MUSCLE DIAGNOSTIC (LOCATION);  right vastus lateralis muscle biopsy;  Surgeon: Melvin Agosto MD;  Location: [facility]     CATARACT IOL, RT/LT         Social History     Socioeconomic History     Marital status:      Spouse name: Not on file     Number of children: 2     Years of education: 14     Highest education level: Not on file   Occupational History     Occupation: RETIRED       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    Social Needs     Financial resource strain: Not on file     Food insecurity:     Worry: Not on file     Inability: Not on file     Transportation needs:     Medical: Not on file     Non-medical: Not on file   Tobacco Use     Smoking status: Former Smoker     Last attempt to quit: 2007     Years since quittin.6     Smokeless tobacco: Never Used   Substance and Sexual Activity     Alcohol use: Yes     Comment: Rarely     Drug use: No     Sexual activity: Not Currently     Partners: Male   Lifestyle     Physical activity:     Days per week: Not on file     Minutes per session: Not on file     Stress: Not on file   Relationships     Social connections:     Talks on phone: Not on file     Gets together: Not on file     Attends Zoroastrianism service: Not on file     Active member of club or organization: Not on file     Attends meetings of clubs or organizations: Not on file     Relationship status: Not on file     Intimate partner violence:     Fear of current or ex partner: Not on file     Emotionally abused: Not on file     Physically abused: Not on file     Forced sexual activity: Not on file   Other Topics Concern     Parent/sibling w/ CABG, MI or angioplasty before 65F 55M? No   Social History  Narrative     Not on file       Current Outpatient Medications   Medication Sig Dispense Refill     amLODIPine (NORVASC) 10 MG tablet Take 1 tablet (10 mg) by mouth daily 90 tablet 3     ARIPiprazole, sensor, 5 MG TABS Take 5 mg by mouth At Bedtime 90 tablet 1     busPIRone (BUSPAR) 15 MG tablet TAKE 1 TABLET BY MOUTH TWICE DAILY 180 tablet 0     clobetasol propionate (CLOBEX) 0.05 % external shampoo Once a week apply to dry scalp and let sit for 15 minutes. Then take a shower 118 mL 2     clotrimazole-betamethasone (LOTRISONE) cream Apply topically 2 times daily To the corners of the mouth 15 g 1     DULoxetine (CYMBALTA) 60 MG capsule TAKE 2 CAPSULES BY MOUTH ONCE DAILY 180 capsule 2     folic acid (FOLVITE) 1 MG tablet Take 1 tablet (1 mg) by mouth daily 90 tablet 1     gabapentin (NEURONTIN) 600 MG tablet Take 1 tablet (600 mg) by mouth 2 times daily 180 tablet 3     IBUPROFEN PO Take 200 mg by mouth as needed        levothyroxine (SYNTHROID/LEVOTHROID) 75 MCG tablet Take 1 tablet (75 mcg) by mouth daily 90 tablet 0     lisinopril (PRINIVIL/ZESTRIL) 40 MG tablet TAKE 1 TABLET BY MOUTH ONCE DAILY 90 tablet 1     nystatin (MYCOSTATIN) 108382 UNIT/GM external powder Apply topically 2 times daily 60 g 11     primidone (MYSOLINE) 50 MG tablet Take 0.25 tablets (12.5 mg) by mouth At Bedtime 10 tablet 1     tiotropium (SPIRIVA HANDIHALER) 18 MCG capsule Inhale 18 mcg into the lungs daily       traZODone (DESYREL) 100 MG tablet Take 1 tablet (100 mg) by mouth At Bedtime 90 tablet 1       Physical Exam:    GENL: NAD. Significant bilateral LE edema.   ABD: Soft, non-tender, no masses.    EG: Poorly-estrogenized, no masses.    VAGINA: Poorly-estrogenized, no masses.    BN HYPERMOBILITY: Minimal.    CYSTOCELE: Grade 1.    APICAL PROLAPSE: None.    RECTOCELE: None.    BIMANUAL: No mass or tenderness.    Cysto:    (Informed consent obtained. Pause for cause performed)   Sterile prep.    17 Fr scope inserted through urethra.  Systematic examination w 70 degree lens.   PVR: 5 cc   MUCOSA: Normal without lesion   ORIFICES: Normal location and morphology   CAPACITY: 200 cc; no pain with filling >> apparent UDC with leakage around the scope at that point   Scope withdrawn without untoward effect.    (Pt tolerated procedure without difficulty).      Results for orders placed or performed in visit on 08/07/19   UA reflex to Microscopic   Result Value Ref Range    Color Urine Yellow     Appearance Urine Cloudy     Glucose Urine Negative NEG^Negative mg/dL    Bilirubin Urine Moderate (A) NEG^Negative    Ketones Urine 15 (A) NEG^Negative mg/dL    Specific Gravity Urine 1.025 1.003 - 1.035    Blood Urine Small (A) NEG^Negative    pH Urine 5.5 5.0 - 7.0 pH    Protein Albumin Urine 100 (A) NEG^Negative mg/dL    Urobilinogen Urine 0.2 0.2 - 1.0 EU/dL    Nitrite Urine Negative NEG^Negative    Leukocyte Esterase Urine Large (A) NEG^Negative    Source Catheterized Urine          IMP:  1. OAB wet, complex, very modest bladder capacity of 200 ml  2. CHF, COPD, musculoskeletal issues, other medical issues      PLAN:  1. Discussed situation with patient in detail.  2. Consider trial Detrol LA4; discussed in detail rationale, mech of action, potential side effects, etc; pt expresses understanding, elects to proceed  3. RTC 2 mos  4. Set realistic expectations  5. 60 minutes spent with patient, more than 50% in counseling and coordination of care for OAB, which did not include time spent for the procedure.

## 2019-08-12 PROBLEM — F33.1 MAJOR DEPRESSIVE DISORDER, RECURRENT EPISODE, MODERATE (H): Status: ACTIVE | Noted: 2019-08-12

## 2019-08-14 ENCOUNTER — OFFICE VISIT (OUTPATIENT)
Dept: BEHAVIORAL HEALTH | Facility: CLINIC | Age: 79
End: 2019-08-14
Payer: MEDICARE

## 2019-08-14 DIAGNOSIS — F43.23 ADJUSTMENT DISORDER WITH MIXED ANXIETY AND DEPRESSED MOOD: Primary | ICD-10-CM

## 2019-08-14 PROCEDURE — 90834 PSYTX W PT 45 MINUTES: CPT | Performed by: SOCIAL WORKER

## 2019-08-14 NOTE — PROGRESS NOTES
"                                             Progress Note    Client Name: Gilma Pace  Date: 8/14/2019           Service Type: Individual  Video Visit: No       Session Start Time: 230  Session End Time: 315     Session Length: 38-52    Session #: 5    Attendees: Client and daughter      Treatment Plan Last Reviewed: 7/16/2019    PHQ-9 / ARISTIDES-7 :     DATA  Interactive Complexity: No  Crisis: No       Progress Since Last Session (Related to Symptoms / Goals / Homework):   Symptoms: No change -    Homework: Did not complete  1.  Client will log activities of Achievement, Closeness, and Enjoyment (ACEs) using ACE rating scale and bring to next session.           2.  Client will log productive v. unproductive worry at least once by next session.  Worksheet given in session to facilitate this.    3.  MOCA at next session    4.  Client will call to make next appt     Episode of Care Goals: Minimal progress - CONTEMPLATION (Considering change and yet undecided); Intervened by assessing the negative and positive thinking (ambivalence) about behavior change     Current / Ongoing Stressors and Concerns:      Client notes that she has been \"same old same old, I am bored.\"  Not much that she can do sitting in her chair, has done some coloring.  Mood has been better lately.           Treatment Objective(s) Addressed in This Session:       Goal- Anxiety: Client will decrease anxiety    I will know I've met my goal when I am less anxious.      Objective #A (Client Action)      Client will use cognitive strategies identified in therapy to challenge anxious thoughts.      Objective #B    Client will use at least 3 coping skills for anxiety management in the next 12 weeks.     -behavioral activation: ACE    Objective #C    Client will identify three distraction and diversion activities and use those activities to decrease level of anxiety.       Intervention:   CBT: - Long discussion today about behavioral activation.  Reviewed " basic tracking options, identified differences between long-term v short-term planning, and discussed setting and achieving SMART goals as an effective method for dealing with depression, anxiety, and many other mental health concerns.  Set behavioral goals in session today.               ASSESSMENT: Current Emotional / Mental Status (status of significant symptoms):   Risk status (Self / Other harm or suicidal ideation)   Client denies current fears or concerns for personal safety.   Client denies current or recent suicidal ideation or behaviors.   Client denies current or recent homicidal ideation or behaviors.   Client denies current or recent self injurious behavior or ideation.   Client denies other safety concerns.   Client Client reports there has been no change in risk factors since their last session.     Client Client reports there has been no change in protective factors since their last session.     A safety and risk management plan has not been developed at this time, however client was given the after-hours number / 911 should there be a change in any of these risk factors.     Appearance:   Appropriate    Eye Contact:   Good    Psychomotor Behavior: Agitated    Attitude:   Cooperative    Orientation:   All   Speech    Rate / Production: Pressured     Volume:  Normal    Mood:    Anxious  Depressed    Affect:    Appropriate    Thought Content:  Clear    Thought Form:  Coherent  Logical    Insight:    Good      Medication Review:   No changes to current psychiatric medication(s)     Medication Compliance:   Yes     Changes in Health Issues:   None reported     Chemical Use Review:   Substance Use: Chemical use reviewed, no active concerns identified      Tobacco Use: No current tobacco use.      Diagnosis:  1. Adjustment disorder with mixed anxiety and depressed mood        Collateral Reports Completed:   Not Applicable    PLAN: (Client Tasks / Therapist Tasks / Other)  1.  Client will log activities of  Achievement, Closeness, and Enjoyment (ACEs) using ACE rating scale and bring to next session.           2.  Client will log productive v. unproductive worry at least once by next session.  Worksheet given in session to facilitate this.    3.  Client will call to make next appt        BRETT AlcantaraSW                                                         ______________________________________________________________________    Treatment Plan    Client's Name: Gilma Pace  YOB: 1940    Date: 3/1/2019     DSM-V Diagnoses: Diagnoses: 296.32 (F33.1) Major Depressive Disorder, Recurrent Episode, Moderate _  300.02 (F41.1) Generalized Anxiety Disorder  Psychosocial & Contextual Factors: some stress with   WHODAS 2.0 (12 item)    DECLINES WHODAS TODAY            This questionnaire asks about difficulties due to health conditions. Health conditions  include  disease or illnesses, other health problems that may be short or long lasting,  injuries, mental health or emotional problems, and problems with alcohol or drugs.                     Think back over the past 30 days and answer these questions, thinking about how much  difficulty you had doing the following activities. For each question, please [place] only  one response.    Referral / Collaboration:  Referral to another professional/service is not indicated at this time..    Anticipated number of session or this episode of care: 18-24      MeasurableTreatment Goal(s) related to diagnosis / functional impairment(s)      Goal- Anxiety: Client will decrease anxiety    I will know I've met my goal when I am less anxious.      Objective #A (Client Action)    Status: cont- Date: 7/16/2019     Client will use cognitive strategies identified in therapy to challenge anxious thoughts.    Intervention(s)  Therapist will provide psychoeducation, behavioral activation, and cognitive restructuring.    Objective #B  Client will use at least 3 coping  skills for anxiety management in the next 12 weeks.    Status: cont- Date: 7/16/2019     Intervention(s)  Therapist will provide psychoeducation, behavioral activation, and cognitive restructuring.      Objective #C  Client will identify three distraction and diversion activities and use those activities to decrease level of anxiety.  Status: cont- Date: 7/16/2019     Intervention(s)  Therapist will provide psychoeducation, behavioral activation, and cognitive restructuring.                  Client has reviewed and agreed to the above plan.      William Dickson, VONNIE  March 1, 2019

## 2019-08-28 DIAGNOSIS — Z23 NEED FOR PROPHYLACTIC VACCINATION AND INOCULATION AGAINST INFLUENZA: ICD-10-CM

## 2019-08-28 DIAGNOSIS — R29.898 WEAKNESS OF BOTH LOWER EXTREMITIES: ICD-10-CM

## 2019-08-28 DIAGNOSIS — G25.0 ESSENTIAL TREMOR: ICD-10-CM

## 2019-08-28 RX ORDER — TRAZODONE HYDROCHLORIDE 100 MG/1
TABLET ORAL
Qty: 90 TABLET | Refills: 1 | Status: SHIPPED | OUTPATIENT
Start: 2019-08-28 | End: 2020-01-31

## 2019-08-28 RX ORDER — PRIMIDONE 50 MG/1
TABLET ORAL
Qty: 10 TABLET | Refills: 1 | Status: SHIPPED | OUTPATIENT
Start: 2019-08-28 | End: 2019-10-20

## 2019-08-28 NOTE — TELEPHONE ENCOUNTER
Routing refill request to provider for review/approval because:  Drug not on the [facility] refill protocol (primidone) Below are the diagnosis attributed to the trazadone, would you like to chage these?  Please address all medication.  Thank you. Janis Howard R.N.    Need for prophylactic vaccination and inoculation against influenza [Z23]       Weakness of both lower extremities [R23.636]

## 2019-09-11 ENCOUNTER — DOCUMENTATION ONLY (OUTPATIENT)
Dept: LAB | Facility: CLINIC | Age: 79
End: 2019-09-11

## 2019-09-11 DIAGNOSIS — E78.2 MIXED HYPERLIPIDEMIA: ICD-10-CM

## 2019-09-11 DIAGNOSIS — I10 ESSENTIAL HYPERTENSION: ICD-10-CM

## 2019-09-11 DIAGNOSIS — M81.0 OSTEOPOROSIS, UNSPECIFIED OSTEOPOROSIS TYPE, UNSPECIFIED PATHOLOGICAL FRACTURE PRESENCE: ICD-10-CM

## 2019-09-11 DIAGNOSIS — E53.8 FOLATE DEFICIENCY: ICD-10-CM

## 2019-09-11 DIAGNOSIS — R73.09 OTHER ABNORMAL GLUCOSE: ICD-10-CM

## 2019-09-11 DIAGNOSIS — E03.9 HYPOTHYROIDISM, UNSPECIFIED TYPE: ICD-10-CM

## 2019-09-11 DIAGNOSIS — E66.01 MORBID OBESITY (H): ICD-10-CM

## 2019-09-11 DIAGNOSIS — E89.0 POSTOPERATIVE HYPOTHYROIDISM: Primary | ICD-10-CM

## 2019-09-11 LAB
T4 FREE SERPL-MCNC: 1.21 NG/DL (ref 0.76–1.46)
TSH SERPL DL<=0.005 MIU/L-ACNC: 5.42 MU/L (ref 0.4–4)

## 2019-09-11 PROCEDURE — 80053 COMPREHEN METABOLIC PANEL: CPT | Performed by: FAMILY MEDICINE

## 2019-09-11 PROCEDURE — 84439 ASSAY OF FREE THYROXINE: CPT | Performed by: FAMILY MEDICINE

## 2019-09-11 PROCEDURE — 36415 COLL VENOUS BLD VENIPUNCTURE: CPT | Performed by: FAMILY MEDICINE

## 2019-09-11 PROCEDURE — 80061 LIPID PANEL: CPT | Performed by: FAMILY MEDICINE

## 2019-09-11 PROCEDURE — 84443 ASSAY THYROID STIM HORMONE: CPT | Performed by: FAMILY MEDICINE

## 2019-09-11 NOTE — PROGRESS NOTES
...Your patient was in for lab test today and there are pending orders in Epic. I drew JIC tubes.  Please review and tag any additional orders to the lab appointment or enter orders as a future and I will watch for them.  Thank you   Ayse   @ [facility]

## 2019-09-12 DIAGNOSIS — M81.0 OSTEOPOROSIS, UNSPECIFIED OSTEOPOROSIS TYPE, UNSPECIFIED PATHOLOGICAL FRACTURE PRESENCE: Primary | ICD-10-CM

## 2019-09-12 DIAGNOSIS — I10 ESSENTIAL HYPERTENSION: ICD-10-CM

## 2019-09-12 LAB
ALBUMIN SERPL-MCNC: 3.5 G/DL (ref 3.4–5)
ALP SERPL-CCNC: 113 U/L (ref 40–150)
ALT SERPL W P-5'-P-CCNC: 13 U/L (ref 0–50)
ANION GAP SERPL CALCULATED.3IONS-SCNC: 9 MMOL/L (ref 3–14)
AST SERPL W P-5'-P-CCNC: 10 U/L (ref 0–45)
BILIRUB SERPL-MCNC: 0.4 MG/DL (ref 0.2–1.3)
BUN SERPL-MCNC: 16 MG/DL (ref 7–30)
CALCIUM SERPL-MCNC: 9 MG/DL (ref 8.5–10.1)
CHLORIDE SERPL-SCNC: 102 MMOL/L (ref 94–109)
CHOLEST SERPL-MCNC: 251 MG/DL
CO2 SERPL-SCNC: 30 MMOL/L (ref 20–32)
CREAT SERPL-MCNC: 0.7 MG/DL (ref 0.52–1.04)
GFR SERPL CREATININE-BSD FRML MDRD: 83 ML/MIN/{1.73_M2}
GLUCOSE SERPL-MCNC: 98 MG/DL (ref 70–99)
HDLC SERPL-MCNC: 58 MG/DL
LDLC SERPL CALC-MCNC: 164 MG/DL
NONHDLC SERPL-MCNC: 193 MG/DL
POTASSIUM SERPL-SCNC: 3.6 MMOL/L (ref 3.4–5.3)
PROT SERPL-MCNC: 8 G/DL (ref 6.8–8.8)
SODIUM SERPL-SCNC: 141 MMOL/L (ref 133–144)
TRIGL SERPL-MCNC: 146 MG/DL

## 2019-09-18 ENCOUNTER — OFFICE VISIT (OUTPATIENT)
Dept: FAMILY MEDICINE | Facility: CLINIC | Age: 79
End: 2019-09-18
Payer: MEDICARE

## 2019-09-18 VITALS
WEIGHT: 201 LBS | HEART RATE: 95 BPM | BODY MASS INDEX: 35.61 KG/M2 | SYSTOLIC BLOOD PRESSURE: 126 MMHG | TEMPERATURE: 97.9 F | DIASTOLIC BLOOD PRESSURE: 72 MMHG | RESPIRATION RATE: 22 BRPM | OXYGEN SATURATION: 97 %

## 2019-09-18 DIAGNOSIS — Z23 NEED FOR PROPHYLACTIC VACCINATION AND INOCULATION AGAINST INFLUENZA: ICD-10-CM

## 2019-09-18 DIAGNOSIS — E78.00 ELEVATED LDL CHOLESTEROL LEVEL: ICD-10-CM

## 2019-09-18 DIAGNOSIS — Z87.891 PERSONAL HISTORY OF TOBACCO USE, PRESENTING HAZARDS TO HEALTH: ICD-10-CM

## 2019-09-18 DIAGNOSIS — Z12.31 ENCOUNTER FOR SCREENING MAMMOGRAM FOR BREAST CANCER: ICD-10-CM

## 2019-09-18 DIAGNOSIS — Z78.0 ASYMPTOMATIC MENOPAUSAL STATE: ICD-10-CM

## 2019-09-18 DIAGNOSIS — Z00.00 ENCOUNTER FOR MEDICARE ANNUAL WELLNESS EXAM: Primary | ICD-10-CM

## 2019-09-18 DIAGNOSIS — R29.898 WEAKNESS OF BOTH LOWER EXTREMITIES: ICD-10-CM

## 2019-09-18 DIAGNOSIS — Z87.891 HISTORY OF SMOKING AT LEAST 1 PACK PER DAY FOR AT LEAST 30 YEARS: ICD-10-CM

## 2019-09-18 DIAGNOSIS — Z23 NEED FOR VACCINATION: ICD-10-CM

## 2019-09-18 PROCEDURE — 90732 PPSV23 VACC 2 YRS+ SUBQ/IM: CPT | Performed by: FAMILY MEDICINE

## 2019-09-18 PROCEDURE — G0008 ADMIN INFLUENZA VIRUS VAC: HCPCS | Mod: 59 | Performed by: FAMILY MEDICINE

## 2019-09-18 PROCEDURE — G0009 ADMIN PNEUMOCOCCAL VACCINE: HCPCS | Mod: 59 | Performed by: FAMILY MEDICINE

## 2019-09-18 PROCEDURE — 90715 TDAP VACCINE 7 YRS/> IM: CPT | Performed by: FAMILY MEDICINE

## 2019-09-18 PROCEDURE — G0439 PPPS, SUBSEQ VISIT: HCPCS | Performed by: FAMILY MEDICINE

## 2019-09-18 PROCEDURE — 90471 IMMUNIZATION ADMIN: CPT | Performed by: FAMILY MEDICINE

## 2019-09-18 PROCEDURE — 90662 IIV NO PRSV INCREASED AG IM: CPT | Performed by: FAMILY MEDICINE

## 2019-09-18 RX ORDER — ROSUVASTATIN CALCIUM 20 MG/1
20 TABLET, COATED ORAL AT BEDTIME
Qty: 90 TABLET | Refills: 3 | Status: SHIPPED | OUTPATIENT
Start: 2019-09-18 | End: 2020-11-23

## 2019-09-18 RX ORDER — LEVOTHYROXINE SODIUM 100 UG/1
100 TABLET ORAL DAILY
Qty: 90 TABLET | Refills: 3 | Status: SHIPPED | OUTPATIENT
Start: 2019-09-18 | End: 2020-11-02

## 2019-09-18 ASSESSMENT — PAIN SCALES - GENERAL: PAINLEVEL: NO PAIN (0)

## 2019-09-18 NOTE — PATIENT INSTRUCTIONS
Patient Education   Personalized Prevention Plan  You are due for the preventive services outlined below.  Your care team is available to assist you in scheduling these services.  If you have already completed any of these items, please share that information with your care team to update in your medical record.  Health Maintenance Due   Topic Date Due     Osteoporosis Screening  1940     Heart Failure Action Plan  1940     COPD Action Plan  1940     Complete Blood Count  1940     Diptheria Tetanus Pertussis (DTAP/TDAP/TD) Vaccine (1 - Tdap) 06/16/1966     Zoster (Shingles) Vaccine (1 of 2) 06/16/1991     Annual Wellness Visit  06/16/2006     Pneumococcal Vaccine (2 of 2 - PPSV23) 10/31/2017     Flu Vaccine (1) 09/01/2019         Please call our [facility] Scheduling Line at [phone] to schedule your:    Mammogram  Dexa Scan (bone density scan)  CT scan         Patient Education     Preventing Osteoporosis: Meeting Your Calcium Needs    Your body needs calcium to build and repair bones. But it can't make calcium on its own. That's why it's important to eat calcium-rich foods. Some foods are naturally rich in calcium. Others have calcium added (fortified). It's best to get calcium from the foods you eat. But if you can't get enough, you may want to take calcium supplements. To meet your daily calcium needs, try the foods listed below.  Dairy Fish & beans Other sources   Source   Calcium (mg) per serving   Source   Calcium (mg) per serving   Source   Calcium (mg) per serving   Low-fat yogurt, plain   415 mg/8 oz.   Sardines, Atlantic, canned, with bones   351 mg/3 oz.   Oatmeal, instant, fortified   215 mg/1 cup   Nonfat milk   302 mg/1 cup   [place], sockeye, canned, with bones   239 mg/3 oz.   Tofu made with calcium sulfate   204 mg/3 oz.   Low-fat milk   297 mg/1 cup   Soybeans, fresh, boiled   131 mg/1/2 cup   Collards   179 mg/1/2 cup   Swiss cheese   272 mg/1 oz.   White beans,  cooked   81 mg/1/2 cup   English muffin, whole wheat   175 mg/1 muffin   Cheddar cheese   205 mg/1 oz.   Navy beans, cooked   79 mg/1/2 cup   Kale   90 mg/1/2 cup   Ice cream strawberry   79 mg/1/2 cup           Orange, navel   56 mg/1 medium   Note: Calcium levels may vary depending on brand and size.  Daily calcium needs  14 to 18 years old: 1,300 mg  19 to 30 years old: 1,000 mg  31 to 50 years old: 1,000 mg  51 to 70 years old, women: 1,200 mg  51 to 70 years old, men: 1,000 mg  Pregnant or nursin to 18 years old: 1,300 mg, 19 to 50 years old: 1,000 mg  Older than 70 (women and men): 1,200 mg   Date Last Reviewed: 2018-2018 [employer]. [address] [place] [address]. All rights reserved. This information is not intended as a substitute for professional medical care. Always follow your healthcare professional's instructions.         please make a laboratory appointment(s) for 6-8 weeks from now.      You are a candidate to get a Chest CT to screen for lung cancer. Call you insurance company and ask if this is covered and how much it will cost you. If you would like to proceed wit the test please call our [facility] Scheduling Line at [phone] to schedule it.

## 2019-09-18 NOTE — LETTER
"My Heart Failure Action Plan   Name: Gilma Pace    YOB: 1940   Date: 9/18/2019    My doctor: Gucci Wu     [place] [address] [facility] [address] [phone]  My Diagnosis: {HF STAGES:068441}   My Exercise Goal: 30 minutes daily  .     My Weight Plan:   Wt Readings from Last 2 Encounters:   07/18/19 88.5 kg (195 lb)   06/14/19 78.9 kg (174 lb)     Weigh yourself daily using the same scale. If you gain more than 2 pounds in 24 hours or 5 pounds in a week {HF WEIGHT GOAL:280932}    My Diet Goal: { :658462::\"No added salt\"}    Emergency Room Visits:    Our goal is to improve your quality of life and help you avoid a visit to the emergency room or hospital.  If we work together, we can achieve this goal. But, if you feel you need to call 911 or go to the emergency room, please do so.  If you go to the emergency room, please bring your list of medicines and your daily weight chart with you.       GREEN ZONE     Doing well today    Weight gained is no more than 2 pounds a day or 5 pounds a week.    No swelling in feet, ankles, legs or stomach.    No more swelling than usual.    No more trouble breathing than usual.    No change in my sleep.    No other problems. Actions:    I am doing fine.  I will take my medicine, follow my diet, see my doctor, exercise, and watch for symptoms.           YELLOW ZONE         Having a bad day or flare up    Weight gain of more than 2 pounds in one day or 5 pounds in one week.    New swelling in ankle, leg, knee or thigh.    Bloating in belly, pants feel tighter.    Swelling in hands or face.    Coughing or trouble breathing while walking or talking.    Harder to breathe last night.    Have trouble sleeping, wake up short of breath.    Much more tired than usual.    Not eating.    Pain in my chest or bad leg cramps.    Feel weak or dizzy. Actions:    I need to take action and call my doctor or nurse today.                 RED " ZONE         Need medical care now    Weight gain of 5 pounds overnight.    Chest pain or pressure that does not go away.    Feel less alert.    Wheezing or have trouble breathing when at rest.    Cannot sleep lying down.    Cannot take my water pill.    Pass out or faint. Actions:    I need to call my doctor or nurse now!    Call 911 if I have chest pain or cannot breathe.

## 2019-09-18 NOTE — NURSING NOTE
"Chief Complaint   Patient presents with     Wellness Visit     Health Maintenance     order pended, COPD Plan, HF Plan, Pneumo 23, TDAP       Initial /78   Pulse 95   Temp 97.9  F (36.6  C) (Oral)   Resp 22   Wt 91.2 kg (201 lb)   SpO2 97%   BMI 35.61 kg/m   Estimated body mass index is 35.61 kg/m  as calculated from the following:    Height as of 5/1/19: 1.6 m (5' 3\").    Weight as of this encounter: 91.2 kg (201 lb).  Medication Reconciliation: complete  Pebbles Posadas CMA  "

## 2019-09-18 NOTE — PROGRESS NOTES
"  SUBJECTIVE:   Gilma Pace is a 79 year old female who presents for Preventive Visit.      Are you in the first 12 months of your Medicare Part B coverage?  No    Physical Health:    In general, how would you rate your overall physical health? fair    Outside of work, how many days during the week do you exercise? none    Outside of work, approximately how many minutes a day do you exercise?not applicable    If you drink alcohol do you typically have >3 drinks per day or >7 drinks per week? No    Do you usually eat at least 4 servings of fruit and vegetables a day, include whole grains & fiber and avoid regularly eating high fat or \"junk\" foods? Yes    Do you have any problems taking medications regularly?  No    Do you have any side effects from medications? tired all the time    Needs assistance for the following daily activities: no assistance needed    Which of the following safety concerns are present in your home?  none identified     Hearing impairment: No    In the past 6 months, have you been bothered by leaking of urine? yes    Mental Health:    In general, how would you rate your overall mental or emotional health? good  PHQ-2 Score:      Do you feel safe in your environment? Yes    Do you have a Health Care Directive? Yes: Patient states has Advance Directive and will bring in a copy to clinic.    Additional concerns to address?  No    Fall risk:  Fallen 2 or more times in the past year?: Yes  Any fall with injury in the past year?: Yes  Timed Up and Go Test (>13.5 is fall risk; contact physician) : 12    Cognitive Screenin) Repeat 3 items (Leader, Season, Table)    2) Clock draw: NORMAL  3) 3 item recall: Recalls 3 objects  Results: 3 items recalled: COGNITIVE IMPAIRMENT LESS LIKELY    Mini-CogTM Copyright HARRIETT López. Licensed by the author for use in [facility]; reprinted with permission (dean@.[facility]). All rights reserved.      Do you have sleep apnea, excessive snoring or daytime " "drowsiness?: drowsiness            Reviewed and updated as needed this visit by clinical staff  Tobacco  Allergies  Med Hx  Surg Hx  Fam Hx  Soc Hx        Reviewed and updated as needed this visit by Provider  Tobacco        Social History     Tobacco Use     Smoking status: Former Smoker     Last attempt to quit: 2007     Years since quittin.7     Smokeless tobacco: Never Used   Substance Use Topics     Alcohol use: Yes     Comment: Rarely                           Current providers sharing in care for this patient include:   Patient Care Team:  Gucci Wu MD as PCP - General (Family Practice)  Gucci Wu MD as Assigned PCP    The following health maintenance items are reviewed in Epic and correct as of today:  Health Maintenance   Topic Date Due     DEXA  1940     HF ACTION PLAN  1940     COPD ACTION PLAN  1941     CBC  1941     DTAP/TDAP/TD IMMUNIZATION (1 - Tdap) 1966     ZOSTER IMMUNIZATION (1 of 2) 1991     MEDICARE ANNUAL WELLNESS VISIT  2006     PNEUMOCOCCAL IMMUNIZATION 65+ LOW/MEDIUM RISK (2 of 2 - PPSV23) 10/31/2017     INFLUENZA VACCINE (1) 2019     PHQ-9  2019     BMP  2020     FALL RISK ASSESSMENT  2020     ARISTIDES ASSESSMENT  2020     ALT  2020     LIPID  2020     ADVANCE CARE PLANNING  2024     SPIROMETRY  Completed     DEPRESSION ACTION PLAN  Completed     IPV IMMUNIZATION  Aged Out     MENINGITIS IMMUNIZATION  Aged Out           ROS:      OBJECTIVE:   /72   Pulse 95   Temp 97.9  F (36.6  C) (Oral)   Resp 22   Wt 91.2 kg (201 lb)   SpO2 97%   BMI 35.61 kg/m   Estimated body mass index is 35.61 kg/m  as calculated from the following:    Height as of 19: 1.6 m (5' 3\").    Weight as of this encounter: 91.2 kg (201 lb).  EXAM:       Diagnostic Test Results:  Labs reviewed in Epic    ASSESSMENT / PLAN:       ICD-10-CM    1. Encounter for Medicare annual wellness exam Z00.00  " "  2. Need for prophylactic vaccination and inoculation against influenza Z23 levothyroxine (SYNTHROID/LEVOTHROID) 100 MCG tablet     Vaccine Administration, Initial [26915]     Vaccine Administration, Each Additional [16177]   3. Weakness of both lower extremities R29.898 levothyroxine (SYNTHROID/LEVOTHROID) 100 MCG tablet   4. History of smoking at least 1 pack per day for at least 30 years Z87.891    5. Elevated LDL cholesterol level E78.00 rosuvastatin (CRESTOR) 20 MG tablet   6. Encounter for screening mammogram for breast cancer Z12.31 MA Screening Digital Bilateral   7. Asymptomatic menopausal state Z78.0 DX Hip/Pelvis/Spine   8. Personal history of tobacco use, presenting hazards to health Z87.891    9. Need for vaccination Z23        End of Life Planning:  Patient currently has an advanced directive: Yes.  Practitioner is supportive of decision.    COUNSELING:  Reviewed preventive health counseling, as reflected in patient instructions       Regular exercise       Healthy diet/nutrition       Vision screening       Dental care       Aspirin Prophylaxsis       Hepatitis C screening       Colon cancer screening       Osteoporosis Prevention/Bone Health    Estimated body mass index is 35.61 kg/m  as calculated from the following:    Height as of 5/1/19: 1.6 m (5' 3\").    Weight as of this encounter: 91.2 kg (201 lb).    Weight management plan: Discussed healthy diet and exercise guidelines     reports that she quit smoking about 12 years ago. She has never used smokeless tobacco.      Appropriate preventive services were discussed with this patient, including applicable screening as appropriate for cardiovascular disease, diabetes, osteopenia/osteoporosis, and glaucoma.  As appropriate for age/gender, discussed screening for colorectal cancer, prostate cancer, breast cancer, and cervical cancer. Checklist reviewing preventive services available has been given to the patient.    Reviewed patients plan of care " and provided an AVS. The Basic Care Plan (routine screening as documented in Health Maintenance) for Gilma meets the Care Plan requirement. This Care Plan has been established and reviewed with the Patient.    Counseling Resources:  ATP IV Guidelines  Pooled Cohorts Equation Calculator  Breast Cancer Risk Calculator  FRAX Risk Assessment  ICSI Preventive Guidelines  Dietary Guidelines for Americans, 2010  [employer]'s MyPlate  ASA Prophylaxis  Lung CA Screening    Gucic Wu MD  [facility]  --------------------------------------------------------------------------------------------------------------------------------------  SUBJECTIVE:  Gilma Pace is a 79 year old female who presents to the clinic today for a routine physical exam.    The patient's last physical was 9-10 years ago     Cholesterol   Date Value Ref Range Status   09/11/2019 251 (H) <200 mg/dL Final     Comment:     Desirable:       <200 mg/dl     HDL Cholesterol   Date Value Ref Range Status   09/11/2019 58 >49 mg/dL Final     LDL Cholesterol Calculated   Date Value Ref Range Status   09/11/2019 164 (H) <100 mg/dL Final     Comment:     Above desirable:  100-129 mg/dl  Borderline High:  130-159 mg/dL  High:             160-189 mg/dL  Very high:       >189 mg/dl       Triglycerides   Date Value Ref Range Status   09/11/2019 146 <150 mg/dL Final     Comment:     Fasting specimen     No results found for: CHOLHDLRATIO  The patient's last fasting lipid panel was done 1 weeks ago and the results are listed above.    The 10-year ASCVD risk score (Sanchez REYES Jr., et al., 2013) is: 27.6%    Values used to calculate the score:      Age: 79 years      Sex: Female      Is Non- : No      Diabetic: No      Tobacco smoker: No      Systolic Blood Pressure: 126 mmHg      Is BP treated: Yes      HDL Cholesterol: 58 mg/dL      Total Cholesterol: 251 mg/dL      The patient reports that she has been treated for high blood  pressure.    The patient reports that she does not take a daily aspirin.        No results found for: HCVAB  The patient reports that she has not been screened for Hepatitis C    (Screen all baby boomers once per CDC-- the generation born from 1945 through 1965 and per USPTF screen age 19 to 79 especially younger people who have used IV drugs)  She would not like to have an Hepatitis C test today            Immunization History   Administered Date(s) Administered     Influenza (High Dose) 3 valent vaccine 09/28/2017, 09/28/2018     Pneumo Conj 13-V (2010&after) 10/31/2016     The patient has not started the Gardasil vaccination series.  The patient's believes that her last tetanus shot was given 10 year(s) ago.   The patient believes that she has not had a Shingrix in the past  The patient believes that she has not had a PPSV23 in the past.  The patient believes that she has had a PCV13 in the past.  The patient believes that she has not had a seasonal flu vaccination this fall or winter.  The patient would like to have a Influenza, PPSV23 and Tdap      No results found for this or any previous visit.]   The patient denies a family history of colon cancer.  The patient reports that she has had a colonoscopy. Her  last colonoscopy was in 2012 and she  report that is was normal. The patient was not  told to have this repeated. She does not want to have another colonoscopy.    The patient denies a family history diabetes.    The patient reports that she does performs a self breast exam monthly.  The patient reports a family history of breast cancer in her grandmother and half sister.  The patient does want to have a mammogram done.  The patient does not want to have a Pap smear done.  She reports that she has not had an abnormal pap smear.    The patient is interested in hormone replacement therapy.  The patient reports that she eats or drinks 1 servings of dairy products per day. She does take a calcium supplement  occasionally .  The patient reports that she has had a bone density scan. Her  last scan was in 2010 and she  report that is was abnormal.     (screen women 65+ or 50+ with risk factors)     The patient reports that she has dental appointments approximately every 5 years .  The patient reports that she  has an eye examination approximately every 2 year(s).        Do you currently smoke? No, quit in 2012, she smokes an e cigarette  How many years have you smoked? 50  How many packs per day did you smoke on average? 1 ppd  (if more than 30 pack year history and the patient is age 55-80 consider ordering an annual low dose radiation lung CT to screen for cancer)  (Do not order if patient has quit more than 15 years ago or has a health condition that limits life expectancy or could not tolerate curative lung surgery)  Are you interested having a lung CT to screen for lung cancer? N/A          Past Medical History:   Diagnosis Date     Anxiety 10/11/2006    Last visit with Mental health specialist in 2002.      Chronic low back pain without sciatica 10/31/2016     Compression fracture of thoracic vertebra (H) 6/13/2011    Overview:  T  11  compression  fx  on  xrays  from  6/13/2011  . Ongoing pain  x  3  months   Get  eval  and  treat  with  spine  specialists  as discussed  Initially  treated  in [place]  .  Pain  is  controlled   with  advil  as needed  . Reviewed   with  patient      Congestive heart failure (H) 10/11/2006    Overview:  Epic      COPD (chronic obstructive pulmonary disease) (H) 9/28/2017     Folate deficiency 6/9/2017    Overview:  Formatting of this note may be different from the original. Hemoglobin (g/dl)  Date Value  06/05/2017 15.6  10/31/2016 13.8  11/04/2015 11.3 (L)   Iron (mcg/dl)  Date Value  06/05/2017 49 (L)   TIBC, Calculated (mcg/dl)  Date Value  06/05/2017 319   % Saturation, calc. (%)  Date Value  06/05/2017 15   Vitamin B12 (pg/ml)  Date Value  06/05/2017 253   Folate (ng/ml)  Date  Value  20     Hypertension goal BP (blood pressure) < 140/90 2017     Mitral regurgitation 2014    Overview:  Needs dental prophylaxis      Mixed hyperlipidemia 10/11/2006     Osteoporosis 2017     Postoperative hypothyroidism 2017       Past Surgical History:   Procedure Laterality Date     APPENDECTOMY OPEN      71 years      BIOPSY MUSCLE DIAGNOSTIC (LOCATION) Right 3/19/2018    Procedure: BIOPSY MUSCLE DIAGNOSTIC (LOCATION);  right vastus lateralis muscle biopsy;  Surgeon: Melvin Agosto MD;  Location: [facility] OR     CATARACT IOL, RT/LT         Family History   Problem Relation Age of Onset     Breast Cancer Maternal Grandmother      Other Cancer Other         pancreatic and liver       Social History     Socioeconomic History     Marital status:      Spouse name: Not on file     Number of children: 2     Years of education: 14     Highest education level: Not on file   Occupational History     Occupation: RETIRED       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    Social Needs     Financial resource strain: Not on file     Food insecurity:     Worry: Not on file     Inability: Not on file     Transportation needs:     Medical: Not on file     Non-medical: Not on file   Tobacco Use     Smoking status: Former Smoker     Last attempt to quit:      Years since quittin.7     Smokeless tobacco: Never Used   Substance and Sexual Activity     Alcohol use: Yes     Comment: Rarely     Drug use: No     Sexual activity: Not Currently     Partners: Male   Lifestyle     Physical activity:     Days per week: Not on file     Minutes per session: Not on file     Stress: Not on file   Relationships     Social connections:     Talks on phone: Not on file     Gets together: Not on file     Attends Orthodox service: Not on file     Active member of club or organization: Not on file     Attends meetings of clubs or organizations: Not on file     Relationship status: Not on file      Intimate partner violence:     Fear of current or ex partner: Not on file     Emotionally abused: Not on file     Physically abused: Not on file     Forced sexual activity: Not on file   Other Topics Concern     Parent/sibling w/ CABG, MI or angioplasty before 65F 55M? No   Social History Narrative     Not on file       Current Outpatient Medications   Medication Sig Dispense Refill     amLODIPine (NORVASC) 10 MG tablet Take 1 tablet (10 mg) by mouth daily 90 tablet 3     ARIPiprazole, sensor, 5 MG TABS Take 5 mg by mouth At Bedtime 90 tablet 1     busPIRone (BUSPAR) 15 MG tablet TAKE 1 TABLET BY MOUTH TWICE DAILY 180 tablet 0     clobetasol propionate (CLOBEX) 0.05 % external shampoo Once a week apply to dry scalp and let sit for 15 minutes. Then take a shower 118 mL 2     clotrimazole-betamethasone (LOTRISONE) cream Apply topically 2 times daily To the corners of the mouth 15 g 1     DULoxetine (CYMBALTA) 60 MG capsule TAKE 2 CAPSULES BY MOUTH ONCE DAILY 180 capsule 2     folic acid (FOLVITE) 1 MG tablet Take 1 tablet (1 mg) by mouth daily 90 tablet 1     gabapentin (NEURONTIN) 600 MG tablet Take 1 tablet (600 mg) by mouth 2 times daily 180 tablet 3     IBUPROFEN PO Take 200 mg by mouth as needed        levothyroxine (SYNTHROID/LEVOTHROID) 75 MCG tablet Take 1 tablet (75 mcg) by mouth daily 90 tablet 0     lisinopril (PRINIVIL/ZESTRIL) 40 MG tablet TAKE 1 TABLET BY MOUTH ONCE DAILY 90 tablet 1     nystatin (MYCOSTATIN) 229923 UNIT/GM external powder Apply topically 2 times daily 60 g 11     primidone (MYSOLINE) 50 MG tablet TAKE 1/2 (ONE-HALF) TABLET BY MOUTH AT BEDTIME 10 tablet 1     tiotropium (SPIRIVA HANDIHALER) 18 MCG capsule Inhale 18 mcg into the lungs daily       tolterodine ER (DETROL LA) 4 MG 24 hr capsule Take 1 capsule (4 mg) by mouth daily 30 capsule 1     traZODone (DESYREL) 100 MG tablet TAKE 1 TABLET BY MOUTH AT BEDTIME 90 tablet 1           PHYSICAL EXAMINATION:  Blood pressure 126/72, pulse 95,  temperature 97.9  F (36.6  C), temperature source Oral, resp. rate 22, weight 91.2 kg (201 lb), SpO2 97 %, not currently breastfeeding.  General appearance - healthy, alert and no distress  Skin - Skin color, texture, turgor normal. No rashes or lesions.  Head - Normocephalic. No masses, lesions, tenderness or abnormalities  Eyes - conjunctivae/corneas clear. PERRL, EOM's intact. Fundi benign  Ears - External ears normal. Canals clear. TM's normal.  Nose/Sinuses - Nares normal. Septum midline. Mucosa normal. No drainage or sinus tenderness.  Oropharynx - Lips, mucosa, and tongue normal. Teeth and gums normal.  Neck - Neck supple. No adenopathy. Thyroid symmetric, normal size,  Lungs - Percussion normal. Good diaphragmatic excursion. Lungs clear  Heart - PMI normal. No lifts, heaves, or thrills. RRR. No murmurs, clicks gallops or rub  Breasts - Breasts normal to inspection and palpation. Axillae negative  Abdomen - Abdomen soft, non-tender. BS normal. No masses, organomegaly  Extremities - Extremities normal. No deformities, edema, or skin discoloration.  Musculoskeletal - Spine ROM normal. Muscular strength intact.  Peripheral pulses - radial=4/4, femoral=4/4, popliteal=4/4, dorsalis pedis=4/4,  Neuro - Gait normal. Reflexes normal and symmetric. Sensation grossly WNL.        Orders Only on 09/11/2019   Component Date Value Ref Range Status     TSH 09/11/2019 5.42* 0.40 - 4.00 mU/L Final     T4 Free 09/11/2019 1.21  0.76 - 1.46 ng/dL Final     Cholesterol 09/11/2019 251* <200 mg/dL Final    Desirable:       <200 mg/dl     Triglycerides 09/11/2019 146  <150 mg/dL Final    Fasting specimen     HDL Cholesterol 09/11/2019 58  >49 mg/dL Final     LDL Cholesterol Calculated 09/11/2019 164* <100 mg/dL Final    Comment: Above desirable:  100-129 mg/dl  Borderline High:  130-159 mg/dL  High:             160-189 mg/dL  Very high:       >189 mg/dl       Non HDL Cholesterol 09/11/2019 193* <130 mg/dL Final    Comment: Above  Desirable:  130-159 mg/dl  Borderline high:  160-189 mg/dl  High:             190-219 mg/dl  Very high:       >219 mg/dl       Sodium 09/11/2019 141  133 - 144 mmol/L Final     Potassium 09/11/2019 3.6  3.4 - 5.3 mmol/L Final     Chloride 09/11/2019 102  94 - 109 mmol/L Final     Carbon Dioxide 09/11/2019 30  20 - 32 mmol/L Final     Anion Gap 09/11/2019 9  3 - 14 mmol/L Final     Glucose 09/11/2019 98  70 - 99 mg/dL Final    Fasting specimen     Urea Nitrogen 09/11/2019 16  7 - 30 mg/dL Final     Creatinine 09/11/2019 0.70  0.52 - 1.04 mg/dL Final     GFR Estimate 09/11/2019 83  >60 mL/min/[1.73_m2] Final    Comment: Non  GFR Calc  Starting 12/18/2018, serum creatinine based estimated GFR (eGFR) will be   calculated using the Chronic Kidney Disease Epidemiology Collaboration   (CKD-EPI) equation.       GFR Estimate If Black 09/11/2019 >90  >60 mL/min/[1.73_m2] Final    Comment:  GFR Calc  Starting 12/18/2018, serum creatinine based estimated GFR (eGFR) will be   calculated using the Chronic Kidney Disease Epidemiology Collaboration   (CKD-EPI) equation.       Calcium 09/11/2019 9.0  8.5 - 10.1 mg/dL Final     Bilirubin Total 09/11/2019 0.4  0.2 - 1.3 mg/dL Final     Albumin 09/11/2019 3.5  3.4 - 5.0 g/dL Final     Protein Total 09/11/2019 8.0  6.8 - 8.8 g/dL Final     Alkaline Phosphatase 09/11/2019 113  40 - 150 U/L Final     ALT 09/11/2019 13  0 - 50 U/L Final     AST 09/11/2019 10  0 - 45 U/L Final       ASSESSMENT:    ICD-10-CM    1. Encounter for Medicare annual wellness exam Z00.00        Well-Adult Physical Exam.  Health Maintenance Due   Topic Date Due     DEXA  1940     HF ACTION PLAN  1940     COPD ACTION PLAN  1941     CBC  1940     DTAP/TDAP/TD IMMUNIZATION (1 - Tdap) 06/16/1966     ZOSTER IMMUNIZATION (1 of 2) 06/16/1991     MEDICARE ANNUAL WELLNESS VISIT  06/16/2006     PNEUMOCOCCAL IMMUNIZATION 65+ LOW/MEDIUM RISK (2 of 2 - PPSV23) 10/31/2017      INFLUENZA VACCINE (1) 09/01/2019     Health Maintenance   Topic Date Due     DEXA  1940     HF ACTION PLAN  1941     COPD ACTION PLAN  1941     CBC  1941     DTAP/TDAP/TD IMMUNIZATION (1 - Tdap) 06/16/1966     ZOSTER IMMUNIZATION (1 of 2) 06/16/1991     MEDICARE ANNUAL WELLNESS VISIT  06/16/2006     PNEUMOCOCCAL IMMUNIZATION 65+ LOW/MEDIUM RISK (2 of 2 - PPSV23) 10/31/2017     INFLUENZA VACCINE (1) 09/01/2019     PHQ-9  12/14/2019     BMP  03/11/2020     FALL RISK ASSESSMENT  05/01/2020     ARISTIDES ASSESSMENT  06/14/2020     ALT  09/11/2020     LIPID  09/11/2020     ADVANCE CARE PLANNING  05/01/2024     SPIROMETRY  Completed     DEPRESSION ACTION PLAN  Completed     IPV IMMUNIZATION  Aged Out     MENINGITIS IMMUNIZATION  Aged Out         HEALTH CARE MAINTENENCE: The recommended screening tests and vaccinatons for this patient have been discussed as above.  The appropriate tests and vaccinations  have been ordered or declined by the patient. Please see the orders in EPIC.The patient specifically declines: n/a     Immunization Status:  up to date and documented except for SHingrix, influenza, ppsv23 and tetanus    Patient Active Problem List   Diagnosis     COPD (chronic obstructive pulmonary disease) (H)     Hypertension goal BP (blood pressure) < 140/90     Postoperative hypothyroidism     Mitral valve disorders(424.0)     Osteoporosis     Anxiety     Chronic low back pain     Chronic low back pain without sciatica     Compression fracture of thoracic vertebra (H)     Essential hypertension     Folate deficiency     Hypothyroidism     Mitral regurgitation     Mixed hyperlipidemia     Other abnormal glucose     Recurrent falls     Smoker     Mild concentric left ventricular hypertrophy (LVH)     Myopathy     Acute respiratory failure (H)     Chronic respiratory failure with hypoxia (H)     Leg weakness     Obesity (BMI 35.0-39.9 without comorbidity)     Pneumonia     Respiratory failure (H)      Morbid obesity (H)     Hypothyroidism, unspecified type     Major depressive disorder, recurrent episode, moderate (H)        ATP III Guidelines  ICSI Preventive Guidelines    PLAN:  Tdap recommended  Shingrix recommended  PPSV23 recommended  Flu shot recommended  Breast self exam demonstrated and recommended  Mammogram recommended  Pap smear was not recommended per the ACOG guidelines  Discussed calcium intake, vitamins and supplements. Recommended 1200 mg of calcium daily  Bone density scan (DEXA) recommended  Weight loss through diet and exercise was recommended  Sunscreen use was recommended especially in the area of tatoos  Recommended dental exams every 6 months  Recommended eye exam every 1-2 years  Follow up in 1 year for the next preventative medical visit      Osteoporosis [place] indications:  Post menopausal women with a hip or vertebral fracture  Any T score less than or equal to -2.5  Any T score between -1.0 and -2.5 and a 10 year hip fracture probability > or = to 3%  Any T score between -1.0 and -2.5 and a 10 year probability or a major osteoporosis-related fracture  > or = 20% based on FRAX score  [email or web address]              Body mass index is 35.61 kg/m .        Prior to immunization administration, verified patients identity using patient s name and date of birth. Please see Immunization Activity for additional information.     Screening Questionnaire for Adult Immunization    Are you sick today?   No   Do you have allergies to medications, food, a vaccine component or latex?   No   Have you ever had a serious reaction after receiving a vaccination?   No   Do you have a long-term health problem with heart disease, lung disease, asthma, kidney disease, metabolic disease (e.g. diabetes), anemia, or other blood disorder?   No   Do you have cancer, leukemia, HIV/AIDS, or any other immune system problem?   No   In the past 3 months, have you taken medications that affect  your immune system,  such as prednisone, other steroids, or anticancer drugs; drugs for the treatment of rheumatoid arthritis, Crohn s disease, or psoriasis; or have you had radiation treatments?   No   Have you had a seizure, or a brain or other nervous system problem?   No   During the past year, have you received a transfusion of blood or blood     products, or been given immune (gamma) globulin or antiviral drug?   No   For women: Are you pregnant or is there a chance you could become        pregnant during the next month?   No   Have you received any vaccinations in the past 4 weeks?   No     Immunization questionnaire answers were all negative.        Per orders of Dr. Wu, injection of TDAP Flu, pneumo 23 given by Pebbles Posadas CMA. Patient instructed to remain in clinic for 15 minutes afterwards, and to report any adverse reaction to me immediately.       Screening performed by Pebbles Posadas CMA on 9/18/2019 at 1:16 PM.

## 2019-09-18 NOTE — LETTER
" My COPD Action Plan     Name: Gilma Pace    YOB: 1940   Date: 9/18/2019    My doctor: Gucci Wu MD   My clinic: [address] [address] [phone]  My Controller Medicine: { :779746}   Dose: ***     My Rescue Medicine: { :033966}   Dose: ***     My Flare Up Medicine: { :324750}   Dose: ***     My COPD Severity: { :354631}      Use of Oxygen: { :655489::\"Oxygen Not Prescribed \"}     Make sure you've had your pneumonia   vaccines.          GREEN ZONE       Doing well today      Usual level of activity and exercise    Usual amount of cough and mucus    No shortness of breath    Usual level of health (thinking clearly, sleeping well, feel like eating) Actions:      Take daily medicines    Use oxygen as prescribed    Follow regular exercise and diet plan    Avoid cigarette smoke and other irritants that harm the lungs           YELLOW ZONE          Having a bad day or flare up      Short of breath more than usual    A lot more sputum (mucus) than usual    Sputum looks yellow, green, tan, brown or bloody    More coughing or wheezing    Fever or chills    Less energy; trouble completing activities    Trouble thinking or focusing    Using quick relief inhaler or nebulizer more often    Poor sleep; symptoms wake me up    Do not feel like eating Actions:      Get plenty of rest    Take daily medicines    Use quick relief inhaler every *** hours    If you use oxygen, call you doctor to see if you should adjust your oxygen    Do breathing exercises or other things to help you relax    Let a loved one, friend or neighbor know you are feeling worse    Call your care team if you have 2 or more symptoms.  Start taking steroids or antibiotics if directed by your care team           RED ZONE       Need medical care now      Severe shortness of breath (feel you can't breathe)    Fever, chills    Not enough breath to do any activity    Trouble coughing up " mucus, walking or talking    Blood in mucus    Frequent coughing   Rescue medicines are not working    Not able to sleep because of breathing    Feel confused or drowsy    Chest pain    Actions:      Call your health care team.  If you cannot reach your care team, call 911 or go to the emergency room.        Annual Reminders:  Meet with Care Team, Flu Shot every Fall  Pharmacy:    [id number] - [place], [place] - [address] AT [place] [address] [address]  [facility] [address] [place], [place] - [address] [facility]

## 2019-09-20 ENCOUNTER — TELEPHONE (OUTPATIENT)
Dept: FAMILY MEDICINE | Facility: CLINIC | Age: 79
End: 2019-09-20

## 2019-09-20 DIAGNOSIS — Z87.891 PERSONAL HISTORY OF TOBACCO USE, PRESENTING HAZARDS TO HEALTH: Primary | ICD-10-CM

## 2019-09-20 NOTE — TELEPHONE ENCOUNTER
RN returned call to pt regarding pt message below.  Pt states at 9/18/19 office visit, provider discussed that pt needed a mammogram, DEXA scan, and CT scan for her lungs.    Pt states she is unable to schedule CT scan because order has not been placed yet. Routed to provider to review and advise.    Pt states she got confirmation from Medicare that this CT scan will be covered.  Pt would like all 3 imaging orders scheduled on the same date at [place].    MADDI Franklin, RN

## 2019-09-20 NOTE — TELEPHONE ENCOUNTER
Reason for Call: Request for an order or referral:    Order or referral being requested: CT for Lung    Date needed: as soon as possible    Has the patient been seen by the PCP for this problem? YES    Additional comments: please call when this has been ordered so patient can make this appointment     Phone number Patient can be reached at:  Home number on file [phone] (home)    Best Time:      Can we leave a detailed message on this number?  YES    Call taken on 9/20/2019 at 11:06 AM by Luisa Cantrell

## 2019-09-26 NOTE — TELEPHONE ENCOUNTER
I called the patient and LM stating that Dr. Wu put the order in for a CT Scan.  She can call [facility] back and scheduled her appointments.  Clinic number given if any further questions.  Tanisha Arevalo,

## 2019-10-09 ENCOUNTER — OFFICE VISIT (OUTPATIENT)
Dept: UROLOGY | Facility: CLINIC | Age: 79
End: 2019-10-09
Payer: MEDICARE

## 2019-10-09 VITALS — HEART RATE: 74 BPM | SYSTOLIC BLOOD PRESSURE: 137 MMHG | DIASTOLIC BLOOD PRESSURE: 83 MMHG | RESPIRATION RATE: 12 BRPM

## 2019-10-09 DIAGNOSIS — N39.0 URINARY TRACT INFECTION WITHOUT HEMATURIA, SITE UNSPECIFIED: ICD-10-CM

## 2019-10-09 DIAGNOSIS — R39.15 URINARY URGENCY: Primary | ICD-10-CM

## 2019-10-09 LAB
ALBUMIN UR-MCNC: 100 MG/DL
APPEARANCE UR: ABNORMAL
BACTERIA #/AREA URNS HPF: ABNORMAL /HPF
BILIRUB UR QL STRIP: ABNORMAL
COLOR UR AUTO: YELLOW
GLUCOSE UR STRIP-MCNC: NEGATIVE MG/DL
HGB UR QL STRIP: NEGATIVE
KETONES UR STRIP-MCNC: NEGATIVE MG/DL
LEUKOCYTE ESTERASE UR QL STRIP: ABNORMAL
NITRATE UR QL: POSITIVE
PH UR STRIP: 5 PH (ref 5–7)
RBC #/AREA URNS AUTO: ABNORMAL /HPF
SOURCE: ABNORMAL
SP GR UR STRIP: >1.03 (ref 1–1.03)
UROBILINOGEN UR STRIP-ACNC: 1 EU/DL (ref 0.2–1)
WBC #/AREA URNS AUTO: ABNORMAL /HPF

## 2019-10-09 PROCEDURE — 81001 URINALYSIS AUTO W/SCOPE: CPT | Performed by: UROLOGY

## 2019-10-09 PROCEDURE — 87088 URINE BACTERIA CULTURE: CPT | Performed by: UROLOGY

## 2019-10-09 PROCEDURE — 99213 OFFICE O/P EST LOW 20 MIN: CPT | Performed by: UROLOGY

## 2019-10-09 PROCEDURE — 87086 URINE CULTURE/COLONY COUNT: CPT | Performed by: UROLOGY

## 2019-10-09 PROCEDURE — 87186 SC STD MICRODIL/AGAR DIL: CPT | Performed by: UROLOGY

## 2019-10-09 RX ORDER — NITROFURANTOIN 25; 75 MG/1; MG/1
100 CAPSULE ORAL 2 TIMES DAILY
Qty: 10 CAPSULE | Refills: 0 | Status: SHIPPED | OUTPATIENT
Start: 2019-10-09 | End: 2020-01-29

## 2019-10-09 RX ORDER — SOLIFENACIN SUCCINATE 5 MG/1
5 TABLET, FILM COATED ORAL DAILY
Qty: 30 TABLET | Refills: 1 | Status: SHIPPED | OUTPATIENT
Start: 2019-10-09 | End: 2019-12-28

## 2019-10-09 NOTE — PROGRESS NOTES
"F/u OAB, bladder capacity 200 ml, CHF, COPD, musculoskeletal issues, other medical issues, Detrol LA4  (Presents again with her dtr)    Compliant.    Had a couple of \"really good days\" but then \"back to where we started.\"    Denies dysuria, gross hematuria, frequency; went 6 hrs between voids when she went to the \"[employer]\" [place]. Nocturia x 2.         Current Outpatient Medications   Medication     amLODIPine (NORVASC) 10 MG tablet     ARIPiprazole, sensor, 5 MG TABS     busPIRone (BUSPAR) 15 MG tablet     clobetasol propionate (CLOBEX) 0.05 % external shampoo     clotrimazole-betamethasone (LOTRISONE) cream     DULoxetine (CYMBALTA) 60 MG capsule     folic acid (FOLVITE) 1 MG tablet     gabapentin (NEURONTIN) 600 MG tablet     IBUPROFEN PO     levothyroxine (SYNTHROID/LEVOTHROID) 100 MCG tablet     lisinopril (PRINIVIL/ZESTRIL) 40 MG tablet     nystatin (MYCOSTATIN) 633890 UNIT/GM external powder     primidone (MYSOLINE) 50 MG tablet     rosuvastatin (CRESTOR) 20 MG tablet     tiotropium (SPIRIVA HANDIHALER) 18 MCG capsule     tolterodine ER (DETROL LA) 4 MG 24 hr capsule     traZODone (DESYREL) 100 MG tablet     No current facility-administered medications for this visit.          Results for orders placed or performed in visit on 10/09/19   UA reflex to Microscopic   Result Value Ref Range    Color Urine Yellow     Appearance Urine Slightly Cloudy     Glucose Urine Negative NEG^Negative mg/dL    Bilirubin Urine Small (A) NEG^Negative    Ketones Urine Negative NEG^Negative mg/dL    Specific Gravity Urine >1.030 1.003 - 1.035    Blood Urine Negative NEG^Negative    pH Urine 5.0 5.0 - 7.0 pH    Protein Albumin Urine 100 (A) NEG^Negative mg/dL    Urobilinogen Urine 1.0 0.2 - 1.0 EU/dL    Nitrite Urine Positive (A) NEG^Negative    Leukocyte Esterase Urine Moderate (A) NEG^Negative    Source Midstream Urine    Urine Microscopic   Result Value Ref Range    WBC Urine 25-50 (A) OTO5^0 - 5 /HPF    RBC Urine O - 2 " OTO2^O - 2 /HPF    Bacteria Urine Many (A) NEG^Negative /HPF       IMP:  1. OAB, refractory to Detrol  2. Probable UTI      PLAN:  1. Discussed situation with patient in detail.  2. Consider trial Vesicare 5; pt elects trial  3. UC; emperic Macrobid x 5 d  4. RTC 2 mos  5. Set realistic expectations  6. 15 minutes spent with patient, more than 50% spent in counseling and coordination of care for OAB/UTI.

## 2019-10-11 LAB
BACTERIA SPEC CULT: ABNORMAL
SPECIMEN SOURCE: ABNORMAL

## 2019-10-20 DIAGNOSIS — G25.0 ESSENTIAL TREMOR: ICD-10-CM

## 2019-10-20 DIAGNOSIS — Z23 NEED FOR PROPHYLACTIC VACCINATION AND INOCULATION AGAINST INFLUENZA: ICD-10-CM

## 2019-10-20 DIAGNOSIS — I10 HYPERTENSION GOAL BP (BLOOD PRESSURE) < 140/90: ICD-10-CM

## 2019-10-20 DIAGNOSIS — F41.1 GAD (GENERALIZED ANXIETY DISORDER): ICD-10-CM

## 2019-10-20 DIAGNOSIS — R29.898 WEAKNESS OF BOTH LOWER EXTREMITIES: ICD-10-CM

## 2019-10-21 ENCOUNTER — TELEPHONE (OUTPATIENT)
Dept: FAMILY MEDICINE | Facility: CLINIC | Age: 79
End: 2019-10-21

## 2019-10-21 NOTE — TELEPHONE ENCOUNTER
My chart message with PHQ9 and ARISTIDES sent to patient to complete and return.      PHQ-9 due now for patient ( 4-8 months from index date)  Index date:       8/23/2018   Index PHQ9 :   12  FU start date:   6/24/2019  FU End date :   10/22/2019    RN- Contact patient for PHQ-9. Remission considered if follow up PHQ-9 less than 5.  If greater than 5 consider follow up appointment, e-visit for medication follow up and evaluation.     Pebbles Posadas, cma

## 2019-10-22 RX ORDER — PRIMIDONE 50 MG/1
TABLET ORAL
Qty: 10 TABLET | Refills: 1 | Status: SHIPPED | OUTPATIENT
Start: 2019-10-22 | End: 2019-12-26

## 2019-10-22 RX ORDER — DULOXETIN HYDROCHLORIDE 60 MG/1
CAPSULE, DELAYED RELEASE ORAL
Qty: 180 CAPSULE | Refills: 2 | Status: SHIPPED | OUTPATIENT
Start: 2019-10-22 | End: 2020-08-27

## 2019-10-22 RX ORDER — LISINOPRIL 40 MG/1
TABLET ORAL
Qty: 90 TABLET | Refills: 1 | Status: SHIPPED | OUTPATIENT
Start: 2019-10-22 | End: 2020-06-01

## 2019-10-22 RX ORDER — BUSPIRONE HYDROCHLORIDE 15 MG/1
TABLET ORAL
Qty: 180 TABLET | Refills: 0 | Status: SHIPPED | OUTPATIENT
Start: 2019-10-22 | End: 2020-01-29

## 2019-10-22 NOTE — TELEPHONE ENCOUNTER
Patient returned PHQ-9 with elevated score:      PHQ-9 SCORE 5/1/2019 6/17/2019 10/21/2019   PHQ-9 Total Score MyChart - - 12 (Moderate depression)   PHQ-9 Total Score 19 7 12         MA sent patient [place] message asking if patient would like help with their depression and if so, they should make appointment or submit E-visit to PCP.      Routing to PCP as FYI or further advisement.

## 2019-11-01 ENCOUNTER — OFFICE VISIT (OUTPATIENT)
Dept: BEHAVIORAL HEALTH | Facility: CLINIC | Age: 79
End: 2019-11-01
Payer: MEDICARE

## 2019-11-01 DIAGNOSIS — I10 HYPERTENSION GOAL BP (BLOOD PRESSURE) < 140/90: ICD-10-CM

## 2019-11-01 DIAGNOSIS — F41.9 ANXIETY: Primary | ICD-10-CM

## 2019-11-01 PROCEDURE — 90834 PSYTX W PT 45 MINUTES: CPT | Performed by: SOCIAL WORKER

## 2019-11-01 RX ORDER — AMLODIPINE BESYLATE 10 MG/1
TABLET ORAL
Qty: 90 TABLET | Refills: 3 | Status: SHIPPED | OUTPATIENT
Start: 2019-11-01 | End: 2021-05-17

## 2019-11-01 NOTE — PROGRESS NOTES
"                                             Progress Note    Client Name: Gilma Pace  Date: 11/1/2019           Service Type: Individual  Video Visit: No       Session Start Time: 1230  Session End Time: 115     Session Length: 38-52    Session #: 6    Attendees: Client and daughter      Treatment Plan Last Reviewed: 7/16/2019    PHQ-9 / ARISTIDES-7 :     DATA  Interactive Complexity: No  Crisis: No       Progress Since Last Session (Related to Symptoms / Goals / Homework):   Symptoms: No change -    Homework: Did not complete  1.  Client will log activities of Achievement, Closeness, and Enjoyment (ACEs) using ACE rating scale and bring to next session.           2.  Client will log productive v. unproductive worry at least once by next session.  Worksheet given in session to facilitate this.    3.  Client will call to make next appt     Episode of Care Goals: Minimal progress - CONTEMPLATION (Considering change and yet undecided); Intervened by assessing the negative and positive thinking (ambivalence) about behavior change     Current / Ongoing Stressors and Concerns:      Client notes that she has been \"better.\"  Daughter is here today and says that she thinks client has been doing better.         Treatment Objective(s) Addressed in This Session:       Goal- Anxiety: Client will decrease anxiety    I will know I've met my goal when I am less anxious.      Objective #A (Client Action)      Client will use cognitive strategies identified in therapy to challenge anxious thoughts.      Objective #B    Client will use at least 3 coping skills for anxiety management in the next 12 weeks.     -behavioral activation: ACE    Objective #C    Client will identify three distraction and diversion activities and use those activities to decrease level of anxiety.       Intervention:   CBT: - Discussed sleep today.  Client reports sleep onset/sleep maintenance insomnia.  Reviewed sleep habits.  Discussed avoiding caffeine and " "screen time before bed.  Identified negative thoughts associated with sleep.  Discussed the importance of limiting bedroom for sleep and intimate activities.  Discussed relying on internal cues (e.g. yawning, nodding) in deciding when to go to bed.  Discussed problem with \"forcing sleep.\"  Reviewed appropriate daytime napping (45 minutes or less no later than 3pm).  Discussed impact of exercise and sunlight exposure on melatonin production, and discussed the role of melatonin in sleep.          ASSESSMENT: Current Emotional / Mental Status (status of significant symptoms):   Risk status (Self / Other harm or suicidal ideation)   Client denies current fears or concerns for personal safety.   Client denies current or recent suicidal ideation or behaviors.   Client denies current or recent homicidal ideation or behaviors.   Client denies current or recent self injurious behavior or ideation.   Client denies other safety concerns.   Client Client reports there has been no change in risk factors since their last session.     Client Client reports there has been no change in protective factors since their last session.     A safety and risk management plan has not been developed at this time, however client was given the after-hours number / 911 should there be a change in any of these risk factors.     Appearance:   Appropriate    Eye Contact:   Good    Psychomotor Behavior: Agitated    Attitude:   Cooperative    Orientation:   All   Speech    Rate / Production: Pressured     Volume:  Normal    Mood:    Anxious  Depressed    Affect:    Appropriate    Thought Content:  Clear    Thought Form:  Coherent  Logical    Insight:    Good      Medication Review:   No changes to current psychiatric medication(s)     Medication Compliance:   Yes     Changes in Health Issues:   None reported     Chemical Use Review:   Substance Use: Chemical use reviewed, no active concerns identified      Tobacco Use: No current tobacco use.  "     Diagnosis:  1. Anxiety        Collateral Reports Completed:   Not Applicable    PLAN: (Client Tasks / Therapist Tasks / Other)  1.  Client will log activities of Achievement, Closeness, and Enjoyment (ACEs) using ACE rating scale and bring to next session.           2.  Client will log productive v. unproductive worry at least once by next session.  Worksheet given in session to facilitate this.    3.  Keep sleep log/reduce napping/regular sleep/wake time     4.  Client will call to make next appt        VONNIE Alcantara                                                         ______________________________________________________________________    Treatment Plan    Client's Name: Gilma Pace  YOB: 1940    Date: 3/1/2019     DSM-V Diagnoses: Diagnoses: 296.32 (F33.1) Major Depressive Disorder, Recurrent Episode, Moderate _  300.02 (F41.1) Generalized Anxiety Disorder  Psychosocial & Contextual Factors: some stress with   WHODAS 2.0 (12 item)    DECLINES WHODAS TODAY            This questionnaire asks about difficulties due to health conditions. Health conditions  include  disease or illnesses, other health problems that may be short or long lasting,  injuries, mental health or emotional problems, and problems with alcohol or drugs.                     Think back over the past 30 days and answer these questions, thinking about how much  difficulty you had doing the following activities. For each question, please [place] only  one response.    Referral / Collaboration:  Referral to another professional/service is not indicated at this time..    Anticipated number of session or this episode of care: 18-24      MeasurableTreatment Goal(s) related to diagnosis / functional impairment(s)      Goal- Anxiety: Client will decrease anxiety    I will know I've met my goal when I am less anxious.      Objective #A (Client Action)    Status: cont- Date: 11/1/2019      Client will use cognitive  strategies identified in therapy to challenge anxious thoughts.    Intervention(s)  Therapist will provide psychoeducation, behavioral activation, and cognitive restructuring.    Objective #B  Client will use at least 3 coping skills for anxiety management in the next 12 weeks.    Status: cont- Date: 11/1/2019     Intervention(s)  Therapist will provide psychoeducation, behavioral activation, and cognitive restructuring.      Objective #C  Client will identify three distraction and diversion activities and use those activities to decrease level of anxiety.  Status: cont- Date: 11/1/2019     Intervention(s)  Therapist will provide psychoeducation, behavioral activation, and cognitive restructuring.                  Client has reviewed and agreed to the above plan.      William Dickson, Northern Light A.R. Gould HospitalSW  March 1, 2019

## 2019-12-02 DIAGNOSIS — B37.2 CANDIDAL INTERTRIGO: ICD-10-CM

## 2019-12-03 RX ORDER — NYSTATIN 100000 [USP'U]/G
POWDER TOPICAL 2 TIMES DAILY
Qty: 60 G | Refills: 0 | Status: SHIPPED | OUTPATIENT
Start: 2019-12-03 | End: 2020-05-07

## 2019-12-03 NOTE — TELEPHONE ENCOUNTER
"Routing refill request to provider for review/approval because:  Indication for refill is not found in last progress note.      Requested Prescriptions   Pending Prescriptions Disp Refills     nystatin (MYCOSTATIN) 885863 UNIT/GM external powder 60 g 11     Sig: Apply topically 2 times daily       Antifungal Agents Passed - 12/2/2019 12:28 PM        Passed - Recent (12 mo) or future (30 days) visit within the authorizing provider's specialty     Patient has had an office visit with the authorizing provider or a provider within the authorizing providers department within the previous 12 mos or has a future within next 30 days. See \"Patient Info\" tab in inbasket, or \"Choose Columns\" in Meds & Orders section of the refill encounter.              Passed - Not Fluconazole or Terconazole      If oral Fluconazole or Terconazole, may refill if indicated in progress notes.           Passed - Medication is active on med list        MADDI Franklin, RN    "

## 2019-12-12 ENCOUNTER — OFFICE VISIT (OUTPATIENT)
Dept: UROLOGY | Facility: CLINIC | Age: 79
End: 2019-12-12
Payer: MEDICARE

## 2019-12-12 VITALS — RESPIRATION RATE: 18 BRPM | DIASTOLIC BLOOD PRESSURE: 61 MMHG | SYSTOLIC BLOOD PRESSURE: 109 MMHG | HEART RATE: 101 BPM

## 2019-12-12 DIAGNOSIS — R39.15 URINARY URGENCY: Primary | ICD-10-CM

## 2019-12-12 LAB
ALBUMIN UR-MCNC: 100 MG/DL
AMORPH CRY #/AREA URNS HPF: ABNORMAL /HPF
APPEARANCE UR: ABNORMAL
BACTERIA #/AREA URNS HPF: ABNORMAL /HPF
BILIRUB UR QL STRIP: ABNORMAL
COLOR UR AUTO: YELLOW
GLUCOSE UR STRIP-MCNC: NEGATIVE MG/DL
HGB UR QL STRIP: ABNORMAL
KETONES UR STRIP-MCNC: ABNORMAL MG/DL
LEUKOCYTE ESTERASE UR QL STRIP: ABNORMAL
NITRATE UR QL: POSITIVE
NON-SQ EPI CELLS #/AREA URNS LPF: ABNORMAL /LPF
PH UR STRIP: 6 PH (ref 5–7)
RBC #/AREA URNS AUTO: ABNORMAL /HPF
SOURCE: ABNORMAL
SP GR UR STRIP: 1.02 (ref 1–1.03)
UROBILINOGEN UR STRIP-ACNC: 0.2 EU/DL (ref 0.2–1)
WBC #/AREA URNS AUTO: ABNORMAL /HPF

## 2019-12-12 PROCEDURE — 99214 OFFICE O/P EST MOD 30 MIN: CPT | Mod: 25 | Performed by: UROLOGY

## 2019-12-12 PROCEDURE — 51702 INSERT TEMP BLADDER CATH: CPT | Performed by: UROLOGY

## 2019-12-12 PROCEDURE — 81001 URINALYSIS AUTO W/SCOPE: CPT | Performed by: UROLOGY

## 2019-12-12 NOTE — PROGRESS NOTES
Catheter insertion documentation on 12/12/2019:    Gilma Pace presents to the clinic for catheter insertion.  Reason for insertion: scheduled insertion  Catheter successfully inserted into the urethral meatus in the usual sterile fashion without immediate complication.  Type of catheter placed: 20 [place] indwelling catheter  Urine is yellow in color.  40 cc's of urine output returned.  Balloon was filled with 10cc's of normal saline.  Securement device placed for the catheter.  The patient tolerated the procedure and was instructed to return or call for pain, fever, leakage or decreased urine flow    Genoveva Orourke RN

## 2019-12-12 NOTE — PROGRESS NOTES
"F/u OAB, bladder capacity 200 ml, CHF, COPD, musculoskeletal issues, other medical issues, Vesicare 5 after failure of Detrol LA4  (Presents again with her dtr)     Compliant.    Essentially completely wet. Uses walker for ambulation. Very tired \"of always smelling like urine even though I'm always changing pads and washing up.\"        Current Outpatient Medications   Medication     amLODIPine (NORVASC) 10 MG tablet     ARIPiprazole, sensor, 5 MG TABS     busPIRone (BUSPAR) 15 MG tablet     clobetasol propionate (CLOBEX) 0.05 % external shampoo     clotrimazole-betamethasone (LOTRISONE) cream     DULoxetine (CYMBALTA) 60 MG capsule     folic acid (FOLVITE) 1 MG tablet     gabapentin (NEURONTIN) 600 MG tablet     IBUPROFEN PO     levothyroxine (SYNTHROID/LEVOTHROID) 100 MCG tablet     lisinopril (PRINIVIL/ZESTRIL) 40 MG tablet     nystatin (MYCOSTATIN) 815651 UNIT/GM external powder     primidone (MYSOLINE) 50 MG tablet     rosuvastatin (CRESTOR) 20 MG tablet     tiotropium (SPIRIVA HANDIHALER) 18 MCG capsule     tolterodine ER (DETROL LA) 4 MG 24 hr capsule     traZODone (DESYREL) 100 MG tablet     No current facility-administered medications for this visit.        IMP:  1. Massive incontinence, end-stage bladder  2. Other medical issues      PLAN:  1. Discussed situation with patient in detail.    2. Consider indwelling perez; discussed in detail rationale, mech of action, technical aspects, risks, monthly replaccement, etc; pt expresses understanding and elects trial >> perez 20 fr placed, connected to sterile collection. Carefully instructed in use    3. RTC 1 mo    4. 25 minutes spent with patient, more than 50% spent in counseling and coordination of care for incont            "

## 2019-12-13 DIAGNOSIS — Z23 NEED FOR PROPHYLACTIC VACCINATION AND INOCULATION AGAINST INFLUENZA: ICD-10-CM

## 2019-12-13 DIAGNOSIS — R29.898 WEAKNESS OF BOTH LOWER EXTREMITIES: ICD-10-CM

## 2019-12-13 RX ORDER — GABAPENTIN 600 MG/1
TABLET ORAL
Qty: 180 TABLET | Refills: 1 | Status: SHIPPED | OUTPATIENT
Start: 2019-12-13 | End: 2020-07-20

## 2019-12-24 DIAGNOSIS — G25.0 ESSENTIAL TREMOR: ICD-10-CM

## 2019-12-24 NOTE — TELEPHONE ENCOUNTER
Routing refill request to provider for review/approval because:  Drug not on the [facility] refill protocol   Shantelle Harris RN

## 2019-12-26 RX ORDER — PRIMIDONE 50 MG/1
25 TABLET ORAL AT BEDTIME
Qty: 45 TABLET | Refills: 1 | Status: SHIPPED | OUTPATIENT
Start: 2019-12-26 | End: 2020-07-06

## 2020-01-12 DIAGNOSIS — K13.0 ANGULAR CHEILITIS: ICD-10-CM

## 2020-01-14 RX ORDER — CLOTRIMAZOLE AND BETAMETHASONE DIPROPIONATE 10; .64 MG/G; MG/G
CREAM TOPICAL
Qty: 15 G | Refills: 3 | Status: SHIPPED | OUTPATIENT
Start: 2020-01-14 | End: 2020-05-07

## 2020-01-14 NOTE — TELEPHONE ENCOUNTER
"Routing refill request to provider for review/approval because:  Has not been ordered since 8/2018. No follow-up plan found for this medication or condition: angulated cheilitis    clotrimazole-betamethasone (LOTRISONE) cream 15 g 1 8/27/2018  --   Sig - Route: Apply topically 2 times daily To the corners of the mouth       Requested Prescriptions   Pending Prescriptions Disp Refills     clotrimazole-betamethasone (LOTRISONE) 1-0.05 % external cream [Pharmacy Med Name: Clotrimazole-Betamethasone 1-0.05 % External Cream] 15 g 0     Sig: APPLY  CREAM TOPICALLY TWICE DAILY TO THE CORNERS OF THE MOUTH.       Topical Steroids and Nonsteroidals Protocol Passed - 1/12/2020 12:52 PM        Passed - Patient is age 6 or older        Passed - Authorizing prescriber's most recent note related to this medication read.     If refill request is for ophthalmic use, please forward request to provider for approval.        Passed - High potency steroid not ordered        Passed - Recent (12 mo) or future (30 days) visit within the authorizing provider's specialty     Patient has had an office visit with the authorizing provider or a provider within the authorizing providers department within the previous 12 mos or has a future within next 30 days. See \"Patient Info\" tab in inbasket, or \"Choose Columns\" in Meds & Orders section of the refill encounter.          Passed - Medication is active on med list        MADDI Franklin, RN    "

## 2020-01-15 ENCOUNTER — OFFICE VISIT (OUTPATIENT)
Dept: FAMILY MEDICINE | Facility: CLINIC | Age: 80
End: 2020-01-15
Payer: MEDICARE

## 2020-01-15 VITALS — HEART RATE: 97 BPM | DIASTOLIC BLOOD PRESSURE: 61 MMHG | TEMPERATURE: 97 F | SYSTOLIC BLOOD PRESSURE: 98 MMHG

## 2020-01-15 DIAGNOSIS — Z91.81 HISTORY OF RECENT FALL: ICD-10-CM

## 2020-01-15 DIAGNOSIS — R41.0 CONFUSION: Primary | ICD-10-CM

## 2020-01-15 DIAGNOSIS — M62.81 MUSCLE WEAKNESS (GENERALIZED): ICD-10-CM

## 2020-01-15 PROCEDURE — 99214 OFFICE O/P EST MOD 30 MIN: CPT | Performed by: NURSE PRACTITIONER

## 2020-01-15 ASSESSMENT — ENCOUNTER SYMPTOMS
TREMORS: 1
DIAPHORESIS: 0
FATIGUE: 1
FREQUENCY: 1
PALPITATIONS: 0
DIZZINESS: 0
SORE THROAT: 0
SHORTNESS OF BREATH: 0
CHILLS: 0
APPETITE CHANGE: 0
SLEEP DISTURBANCE: 1
CONFUSION: 1
FEVER: 0
ARTHRALGIAS: 1
MYALGIAS: 1
TROUBLE SWALLOWING: 0
DIFFICULTY URINATING: 0
WEAKNESS: 1
HEADACHES: 0
SPEECH DIFFICULTY: 1
COUGH: 0
HEMATURIA: 0
ABDOMINAL PAIN: 0

## 2020-01-15 NOTE — PROGRESS NOTES
Subjective     Gilma Pace is a 79 year old female who presents to clinic today for the following health issues:    HPI   Gilma is a 79 y.o female with a PMH Of HTN, anxiety, depression, urinary urgency, hypothyroidism, and chronic weakness bilateral lower extremities who presents with family members for c/o falling 4-5 times in the past 24 hours.  Family reports similar symptoms about 1 year ago with multiple falls off and on, legs would just randomly give out.  Family reports during this time she was evaluated at the .  She was started on medication for tremors, which helped greatly.  A few months prior to xmas she was improving.  After Kale she felt her tremors where coming back and she could not sleep.  Gilma started taking her 's pain pills at night (Oxycodone) which she states she only took at bedtime.  Gilma states she stopped taking the pain med 3 days ago.  Family reports confusion and slurred speech at times that comes out of nowhere and then resolves spontaneous.  Family reports change in behavior/confusion over the past 24-36 hours.  Gilma was able to shower and get dressed this morning with no issues.  Prior to leaving for the appt she developed increased weakness and difficulty leaving the house for the appt.  She made it down the ramp with a walker but did not have enough strength in her legs to get into the car.  She slipped down and landed on her buttocks.   had to call the police to help get her into the car.  Family reports they have never witnessed her fall.  She will holler out and is found sitting on the floor and unable to get up by herself.  She uses a walker at home.  Daughter reports she is sleeping 20 hours/day in a recliner and this has been ongoing.  Gilma states for certain she has never hit her head with any of the falls and falls on her buttocks and crawls on her hands and knees to get where she needs to go.  Reports generalized body aches.    She is  followed by Urology and had a catheter but Gilma did not like it and removed the catheter herself 2 weeks ago.  She has not followed up with Urology since.  Denies dysuria, hematuria, coughing/choking with eating or drinking.          Patient Active Problem List   Diagnosis     COPD (chronic obstructive pulmonary disease) (H)     Hypertension goal BP (blood pressure) < 140/90     Postoperative hypothyroidism     Mitral valve disorders(424.0)     Osteoporosis     Anxiety     Chronic low back pain     Chronic low back pain without sciatica     Compression fracture of thoracic vertebra (H)     Essential hypertension     Folate deficiency     Hypothyroidism     Mitral regurgitation     Mixed hyperlipidemia     Other abnormal glucose     Recurrent falls     Smoker     Mild concentric left ventricular hypertrophy (LVH)     Myopathy     Acute respiratory failure (H)     Chronic respiratory failure with hypoxia (H)     Leg weakness     Obesity (BMI 35.0-39.9 without comorbidity)     Pneumonia     Respiratory failure (H)     Morbid obesity (H)     Hypothyroidism, unspecified type     Major depressive disorder, recurrent episode, moderate (H)     Past Surgical History:   Procedure Laterality Date     APPENDECTOMY OPEN      71 years      BIOPSY MUSCLE DIAGNOSTIC (LOCATION) Right 3/19/2018    Procedure: BIOPSY MUSCLE DIAGNOSTIC (LOCATION);  right vastus lateralis muscle biopsy;  Surgeon: Melvin Agosto MD;  Location: [facility]     CATARACT IOL, RT/LT         Social History     Tobacco Use     Smoking status: Former Smoker     Last attempt to quit:      Years since quittin.0     Smokeless tobacco: Never Used   Substance Use Topics     Alcohol use: Yes     Comment: Rarely     Family History   Problem Relation Age of Onset     Breast Cancer Maternal Grandmother      Other Cancer Other         pancreatic and liver         Current Outpatient Medications   Medication Sig Dispense Refill     amLODIPine (NORVASC) 10 MG tablet  TAKE 1 TABLET BY MOUTH ONCE DAILY 90 tablet 3     ARIPiprazole, sensor, 5 MG TABS Take 5 mg by mouth At Bedtime 90 tablet 1     busPIRone (BUSPAR) 15 MG tablet TAKE 1 TABLET BY MOUTH TWICE DAILY 180 tablet 0     clobetasol propionate (CLOBEX) 0.05 % external shampoo Once a week apply to dry scalp and let sit for 15 minutes. Then take a shower 118 mL 2     clotrimazole-betamethasone (LOTRISONE) 1-0.05 % external cream Apply to the corners of the mouth bid as needed 15 g 3     DULoxetine (CYMBALTA) 60 MG capsule TAKE 2 CAPSULES BY MOUTH ONCE DAILY 180 capsule 2     folic acid (FOLVITE) 1 MG tablet Take 1 tablet (1 mg) by mouth daily 90 tablet 1     gabapentin (NEURONTIN) 600 MG tablet TAKE 1 TABLET BY MOUTH TWICE DAILY 180 tablet 1     IBUPROFEN PO Take 200 mg by mouth as needed        levothyroxine (SYNTHROID/LEVOTHROID) 100 MCG tablet Take 1 tablet (100 mcg) by mouth daily 90 tablet 3     lisinopril (PRINIVIL/ZESTRIL) 40 MG tablet TAKE 1 TABLET BY MOUTH ONCE DAILY 90 tablet 1     nystatin (MYCOSTATIN) 147327 UNIT/GM external powder Apply topically 2 times daily 60 g 0     primidone (MYSOLINE) 50 MG tablet Take 0.5 tablets (25 mg) by mouth At Bedtime 45 tablet 1     rosuvastatin (CRESTOR) 20 MG tablet Take 1 tablet (20 mg) by mouth At Bedtime 90 tablet 3     solifenacin (VESICARE) 5 MG tablet Take 1 tablet (5 mg) by mouth daily 30 tablet 1     tiotropium (SPIRIVA HANDIHALER) 18 MCG capsule Inhale 18 mcg into the lungs daily       tolterodine ER (DETROL LA) 4 MG 24 hr capsule Take 1 capsule (4 mg) by mouth daily 30 capsule 1     traZODone (DESYREL) 100 MG tablet TAKE 1 TABLET BY MOUTH AT BEDTIME 90 tablet 1     Allergies   Allergen Reactions     Codeine Unknown     nausea     Sulfa Drugs GI Disturbance     GI upset     Tetracyclines GI Disturbance     Gi upset       Reviewed and updated as needed this visit by Provider  Tobacco  Allergies  Meds  Problems  Med Hx  Surg Hx  Fam Hx         Review of Systems    Constitutional: Positive for fatigue. Negative for appetite change, chills, diaphoresis and fever.   HENT: Negative for sore throat and trouble swallowing.    Respiratory: Negative for cough and shortness of breath.    Cardiovascular: Negative for chest pain, palpitations and peripheral edema.   Gastrointestinal: Negative for abdominal pain.   Genitourinary: Positive for frequency and urgency. Negative for difficulty urinating and hematuria.   Musculoskeletal: Positive for arthralgias and myalgias.   Neurological: Positive for tremors, speech difficulty and weakness. Negative for dizziness and headaches.   Psychiatric/Behavioral: Positive for confusion and sleep disturbance.            Objective    BP 98/61   Pulse 97   Temp 97  F (36.1  C) (Oral)   There is no height or weight on file to calculate BMI.  Physical Exam  Vitals signs reviewed.   Constitutional:       Appearance: She is well-developed.   HENT:      Head: Normocephalic.      Nose: Nose normal.      Mouth/Throat:      Lips: Pink.      Mouth: Mucous membranes are moist.      Pharynx: Oropharynx is clear.   Eyes:      General: Lids are normal.      Conjunctiva/sclera: Conjunctivae normal.      Pupils: Pupils are equal, round, and reactive to light.   Cardiovascular:      Rate and Rhythm: Normal rate and regular rhythm.   Pulmonary:      Effort: Pulmonary effort is normal.      Breath sounds: Normal breath sounds.   Skin:     General: Skin is warm and dry.   Neurological:      Mental Status: She is alert. She is disoriented.   Psychiatric:         Mood and Affect: Mood normal.                Assessment & Plan     1. Confusion  - given new onset of symptoms and multiple falls, I recommend she go to the ER to be further evaluated.  Family declines ambulance and states they will transport her.      2. History of recent fall  - UA reflex to Microscopic and Culture- cancelled.  Unable to give urine sample.     3. Muscle weakness (generalized)         Bertha  EVELIA Perrin, MARY  [facility]

## 2020-01-24 ENCOUNTER — NURSING HOME VISIT (OUTPATIENT)
Dept: GERIATRICS | Facility: CLINIC | Age: 80
End: 2020-01-24
Payer: MEDICARE

## 2020-01-24 ENCOUNTER — RECORDS - HEALTHEAST (OUTPATIENT)
Dept: LAB | Facility: CLINIC | Age: 80
End: 2020-01-24

## 2020-01-24 VITALS
DIASTOLIC BLOOD PRESSURE: 65 MMHG | HEART RATE: 59 BPM | BODY MASS INDEX: 37.77 KG/M2 | SYSTOLIC BLOOD PRESSURE: 101 MMHG | WEIGHT: 213.2 LBS | TEMPERATURE: 98 F | RESPIRATION RATE: 20 BRPM | OXYGEN SATURATION: 93 %

## 2020-01-24 DIAGNOSIS — E03.9 HYPOTHYROIDISM, UNSPECIFIED TYPE: ICD-10-CM

## 2020-01-24 DIAGNOSIS — F51.01 PRIMARY INSOMNIA: ICD-10-CM

## 2020-01-24 DIAGNOSIS — I10 HYPERTENSION GOAL BP (BLOOD PRESSURE) < 140/90: ICD-10-CM

## 2020-01-24 DIAGNOSIS — J96.11 CHRONIC RESPIRATORY FAILURE WITH HYPOXIA (H): ICD-10-CM

## 2020-01-24 DIAGNOSIS — J44.1 CHRONIC OBSTRUCTIVE PULMONARY DISEASE WITH ACUTE EXACERBATION (H): ICD-10-CM

## 2020-01-24 DIAGNOSIS — F33.1 MAJOR DEPRESSIVE DISORDER, RECURRENT EPISODE, MODERATE (H): Primary | ICD-10-CM

## 2020-01-24 PROCEDURE — 99309 SBSQ NF CARE MODERATE MDM 30: CPT | Performed by: NURSE PRACTITIONER

## 2020-01-24 RX ORDER — ALENDRONATE SODIUM 70 MG/1
70 TABLET ORAL
COMMUNITY
End: 2020-03-03

## 2020-01-24 RX ORDER — PREDNISONE 20 MG/1
20 TABLET ORAL SEE ADMIN INSTRUCTIONS
COMMUNITY
End: 2020-01-31

## 2020-01-24 RX ORDER — ALBUTEROL SULFATE 90 UG/1
2 AEROSOL, METERED RESPIRATORY (INHALATION) EVERY 4 HOURS PRN
COMMUNITY
End: 2020-03-03

## 2020-01-24 NOTE — PROGRESS NOTES
[place] [facility]  PRIMARY CARE PROVIDER AND CLINIC:  Gucci Wu MD, [address] [facility] [address]  Chief Complaint   Patient presents with     Hospital F/U     [place] Medical Record Number:  [id number]  Place of Service where encounter took place:  [facility] [[id number]]    Gilma Pace  is a 79 year old  (1940), admitted to the above facility from  [facility]. Hospital stay 1/15/2020 through 1/22/2020..  Admitted to this facility for  rehab, medical management and nursing care.    HPI:    HPI information obtained from: patient report and Care Everywhere Epic chart review.   Brief Summary of Hospital Course: Treated for COPD exacerbation, UTI with sepsis. Medication added were requip. Prednisone, and   Updates on Status Since Skilled nursing Admission:   1/29 nursing reports no concerns. Patient states she's not sleeping.    CODE STATUS/ADVANCE DIRECTIVES DISCUSSION:   CPR/Full code   Patient's living condition: lives with spouse  ALLERGIES: Codeine; Sulfa drugs; and Tetracyclines  PAST MEDICAL HISTORY:  has a past medical history of Anxiety (10/11/2006), Chronic low back pain without sciatica (10/31/2016), Compression fracture of thoracic vertebra (H) (6/13/2011), Congestive heart failure (H) (10/11/2006), COPD (chronic obstructive pulmonary disease) (H) (9/28/2017), Folate deficiency (6/9/2017), Hypertension goal BP (blood pressure) < 140/90 (9/28/2017), Mitral regurgitation (2/11/2014), Mixed hyperlipidemia (10/11/2006), Osteoporosis (9/28/2017), and Postoperative hypothyroidism (9/28/2017).  PAST SURGICAL HISTORY:   has a past surgical history that includes Appendectomy open; cataract iol, rt/lt; and Biopsy muscle diagnostic (location) (Right, 3/19/2018).  FAMILY HISTORY: family history includes Breast Cancer in her maternal grandmother; Other Cancer in an other family member.  SOCIAL HISTORY:   reports that she quit smoking about 13  years ago. She has never used smokeless tobacco. She reports current alcohol use. She reports that she does not use drugs.    Post Discharge Medication Reconciliation Status: discharge medications reconciled, continue medications without change    Current Outpatient Medications   Medication Sig Dispense Refill     albuterol (PROAIR HFA/PROVENTIL HFA/VENTOLIN HFA) 108 (90 Base) MCG/ACT inhaler Inhale 2 puffs into the lungs every 4 hours as needed for shortness of breath / dyspnea or wheezing       alendronate (FOSAMAX) 70 MG tablet Take 70 mg by mouth every 7 days       amLODIPine (NORVASC) 10 MG tablet TAKE 1 TABLET BY MOUTH ONCE DAILY 90 tablet 3     ARIPiprazole, sensor, 5 MG TABS Take 5 mg by mouth At Bedtime 90 tablet 1     clotrimazole-betamethasone (LOTRISONE) 1-0.05 % external cream Apply to the corners of the mouth bid as needed 15 g 3     DULoxetine (CYMBALTA) 60 MG capsule TAKE 2 CAPSULES BY MOUTH ONCE DAILY 180 capsule 2     folic acid (FOLVITE) 1 MG tablet Take 1 tablet (1 mg) by mouth daily 90 tablet 1     gabapentin (NEURONTIN) 600 MG tablet TAKE 1 TABLET BY MOUTH TWICE DAILY 180 tablet 1     levothyroxine (SYNTHROID/LEVOTHROID) 100 MCG tablet Take 1 tablet (100 mcg) by mouth daily 90 tablet 3     lisinopril (PRINIVIL/ZESTRIL) 40 MG tablet TAKE 1 TABLET BY MOUTH ONCE DAILY 90 tablet 1     nystatin (MYCOSTATIN) 347237 UNIT/GM external powder Apply topically 2 times daily 60 g 0     predniSONE (DELTASONE) 20 MG tablet Take 20 mg by mouth See Admin Instructions       primidone (MYSOLINE) 50 MG tablet Take 0.5 tablets (25 mg) by mouth At Bedtime 45 tablet 1     rosuvastatin (CRESTOR) 20 MG tablet Take 1 tablet (20 mg) by mouth At Bedtime 90 tablet 3     tiotropium (SPIRIVA HANDIHALER) 18 MCG capsule Inhale 18 mcg into the lungs daily       traZODone (DESYREL) 100 MG tablet TAKE 1 TABLET BY MOUTH AT BEDTIME 90 tablet 1     acetaminophen (TYLENOL) 500 MG tablet Take 1,000 mg by mouth 3 times daily        pramipexole (MIRAPEX) 0.125 MG tablet Take 0.125 mg by mouth 2 times daily       traZODone (DESYREL) 50 MG tablet Take 25 mg by mouth At Bedtime       ROS:  4 point ROS including Respiratory, CV, GI and , other than that noted in the HPI,  is negative    Vitals:  /65   Pulse 59   Temp 98  F (36.7  C)   Resp 20   Wt 96.7 kg (213 lb 3.2 oz)   SpO2 93%   BMI 37.77 kg/m    Exam:  GENERAL APPEARANCE:  Alert, in no distress  RESP:  respiratory effort and palpation of chest normal, no respiratory distress, lungs sounds clear  CV:  Palpation and auscultation of heart done , regular rate and rhythm, no murmur, rub, or gallop, edema Trace bilateral LE.   ABDOMEN:  normal bowel sounds, soft, nontender,   M/S:  Gait and station observed. Ambulates with therapy and walker, no tenderness or swelling of the joints   SKIN:  Inspection and palpation of skin and subcutaneous tissue at baseline  PSYCH:  insight and judgement, memory Seems mild impaired, affect and mood normal    ASSESSMENT/PLAN:  Major depressive disorder, recurrent episode, moderate (H)  Primary Insomnia  Complains of not sleeping. This is chronic and has been going on for several months. She is followed by her primary care provider. She is currently on duloxetine, trazodone and ability. Will reduce doses to see if that helps her get some sleep.     Hypothyroidism, unspecified type  TSH   Date Value Ref Range Status   09/11/2019 5.42 (H) 0.40 - 4.00 mU/L Final   continues on replacement    Chronic obstructive pulmonary disease with acute exacerbation (H)  Chronic respiratory failure with hypoxia (H)  Stable. Currently on prednisone taper. Complains of not sleeping and breathing is stable. Continue PTA inhalers and PTA oxygen at 4L n/c.     Hypertension goal BP (blood pressure) < 140/90  Controlled. Continue PTA amlodipine and lisinopril     Restless leg syndrome  Patient was started on requip at hospital. Patient reports she doesn't think it has helped  much. Also on gabapentin.    Electronically signed by:  Mabel Ortiz NP

## 2020-01-27 LAB
TSH SERPL DL<=0.005 MIU/L-ACNC: 2.68 UIU/ML (ref 0.3–5)
WBC: 19.7 THOU/UL (ref 4–11)

## 2020-01-29 ENCOUNTER — NURSING HOME VISIT (OUTPATIENT)
Dept: GERIATRICS | Facility: CLINIC | Age: 80
End: 2020-01-29
Payer: MEDICARE

## 2020-01-29 VITALS
HEART RATE: 87 BPM | RESPIRATION RATE: 19 BRPM | DIASTOLIC BLOOD PRESSURE: 86 MMHG | TEMPERATURE: 98.7 F | WEIGHT: 213.2 LBS | BODY MASS INDEX: 37.77 KG/M2 | SYSTOLIC BLOOD PRESSURE: 144 MMHG | OXYGEN SATURATION: 92 %

## 2020-01-29 DIAGNOSIS — E03.9 HYPOTHYROIDISM, UNSPECIFIED TYPE: ICD-10-CM

## 2020-01-29 DIAGNOSIS — F33.1 MAJOR DEPRESSIVE DISORDER, RECURRENT EPISODE, MODERATE (H): ICD-10-CM

## 2020-01-29 DIAGNOSIS — J96.11 CHRONIC RESPIRATORY FAILURE WITH HYPOXIA (H): ICD-10-CM

## 2020-01-29 DIAGNOSIS — F51.01 PRIMARY INSOMNIA: ICD-10-CM

## 2020-01-29 DIAGNOSIS — J44.1 CHRONIC OBSTRUCTIVE PULMONARY DISEASE WITH ACUTE EXACERBATION (H): Primary | ICD-10-CM

## 2020-01-29 DIAGNOSIS — I10 HYPERTENSION GOAL BP (BLOOD PRESSURE) < 140/90: ICD-10-CM

## 2020-01-29 PROBLEM — A41.9 SEPSIS (H): Status: ACTIVE | Noted: 2020-01-15

## 2020-01-29 PROCEDURE — 99309 SBSQ NF CARE MODERATE MDM 30: CPT | Performed by: NURSE PRACTITIONER

## 2020-01-29 RX ORDER — ACETAMINOPHEN 500 MG
1000 TABLET ORAL 3 TIMES DAILY
COMMUNITY
End: 2020-03-03

## 2020-01-29 RX ORDER — DULOXETIN HYDROCHLORIDE 60 MG/1
60 CAPSULE, DELAYED RELEASE ORAL DAILY
Qty: 180 CAPSULE | Refills: 2 | Status: CANCELLED
Start: 2020-01-29

## 2020-01-29 RX ORDER — TRAZODONE HYDROCHLORIDE 50 MG/1
25 TABLET, FILM COATED ORAL AT BEDTIME
COMMUNITY
End: 2020-02-07

## 2020-01-29 RX ORDER — PRAMIPEXOLE DIHYDROCHLORIDE 0.12 MG/1
0.12 TABLET ORAL 2 TIMES DAILY
COMMUNITY
End: 2020-02-07

## 2020-01-29 NOTE — PROGRESS NOTES
[place] [facility]  PRIMARY CARE PROVIDER AND CLINIC:  Gucci Wu MD, [address] [facility] [address]  Chief Complaint   Patient presents with     RECHECK     [place] Medical Record Number:  [id number]  Place of Service where encounter took place:  [facility] [[id number]]    Gilma Pace  is a 79 year old  (1940), admitted to the above facility from  [facility]. Hospital stay 1/15/2020 through 1/22/2020..  Admitted to this facility for  rehab, medical management and nursing care.    HPI:    HPI information obtained from: patient report and Care Everywhere Epic chart review.   Brief Summary of Hospital Course: Treated for COPD exacerbation, UTI with sepsis. Medication added were requip. Prednisone, and abx  Updates on Status Since Skilled nursing Admission:   1/24 nursing reports no concerns. Patient states she's not sleeping. Decrease duloxetine to 60 mg once daily. Decrease trazodone to 50 mg at bedtime. Taper prednisone.  1/29 continues to report no sleeping. Nursing relays depressed mood.     CODE STATUS/ADVANCE DIRECTIVES DISCUSSION:   CPR/Full code   Patient's living condition: lives with spouse     ALLERGIES: Codeine; Sulfa drugs; and Tetracyclines  PAST MEDICAL HISTORY:  has a past medical history of Anxiety (10/11/2006), Chronic low back pain without sciatica (10/31/2016), Compression fracture of thoracic vertebra (H) (6/13/2011), Congestive heart failure (H) (10/11/2006), COPD (chronic obstructive pulmonary disease) (H) (9/28/2017), Folate deficiency (6/9/2017), Hypertension goal BP (blood pressure) < 140/90 (9/28/2017), Mitral regurgitation (2/11/2014), Mixed hyperlipidemia (10/11/2006), Osteoporosis (9/28/2017), and Postoperative hypothyroidism (9/28/2017).  PAST SURGICAL HISTORY:   has a past surgical history that includes Appendectomy open; cataract iol, rt/lt; and Biopsy muscle diagnostic (location) (Right, 3/19/2018).  FAMILY  HISTORY: family history includes Breast Cancer in her maternal grandmother; Other Cancer in an other family member.  SOCIAL HISTORY:   reports that she quit smoking about 13 years ago. She has never used smokeless tobacco. She reports current alcohol use. She reports that she does not use drugs.      Current Outpatient Medications   Medication Sig Dispense Refill     acetaminophen (TYLENOL) 500 MG tablet Take 1,000 mg by mouth 3 times daily       albuterol (PROAIR HFA/PROVENTIL HFA/VENTOLIN HFA) 108 (90 Base) MCG/ACT inhaler Inhale 2 puffs into the lungs every 4 hours as needed for shortness of breath / dyspnea or wheezing       alendronate (FOSAMAX) 70 MG tablet Take 70 mg by mouth every 7 days       amLODIPine (NORVASC) 10 MG tablet TAKE 1 TABLET BY MOUTH ONCE DAILY 90 tablet 3     ARIPiprazole, sensor, 5 MG TABS Take 5 mg by mouth At Bedtime 90 tablet 1     clotrimazole-betamethasone (LOTRISONE) 1-0.05 % external cream Apply to the corners of the mouth bid as needed 15 g 3     DULoxetine (CYMBALTA) 60 MG capsule TAKE 2 CAPSULES BY MOUTH ONCE DAILY 180 capsule 2     folic acid (FOLVITE) 1 MG tablet Take 1 tablet (1 mg) by mouth daily 90 tablet 1     gabapentin (NEURONTIN) 600 MG tablet TAKE 1 TABLET BY MOUTH TWICE DAILY 180 tablet 1     levothyroxine (SYNTHROID/LEVOTHROID) 100 MCG tablet Take 1 tablet (100 mcg) by mouth daily 90 tablet 3     lisinopril (PRINIVIL/ZESTRIL) 40 MG tablet TAKE 1 TABLET BY MOUTH ONCE DAILY 90 tablet 1     nystatin (MYCOSTATIN) 507416 UNIT/GM external powder Apply topically 2 times daily 60 g 0     pramipexole (MIRAPEX) 0.125 MG tablet Take 0.125 mg by mouth 2 times daily       primidone (MYSOLINE) 50 MG tablet Take 0.5 tablets (25 mg) by mouth At Bedtime 45 tablet 1     rosuvastatin (CRESTOR) 20 MG tablet Take 1 tablet (20 mg) by mouth At Bedtime 90 tablet 3     tiotropium (SPIRIVA HANDIHALER) 18 MCG capsule Inhale 18 mcg into the lungs daily       traZODone (DESYREL) 50 MG tablet Take  25 mg by mouth At Bedtime       ROS:  4 point ROS including Respiratory, CV, GI and , other than that noted in the HPI,  is negative    Vitals:  BP (!) 144/86   Pulse 87   Temp 98.7  F (37.1  C)   Resp 19   Wt 96.7 kg (213 lb 3.2 oz)   SpO2 92%   BMI 37.77 kg/m    Exam:  GENERAL APPEARANCE:  Alert, in no distress  RESP:  respiratory effort and palpation of chest normal, no respiratory distress, lungs sounds clear. On 4 L n/c  CV:  Palpation and auscultation of heart done , regular rate and rhythm, no murmur, rub, or gallop, edema Trace bilateral LE.   ABDOMEN:  normal bowel sounds, soft, nontender,   M/S:  Gait and station observed. Ambulates with therapy and walker, no tenderness or swelling of the joints   SKIN:  Inspection and palpation of skin and subcutaneous tissue at baseline  PSYCH:  insight and judgement, memory Seems mild impaired, affect and mood normal    ASSESSMENT/PLAN:  Chronic obstructive pulmonary disease with acute exacerbation (H)  Chronic respiratory failure with hypoxia (H)  Stable.Prednisone tapered and stopped. breathing is stable. Denies increase in shortness of breath and cough.  Continue PTA inhalers and PTA oxygen at 4L n/c.     Major depressive disorder, recurrent episode, moderate (H)  Primary Insomnia  Complains of not sleeping. This is chronic and has been going on for several months. She is followed by her primary care provider. She is currently on duloxetine, trazodone and abilify. Doses reduced last week to see if that helps her get some sleep.  - no changes today.     Hypothyroidism, unspecified type  TSH   Date Value Ref Range Status   09/11/2019 5.42 (H) 0.40 - 4.00 mU/L Final   continues on replacement    Hypertension goal BP (blood pressure) < 140/90  Controlled. Continue PTA amlodipine and lisinopril     Restless leg syndrome  Patient was started on requip at hospital. Patient reports she doesn't think it has helped much. Also on gabapentin.    Electronically signed  by:  Mabel Ortiz NP       Orders written by provider at facility and transcribed by : Janis Rosales MA   1.  WBC on Monday.  Dx: leukocytosis

## 2020-01-31 ENCOUNTER — NURSE TRIAGE (OUTPATIENT)
Dept: NURSING | Facility: CLINIC | Age: 80
End: 2020-01-31

## 2020-01-31 ENCOUNTER — RECORDS - HEALTHEAST (OUTPATIENT)
Dept: LAB | Facility: CLINIC | Age: 80
End: 2020-01-31

## 2020-01-31 NOTE — TELEPHONE ENCOUNTER
Haylee (nurse @ [facility]) calls and says that she needs to know if pt. Has had the flu shot. RN checked Epic and answered Haylee's question. Haylee voiced understanding and had no further questions.  Reason for Disposition    Health Information question, no triage required and triager able to answer question    Additional Information    Negative: [1] Caller is not with the adult (patient) AND [2] reporting urgent symptoms    Negative: Lab result questions    Negative: Medication questions    Negative: Caller can't be reached by phone    Negative: Caller has already spoken to PCP or another triager    Negative: RN needs further essential information from caller in order to complete triage    Negative: Requesting regular office appointment    Negative: [1] Caller requesting NON-URGENT health information AND [2] PCP's office is the best resource    Protocols used: INFORMATION ONLY CALL-A-

## 2020-02-03 ENCOUNTER — NURSING HOME VISIT (OUTPATIENT)
Dept: GERIATRICS | Facility: CLINIC | Age: 80
End: 2020-02-03
Payer: MEDICARE

## 2020-02-03 VITALS
SYSTOLIC BLOOD PRESSURE: 110 MMHG | HEART RATE: 86 BPM | BODY MASS INDEX: 37.77 KG/M2 | DIASTOLIC BLOOD PRESSURE: 64 MMHG | OXYGEN SATURATION: 95 % | TEMPERATURE: 97.6 F | RESPIRATION RATE: 18 BRPM | WEIGHT: 213.2 LBS

## 2020-02-03 DIAGNOSIS — J96.11 CHRONIC RESPIRATORY FAILURE WITH HYPOXIA (H): ICD-10-CM

## 2020-02-03 DIAGNOSIS — J44.1 CHRONIC OBSTRUCTIVE PULMONARY DISEASE WITH ACUTE EXACERBATION (H): Primary | ICD-10-CM

## 2020-02-03 DIAGNOSIS — I10 HYPERTENSION GOAL BP (BLOOD PRESSURE) < 140/90: ICD-10-CM

## 2020-02-03 DIAGNOSIS — F51.01 PRIMARY INSOMNIA: ICD-10-CM

## 2020-02-03 DIAGNOSIS — E03.9 HYPOTHYROIDISM, UNSPECIFIED TYPE: ICD-10-CM

## 2020-02-03 DIAGNOSIS — F33.1 MAJOR DEPRESSIVE DISORDER, RECURRENT EPISODE, MODERATE (H): ICD-10-CM

## 2020-02-03 DIAGNOSIS — M25.551 RIGHT HIP PAIN: ICD-10-CM

## 2020-02-03 DIAGNOSIS — N39.45 CONTINUOUS LEAKAGE OF URINE: ICD-10-CM

## 2020-02-03 DIAGNOSIS — Z97.8 CHRONIC INDWELLING FOLEY CATHETER: ICD-10-CM

## 2020-02-03 DIAGNOSIS — M54.50 MIDLINE LOW BACK PAIN WITHOUT SCIATICA, UNSPECIFIED CHRONICITY: ICD-10-CM

## 2020-02-03 LAB — WBC: 14.5 THOU/UL (ref 4–11)

## 2020-02-03 PROCEDURE — 99309 SBSQ NF CARE MODERATE MDM 30: CPT | Performed by: NURSE PRACTITIONER

## 2020-02-03 NOTE — PROGRESS NOTES
[place] [facility]  PRIMARY CARE PROVIDER AND CLINIC:  Gucci Wu MD, [address] [facility] [address]  Chief Complaint   Patient presents with     RECHECK     [place] Medical Record Number:  [id number]  Place of Service where encounter took place:  [facility] (S) [[id number]]    Gilma Pace  is a 79 year old  (1940), admitted to the above facility from  [facility]. Hospital stay 1/15/2020 through 1/22/2020..  Admitted to this facility for  rehab, medical management and nursing care.    HPI:    HPI information obtained from: patient report and Care Everywhere Epic chart review.   Brief Summary of Hospital Course: Treated for COPD exacerbation, UTI with sepsis. Medication added were requip. Prednisone, and abx  Updates on Status Since Skilled nursing Admission:   1/24 nursing reports no concerns. Patient states she's not sleeping. Decrease duloxetine to 60 mg once daily. Decrease trazodone to 50 mg at bedtime. Taper prednisone.  1/29 continues to report no sleeping. Nursing relays depressed mood.   2/3 The plan last week was for her to discharge on Thursday but as of today there are no discharge plans. She is complaining of increased pain and therapy will work with her more.     CODE STATUS/ADVANCE DIRECTIVES DISCUSSION:   CPR/Full code   Patient's living condition: lives with spouse     ALLERGIES: Codeine; Sulfa drugs; and Tetracyclines  PAST MEDICAL HISTORY:  has a past medical history of Anxiety (10/11/2006), Chronic low back pain without sciatica (10/31/2016), Compression fracture of thoracic vertebra (H) (6/13/2011), Congestive heart failure (H) (10/11/2006), COPD (chronic obstructive pulmonary disease) (H) (9/28/2017), Folate deficiency (6/9/2017), Hypertension goal BP (blood pressure) < 140/90 (9/28/2017), Mitral regurgitation (2/11/2014), Mixed hyperlipidemia (10/11/2006), Osteoporosis (9/28/2017), and Postoperative hypothyroidism  (9/28/2017).  PAST SURGICAL HISTORY:   has a past surgical history that includes Appendectomy open; cataract iol, rt/lt; and Biopsy muscle diagnostic (location) (Right, 3/19/2018).  FAMILY HISTORY: family history includes Breast Cancer in her maternal grandmother; Other Cancer in an other family member.  SOCIAL HISTORY:   reports that she quit smoking about 13 years ago. She has never used smokeless tobacco. She reports current alcohol use. She reports that she does not use drugs.      Current Outpatient Medications   Medication Sig Dispense Refill     acetaminophen (TYLENOL) 500 MG tablet Take 1,000 mg by mouth 3 times daily Also every day PRN       albuterol (PROAIR HFA/PROVENTIL HFA/VENTOLIN HFA) 108 (90 Base) MCG/ACT inhaler Inhale 2 puffs into the lungs every 4 hours as needed for shortness of breath / dyspnea or wheezing       alendronate (FOSAMAX) 70 MG tablet Take 70 mg by mouth every 7 days       amLODIPine (NORVASC) 10 MG tablet TAKE 1 TABLET BY MOUTH ONCE DAILY 90 tablet 3     ARIPiprazole, sensor, 5 MG TABS Take 5 mg by mouth At Bedtime 90 tablet 1     clotrimazole-betamethasone (LOTRISONE) 1-0.05 % external cream Apply to the corners of the mouth bid as needed 15 g 3     DULoxetine (CYMBALTA) 60 MG capsule TAKE 2 CAPSULES BY MOUTH ONCE DAILY 180 capsule 2     folic acid (FOLVITE) 1 MG tablet Take 1 tablet (1 mg) by mouth daily 90 tablet 1     gabapentin (NEURONTIN) 600 MG tablet TAKE 1 TABLET BY MOUTH TWICE DAILY 180 tablet 1     levothyroxine (SYNTHROID/LEVOTHROID) 100 MCG tablet Take 1 tablet (100 mcg) by mouth daily 90 tablet 3     lisinopril (PRINIVIL/ZESTRIL) 40 MG tablet TAKE 1 TABLET BY MOUTH ONCE DAILY 90 tablet 1     Menthol, Topical Analgesic, (BIOFREEZE) 4 % GEL Externally apply topically At Bedtime       nystatin (MYCOSTATIN) 518632 UNIT/GM external powder Apply topically 2 times daily 60 g 0     pramipexole (MIRAPEX) 0.125 MG tablet Take 0.125 mg by mouth 2 times daily       primidone  (MYSOLINE) 50 MG tablet Take 0.5 tablets (25 mg) by mouth At Bedtime 45 tablet 1     rosuvastatin (CRESTOR) 20 MG tablet Take 1 tablet (20 mg) by mouth At Bedtime 90 tablet 3     tiotropium (SPIRIVA HANDIHALER) 18 MCG capsule Inhale 18 mcg into the lungs daily       traZODone (DESYREL) 50 MG tablet Take 25 mg by mouth At Bedtime       ROS:  4 point ROS including Respiratory, CV, GI and , other than that noted in the HPI,  is negative    Vitals:  /64   Pulse 86   Temp 97.6  F (36.4  C)   Resp 18   Wt 96.7 kg (213 lb 3.2 oz)   SpO2 95%   BMI 37.77 kg/m    Exam:  GENERAL APPEARANCE:  Alert, in no distress  RESP:  respiratory effort and palpation of chest normal, no respiratory distress, lungs sounds clear. On 4 L n/c  CV:  Palpation and auscultation of heart done , regular rate and rhythm, no murmur, rub, or gallop, edema Trace bilateral LE.   ABDOMEN:  normal bowel sounds, soft, nontender,   M/S:  Gait and station observed. Ambulates with therapy and walker, no tenderness or swelling of the joints   SKIN:  Inspection and palpation of skin and subcutaneous tissue at baseline  PSYCH:  insight and judgement, memory Seems mild impaired, affect and mood normal    ASSESSMENT/PLAN:  Chronic obstructive pulmonary disease with acute exacerbation (H)  Chronic respiratory failure with hypoxia (H)  Stable.Prednisone tapered and stopped. breathing is stable. Denies increase in shortness of breath and cough.  Continue PTA inhalers and PTA oxygen at 4L n/c.     Major depressive disorder, recurrent episode, moderate (H)  Primary Insomnia  Complains of not sleeping. This is chronic and has been going on for several months. She is followed by her primary care provider. She is currently on duloxetine, trazodone and abilify. Doses reduced last week to see if that helps her get some sleep.  - no changes today.     Hypothyroidism, unspecified type  TSH   Date Value Ref Range Status   09/11/2019 5.42 (H) 0.40 - 4.00 mU/L Final    continues on replacement    Hypertension goal BP (blood pressure) < 140/90  Controlled. Continue PTA amlodipine and lisinopril     Restless leg syndrome  Patient was started on requip at hospital. Patient reports this is much improved    Continuous leakage of urine  Chronic indwelling Perez catheter  Perez placed by urology due to continuous incontinence. Patient removed the perez herself at home prior to hospital stay. It was reinserted at the hospital. Today she states she hates the catheter.  I really reviewed with her the reason that the catheter was placed and that she will have to manage her incontinence with incontinence pads.  She states she would prefer that over having the catheter.  On a side note B IMS is 15 out of 15.  No other cog scores available  -Remove Perez today no reason to BladderScan her    Midline low back pain without sciatica, unspecified chronicity  Right hip pain  Complains of low back pain and right hip pain.  She has not tried any topical agents.  Will add Biofreeze.      Electronically signed by:  Mabel Ortiz NP     Orders written by provider at facility and transcribed by : Janis Rosales MA   1.  Add Tylenol 1000 mg po every day PRN for pain.  2.  Biofreeze apply to low back and right hip at bedtime.  3.  Remove perez today.

## 2020-02-07 ENCOUNTER — DISCHARGE SUMMARY NURSING HOME (OUTPATIENT)
Dept: GERIATRICS | Facility: CLINIC | Age: 80
End: 2020-02-07
Payer: MEDICARE

## 2020-02-07 ENCOUNTER — TELEPHONE (OUTPATIENT)
Dept: FAMILY MEDICINE | Facility: CLINIC | Age: 80
End: 2020-02-07

## 2020-02-07 VITALS
DIASTOLIC BLOOD PRESSURE: 60 MMHG | RESPIRATION RATE: 24 BRPM | HEART RATE: 87 BPM | HEIGHT: 63 IN | TEMPERATURE: 97.1 F | BODY MASS INDEX: 27.29 KG/M2 | OXYGEN SATURATION: 90 % | SYSTOLIC BLOOD PRESSURE: 100 MMHG | WEIGHT: 154 LBS

## 2020-02-07 DIAGNOSIS — F33.1 MAJOR DEPRESSIVE DISORDER, RECURRENT EPISODE, MODERATE (H): Primary | ICD-10-CM

## 2020-02-07 DIAGNOSIS — G25.81 RESTLESS LEG SYNDROME: ICD-10-CM

## 2020-02-07 DIAGNOSIS — E03.9 HYPOTHYROIDISM, UNSPECIFIED TYPE: ICD-10-CM

## 2020-02-07 DIAGNOSIS — M25.551 RIGHT HIP PAIN: ICD-10-CM

## 2020-02-07 DIAGNOSIS — J96.11 CHRONIC RESPIRATORY FAILURE WITH HYPOXIA (H): ICD-10-CM

## 2020-02-07 DIAGNOSIS — I10 HYPERTENSION GOAL BP (BLOOD PRESSURE) < 140/90: ICD-10-CM

## 2020-02-07 DIAGNOSIS — J44.1 CHRONIC OBSTRUCTIVE PULMONARY DISEASE WITH ACUTE EXACERBATION (H): ICD-10-CM

## 2020-02-07 DIAGNOSIS — F51.01 PRIMARY INSOMNIA: ICD-10-CM

## 2020-02-07 DIAGNOSIS — M54.50 MIDLINE LOW BACK PAIN WITHOUT SCIATICA, UNSPECIFIED CHRONICITY: ICD-10-CM

## 2020-02-07 DIAGNOSIS — N39.45 CONTINUOUS LEAKAGE OF URINE: ICD-10-CM

## 2020-02-07 PROCEDURE — 99316 NF DSCHRG MGMT 30 MIN+: CPT | Performed by: NURSE PRACTITIONER

## 2020-02-07 RX ORDER — AMOXICILLIN 250 MG
1 CAPSULE ORAL 2 TIMES DAILY PRN
COMMUNITY
End: 2020-03-03

## 2020-02-07 RX ORDER — TRAZODONE HYDROCHLORIDE 50 MG/1
25 TABLET, FILM COATED ORAL AT BEDTIME
Qty: 30 TABLET | Refills: 0 | Status: SHIPPED | OUTPATIENT
Start: 2020-02-07 | End: 2020-03-03 | Stop reason: DRUGHIGH

## 2020-02-07 RX ORDER — BUSPIRONE HYDROCHLORIDE 15 MG/1
15 TABLET ORAL 2 TIMES DAILY
COMMUNITY
End: 2020-02-28

## 2020-02-07 RX ORDER — PRAMIPEXOLE DIHYDROCHLORIDE 0.12 MG/1
0.12 TABLET ORAL 2 TIMES DAILY
Qty: 60 TABLET | Refills: 0 | Status: SHIPPED | OUTPATIENT
Start: 2020-02-07 | End: 2020-03-03

## 2020-02-07 ASSESSMENT — MIFFLIN-ST. JEOR: SCORE: 1147.67

## 2020-02-07 NOTE — TELEPHONE ENCOUNTER
Reason for Call:  Other call back    Detailed comments: home care is calling scheduled appt with provider, states if provider needs to see patient any sooner to please call patient at home, verified with home care correct number on file. Please call to discuss. Thank you.    Phone Number Patient can be reached at: [phone]    Best Time:     Can we leave a detailed message on this number? YES    Call taken on 2/7/2020 at 8:37 AM by Mitzi Olvera

## 2020-02-07 NOTE — PROGRESS NOTES
[place] [facility] DISCHARGE SUMMARY    PATIENT'S NAME: Gilma Pace  YOB: 1940  MEDICAL RECORD NUMBER:  [id number]  Place of Service where encounter took place:  [facility] (S) [[id number]]    PRIMARY CARE PROVIDER AND CLINIC RESPONSIBLE AFTER TRANSFER: Gucci Wu [address] [facility] [address]    [facility] Provider     Transferring providers: Mabel Ortiz NP / Krystina Hendricks MD  Recent Hospitalization/ED:  [facility]. Hospital stay 1/15/2020 through 1/22/2020.  Date of SNF Admission: 1/22  Date of SNF (anticipated) Discharge: 2/9  Discharged to: previous independent home  Cognitive Scores: BIMS 15/15;   Physical Function: Ambulating 200 ft with 4 wheeled walker assist of 1 for ambulation transfers dressing.       CODE STATUS/ADVANCE DIRECTIVES DISCUSSION:  Full Code  ALLERGIES: Codeine; Sulfa drugs; and Tetracyclines    DISCHARGE DIAGNOSIS/NURSING FACILITY COURSE:   Brief Summary of Hospital Course: Treated for COPD exacerbation, UTI with sepsis. Medication added were requip. Prednisone, and abx  Updates on Status Since Skilled nursing Admission:   1/24 nursing reports no concerns. Patient states she's not sleeping. Decrease duloxetine to 60 mg once daily. Decrease trazodone to 50 mg at bedtime. Taper prednisone.  1/29 continues to report trouble sleeping. Nursing relays depressed mood.   2/3 The plan last week was for her to discharge on Thursday but as of today there are no discharge plans. She is complaining of increased pain and therapy will work with her more. 1.  Add Tylenol 1000 mg po every day PRN for pain. 2.  Biofreeze apply to low back and right hip at bedtime. 3.  Remove perez today.    Chronic obstructive pulmonary disease with acute exacerbation (H)  Chronic respiratory failure with hypoxia (H)  Stable.Prednisone tapered and stopped. breathing is stable. Denies increase in shortness of breath and cough.  Continue PTA inhalers and  PTA oxygen at 4L n/c.      Major depressive disorder, recurrent episode, moderate (H)  Primary Insomnia  Complains of not sleeping. This is chronic and has been going on for several months. She is followed by her primary care provider. She is currently on duloxetine, trazodone and abilify. Doses reduced last week to see if that helps her get some sleep. Patient states her restless legs are better and she thinks her sleep has improved.   - no changes today.      Hypothyroidism, unspecified type  tsh done at nursing home was 2.68  continues on replacement     Hypertension goal BP (blood pressure) < 140/90  Controlled. Continue PTA amlodipine and lisinopril      Restless leg syndrome  Patient was started on requip at hospital. Patient reports this is much improved     Continuous leakage of urine  Chronic indwelling Perez catheter  Perez placed by urology due to continuous incontinence. Patient removed the perez herself at home prior to hospital stay. It was reinserted at the hospital. Today she states she hates the catheter.  I reviewed with her the reason that the catheter was placed and that she will have to manage her incontinence with incontinence pads.  She states she would prefer that over having the catheter.  Cognition scores include B IMS is 15 out of 15.  No other cog scores available  -Remove Perez today no reason to BladderScan her     Midline low back pain without sciatica, unspecified chronicity  Right hip pain  Complains of low back pain and right hip pain.  biofreeze added. And continue tylenol.     Leukocytosis.   Related to prednisone use. Trending down. See labs below.     PAST MEDICAL HISTORY:  has a past medical history of Anxiety (10/11/2006), Chronic low back pain without sciatica (10/31/2016), Compression fracture of thoracic vertebra (H) (6/13/2011), Congestive heart failure (H) (10/11/2006), COPD (chronic obstructive pulmonary disease) (H) (9/28/2017), Folate deficiency (6/9/2017),  Hypertension goal BP (blood pressure) < 140/90 (9/28/2017), Mitral regurgitation (2/11/2014), Mixed hyperlipidemia (10/11/2006), Osteoporosis (9/28/2017), and Postoperative hypothyroidism (9/28/2017).    DISCHARGE MEDICATIONS:  Current Outpatient Medications   Medication Sig Dispense Refill     acetaminophen (TYLENOL) 500 MG tablet Take 1,000 mg by mouth 3 times daily Also every day PRN       albuterol (PROAIR HFA/PROVENTIL HFA/VENTOLIN HFA) 108 (90 Base) MCG/ACT inhaler Inhale 2 puffs into the lungs every 4 hours as needed for shortness of breath / dyspnea or wheezing       alendronate (FOSAMAX) 70 MG tablet Take 70 mg by mouth every 7 days       amLODIPine (NORVASC) 10 MG tablet TAKE 1 TABLET BY MOUTH ONCE DAILY 90 tablet 3     ARIPiprazole, sensor, 5 MG TABS Take 5 mg by mouth At Bedtime 90 tablet 1     busPIRone (BUSPAR) 15 MG tablet Take 15 mg by mouth 2 times daily       clotrimazole-betamethasone (LOTRISONE) 1-0.05 % external cream Apply to the corners of the mouth bid as needed 15 g 3     DULoxetine (CYMBALTA) 60 MG capsule TAKE 2 CAPSULES BY MOUTH ONCE DAILY 180 capsule 2     folic acid (FOLVITE) 1 MG tablet Take 1 tablet (1 mg) by mouth daily 90 tablet 1     gabapentin (NEURONTIN) 600 MG tablet TAKE 1 TABLET BY MOUTH TWICE DAILY 180 tablet 1     levothyroxine (SYNTHROID/LEVOTHROID) 100 MCG tablet Take 1 tablet (100 mcg) by mouth daily 90 tablet 3     lisinopril (PRINIVIL/ZESTRIL) 40 MG tablet TAKE 1 TABLET BY MOUTH ONCE DAILY 90 tablet 1     Menthol, Topical Analgesic, (BIOFREEZE) 4 % GEL Externally apply topically At Bedtime       nystatin (MYCOSTATIN) 207478 UNIT/GM external powder Apply topically 2 times daily 60 g 0     pramipexole (MIRAPEX) 0.125 MG tablet Take 1 tablet (0.125 mg) by mouth 2 times daily 60 tablet 0     primidone (MYSOLINE) 50 MG tablet Take 0.5 tablets (25 mg) by mouth At Bedtime 45 tablet 1     rosuvastatin (CRESTOR) 20 MG tablet Take 1 tablet (20 mg) by mouth At Bedtime 90 tablet 3  "    senna-docusate (SENOKOT-S/PERICOLACE) 8.6-50 MG tablet Take 1 tablet by mouth 2 times daily as needed for constipation       tiotropium (SPIRIVA HANDIHALER) 18 MCG capsule Inhale 18 mcg into the lungs daily       traZODone (DESYREL) 50 MG tablet Take 0.5 tablets (25 mg) by mouth At Bedtime 30 tablet 0       ROS:    4 point ROS including Respiratory, CV, GI and , other than that noted in the HPI,  is negative    Physical Exam:   Vitals: /60   Pulse 87   Temp 97.1  F (36.2  C)   Resp 24   Ht 1.6 m (5' 3\")   Wt 69.9 kg (154 lb)   SpO2 90%   BMI 27.28 kg/m    BMI= Body mass index is 27.28 kg/m .  General: Alert, in no distress.    DISCHARGE PLAN:  Occupational Therapy, Physical Therapy, Registered Nurse and Home Health Aide  Patient instructed to follow-up with:  PCP in 7 days      [facility] scheduled appointments:  Future Appointments   Date Time Provider Department [place]   3/2/2020  3:30 PM Michel Lorenz, [facility]   3/3/2020 10:30 AM Gucci Wu MD [facility] referral needed and placed by this provider: No    Pending labs: none  [facility] labs: 1/27 tsh 2.68 wbc 19.7 2/3 wbc 14.5    Discharge Treatments:    - Ok to discharge to home with current medications and treatments  - Home Physical Therapy / Ocupational Therapy / RN / HHA  - Follow up with Primary care provider in 1 week  - Scripts sent to pharmacy: pramipexole, trazodone.    - Scripts written: none    TOTAL DISCHARGE TIME:   Greater than 30 minutes  Electronically signed by:  Mabel Ortiz NP        "

## 2020-02-10 ENCOUNTER — MEDICAL CORRESPONDENCE (OUTPATIENT)
Dept: HEALTH INFORMATION MANAGEMENT | Facility: CLINIC | Age: 80
End: 2020-02-10

## 2020-02-10 ENCOUNTER — TELEPHONE (OUTPATIENT)
Dept: FAMILY MEDICINE | Facility: CLINIC | Age: 80
End: 2020-02-10

## 2020-02-10 NOTE — TELEPHONE ENCOUNTER
Mar 03, 2020 10:30 AM CST  Office Visit with Gucci Wu MD  [facility] [address] [facility] [address] [phone]        Routing to provider to advise.  Zoila Whittaker BSN, RN

## 2020-02-10 NOTE — TELEPHONE ENCOUNTER
Reason for Call:  Home Health Care    Antonina with [facility] called regarding (reason for call): Home Care orders    Orders are needed for this patient.     PT/OT: eval and treat    Home health aide: 1 time per week for 4 weeks.     Skilled Nursin times per week for 2 weeks  1 time a week for 3 weeks  1 time every other week for 2 weeks.     Pt Provider: Dr. Wu    Phone Number Homecare Nurse can be reached at: [phone]    Can we leave a detailed message on this number? YES    Phone number patient can be reached at:     Best Time:     Call taken on 2/10/2020 at 12:36 PM by Carol Cardenas

## 2020-02-13 NOTE — TELEPHONE ENCOUNTER
This was routed to me directly, forwarding back to the team for an RN to address.   ROSITA Navarro

## 2020-02-13 NOTE — TELEPHONE ENCOUNTER
Left detailed message on Antonina's confidential voice mail with verbal orders as below      Johanna KURTZN, RN, CPN

## 2020-02-17 ENCOUNTER — DOCUMENTATION ONLY (OUTPATIENT)
Dept: CARE COORDINATION | Facility: CLINIC | Age: 80
End: 2020-02-17

## 2020-02-17 NOTE — PROGRESS NOTES
[facility] utilizes an encounter to take the place of a direct phone call to your office. Please take a moment to review the below request. Please reply or route message to author of this encounter.  Message will act as a verbal OK of orders requested below. Thank you.    ORDER  Pt seen for home PT eval today 2/17/20. Plan to continue 1w1, 2w1, 1w1 for gait training, transfer training, there ex/hep instruction.  Mehdi Ruano PT

## 2020-02-21 ENCOUNTER — TELEPHONE (OUTPATIENT)
Dept: FAMILY MEDICINE | Facility: CLINIC | Age: 80
End: 2020-02-21

## 2020-02-21 NOTE — TELEPHONE ENCOUNTER
Next 5 appointments (look out 90 days)    Mar 02, 2020  3:30 PM CST  [facility] ([facility]) [address] [facility] [address] [phone]   Mar 03, 2020 10:30 AM CST  Office Visit with Gucci Wu MD  [facility] ([facility]) [address] [facility] [address] [phone]

## 2020-02-21 NOTE — TELEPHONE ENCOUNTER
Reason for Call:  Form, our goal is to have forms completed with 72 hours, however, some forms may require a visit or additional information.    Type of letter, form or note:  Home Health Certification    Who is the form from?: Home care    Where did the form come from: form was faxed in    What clinic location was the form placed at?: [place]    Where the form was placed: Given to MA/RN    What number is listed as a contact on the form?: [facility] [phone]       Additional comments: I placed the [facility] forms in the [facility]'s orange folder for the RN to review    Call taken on 2/21/2020 at 12:53 PM by Tanisha Arevalo

## 2020-02-26 ENCOUNTER — DOCUMENTATION ONLY (OUTPATIENT)
Dept: CARE COORDINATION | Facility: CLINIC | Age: 80
End: 2020-02-26

## 2020-02-26 NOTE — PROGRESS NOTES
[facility] utilizes an encounter to take the place of a direct phone call to your office. Please take a moment to review the below request. Please reply or route message to author of this encounter.  Message will act as a verbal OK of orders requested below. Thank you.    ORDER    HA 1 x a week for one week then 2 x a week for 4 weeks.     Char Antonio RN Case Manager  [phone]  [email or web address]

## 2020-02-28 DIAGNOSIS — F33.1 MAJOR DEPRESSIVE DISORDER, RECURRENT EPISODE, MODERATE (H): Primary | ICD-10-CM

## 2020-02-28 RX ORDER — BUSPIRONE HYDROCHLORIDE 15 MG/1
15 TABLET ORAL 2 TIMES DAILY
Qty: 180 TABLET | Refills: 0 | Status: SHIPPED | OUTPATIENT
Start: 2020-02-28 | End: 2020-06-09

## 2020-02-28 NOTE — TELEPHONE ENCOUNTER
Routing refill request to provider for review/approval because:  Medication is reported/historical:  This medication and dose is listed as a discharge medication from the nursing home. This can be seen in CareEverywhere.    Routing to provider to advise.  Zoila LINDA, RN      Mar 03, 2020 10:30 AM CST  Office Visit with Gucci Wu MD  [facility] [address] [address] [phone]

## 2020-03-03 ENCOUNTER — OFFICE VISIT (OUTPATIENT)
Dept: FAMILY MEDICINE | Facility: CLINIC | Age: 80
End: 2020-03-03
Payer: MEDICARE

## 2020-03-03 VITALS
DIASTOLIC BLOOD PRESSURE: 65 MMHG | SYSTOLIC BLOOD PRESSURE: 101 MMHG | WEIGHT: 182.8 LBS | TEMPERATURE: 96.7 F | BODY MASS INDEX: 32.38 KG/M2 | OXYGEN SATURATION: 92 % | HEART RATE: 110 BPM

## 2020-03-03 DIAGNOSIS — G25.81 RESTLESS LEGS SYNDROME (RLS): ICD-10-CM

## 2020-03-03 DIAGNOSIS — M81.0 OSTEOPOROSIS, UNSPECIFIED OSTEOPOROSIS TYPE, UNSPECIFIED PATHOLOGICAL FRACTURE PRESENCE: Primary | ICD-10-CM

## 2020-03-03 DIAGNOSIS — Z78.0 ASYMPTOMATIC MENOPAUSAL STATE: ICD-10-CM

## 2020-03-03 DIAGNOSIS — Z53.9 DIAGNOSIS NOT YET DEFINED: Primary | ICD-10-CM

## 2020-03-03 PROCEDURE — 99214 OFFICE O/P EST MOD 30 MIN: CPT | Performed by: FAMILY MEDICINE

## 2020-03-03 PROCEDURE — G0180 MD CERTIFICATION HHA PATIENT: HCPCS | Performed by: FAMILY MEDICINE

## 2020-03-03 RX ORDER — PRAMIPEXOLE DIHYDROCHLORIDE 0.25 MG/1
0.25 TABLET ORAL AT BEDTIME
Qty: 90 TABLET | Refills: 3 | Status: SHIPPED | OUTPATIENT
Start: 2020-03-03 | End: 2021-03-29

## 2020-03-03 RX ORDER — TRAZODONE HYDROCHLORIDE 100 MG/1
100 TABLET ORAL AT BEDTIME
COMMUNITY
Start: 2020-03-03 | End: 2020-07-01

## 2020-03-03 RX ORDER — SOLIFENACIN SUCCINATE 5 MG/1
5 TABLET, FILM COATED ORAL DAILY
COMMUNITY
Start: 2020-03-03 | End: 2020-05-08

## 2020-03-03 RX ORDER — ALENDRONATE SODIUM 70 MG/1
70 TABLET ORAL
Qty: 12 TABLET | Refills: 3 | Status: SHIPPED | OUTPATIENT
Start: 2020-03-03 | End: 2021-04-12

## 2020-03-03 ASSESSMENT — ANXIETY QUESTIONNAIRES
1. FEELING NERVOUS, ANXIOUS, OR ON EDGE: NOT AT ALL
2. NOT BEING ABLE TO STOP OR CONTROL WORRYING: NOT AT ALL
5. BEING SO RESTLESS THAT IT IS HARD TO SIT STILL: NOT AT ALL
IF YOU CHECKED OFF ANY PROBLEMS ON THIS QUESTIONNAIRE, HOW DIFFICULT HAVE THESE PROBLEMS MADE IT FOR YOU TO DO YOUR WORK, TAKE CARE OF THINGS AT HOME, OR GET ALONG WITH OTHER PEOPLE: SOMEWHAT DIFFICULT
3. WORRYING TOO MUCH ABOUT DIFFERENT THINGS: NOT AT ALL
7. FEELING AFRAID AS IF SOMETHING AWFUL MIGHT HAPPEN: NOT AT ALL
6. BECOMING EASILY ANNOYED OR IRRITABLE: NOT AT ALL
GAD7 TOTAL SCORE: 1

## 2020-03-03 ASSESSMENT — PATIENT HEALTH QUESTIONNAIRE - PHQ9
5. POOR APPETITE OR OVEREATING: SEVERAL DAYS
SUM OF ALL RESPONSES TO PHQ QUESTIONS 1-9: 1

## 2020-03-03 NOTE — PROGRESS NOTES
Subjective     Gilma Pace is a 79 year old female who presents to clinic today for the following health issues:    [facility]     Hospital Follow-up Visit:    Hospital/Nursing Home/IP Rehab Fa  cility: [facility]   Date of Admission: 1/15/2020  Date of Discharge: 1/22/2020  Reason(s) for Admission: Sepsis    Discharged to rehab [facility] 1/24/2020-2/9/2020            Problems taking medications regularly:  None       Medication changes since discharge: Trazodone dose was lowered       Problems adhering to non-medication therapy:  None    Summary of hospitalization:  [facility] discharge summary reviewed  Diagnostic Tests/Treatments reviewed.  Follow up needed: none  Other Healthcare Providers Involved in Patient s Care:         None  Update since discharge: improved.       Post Discharge Medication Reconciliation: discharge medications reconciled and changed, per note/orders (see AVS).  Plan of care communicated with patient and her daughter     Coding guidelines for this visit:  Type of Medical   Decision Making Face-to-Face Visit       within 7 Days of discharge Face-to-Face Visit        within 14 days of discharge   Moderate Complexity 36913 [id number]   High Complexity 36535 23800              Reviewed and updated as needed this visit by Provider         Review of Systems         Objective    There were no vitals taken for this visit.  There is no height or weight on file to calculate BMI.  Physical Exam       --------------------------------------------------------------------------------------------------------------------------------------  Subjective:  Gilma is a 79 year old female who presents today for follow-up on a recent hospitalization at [facility]. She was admitted to the hospital on 1-15-20 and discharged on 1-22. She went to [facility] in [place] after that for about 10 day(s).    The patient went to the hospital for symptoms of falling  and feeling off and not remembering things. She was diagnosed with UTI and sepsis. The patient was treated with antibiotic(s) and also mirapex for restless leg syndrome. She reports that it is 79% better. No side effects . She would like to go up on the dose. She had the dose of trazodone lowered as well    She denies any difficulty breathing.     She was asked to follow up today specifically to check up on n/a. At this time the patient reports a lot of improvement of the original symptoms. In addition the patient reports the following new symptoms:none. her daughter reports that the patient is doing better than she has in a long time.      [facility]    Current Outpatient Medications:      acetaminophen (TYLENOL) 500 MG tablet, Take 1,000 mg by mouth 3 times daily Also every day PRN, Disp: , Rfl:      albuterol (PROAIR HFA/PROVENTIL HFA/VENTOLIN HFA) 108 (90 Base) MCG/ACT inhaler, Inhale 2 puffs into the lungs every 4 hours as needed for shortness of breath / dyspnea or wheezing, Disp: , Rfl:      alendronate (FOSAMAX) 70 MG tablet, Take 70 mg by mouth every 7 days, Disp: , Rfl:      amLODIPine (NORVASC) 10 MG tablet, TAKE 1 TABLET BY MOUTH ONCE DAILY, Disp: 90 tablet, Rfl: 3     ARIPiprazole, sensor, 5 MG TABS, Take 5 mg by mouth At Bedtime, Disp: 90 tablet, Rfl: 1     busPIRone (BUSPAR) 15 MG tablet, Take 1 tablet (15 mg) by mouth 2 times daily, Disp: 180 tablet, Rfl: 0     clotrimazole-betamethasone (LOTRISONE) 1-0.05 % external cream, Apply to the corners of the mouth bid as needed, Disp: 15 g, Rfl: 3     DULoxetine (CYMBALTA) 60 MG capsule, TAKE 2 CAPSULES BY MOUTH ONCE DAILY, Disp: 180 capsule, Rfl: 2     folic acid (FOLVITE) 1 MG tablet, Take 1 tablet (1 mg) by mouth daily, Disp: 90 tablet, Rfl: 1     gabapentin (NEURONTIN) 600 MG tablet, TAKE 1 TABLET BY MOUTH TWICE DAILY, Disp: 180 tablet, Rfl: 1     levothyroxine (SYNTHROID/LEVOTHROID) 100 MCG tablet, Take 1 tablet  (100 mcg) by mouth daily, Disp: 90 tablet, Rfl: 3     lisinopril (PRINIVIL/ZESTRIL) 40 MG tablet, TAKE 1 TABLET BY MOUTH ONCE DAILY, Disp: 90 tablet, Rfl: 1     Menthol, Topical Analgesic, (BIOFREEZE) 4 % GEL, Externally apply topically At Bedtime, Disp: , Rfl:      nystatin (MYCOSTATIN) 600092 UNIT/GM external powder, Apply topically 2 times daily, Disp: 60 g, Rfl: 0     pramipexole (MIRAPEX) 0.125 MG tablet, Take 1 tablet (0.125 mg) by mouth 2 times daily, Disp: 60 tablet, Rfl: 0     primidone (MYSOLINE) 50 MG tablet, Take 0.5 tablets (25 mg) by mouth At Bedtime, Disp: 45 tablet, Rfl: 1     rosuvastatin (CRESTOR) 20 MG tablet, Take 1 tablet (20 mg) by mouth At Bedtime, Disp: 90 tablet, Rfl: 3     senna-docusate (SENOKOT-S/PERICOLACE) 8.6-50 MG tablet, Take 1 tablet by mouth 2 times daily as needed for constipation, Disp: , Rfl:      tiotropium (SPIRIVA HANDIHALER) 18 MCG capsule, Inhale 18 mcg into the lungs daily, Disp: , Rfl:      traZODone (DESYREL) 50 MG tablet, Take 0.5 tablets (25 mg) by mouth At Bedtime, Disp: 30 tablet, Rfl: 0    Past Medical History:   Diagnosis Date     Anxiety 10/11/2006    Last visit with Mental health specialist in 2002.      Chronic low back pain without sciatica 10/31/2016     Compression fracture of thoracic vertebra (H) 6/13/2011    Overview:  T  11  compression  fx  on  xrays  from  6/13/2011  . Ongoing pain  x  3  months   Get  eval  and  treat  with  spine  specialists  as discussed  Initially  treated  in [place]  .  Pain  is  controlled   with  advil  as needed  . Reviewed   with  patient      Congestive heart failure (H) 10/11/2006    Overview:  Epic      COPD (chronic obstructive pulmonary disease) (H) 9/28/2017     Folate deficiency 6/9/2017    Overview:  Formatting of this note may be different from the original. Hemoglobin (g/dl)  Date Value  06/05/2017 15.6  10/31/2016 13.8  11/04/2015 11.3 (L)   Iron (mcg/dl)  Date Value  06/05/2017 49 (L)   TIBC, Calculated (mcg/dl)   Date Value  06/05/2017 319   % Saturation, calc. (%)  Date Value  06/05/2017 15   Vitamin B12 (pg/ml)  Date Value  06/05/2017 253   Folate (ng/ml)  Date Value  06/05/20     Hypertension goal BP (blood pressure) < 140/90 9/28/2017     Mitral regurgitation 2/11/2014    Overview:  Needs dental prophylaxis      Mixed hyperlipidemia 10/11/2006     Osteoporosis 9/28/2017     Postoperative hypothyroidism 9/28/2017       OBJECTIVE:  /65   Pulse 110   Temp 96.7  F (35.9  C) (Tympanic)   Wt 82.9 kg (182 lb 12.8 oz)   SpO2 92%   BMI 32.38 kg/m    GENERAL APPEARANCE: healthy, alert and no distress  EYES: EOMI,  PERRL  HENT: ear canals and TM's normal and nose and mouth without ulcers or lesions  RESP: lungs clear to auscultation - no rales, rhonchi or wheezes  CV: regular rates and rhythm, normal S1 S2, no S3 or S4 and no murmur, click or rub -  ABDOMEN:  soft, nontender, no HSM or masses and bowel sounds normal    ASSESSMENT:  79 year old female who recently underwent hospitalization for UTI and sepsis which appear to have improved.      Plan: At this time we will increase the dose of the mirapex to 0.25 mg at bedtime otherwise  continue the present management and have the patient return to clinic as needed. Gilma  voiced understanding to informations discussed today.  Follow up in the fall for her complete physical exam or sooner as needed             --------------------------------------------------------------------------------------------------------------------------------------    James Benton DO - 01/22/2020 2:09 PM CST  Formatting of this note might be different from the original.  HOSPITAL DISCHARGE SUMMARY    Patient Name: Gilma Pace  YOB: 1940   Medical Record Number: [id number]   Attending Provider: James Benton DO   Admission Date: 1/15/2020  Discharge Date: 1/22/2020    She will be discharged to rehab    DISCHARGE DIAGNOSES:  COPD ex  UTI with sepsis - resolved  Severe  Sepsis - resolved  Metabolic Encephalopathy- resolved  AYDEN  Hypothyroidism- continue her synthroid  RLS - started on mirapex 0.125mg bid  Leukocytosis- secondary to prednisone therapy    Wound Simple Other (Comment);Abrasion Left Toe (Active)   No First Observed/Origin Date or First Observed/Origin Time found. Wound Type: (c) Other (Comment);Abrasion Orientation: Left Location: Toe Wound Observance : Prior to Admission     Skin Condition Incontinence Associated Dermatitis;Moisture Related Dermatitis Left;Right Breast;Groin (Active)   First Observed/Origin Date/First Observed/Origin Time: 01/15/20 1715 Skin Condition Type: Incontinence Associated Dermatitis;Moisture Related Dermatitis Orientation: Left;Right Location: Breast;Groin Wound Observance : Prior to Admission     Skin Condition Ecchymotic (Bruised) Abdomen (Active)   First Observed/Origin Date/First Observed/Origin Time: 01/22/20 0854 Skin Condition Type: Ecchymotic (Bruised) Location: Abdomen Wound Observance : Hospital Acquired     Skin Condition Ecchymotic (Bruised) Right Foot (Active)   First Observed/Origin Date/First Observed/Origin Time: 01/22/20 0855 Skin Condition Type: Ecchymotic (Bruised) Orientation: (c) Right Location: Foot Wound Observance : Prior to Admission     DISCHARGE MEDICATIONS:  Current Discharge Medication List     NEW MEDICATIONS   Details   pramipexole (MIRAPEX) 0.125 mg oral tablet Take 1 tablet (0.125 mg) by mouth twice a day.  Qty: 60 tablet, Refills: 0     predniSONE (DELTASONE) 20 mg oral tablet Take 3 tablets (60 mg) by mouth once daily.  Qty: 24 tablet, Refills: 0   Comments: Take 60mg for 3 days then take 40mg for 5 days then take 20mg for 5 days then stop       MEDICATIONS CONTINUED UNCHANGED   Details   albuterol HFA (PROVENTIL;VENTOLIN HFA) 90 mcg/actuation Inhl inhaler Inhale 2 puffs.     alendronate (FOSAMAX) 70 mg oral tablet Take 70 mg by mouth every 7 (seven) days before breakfast. mondays     amLODIPine (NORVASC) 10  mg oral tablet TAKE 1 TABLET BY MOUTH ONCE DAILY     ARIPiprazole (ABILIFY) 5 mg oral tablet Take 5 mg by mouth at bedtime.     busPIRone (BUSPAR) 15 mg oral tablet Take 15 mg by mouth twice a day.     clotrimazole-betamethasone 1%-0.05% (LOTRISONE) 1-0.05 % Top cream cream Apply to the corners of the mouth bid as needed     DULoxetine (CYMBALTA) 60 mg oral delayed release capsule Take 120 mg by mouth once daily.     folic acid (FOLVITE) 1 mg oral tablet Take 1 mg by mouth once daily.     gabapentin (NEURONTIN) 600 mg oral tablet TAKE 1 TABLET BY MOUTH TWICE DAILY     levothyroxine (SYNTHROID) 100 mcg oral tablet Take 100 mcg by mouth once daily.     lisinopril (PRINIVIL) 40 mg oral tablet TAKE 1 TABLET BY MOUTH ONCE DAILY     nystatin (MYCOSTATIN) 100,000 unit/gram Top Powd powder Apply to skin twice a day.     primidone (MYSOLINE) 50 mg oral tablet Take 25 mg by mouth at bedtime.     rosuvastatin (CRESTOR) 20 mg oral tablet Take 20 mg by mouth once daily.     solifenacin (VESICARE) 5 mg oral tablet Take 5 mg by mouth Daily.     tiotropium (SPIRIVA) 18 mcg Inhl capsule with inhalation device Inhale 18 mcg Daily.     traZODone (DESYREL) 100 mg oral tablet TAKE 1 TABLET BY MOUTH AT BEDTIME       DISCONTINUED MEDICATIONS     ARIPiprazole 5 mg oral [id number]

## 2020-03-03 NOTE — TELEPHONE ENCOUNTER
Per verbal order from Dr. Gucci Wu, this RN updated HHC form with current medications from office notes from today.     Form put in PCP's box to sign.  Please route this message back to TC along with form.     Zoila KURTZN, RN

## 2020-03-03 NOTE — PATIENT INSTRUCTIONS
Please call our [facility] Scheduling Line at [phone] to schedule your:    Dexa Scan (bone density scan)

## 2020-03-03 NOTE — LETTER
My COPD Action Plan     Name: Gilma Pace    YOB: 1940   Date: 3/3/2020    My doctor: Gucci Wu MD   My clinic: [address] [address] [phone]  My Controller Medicine: Tiotropium (Spiriva)   Dose: 18 mg once a d     My Rescue Medicine: Albuterol (Proair/Ventolin/Proventil) inhaler   Dose:      My Flare Up Medicine: none   Dose: n/a     My COPD Severity: Mild = FeV1 > 80%      Use of Oxygen: Oxygen Not Prescribed  and 4 Liters continuously     Make sure you've had your pneumonia   vaccines.          GREEN ZONE       Doing well today      Usual level of activity and exercise    Usual amount of cough and mucus    No shortness of breath    Usual level of health (thinking clearly, sleeping well, feel like eating) Actions:      Take daily medicines    Use oxygen as prescribed    Follow regular exercise and diet plan    Avoid cigarette smoke and other irritants that harm the lungs           YELLOW ZONE          Having a bad day or flare up      Short of breath more than usual    A lot more sputum (mucus) than usual    Sputum looks yellow, green, tan, brown or bloody    More coughing or wheezing    Fever or chills    Less energy; trouble completing activities    Trouble thinking or focusing    Using quick relief inhaler or nebulizer more often    Poor sleep; symptoms wake me up    Do not feel like eating Actions:      Get plenty of rest    Take daily medicines    Use quick relief inhaler every 6 hours    If you use oxygen, call you doctor to see if you should adjust your oxygen    Do breathing exercises or other things to help you relax    Let a loved one, friend or neighbor know you are feeling worse    Call your care team if you have 2 or more symptoms.  Start taking steroids or antibiotics if directed by your care team           RED ZONE       Need medical care now      Severe shortness of breath (feel you can't breathe)    Fever,  chills    Not enough breath to do any activity    Trouble coughing up mucus, walking or talking    Blood in mucus    Frequent coughing   Rescue medicines are not working    Not able to sleep because of breathing    Feel confused or drowsy    Chest pain    Actions:      Call your health care team.  If you cannot reach your care team, call 911 or go to the emergency room.        Annual Reminders:  Meet with Care Team, Flu Shot every Fall  Pharmacy:    [id number] - [place], [place] - [address] [address] [address]  [facility] [address] [place], [place] - [address] [facility]   Rossi Cantrell)  2020 18:57:35

## 2020-03-04 ASSESSMENT — ANXIETY QUESTIONNAIRES: GAD7 TOTAL SCORE: 1

## 2020-03-09 ENCOUNTER — OFFICE VISIT (OUTPATIENT)
Dept: BEHAVIORAL HEALTH | Facility: CLINIC | Age: 80
End: 2020-03-09
Payer: MEDICARE

## 2020-03-09 DIAGNOSIS — F41.9 ANXIETY: Primary | ICD-10-CM

## 2020-03-09 PROCEDURE — 90834 PSYTX W PT 45 MINUTES: CPT | Performed by: SOCIAL WORKER

## 2020-03-09 NOTE — PROGRESS NOTES
"                                             Progress Note    Client Name: Gilma Pace  Date: 3/9/2020           Service Type: Individual  Video Visit: No       Session Start Time: 130  Session End Time: 215     Session Length: 38-52    Session #: 7    Attendees: Client and daughter      Treatment Plan Last Reviewed: 7/16/2019    PHQ-9 / ARISTIDES-7 :     DATA  Interactive Complexity: No  Crisis: No       Progress Since Last Session (Related to Symptoms / Goals / Homework):   Symptoms: No change -    Homework: Did not complete       Episode of Care Goals: Minimal progress - CONTEMPLATION (Considering change and yet undecided); Intervened by assessing the negative and positive thinking (ambivalence) about behavior change     Current / Ongoing Stressors and Concerns:      Client notes that she has been \"lots has happened.\"  Daughter is here today and said that client got a severe UTI and was in the hospital and a nursing home after that.  Since getting back home she has been feeling better.  Not so focused on thoughts of 's behavioral in the past.           Treatment Objective(s) Addressed in This Session:       Goal- Anxiety: Client will decrease anxiety    I will know I've met my goal when I am less anxious.      Objective #A (Client Action)      Client will use cognitive strategies identified in therapy to challenge anxious thoughts.      Objective #B    Client will use at least 3 coping skills for anxiety management in the next 12 weeks.     -behavioral activation: ACE    Objective #C    Client will identify three distraction and diversion activities and use those activities to decrease level of anxiety.       Intervention:   CBT: - Discussed sleep today.  Client reports sleep onset/sleep maintenance insomnia.  Reviewed sleep habits.  Discussed avoiding caffeine and screen time before bed.  Identified negative thoughts associated with sleep.  Discussed the importance of limiting bedroom for sleep and intimate " "activities.  Discussed relying on internal cues (e.g. yawning, nodding) in deciding when to go to bed.  Discussed problem with \"forcing sleep.\"  Reviewed appropriate daytime napping (45 minutes or less no later than 3pm).  Discussed impact of exercise and sunlight exposure on melatonin production, and discussed the role of melatonin in sleep.          ASSESSMENT: Current Emotional / Mental Status (status of significant symptoms):   Risk status (Self / Other harm or suicidal ideation)   Client denies current fears or concerns for personal safety.   Client denies current or recent suicidal ideation or behaviors.   Client denies current or recent homicidal ideation or behaviors.   Client denies current or recent self injurious behavior or ideation.   Client denies other safety concerns.   Client Client reports there has been no change in risk factors since their last session.     Client Client reports there has been no change in protective factors since their last session.     A safety and risk management plan has not been developed at this time, however client was given the after-hours number / 911 should there be a change in any of these risk factors.     Appearance:   Appropriate    Eye Contact:   Good    Psychomotor Behavior: Agitated    Attitude:   Cooperative    Orientation:   All   Speech    Rate / Production: Pressured     Volume:  Normal    Mood:    Anxious  Depressed    Affect:    Appropriate    Thought Content:  Clear    Thought Form:  Coherent  Logical    Insight:    Good      Medication Review:   No changes to current psychiatric medication(s)     Medication Compliance:   Yes     Changes in Health Issues:   None reported     Chemical Use Review:   Substance Use: Chemical use reviewed, no active concerns identified      Tobacco Use: No current tobacco use.      Diagnosis:  1. Anxiety        Collateral Reports Completed:   Not Applicable    PLAN: (Client Tasks / Therapist Tasks / Other)  1.  Client will log " activities of Achievement, Closeness, and Enjoyment (ACEs) using ACE rating scale and bring to next session.           2.  Client will log productive v. unproductive worry at least once by next session.  Worksheet given in session to facilitate this.    3.  Keep sleep log/reduce napping/regular sleep/wake time     4.  Client will call to make next appt        William Dickson, LICSW                                                         ______________________________________________________________________    Treatment Plan    Client's Name: Gilma Pace  YOB: 1940    Date: 3/1/2019     DSM-V Diagnoses: Diagnoses: 296.32 (F33.1) Major Depressive Disorder, Recurrent Episode, Moderate _  300.02 (F41.1) Generalized Anxiety Disorder  Psychosocial & Contextual Factors: some stress with   WHODAS 2.0 (12 item)    DECLINES WHODAS TODAY            This questionnaire asks about difficulties due to health conditions. Health conditions  include  disease or illnesses, other health problems that may be short or long lasting,  injuries, mental health or emotional problems, and problems with alcohol or drugs.                     Think back over the past 30 days and answer these questions, thinking about how much  difficulty you had doing the following activities. For each question, please [place] only  one response.    Referral / Collaboration:  Referral to another professional/service is not indicated at this time..    Anticipated number of session or this episode of care: 18-24      MeasurableTreatment Goal(s) related to diagnosis / functional impairment(s)      Goal- Anxiety: Client will decrease anxiety    I will know I've met my goal when I am less anxious.      Objective #A (Client Action)    Status: cont- Date: 11/1/2019      Client will use cognitive strategies identified in therapy to challenge anxious thoughts.    Intervention(s)  Therapist will provide psychoeducation, behavioral activation, and  cognitive restructuring.    Objective #B  Client will use at least 3 coping skills for anxiety management in the next 12 weeks.    Status: cont- Date: 11/1/2019     Intervention(s)  Therapist will provide psychoeducation, behavioral activation, and cognitive restructuring.      Objective #C  Client will identify three distraction and diversion activities and use those activities to decrease level of anxiety.  Status: cont- Date: 11/1/2019     Intervention(s)  Therapist will provide psychoeducation, behavioral activation, and cognitive restructuring.                  Client has reviewed and agreed to the above plan.      William Dickson, MaineGeneral Medical CenterSW  March 1, 2019

## 2020-03-13 DIAGNOSIS — E53.8 FOLATE DEFICIENCY: ICD-10-CM

## 2020-03-16 RX ORDER — FOLIC ACID 1 MG/1
TABLET ORAL
Qty: 90 TABLET | Refills: 0 | Status: SHIPPED | OUTPATIENT
Start: 2020-03-16 | End: 2020-06-24

## 2020-03-16 NOTE — TELEPHONE ENCOUNTER
Prescription approved per [facility] Refill Protocol.  Shantelle Harris RN

## 2020-04-06 DIAGNOSIS — Z23 NEED FOR PROPHYLACTIC VACCINATION AND INOCULATION AGAINST INFLUENZA: ICD-10-CM

## 2020-04-06 DIAGNOSIS — R29.898 WEAKNESS OF BOTH LOWER EXTREMITIES: ICD-10-CM

## 2020-04-07 RX ORDER — TIOTROPIUM BROMIDE 18 UG/1
18 CAPSULE ORAL; RESPIRATORY (INHALATION) DAILY
Qty: 90 CAPSULE | Refills: 0 | Status: SHIPPED | OUTPATIENT
Start: 2020-04-07 | End: 2020-04-30

## 2020-04-08 ENCOUNTER — TELEPHONE (OUTPATIENT)
Dept: FAMILY MEDICINE | Facility: CLINIC | Age: 80
End: 2020-04-08

## 2020-04-08 NOTE — TELEPHONE ENCOUNTER
Central Prior Authorization Team  Phone: [phone]    PA Initiation    Medication: SPIRIVA HANDIHALER  Insurance Company: [employer] - Phone [phone] Fax [phone]  Pharmacy Filling the Rx: [facility] [address] [place] [id number] [facility]  Filling Pharmacy Phone: [phone]  Filling Pharmacy Fax:    Start Date: 4/8/2020

## 2020-04-08 NOTE — TELEPHONE ENCOUNTER
Prior Authorization Retail Medication Request    Medication/Dose: tiotropium (SPIRIVA HANDIHALER) 18 MCG inhaled capsule  ICD code (if different than what is on RX):  Need for prophylactic vaccination and inoculation against influenza [Z23] Weakness of both lower extremities [R29.898]   Previously Tried and Failed:    Rationale:      Insurance Name:  Medicare/[employer]  Insurance ID:  [id number]/ [id number]      Pharmacy Information (if different than what is on RX)  Name:  [facility]  Phone:  [phone]

## 2020-04-08 NOTE — TELEPHONE ENCOUNTER
Prior Authorization Approval    Authorization Effective Date: 3/9/2020  Authorization Expiration Date: 4/8/2021  Medication: SPIRIVA HANDIHALER- APPROVED   Approved Dose/Quantity:   Reference #:     Insurance Company: [employer] - Phone [phone] Fax [phone]  Expected CoPay:       CoPay Card Available:      Foundation Assistance Needed:    Which Pharmacy is filling the prescription (Not needed for infusion/clinic administered): [facility] [address] [place] [address] [facility]  Pharmacy Notified: Yes  Patient Notified: Comment:  **Instructed pharmacy to notify patient when script is ready to /ship.**

## 2020-04-10 ENCOUNTER — VIRTUAL VISIT (OUTPATIENT)
Dept: BEHAVIORAL HEALTH | Facility: CLINIC | Age: 80
End: 2020-04-10
Payer: MEDICARE

## 2020-04-10 DIAGNOSIS — F41.9 ANXIETY: Primary | ICD-10-CM

## 2020-04-10 PROCEDURE — 98968 PH1 ASSMT&MGMT NQHP 21-30: CPT | Performed by: SOCIAL WORKER

## 2020-04-10 NOTE — PROGRESS NOTES
"                                             Progress Note    Client Name: Gilma Pace  Date: 4/10/2020           Service Type: Individual  Video Visit: No       Session Start Time: 1230  Session End Time: 115     Session Length: 38-52    Session #: 8    Attendees: Client and daughter      Treatment Plan Last Reviewed: 4/10/2020   PHQ-9 / ARISTIDES-7 :     DATA  Interactive Complexity: No  Crisis: No       Progress Since Last Session (Related to Symptoms / Goals / Homework):   Symptoms: No change -    Homework: Did not complete       Episode of Care Goals: Minimal progress - CONTEMPLATION (Considering change and yet undecided); Intervened by assessing the negative and positive thinking (ambivalence) about behavior change     Current / Ongoing Stressors and Concerns:      Client notes that she has been \"good, nothing much has changed.\"  Client was not going out much before the virus, so this has not changed.  Everyone in the family is healthy.  One of her daughter had been in [facility] but is back now, client had some anxiety about this but it turned out ok in the end.  Has not been doing her coloring lately, trying to make a goal to do this daily also wants to do some knitting each day.  Knows that she feels better when she does these things every day.                     Treatment Objective(s) Addressed in This Session:       Goal- Anxiety: Client will decrease anxiety    I will know I've met my goal when I am less anxious.      Objective #A (Client Action)      Client will use cognitive strategies identified in therapy to challenge anxious thoughts.      Objective #B    Client will use at least 3 coping skills for anxiety management in the next 12 weeks.     -behavioral activation: ACE    Objective #C    Client will identify three distraction and diversion activities and use those activities to decrease level of anxiety.       Intervention:   CBT: - Discussed sleep today.  Client reports sleep onset/sleep " "maintenance insomnia.  Reviewed sleep habits.  Discussed avoiding caffeine and screen time before bed.  Identified negative thoughts associated with sleep.  Discussed the importance of limiting bedroom for sleep and intimate activities.  Discussed relying on internal cues (e.g. yawning, nodding) in deciding when to go to bed.  Discussed problem with \"forcing sleep.\"  Reviewed appropriate daytime napping (45 minutes or less no later than 3pm).  Discussed impact of exercise and sunlight exposure on melatonin production, and discussed the role of melatonin in sleep.          ASSESSMENT: Current Emotional / Mental Status (status of significant symptoms):   Risk status (Self / Other harm or suicidal ideation)   Client denies current fears or concerns for personal safety.   Client denies current or recent suicidal ideation or behaviors.   Client denies current or recent homicidal ideation or behaviors.   Client denies current or recent self injurious behavior or ideation.   Client denies other safety concerns.   Client Client reports there has been no change in risk factors since their last session.     Client Client reports there has been no change in protective factors since their last session.     A safety and risk management plan has not been developed at this time, however client was given the after-hours number / 911 should there be a change in any of these risk factors.     Appearance:   Appropriate    Eye Contact:   Good    Psychomotor Behavior: Agitated    Attitude:   Cooperative    Orientation:   All   Speech    Rate / Production: Pressured     Volume:  Normal    Mood:    Anxious  Depressed    Affect:    Appropriate    Thought Content:  Clear    Thought Form:  Coherent  Logical    Insight:    Good      Medication Review:   No changes to current psychiatric medication(s)     Medication Compliance:   Yes     Changes in Health Issues:   None reported     Chemical Use Review:   Substance Use: Chemical use reviewed, " no active concerns identified      Tobacco Use: No current tobacco use.      Diagnosis:  1. Anxiety        Collateral Reports Completed:   Not Applicable    PLAN: (Client Tasks / Therapist Tasks / Other)  1.  Client will log activities of Achievement, Closeness, and Enjoyment (ACEs) using ACE rating scale and bring to next session.           2.  Client will log productive v. unproductive worry at least once by next session.  Worksheet given in session to facilitate this.    3.  Keep sleep log/reduce napping/regular sleep/wake time     4.  Client will call to make next appt        William Dickson, LICSW                                                         ______________________________________________________________________    Treatment Plan    Client's Name: Gilma Pace  YOB: 1940    Date: 3/1/2019     DSM-V Diagnoses: Diagnoses: 296.32 (F33.1) Major Depressive Disorder, Recurrent Episode, Moderate _  300.02 (F41.1) Generalized Anxiety Disorder  Psychosocial & Contextual Factors: some stress with   WHODAS 2.0 (12 item)    DECLINES WHODAS TODAY            This questionnaire asks about difficulties due to health conditions. Health conditions  include  disease or illnesses, other health problems that may be short or long lasting,  injuries, mental health or emotional problems, and problems with alcohol or drugs.                     Think back over the past 30 days and answer these questions, thinking about how much  difficulty you had doing the following activities. For each question, please Yocha Dehe only  one response.    Referral / Collaboration:  Referral to another professional/service is not indicated at this time..    Anticipated number of session or this episode of care: 18-24      MeasurableTreatment Goal(s) related to diagnosis / functional impairment(s)      Goal- Anxiety: Client will decrease anxiety    I will know I've met my goal when I am less anxious.      Objective #A (Client  Action)    Status: cont- Date: 4/10/2020     Client will use cognitive strategies identified in therapy to challenge anxious thoughts.    Intervention(s)  Therapist will provide psychoeducation, behavioral activation, and cognitive restructuring.    Objective #B  Client will use at least 3 coping skills for anxiety management in the next 12 weeks.    Status: cont- Date: 4/10/2020     Intervention(s)  Therapist will provide psychoeducation, behavioral activation, and cognitive restructuring.      Objective #C  Client will identify three distraction and diversion activities and use those activities to decrease level of anxiety.  Status: cont- Date: 4/10/2020     Intervention(s)  Therapist will provide psychoeducation, behavioral activation, and cognitive restructuring.                  Client has reviewed and agreed to the above plan.      William Dickson, [facility]  March 1, 2019        Telemedicine Visit: The patient's condition can be safely assessed and treated via synchronous audio and visual telemedicine encounter.      Reason for Telemedicine Visit: clinic closed-covid 19    Originating Site (Patient Location): Patient's home    Distant Site (Provider Location): Provider Remote Setting    Consent:  The patient/guardian has verbally consented to: the potential risks and benefits of telemedicine (video visit) versus in person care; bill my insurance or make self-payment for services provided; and responsibility for payment of non-covered services.    2020

## 2020-04-18 DIAGNOSIS — Z23 NEED FOR PROPHYLACTIC VACCINATION AND INOCULATION AGAINST INFLUENZA: ICD-10-CM

## 2020-04-18 DIAGNOSIS — R29.898 WEAKNESS OF BOTH LOWER EXTREMITIES: ICD-10-CM

## 2020-04-20 RX ORDER — GABAPENTIN 600 MG/1
TABLET ORAL
Qty: 180 TABLET | Refills: 0 | OUTPATIENT
Start: 2020-04-20

## 2020-04-21 DIAGNOSIS — F32.1 MODERATE MAJOR DEPRESSION (H): ICD-10-CM

## 2020-04-21 NOTE — TELEPHONE ENCOUNTER
"Routing refill request to provider for review/approval because:  Failed protocol. Please advise.  Thank you. Janis Howard R.N.    Requested Prescriptions   Pending Prescriptions Disp Refills    ARIPiprazole (ABILIFY) 5 MG tablet [Pharmacy Med Name: ARIPiprazole 5 MG Oral Tablet] 90 tablet 0     Sig: TAKE 1 TABLET BY MOUTH AT BEDTIME       Antipsychotic Medications Failed - 4/21/2020 12:33 PM        Failed - CBC on file in past 12 months     No lab results found.              Passed - Blood pressure under 140/90 in past 12 months     BP Readings from Last 3 Encounters:   03/03/20 101/65   02/07/20 100/60   02/03/20 110/64                 Passed - Patient is 12 years of age or older        Passed - Lipid panel on file within the past 12 months     Recent Labs   Lab Test 09/11/19  1058   CHOL 251*   TRIG 146   HDL 58   *   NHDL 193*               Passed - Heart Rate on file within past 12 months     Pulse Readings from Last 3 Encounters:   03/03/20 110   02/07/20 87   02/03/20 86               Passed - A1c or Glucose on file in past 12 months     Recent Labs   Lab Test 09/11/19  1058   GLC 98       Please review patients last 3 weights. If a weight gain of >10 lbs exists, you may refill the prescription once after instructing the patient to schedule an appointment within the next 30 days.    Wt Readings from Last 3 Encounters:   03/03/20 82.9 kg (182 lb 12.8 oz)   02/07/20 69.9 kg (154 lb)   02/03/20 96.7 kg (213 lb 3.2 oz)             Passed - Medication is active on med list        Passed - Patient is not pregnant        Passed - No positve pregnancy test on file in past 12 months        Passed - Recent (6 mo) or future (30 days) visit within the authorizing provider's specialty     Patient had office visit in the last 6 months or has a visit in the next 30 days with authorizing provider or within the authorizing provider's specialty.  See \"Patient Info\" tab in inbasket, or \"Choose Columns\" in Meds & Orders " section of the refill encounter.

## 2020-04-24 ENCOUNTER — TELEPHONE (OUTPATIENT)
Dept: FAMILY MEDICINE | Facility: CLINIC | Age: 80
End: 2020-04-24

## 2020-04-24 NOTE — TELEPHONE ENCOUNTER
"SHARAN on file for daughter, Aicha Lowry.    She reports that patient has been \"low on her oxygen off and on the last few days\".  She reports the patient's  was hooking the oxygen yesterday to the water and it was hooked up wrong.  Patient was without her oxygen for several hours.   Patient became \"...very weak and incoherent\".  The error was finally recognized and patient was put back on the oxygen. Family did not bring her to ER stating they thought the decline they saw was from the patient not receiving the oxygen.      Today patient has been pulling the nasal canula out of her nose and patient still seems very tired.  They cannot maintain an oxygen level above 84% for very long despite patient having oxygen on.    This RN advised that patient needs to be brought to ER now for evaluation.   Aicha asked if we could order a home care RN to come see the patient today since patient no longer has home care.  \"Maybe they could order labs?\"  Explained to Aicha that setting up home care can take a day or more and this is an emergency so patient needs to go to ER now.   Aicha agreed and the family will take her now.     Zoila Whittaker BSN, RN    "

## 2020-04-28 ENCOUNTER — DOCUMENTATION ONLY (OUTPATIENT)
Dept: CARE COORDINATION | Facility: CLINIC | Age: 80
End: 2020-04-28

## 2020-04-28 ENCOUNTER — TELEPHONE (OUTPATIENT)
Dept: FAMILY MEDICINE | Facility: CLINIC | Age: 80
End: 2020-04-28

## 2020-04-28 DIAGNOSIS — J44.9 CHRONIC OBSTRUCTIVE PULMONARY DISEASE, UNSPECIFIED COPD TYPE (H): Primary | ICD-10-CM

## 2020-04-28 RX ORDER — ARIPIPRAZOLE 5 MG/1
TABLET ORAL
Qty: 90 TABLET | Refills: 0 | Status: SHIPPED | OUTPATIENT
Start: 2020-04-28 | End: 2020-08-03

## 2020-04-28 RX ORDER — ALBUTEROL SULFATE 90 UG/1
2 AEROSOL, METERED RESPIRATORY (INHALATION) EVERY 6 HOURS
Qty: 18 G | Refills: 2 | Status: SHIPPED | OUTPATIENT
Start: 2020-04-28 | End: 2021-01-01

## 2020-04-28 NOTE — TELEPHONE ENCOUNTER
"Janet jade is checking on the status of this request. Please advise          Documentation Only     4/28/2020  [facility]   Gucci Wu MD   [facility]   Chronic obstructive pulmonary disease, unspecified COPD type (H)   Dx   Home Care/Hospice   Reason for Visit    Progress Notes   Janet Morris      [facility] now requests orders and shares plan of care/discharge summaries for some patients through [employer].  Please REPLY TO THIS MESSAGE OR ROUTE BACK TO THE AUTHOR in order to give authorization for orders when needed.  This is considered a verbal order, you will still receive a faxed copy of orders for signature.  Thank you for your assistance in improving collaboration for our patients.     ORDER  1. Skilled Nursing Visits 1w1, 2w1, 1w2, 3 prn  2. PT eval and treat  3. OT eval and treat  4. HHA 2w4     Please address asap:   -Pt is out of abilify 5 mg at bedtime.  -Pt does not have albuterol HFA inhaler.- on hospital dc list, but no frequency for inhaler.  Please refill and send RX to [facility] pharmacy in [place]. Thank you!     VS today: T 97.7, P 92, R 19 labored with exertion, unlabored at rest, /70, O2 90% 4 liters per NC, LS CTAB, Edema +1 pitting edema to BLE (not on any diuretic). Her appetite is poor. She is on oral abx for UTI. She should have abx completed by Monday next week. BM small this am. Belly soft non distended/non tender.   Depression: \"It's not that bad I just I feel blah\". Pain: 1/10 Muscle pain to left arm, \"since the hospital, pushing myself up in bed\".      Pt wondering If she needs to make appt with you....     Thank you  Janet Morris RN  [phone]      Additional Documentation     Encounter Info:     Billing Info,    History,    Allergies,    Detailed Report       Encounter Information      Provider  Department  Encounter #  Center    4/28/2020 11:29 AM  Gucci Wu MD   [facility]  [id number]     Reviewed " this Encounter     None

## 2020-04-28 NOTE — PROGRESS NOTES
"[facility] now requests orders and shares plan of care/discharge summaries for some patients through [employer].  Please REPLY TO THIS MESSAGE OR ROUTE BACK TO THE AUTHOR in order to give authorization for orders when needed.  This is considered a verbal order, you will still receive a faxed copy of orders for signature.  Thank you for your assistance in improving collaboration for our patients.    ORDER  1. Skilled Nursing Visits 1w1, 2w1, 1w2, 3 prn  2. PT eval and treat  3. OT eval and treat  4. HHA 2w4    Please address asap:   -Pt is out of abilify 5 mg at bedtime.  -Pt does not have albuterol HFA inhaler.- on hospital dc list, but no frequency for inhaler.  Please refill and send RX to [facility] pharmacy in [place]. Thank you!    VS today: T 97.7, P 92, R 19 labored with exertion, unlabored at rest, /70, O2 90% 4 liters per NC, LS CTAB, Edema +1 pitting edema to BLE (not on any diuretic). Her appetite is poor. She is on oral abx for UTI. She should have abx completed by Monday next week. BM small this am. Belly soft non distended/non tender.   Depression: \"It's not that bad I just I feel blah\". Pain: 1/10 Muscle pain to left arm, \"since the hospital, pushing myself up in bed\".     Pt wondering If she needs to make appt with you....    Thank you  Janet Morris RN  [phone]  "

## 2020-04-28 NOTE — TELEPHONE ENCOUNTER
I left [place] with home care know that Dr. Gucci Wu had sent the abilify and the proventil inhaler to her pharmacy this afternoon.  Ok for home care orders as per written below per verbal order read back Dr. Gucci Wu .  Saw Dr. Gucci Wu 3/3/20 for hospital follow up.  Home care will be monitoring her health status with visits.  No need for appointment at this time.  Natalie Melgar RN

## 2020-04-30 ENCOUNTER — TELEPHONE (OUTPATIENT)
Dept: FAMILY MEDICINE | Facility: CLINIC | Age: 80
End: 2020-04-30

## 2020-04-30 DIAGNOSIS — Z23 NEED FOR PROPHYLACTIC VACCINATION AND INOCULATION AGAINST INFLUENZA: ICD-10-CM

## 2020-04-30 DIAGNOSIS — R29.898 WEAKNESS OF BOTH LOWER EXTREMITIES: ICD-10-CM

## 2020-04-30 NOTE — TELEPHONE ENCOUNTER
Alternative request.    Drug: [facility]     Pharmacy  Notes: Any alternative? This is $775 after insurance.

## 2020-05-07 ENCOUNTER — TELEPHONE (OUTPATIENT)
Dept: FAMILY MEDICINE | Facility: CLINIC | Age: 80
End: 2020-05-07

## 2020-05-07 NOTE — TELEPHONE ENCOUNTER
Reason for Call:  Form, our goal is to have forms completed with 72 hours, however, some forms may require a visit or additional information.    Type of letter, form or note:  Home Health Certification    Who is the form from?: Home care    Where did the form come from: form was faxed in    What clinic location was the form placed at?: [place]    Where the form was placed: Given to MA/RN    What number is listed as a contact on the form?:  Home Care [phone]       Additional comments: I placed the [facility] forms in the [facility] orange folder for RN review    Call taken on 5/7/2020 at 11:14 AM by Tanisha Arevalo

## 2020-05-07 NOTE — TELEPHONE ENCOUNTER
This RN reconciled medication list from [facility] form against our [facility] medication list and there are discrepancies.  They are:        1.  Two medications were discontinued at hospital discharge per [facility] records:   -- lotrisone cream   -- mycostatin powder    FYI:   This RN removed them from our Epic list.       2.  Note on [facility] form states patient has a self-reported medication that she is taking on her own.  She is taking acetaminophen 500 mg/ Taking 2 tablets 3 times daily as needed.   Ok for this RN to add to Epic medication list?      3.  Vesicare 5 mg daily on Epic medication list but not on [facility] form. No mention of it in hospital discharge.  Routing to provider to advise.      Zoila Whittaker BSN, RN           Zoila Whittaker BSN, RN

## 2020-05-08 ENCOUNTER — OFFICE VISIT (OUTPATIENT)
Dept: BEHAVIORAL HEALTH | Facility: CLINIC | Age: 80
End: 2020-05-08
Payer: MEDICARE

## 2020-05-08 DIAGNOSIS — Z53.9 DIAGNOSIS NOT YET DEFINED: Primary | ICD-10-CM

## 2020-05-08 DIAGNOSIS — F41.9 ANXIETY: Primary | ICD-10-CM

## 2020-05-08 PROCEDURE — G0180 MD CERTIFICATION HHA PATIENT: HCPCS | Performed by: FAMILY MEDICINE

## 2020-05-08 PROCEDURE — 90834 PSYTX W PT 45 MINUTES: CPT | Mod: TEL | Performed by: SOCIAL WORKER

## 2020-05-08 RX ORDER — ACETAMINOPHEN 500 MG
1000 TABLET ORAL EVERY 8 HOURS PRN
COMMUNITY
Start: 2020-05-08

## 2020-05-08 NOTE — TELEPHONE ENCOUNTER
I faxed the completed and signed HHC forms to [facility] @[phone]. I sent the [facility] to STAT scanning.   Tanisha Arevalo,

## 2020-05-08 NOTE — PROGRESS NOTES
"                                             Progress Note    Client Name: Gilma Pace  Date: 5/8/2020           Service Type: Individual  Video Visit: No       Session Start Time: 1230  Session End Time: 110     Session Length: 40    Session #: 9  Attendees: Client      The patient has been notified of the following:      \"We have found that certain health care needs can be provided without the need for a face to face visit.  This service lets us provide the care you need with a phone conversation.       I will have full access to your [place] medical record during this entire phone call.   I will be taking notes for your medical record.      Since this is like an office visit, we will bill your insurance company for this service.       There are potential benefits and risks of telephone visits (e.g. limits to patient confidentiality) that differ from in-person visits.?  Confidentiality still applies for telephone services, and nobody will record the visit.  It is important to be in a quiet, private space that is free of distractions (including cell phone or other devices) during the visit.??      If during the course of the call I believe a telephone visit is not appropriate, you will not be charged for this service\"     Consent has been obtained for this service by care team member: Yes     Treatment Plan Last Reviewed: 4/10/2020   PHQ-9 / ARISTIDES-7 :     DATA  Interactive Complexity: No  Crisis: No       Progress Since Last Session (Related to Symptoms / Goals / Homework):   Symptoms: No change -    Homework: Did not complete       Episode of Care Goals: Minimal progress - CONTEMPLATION (Considering change and yet undecided); Intervened by assessing the negative and positive thinking (ambivalence) about behavior change     Current / Ongoing Stressors and Concerns:      Client notes that she has been \"ok, was back in the hospital.\"  Reports she had another uti and ended up back in the hospital because of this; " "notes that she is feeling better.  Some stress but reports that her mood is \"ok.\"  Daughter Allie is working from home.  Daughter Hiral will be doing day care for her sister's kids and is also busy with school.   is doing fine, and is \"waiting on he hand and foot.\"              Treatment Objective(s) Addressed in This Session:       Goal- Anxiety: Client will decrease anxiety    I will know I've met my goal when I am less anxious.      Objective #A (Client Action)      Client will use cognitive strategies identified in therapy to challenge anxious thoughts.      Objective #B    Client will use at least 3 coping skills for anxiety management in the next 12 weeks.     -behavioral activation: ACE    Objective #C    Client will identify three distraction and diversion activities and use those activities to decrease level of anxiety.       Intervention:   CBT: - Discussed sleep today.  Client reports sleep onset/sleep maintenance insomnia.  Reviewed sleep habits.  Discussed avoiding caffeine and screen time before bed.  Identified negative thoughts associated with sleep.  Discussed the importance of limiting bedroom for sleep and intimate activities.  Discussed relying on internal cues (e.g. yawning, nodding) in deciding when to go to bed.  Discussed problem with \"forcing sleep.\"  Reviewed appropriate daytime napping (45 minutes or less no later than 3pm).  Discussed impact of exercise and sunlight exposure on melatonin production, and discussed the role of melatonin in sleep.          ASSESSMENT: Current Emotional / Mental Status (status of significant symptoms):   Risk status (Self / Other harm or suicidal ideation)   Client denies current fears or concerns for personal safety.   Client denies current or recent suicidal ideation or behaviors.   Client denies current or recent homicidal ideation or behaviors.   Client denies current or recent self injurious behavior or ideation.   Client denies other safety " concerns.   Client Client reports there has been no change in risk factors since their last session.     Client Client reports there has been no change in protective factors since their last session.     A safety and risk management plan has not been developed at this time, however client was given the after-hours number / 911 should there be a change in any of these risk factors.     Appearance:   Appropriate    Eye Contact:   Good    Psychomotor Behavior: Agitated    Attitude:   Cooperative    Orientation:   All   Speech    Rate / Production: Pressured     Volume:  Normal    Mood:    Anxious  Depressed    Affect:    Appropriate    Thought Content:  Clear    Thought Form:  Coherent  Logical    Insight:    Good      Medication Review:   No changes to current psychiatric medication(s)     Medication Compliance:   Yes     Changes in Health Issues:   None reported     Chemical Use Review:   Substance Use: Chemical use reviewed, no active concerns identified      Tobacco Use: No current tobacco use.      Diagnosis:  No diagnosis found.    Collateral Reports Completed:   Not Applicable    PLAN: (Client Tasks / Therapist Tasks / Other)  1.  Client will log activities of Achievement, Closeness, and Enjoyment (ACEs) using ACE rating scale and bring to next session.           2.  Client will log productive v. unproductive worry at least once by next session.  Worksheet given in session to facilitate this.    3.  Keep sleep log/reduce napping/regular sleep/wake time     4.  Client will call to make next appt        VONNIE Alcantara      5/8/2020                                                          ______________________________________________________________________    Treatment Plan    Client's Name: Gilma Pace  YOB: 1940    Date: 3/1/2019     DSM-V Diagnoses: Diagnoses: 296.32 (F33.1) Major Depressive Disorder, Recurrent Episode, Moderate _  300.02 (F41.1) Generalized Anxiety  Disorder  Psychosocial & Contextual Factors: some stress with   WHODAS 2.0 (12 item)    DECLINES WHODAS TODAY            This questionnaire asks about difficulties due to health conditions. Health conditions  include  disease or illnesses, other health problems that may be short or long lasting,  injuries, mental health or emotional problems, and problems with alcohol or drugs.                     Think back over the past 30 days and answer these questions, thinking about how much  difficulty you had doing the following activities. For each question, please [place] only  one response.    Referral / Collaboration:  Referral to another professional/service is not indicated at this time..    Anticipated number of session or this episode of care: 18-24      MeasurableTreatment Goal(s) related to diagnosis / functional impairment(s)      Goal- Anxiety: Client will decrease anxiety    I will know I've met my goal when I am less anxious.      Objective #A (Client Action)    Status: cont- Date: 4/10/2020     Client will use cognitive strategies identified in therapy to challenge anxious thoughts.    Intervention(s)  Therapist will provide psychoeducation, behavioral activation, and cognitive restructuring.    Objective #B  Client will use at least 3 coping skills for anxiety management in the next 12 weeks.    Status: cont- Date: 4/10/2020     Intervention(s)  Therapist will provide psychoeducation, behavioral activation, and cognitive restructuring.      Objective #C  Client will identify three distraction and diversion activities and use those activities to decrease level of anxiety.  Status: cont- Date: 4/10/2020     Intervention(s)  Therapist will provide psychoeducation, behavioral activation, and cognitive restructuring.                  Client has reviewed and agreed to the above plan.      William Dickson, VONNIE  March 1, 2019

## 2020-05-08 NOTE — TELEPHONE ENCOUNTER
This RN made the requested changes  Form put in PCP's box to sign.  Please route this message back to TC along with form.     Zoila KURTZN, RN

## 2020-06-01 ENCOUNTER — VIRTUAL VISIT (OUTPATIENT)
Dept: BEHAVIORAL HEALTH | Facility: CLINIC | Age: 80
End: 2020-06-01
Payer: MEDICARE

## 2020-06-01 DIAGNOSIS — I10 HYPERTENSION GOAL BP (BLOOD PRESSURE) < 140/90: ICD-10-CM

## 2020-06-01 DIAGNOSIS — F41.9 ANXIETY: Primary | ICD-10-CM

## 2020-06-01 PROCEDURE — 90834 PSYTX W PT 45 MINUTES: CPT | Performed by: SOCIAL WORKER

## 2020-06-01 RX ORDER — LISINOPRIL 40 MG/1
TABLET ORAL
Qty: 90 TABLET | Refills: 0 | Status: SHIPPED | OUTPATIENT
Start: 2020-06-01 | End: 2020-10-26

## 2020-06-01 NOTE — PROGRESS NOTES
"                                             Progress Note    Client Name: Gilma Pace  Date: 6/1/2020           Service Type: Individual  Video Visit: No       Session Start Time: 1230  Session End Time: 110     Session Length: 45    Session #: 10  Attendees: Client      The patient has been notified of the following:      \"We have found that certain health care needs can be provided without the need for a face to face visit.  This service lets us provide the care you need with a phone conversation.       I will have full access to your [place] medical record during this entire phone call.   I will be taking notes for your medical record.      Since this is like an office visit, we will bill your insurance company for this service.       There are potential benefits and risks of telephone visits (e.g. limits to patient confidentiality) that differ from in-person visits.?  Confidentiality still applies for telephone services, and nobody will record the visit.  It is important to be in a quiet, private space that is free of distractions (including cell phone or other devices) during the visit.??      If during the course of the call I believe a telephone visit is not appropriate, you will not be charged for this service\"     Consent has been obtained for this service by care team member: Yes     Treatment Plan Last Reviewed: 4/10/2020   PHQ-9 / ARISTIDES-7 :     DATA  Interactive Complexity: No  Crisis: No       Progress Since Last Session (Related to Symptoms / Goals / Homework):   Symptoms: No change -    Homework: Did not complete       Episode of Care Goals: Minimal progress - CONTEMPLATION (Considering change and yet undecided); Intervened by assessing the negative and positive thinking (ambivalence) about behavior change     Current / Ongoing Stressors and Concerns:      Client notes that she has been \"Angelo rockwell was in the hospital.\"   was in the hospital for pnemonia and they      were worried " that he had covid, luckily he does not.  He is at home recovering.  Younger sister  from cancer last week and she has been morning this.           Treatment Objective(s) Addressed in This Session:       Goal- Anxiety: Client will decrease anxiety    I will know I've met my goal when I am less anxious.      Objective #A (Client Action)      Client will use cognitive strategies identified in therapy to challenge anxious thoughts.      Objective #B    Client will use at least 3 coping skills for anxiety management in the next 12 weeks.     -behavioral activation: ACE    Objective #C    Client will identify three distraction and diversion activities and use those activities to decrease level of anxiety.       Intervention:   CBT: -     Reviewed 5 Stages of Grief (Lo Stephens MD) with client today in session:  1. Denial: This can t be happening to me.   2. Anger:  Why is this happening to me?   3. Bargaining:  Please make this not happen and in return I will do anything.   4. Depression/Mourning:  I am so sad that I cannot change what has happened.   5. Acceptance:  I have made peace both with what has happened as well as my inability to change it.   Reviewed these as phases that may not always follow a linear pattern (i.e. client may feel depressed one day and angry the next).  Identified strategies for managing grief and loss including journaling, exercise, and investing time and energy into healthy interpersonal relationships.          ASSESSMENT: Current Emotional / Mental Status (status of significant symptoms):   Risk status (Self / Other harm or suicidal ideation)   Client denies current fears or concerns for personal safety.   Client denies current or recent suicidal ideation or behaviors.   Client denies current or recent homicidal ideation or behaviors.   Client denies current or recent self injurious behavior or ideation.   Client denies other safety concerns.   Client Client reports there has  been no change in risk factors since their last session.     Client Client reports there has been no change in protective factors since their last session.     A safety and risk management plan has not been developed at this time, however client was given the after-hours number / 911 should there be a change in any of these risk factors.     Appearance:   Appropriate    Eye Contact:   Good    Psychomotor Behavior: Agitated    Attitude:   Cooperative    Orientation:   All   Speech    Rate / Production: Pressured     Volume:  Normal    Mood:    Anxious  Depressed    Affect:    Appropriate    Thought Content:  Clear    Thought Form:  Coherent  Logical    Insight:    Good      Medication Review:   No changes to current psychiatric medication(s)     Medication Compliance:   Yes     Changes in Health Issues:   None reported     Chemical Use Review:   Substance Use: Chemical use reviewed, no active concerns identified      Tobacco Use: No current tobacco use.      Diagnosis:  1. Anxiety        Collateral Reports Completed:   Not Applicable    PLAN: (Client Tasks / Therapist Tasks / Other)  1.  Client will log activities of Achievement, Closeness, and Enjoyment (ACEs) using ACE rating scale and bring to next session.           2.  Writing for grief as needed    3.  Keep sleep log/reduce napping/regular sleep/wake time     4.  Client will call to make next appt        William Dickson [facility]      5/8/2020                                                          ______________________________________________________________________    Treatment Plan    Client's Name: Gilma Pace  YOB: 1940    Date: 3/1/2019     DSM-V Diagnoses: Diagnoses: 296.32 (F33.1) Major Depressive Disorder, Recurrent Episode, Moderate _  300.02 (F41.1) Generalized Anxiety Disorder  Psychosocial & Contextual Factors: some stress with   WHODAS 2.0 (12 item)    DECLINES WHODAS TODAY            This questionnaire asks about  difficulties due to health conditions. Health conditions  include  disease or illnesses, other health problems that may be short or long lasting,  injuries, mental health or emotional problems, and problems with alcohol or drugs.                     Think back over the past 30 days and answer these questions, thinking about how much  difficulty you had doing the following activities. For each question, please [place] only  one response.    Referral / Collaboration:  Referral to another professional/service is not indicated at this time..    Anticipated number of session or this episode of care: 18-24      MeasurableTreatment Goal(s) related to diagnosis / functional impairment(s)      Goal- Anxiety: Client will decrease anxiety    I will know I've met my goal when I am less anxious.      Objective #A (Client Action)    Status: cont- Date: 4/10/2020     Client will use cognitive strategies identified in therapy to challenge anxious thoughts.    Intervention(s)  Therapist will provide psychoeducation, behavioral activation, and cognitive restructuring.    Objective #B  Client will use at least 3 coping skills for anxiety management in the next 12 weeks.    Status: cont- Date: 4/10/2020     Intervention(s)  Therapist will provide psychoeducation, behavioral activation, and cognitive restructuring.      Objective #C  Client will identify three distraction and diversion activities and use those activities to decrease level of anxiety.  Status: cont- Date: 4/10/2020     Intervention(s)  Therapist will provide psychoeducation, behavioral activation, and cognitive restructuring.                  Client has reviewed and agreed to the above plan.      William Dickson, [facility] CenterSW  March 1, 2019

## 2020-06-07 DIAGNOSIS — F33.1 MAJOR DEPRESSIVE DISORDER, RECURRENT EPISODE, MODERATE (H): ICD-10-CM

## 2020-06-09 RX ORDER — BUSPIRONE HYDROCHLORIDE 15 MG/1
TABLET ORAL
Qty: 180 TABLET | Refills: 0 | Status: SHIPPED | OUTPATIENT
Start: 2020-06-09 | End: 2020-09-21

## 2020-06-12 ENCOUNTER — TELEPHONE (OUTPATIENT)
Dept: FAMILY MEDICINE | Facility: CLINIC | Age: 80
End: 2020-06-12

## 2020-06-12 ENCOUNTER — VIRTUAL VISIT (OUTPATIENT)
Dept: FAMILY MEDICINE | Facility: CLINIC | Age: 80
End: 2020-06-12
Payer: MEDICARE

## 2020-06-12 DIAGNOSIS — R53.83 OTHER FATIGUE: ICD-10-CM

## 2020-06-12 DIAGNOSIS — Z87.440 PERSONAL HISTORY OF URINARY TRACT INFECTION: ICD-10-CM

## 2020-06-12 DIAGNOSIS — M62.81 GENERALIZED MUSCLE WEAKNESS: ICD-10-CM

## 2020-06-12 DIAGNOSIS — M62.81 GENERALIZED MUSCLE WEAKNESS: Primary | ICD-10-CM

## 2020-06-12 DIAGNOSIS — R82.90 ABNORMAL URINALYSIS: Primary | ICD-10-CM

## 2020-06-12 LAB
ALBUMIN UR-MCNC: 100 MG/DL
APPEARANCE UR: ABNORMAL
BACTERIA #/AREA URNS HPF: ABNORMAL /HPF
BILIRUB UR QL STRIP: ABNORMAL
COLOR UR AUTO: YELLOW
GLUCOSE UR STRIP-MCNC: NEGATIVE MG/DL
HGB UR QL STRIP: ABNORMAL
KETONES UR STRIP-MCNC: ABNORMAL MG/DL
LEUKOCYTE ESTERASE UR QL STRIP: ABNORMAL
NITRATE UR QL: NEGATIVE
NON-SQ EPI CELLS #/AREA URNS LPF: ABNORMAL /LPF
PH UR STRIP: 5.5 PH (ref 5–7)
RBC #/AREA URNS AUTO: ABNORMAL /HPF
SOURCE: ABNORMAL
SP GR UR STRIP: >1.03 (ref 1–1.03)
UROBILINOGEN UR STRIP-ACNC: 1 EU/DL (ref 0.2–1)
WBC #/AREA URNS AUTO: ABNORMAL /HPF

## 2020-06-12 PROCEDURE — 99213 OFFICE O/P EST LOW 20 MIN: CPT | Mod: TEL | Performed by: FAMILY MEDICINE

## 2020-06-12 PROCEDURE — 87086 URINE CULTURE/COLONY COUNT: CPT | Performed by: FAMILY MEDICINE

## 2020-06-12 PROCEDURE — 81001 URINALYSIS AUTO W/SCOPE: CPT | Performed by: FAMILY MEDICINE

## 2020-06-12 PROCEDURE — 87088 URINE BACTERIA CULTURE: CPT | Performed by: FAMILY MEDICINE

## 2020-06-12 PROCEDURE — 87186 SC STD MICRODIL/AGAR DIL: CPT | Performed by: FAMILY MEDICINE

## 2020-06-12 RX ORDER — NITROFURANTOIN 25; 75 MG/1; MG/1
100 CAPSULE ORAL 2 TIMES DAILY
Qty: 10 CAPSULE | Refills: 0 | Status: SHIPPED | OUTPATIENT
Start: 2020-06-12 | End: 2020-06-17

## 2020-06-12 NOTE — TELEPHONE ENCOUNTER
Please call the patient. Her urinalysis is abnormal and suspicious for infection but not definitive. She should start on antibiotic(s) now while we are waiting for the culture

## 2020-06-12 NOTE — PROGRESS NOTES
"Gilma Pace is a 79 year old female who is being evaluated via a billable telephone visit.      The patient has been notified of following:     \"This telephone visit will be conducted via a call between you and your physician/provider. We have found that certain health care needs can be provided without the need for a physical exam.  This service lets us provide the care you need with a short phone conversation.  If a prescription is necessary we can send it directly to your pharmacy.  If lab work is needed we can place an order for that and you can then stop by our lab to have the test done at a later time.    Telephone visits are billed at different rates depending on your insurance coverage. During this emergency period, for some insurers they may be billed the same as an in-person visit.  Please reach out to your insurance provider with any questions.    If during the course of the call the physician/provider feels a telephone visit is not appropriate, you will not be charged for this service.\"    Patient has given verbal consent for Telephone visit?  Yes    What phone number would you like to be contacted at? [phone]  How would you like to obtain your AVS? Phillhart    Subjective     Gilma Pace is a 79 year old female who presents via phone visit today for the following health issues:  We changed this appointment(s) to a video visit as the patient and her daughter request(ed) that. They wanted to do it through her daughter's phone but they do not have the Phillhart hoooked up so we used Doximity instead of Amwell    HPI                 Reviewed and updated as needed this visit by Provider         Review of Systems          Objective   Reported vitals:  There were no vitals taken for this visit.   healthy, alert and no distress  PSYCH: Alert and oriented times 3; coherent speech, normal   rate and volume, able to articulate logical thoughts, able   to abstract reason, no tangential thoughts, no " hallucinations   or delusions  Her affect is normal  RESP: No cough, no audible wheezing, able to talk in full sentences  Remainder of exam unable to be completed due to telephone visits    Diagnostic Test Results:  Labs reviewed in Epic        Video Start time 2:24  Video end time 2:31 7 minute(s)   Via SpinGo         Assessment/Plan:  1. Generalized muscle weakness    - UA with Microscopic; Future  - Urine Culture Aerobic Bacterial; Future    2. Personal history of urinary tract infection    - UA with Microscopic; Future  - Urine Culture Aerobic Bacterial; Future    3. Other fatigue   - UA with Microscopic; Future  - Urine Culture Aerobic Bacterial; Future    No follow-ups on file.      Phone call duration:  7 minutes    Gucci Wu MD  --------------------------------------------------------------------------------------------------------------------------------------  SUBJECTIVE:   Gilma Pace is a 79 year old female who presents today for symptom of:   She reports that she is feeling rotten. She had a fall this am. She had a bruise on her right arm. She feels weaker than normal. She can't control her legs as well and they shake a little.  She was hospitalized for a UTI approximately 2 month(s) ago and the initial symptom(s) of that was lower extremity(ies) weakness.      She denies any other major injuries from her fall today.     She started having these symptoms 2day(s) ago. She  denies hematuria, denies back pain,  denies nausea or vomiting,  denies fever or chills.  She denies a history of vaginal discharge.   This patient does nt  have a history of frequent urinary tract infections.     Last time she had a UTI she had confusion and weak legs  This was 2 month(s) ago.   She was treated with medication and she got bacl to normal she was hospitalized       Past Medical History:   Diagnosis Date     Anxiety 10/11/2006    Last visit with Mental health specialist in 2002.      Chronic low back pain  without sciatica 10/31/2016     Compression fracture of thoracic vertebra (H) 6/13/2011    Overview:  T  11  compression  fx  on  xrays  from  6/13/2011  . Ongoing pain  x  3  months   Get  eval  and  treat  with  spine  specialists  as discussed  Initially  treated  in [place]  .  Pain  is  controlled   with  advil  as needed  . Reviewed   with  patient      Congestive heart failure (H) 10/11/2006    Overview:  Epic      COPD (chronic obstructive pulmonary disease) (H) 9/28/2017     Folate deficiency 6/9/2017    Overview:  Formatting of this note may be different from the original. Hemoglobin (g/dl)  Date Value  06/05/2017 15.6  10/31/2016 13.8  11/04/2015 11.3 (L)   Iron (mcg/dl)  Date Value  06/05/2017 49 (L)   TIBC, Calculated (mcg/dl)  Date Value  06/05/2017 319   % Saturation, calc. (%)  Date Value  06/05/2017 15   Vitamin B12 (pg/ml)  Date Value  06/05/2017 253   Folate (ng/ml)  Date Value  06/05/20     Hypertension goal BP (blood pressure) < 140/90 9/28/2017     Mitral regurgitation 2/11/2014    Overview:  Needs dental prophylaxis      Mixed hyperlipidemia 10/11/2006     Osteoporosis 9/28/2017     Postoperative hypothyroidism 9/28/2017     Current Outpatient Medications   Medication Sig Dispense Refill     acetaminophen (TYLENOL) 500 MG tablet Take 2 tablets (1,000 mg) by mouth every 8 hours as needed for mild pain       albuterol (PROVENTIL HFA) 108 (90 Base) MCG/ACT inhaler Inhale 2 puffs into the lungs every 6 hours 18 g 2     alendronate (FOSAMAX) 70 MG tablet Take 1 tablet (70 mg) by mouth every 7 days 12 tablet 3     amLODIPine (NORVASC) 10 MG tablet TAKE 1 TABLET BY MOUTH ONCE DAILY 90 tablet 3     ARIPiprazole (ABILIFY) 5 MG tablet TAKE 1 TABLET BY MOUTH AT BEDTIME 90 tablet 0     busPIRone (BUSPAR) 15 MG tablet Take 1 tablet by mouth twice daily 180 tablet 0     DULoxetine (CYMBALTA) 60 MG capsule TAKE 2 CAPSULES BY MOUTH ONCE DAILY 180 capsule 2     folic acid (FOLVITE) 1 MG tablet Take 1 tablet by  mouth once daily 90 tablet 0     gabapentin (NEURONTIN) 600 MG tablet TAKE 1 TABLET BY MOUTH TWICE DAILY 180 tablet 1     levothyroxine (SYNTHROID/LEVOTHROID) 100 MCG tablet Take 1 tablet (100 mcg) by mouth daily 90 tablet 3     lisinopril (ZESTRIL) 40 MG tablet Take 1 tablet by mouth once daily 90 tablet 0     pramipexole (MIRAPEX) 0.25 MG tablet Take 1 tablet (0.25 mg) by mouth At Bedtime 90 tablet 3     primidone (MYSOLINE) 50 MG tablet Take 0.5 tablets (25 mg) by mouth At Bedtime 45 tablet 1     rosuvastatin (CRESTOR) 20 MG tablet Take 1 tablet (20 mg) by mouth At Bedtime 90 tablet 3     traZODone (DESYREL) 100 MG tablet Take 1 tablet (100 mg) by mouth At Bedtime       umeclidinium (INCRUSE ELLIPTA) 62.5 MCG/INH inhaler Inhale 1 puff into the lungs daily 3 Inhaler 3     Social History     Tobacco Use     Smoking status: Former Smoker     Last attempt to quit:      Years since quittin.4     Smokeless tobacco: Never Used   Substance Use Topics     Alcohol use: Yes     Comment: Rarely           OBJECTIVE:  There were no vitals taken for this visit.  GENERAL APPEARANCE: healthy, alert and no distress  RESP: lungs clear to auscultation - no rales, rhonchi or wheezes  CV: regular rates and rhythm, normal S1 S2, no murmur noted  ABDOMEN:  soft, nontender, no HSM or masses and bowel sounds normal  BACK: No CVA tenderness  SKIN: no suspicious lesions or rashes    No results found for any visits on 20.     ASSESSMENT:   Possible(felicity) early uti    PLAN:  I asked that they make a laboratory appointment(s) to give a urinalysis today.  We will start treatment based on the result(s).  As per ordered above  Drink plenty of fluids.  Prevention and treatment of UTI's discussed.Signs and symptoms of pyelonephritis mentioned.  Follow up with primary care physician if not improving

## 2020-06-15 ENCOUNTER — VIRTUAL VISIT (OUTPATIENT)
Dept: BEHAVIORAL HEALTH | Facility: CLINIC | Age: 80
End: 2020-06-15
Payer: MEDICARE

## 2020-06-15 DIAGNOSIS — F41.9 ANXIETY: Primary | ICD-10-CM

## 2020-06-15 DIAGNOSIS — N39.0 URINARY TRACT INFECTION WITHOUT HEMATURIA, SITE UNSPECIFIED: Primary | ICD-10-CM

## 2020-06-15 LAB
BACTERIA SPEC CULT: ABNORMAL
BACTERIA SPEC CULT: ABNORMAL
Lab: ABNORMAL
SPECIMEN SOURCE: ABNORMAL

## 2020-06-15 PROCEDURE — 90834 PSYTX W PT 45 MINUTES: CPT | Performed by: SOCIAL WORKER

## 2020-06-15 RX ORDER — AMPICILLIN TRIHYDRATE 500 MG
500 CAPSULE ORAL 4 TIMES DAILY
Qty: 20 CAPSULE | Refills: 0 | Status: SHIPPED | OUTPATIENT
Start: 2020-06-15 | End: 2020-07-24

## 2020-06-15 NOTE — PROGRESS NOTES
"                                               Progress Note    Client Name: Gilma Pace  Date: 6/15/20          Service Type: Individual  Video Visit: No       Session Start Time: 1130  Session End Time: 1215     Session Length: 45    Session #: 11  Attendees: Client      The patient has been notified of the following:      \"We have found that certain health care needs can be provided without the need for a face to face visit.  This service lets us provide the care you need with a phone conversation.       I will have full access to your [facility] medical record during this entire phone call.   I will be taking notes for your medical record.      Since this is like an office visit, we will bill your insurance company for this service.       There are potential benefits and risks of telephone visits (e.g. limits to patient confidentiality) that differ from in-person visits.?  Confidentiality still applies for telephone services, and nobody will record the visit.  It is important to be in a quiet, private space that is free of distractions (including cell phone or other devices) during the visit.??      If during the course of the call I believe a telephone visit is not appropriate, you will not be charged for this service\"     Consent has been obtained for this service by care team member: Yes     Treatment Plan Last Reviewed: 4/10/2020   PHQ-9 / ARISTIDES-7 :     DATA  Interactive Complexity: No  Crisis: No       Progress Since Last Session (Related to Symptoms / Goals / Homework):   Symptoms: No change -    Homework: Did not complete       Episode of Care Goals: Minimal progress - CONTEMPLATION (Considering change and yet undecided); Intervened by assessing the negative and positive thinking (ambivalence) about behavior change     Current / Ongoing Stressors and Concerns:     Client notes that she has been \"not so good.\"  Reports that she developed another bladder infection and was rxd antibiotics and now has two " different kinds, has been on them for three days and thinks that it is doing some good.  She knows that she needs to take the medicine as rxd and discuss any concerns with her care team.        Treatment Objective(s) Addressed in This Session:       Goal- Anxiety: Client will decrease anxiety    I will know I've met my goal when I am less anxious.      Objective #A (Client Action)      Client will use cognitive strategies identified in therapy to challenge anxious thoughts.      Objective #B    Client will use at least 3 coping skills for anxiety management in the next 12 weeks.     -behavioral activation: ACE    Objective #C    Client will identify three distraction and diversion activities and use those activities to decrease level of anxiety.       Intervention:   CBT: -     Reviewed 5 Stages of Grief (Lo Stephens MD) with client today in session:  1. Denial: This can t be happening to me.   2. Anger:  Why is this happening to me?   3. Bargaining:  Please make this not happen and in return I will do anything.   4. Depression/Mourning:  I am so sad that I cannot change what has happened.   5. Acceptance:  I have made peace both with what has happened as well as my inability to change it.   Reviewed these as phases that may not always follow a linear pattern (i.e. client may feel depressed one day and angry the next).  Identified strategies for managing grief and loss including journaling, exercise, and investing time and energy into healthy interpersonal relationships.          ASSESSMENT: Current Emotional / Mental Status (status of significant symptoms):   Risk status (Self / Other harm or suicidal ideation)   Client denies current fears or concerns for personal safety.   Client denies current or recent suicidal ideation or behaviors.   Client denies current or recent homicidal ideation or behaviors.   Client denies current or recent self injurious behavior or ideation.   Client denies other safety  concerns.   Client Client reports there has been no change in risk factors since their last session.     Client Client reports there has been no change in protective factors since their last session.     A safety and risk management plan has not been developed at this time, however client was given the after-hours number / 911 should there be a change in any of these risk factors.     Appearance:   Appropriate    Eye Contact:   Good    Psychomotor Behavior: Agitated    Attitude:   Cooperative    Orientation:   All   Speech    Rate / Production: Pressured     Volume:  Normal    Mood:    Anxious  Depressed    Affect:    Appropriate    Thought Content:  Clear    Thought Form:  Coherent  Logical    Insight:    Good      Medication Review:   No changes to current psychiatric medication(s)     Medication Compliance:   Yes     Changes in Health Issues:   None reported     Chemical Use Review:   Substance Use: Chemical use reviewed, no active concerns identified      Tobacco Use: No current tobacco use.      Diagnosis:  1. ERRONEOUS ENCOUNTER--DISREGARD        Collateral Reports Completed:   Not Applicable    PLAN: (Client Tasks / Therapist Tasks / Other)  1.  Client will log activities of Achievement, Closeness, and Enjoyment (ACEs) using ACE rating scale and bring to next session.           2.  Writing for grief as needed    3.  Keep sleep log/reduce napping/regular sleep/wake time     4.  Client will call to make next appt        William Dickson, [facility]      5/8/2020                                                          ______________________________________________________________________    Treatment Plan    Client's Name: Gilma Pace  YOB: 1940    Date: 3/1/2019     DSM-V Diagnoses: Diagnoses: 296.32 (F33.1) Major Depressive Disorder, Recurrent Episode, Moderate _  300.02 (F41.1) Generalized Anxiety Disorder  Psychosocial & Contextual Factors: some stress with   WHODAS 2.0 (12  item)    DECLINES WHODAS TODAY            This questionnaire asks about difficulties due to health conditions. Health conditions  include  disease or illnesses, other health problems that may be short or long lasting,  injuries, mental health or emotional problems, and problems with alcohol or drugs.                     Think back over the past 30 days and answer these questions, thinking about how much  difficulty you had doing the following activities. For each question, please [place] only  one response.    Referral / Collaboration:  Referral to another professional/service is not indicated at this time..    Anticipated number of session or this episode of care: 18-24      MeasurableTreatment Goal(s) related to diagnosis / functional impairment(s)      Goal- Anxiety: Client will decrease anxiety    I will know I've met my goal when I am less anxious.      Objective #A (Client Action)    Status: cont- Date: 4/10/2020     Client will use cognitive strategies identified in therapy to challenge anxious thoughts.    Intervention(s)  Therapist will provide psychoeducation, behavioral activation, and cognitive restructuring.    Objective #B  Client will use at least 3 coping skills for anxiety management in the next 12 weeks.    Status: cont- Date: 4/10/2020     Intervention(s)  Therapist will provide psychoeducation, behavioral activation, and cognitive restructuring.      Objective #C  Client will identify three distraction and diversion activities and use those activities to decrease level of anxiety.  Status: cont- Date: 4/10/2020     Intervention(s)  Therapist will provide psychoeducation, behavioral activation, and cognitive restructuring.                  Client has reviewed and agreed to the above plan.      William Dickson, [facility]  March 1, 2019

## 2020-06-21 ENCOUNTER — TELEPHONE (OUTPATIENT)
Dept: CARE COORDINATION | Facility: CLINIC | Age: 80
End: 2020-06-21

## 2020-06-21 NOTE — TELEPHONE ENCOUNTER
[facility] now requests orders and shares plan of care/discharge summaries for some patients through [employer].  Please REPLY TO THIS MESSAGE OR ROUTE BACK TO THE AUTHOR in order to give authorization for orders when needed.  This is considered a verbal order, you will still receive a faxed copy of orders for signature.  Thank you for your assistance in improving collaboration for our patients.    ORDER  1. Skilled Nursing Visits 2w1, 1w3, 3 prn  2. PT eval and treat  3. OT eval and treat  4. HHA 2w4

## 2020-06-23 ENCOUNTER — TELEPHONE (OUTPATIENT)
Dept: FAMILY MEDICINE | Facility: CLINIC | Age: 80
End: 2020-06-23

## 2020-06-23 NOTE — TELEPHONE ENCOUNTER
I have reviewed the message from Home Care/Hospice and I authorize these orders.  Gucci Wu MD     You can increase her oxygen to 4 lpm with activity as needed

## 2020-06-23 NOTE — TELEPHONE ENCOUNTER
[facility] now requests orders and shares plan of care/discharge summaries for some patients through [employer].  Please REPLY TO THIS MESSAGE OR ROUTE BACK TO THE AUTHOR in order to give authorization for orders when needed.  This is considered a verbal order, you will still receive a faxed copy of orders for signature.  Thank you for your assistance in improving collaboration for our patients.    ORDER    Occupational Therapy 2w2    Pt O2 Sats decreasing to 80% whenon portable O2 at 3 liters when  walking to bathroom. Will you please provide guidelines as to if O2 can be increased with activity and how high    Estela Patrick OTRL T  [phone]

## 2020-06-24 NOTE — TELEPHONE ENCOUNTER
Estela, OT, is given written orders below.    Estela states patient primarily already on 4 LPM, when gets up and goes to bathroom O2 sat is around 80%.  Prior to last hospitalization, she was doing really well with Portable Oxygen tank, but again worsened, with hospitalization.  Portable O2 tank is a Pulse delivery system , uncertain if this is the cause, or as she gets better this will also improve.   In addition, prior to hospitalization patient was on 4 LPM.  Please advise.    Okay to leave detailed voicemail.    Shantelle Harris RN

## 2020-06-24 NOTE — TELEPHONE ENCOUNTER
Left message on answering machine for Estela to call back. [phone]  Johanna KURTZN, RN, CPN

## 2020-06-24 NOTE — TELEPHONE ENCOUNTER
She likely will get better as she recovers from her hospital stay. I am unfamiliar with the pulse delivery system. Please clarify what that means.  Gucci Wu MD

## 2020-06-25 ENCOUNTER — TELEPHONE (OUTPATIENT)
Dept: FAMILY MEDICINE | Facility: CLINIC | Age: 80
End: 2020-06-25

## 2020-06-25 NOTE — TELEPHONE ENCOUNTER
[facility] now requests orders and shares plan of care/discharge summaries for some patients through [employer].  Please REPLY TO THIS MESSAGE OR ROUTE BACK TO THE AUTHOR in order to give authorization for orders when needed.  This is considered a verbal order, you will still receive a faxed copy of orders for signature.  Thank you for your assistance in improving collaboration for our patients.    ORDER    PT eval completed. Requesting okay for PT 1xwx2 for therapeutic exercises, gait training, and instruction in HEP.    Thank you,   Isa Ceron, PT  [phone]

## 2020-06-25 NOTE — TELEPHONE ENCOUNTER
Called Estela OT back.     She said patient is using a portable oxygen machine that she bought herself.   It does NOT deliver O2 continuously. It will give her a puff of oxygen when she inhales on her own. Patient tends to hold her breath.   She doesn't like a regular oxygen tank because she is very afraid of tripping over the hose and falling. She is taking her oxygen off completely when she goes to the bathroom and they are working with her on this to keep it on since her sats drop too low.     She has her oxygen set to 4 LPM when she is sitting and they are wondering if they can get approval for patient to turn it up higher (maybe 5 LPM or higher) when she is moving around.     Routing to provider to advise.  Zoila KURTZN, RN

## 2020-06-26 ENCOUNTER — TELEPHONE (OUTPATIENT)
Dept: FAMILY MEDICINE | Facility: CLINIC | Age: 80
End: 2020-06-26

## 2020-06-26 NOTE — TELEPHONE ENCOUNTER
Going above 4 lpm can have bad affects where the patient's CO2 builds up. It would be ideal to stay at 4 lmp and have her use her oxygen while ambulating.   Gucci Wu MD

## 2020-06-26 NOTE — TELEPHONE ENCOUNTER
Reason for Call:  Form, our goal is to have forms completed with 72 hours, however, some forms may require a visit or additional information.    Type of letter, form or note:  Home Health Certification    Who is the form from?: Home care    Where did the form come from: form was faxed in    What clinic location was the form placed at?: [place]    Where the form was placed: Given to MA/RN    What number is listed as a contact on the form?:  Home Care [phone]       Additional comments: I placed the [facility] forms in the [facility]'s orange folder for RN review    Call taken on 6/26/2020 at 4:13 PM by Tanisha Arevalo

## 2020-06-30 ENCOUNTER — VIRTUAL VISIT (OUTPATIENT)
Dept: BEHAVIORAL HEALTH | Facility: CLINIC | Age: 80
End: 2020-06-30
Payer: MEDICARE

## 2020-06-30 DIAGNOSIS — F41.9 ANXIETY: Primary | ICD-10-CM

## 2020-06-30 PROCEDURE — 90834 PSYTX W PT 45 MINUTES: CPT | Mod: TEL | Performed by: SOCIAL WORKER

## 2020-06-30 NOTE — PROGRESS NOTES
"                                               Progress Note    Client Name: Gilma Pace  Date: 6/30/2020           Service Type: Individual  Video Visit: No       Session Start Time: 1230  Session End Time: 115     Session Length: 45    Session #: 12  Attendees: Client      The patient has been notified of the following:      \"We have found that certain health care needs can be provided without the need for a face to face visit.  This service lets us provide the care you need with a phone conversation.       I will have full access to your [facility] medical record during this entire phone call.   I will be taking notes for your medical record.      Since this is like an office visit, we will bill your insurance company for this service.       There are potential benefits and risks of telephone visits (e.g. limits to patient confidentiality) that differ from in-person visits.?  Confidentiality still applies for telephone services, and nobody will record the visit.  It is important to be in a quiet, private space that is free of distractions (including cell phone or other devices) during the visit.??      If during the course of the call I believe a telephone visit is not appropriate, you will not be charged for this service\"     Consent has been obtained for this service by care team member: Yes     Treatment Plan Last Reviewed: 4/10/2020   PHQ-9 / ARISTIDES-7 :     DATA  Interactive Complexity: No  Crisis: No       Progress Since Last Session (Related to Symptoms / Goals / Homework):   Symptoms: No change -    Homework: Did not complete       Episode of Care Goals: Minimal progress - CONTEMPLATION (Considering change and yet undecided); Intervened by assessing the negative and positive thinking (ambivalence) about behavior change     Current / Ongoing Stressors and Concerns:     Client notes that she has been \"was back in the hospital.\"  Had another UTI and was in the hospital for this, completed her antibiotic " treatment and is now feeling better.  More stress with her health in that her bridge fell out and she will need to have her teeth pulled and ultimately will need to get full dentures.         Treatment Objective(s) Addressed in This Session:       Goal- Anxiety: Client will decrease anxiety    I will know I've met my goal when I am less anxious.      Objective #A (Client Action)      Client will use cognitive strategies identified in therapy to challenge anxious thoughts.      Objective #B    Client will use at least 3 coping skills for anxiety management in the next 12 weeks.     -behavioral activation: ACE    Objective #C    Client will identify three distraction and diversion activities and use those activities to decrease level of anxiety.       Intervention:   CBT: -     Reviewed 5 Stages of Grief (Lo Stephens MD) with client today in session:  1. Denial: This can t be happening to me.   2. Anger:  Why is this happening to me?   3. Bargaining:  Please make this not happen and in return I will do anything.   4. Depression/Mourning:  I am so sad that I cannot change what has happened.   5. Acceptance:  I have made peace both with what has happened as well as my inability to change it.   Reviewed these as phases that may not always follow a linear pattern (i.e. client may feel depressed one day and angry the next).  Identified strategies for managing grief and loss including journaling, exercise, and investing time and energy into healthy interpersonal relationships.          ASSESSMENT: Current Emotional / Mental Status (status of significant symptoms):   Risk status (Self / Other harm or suicidal ideation)   Client denies current fears or concerns for personal safety.   Client denies current or recent suicidal ideation or behaviors.   Client denies current or recent homicidal ideation or behaviors.   Client denies current or recent self injurious behavior or ideation.   Client denies other safety  concerns.   Client Client reports there has been no change in risk factors since their last session.     Client Client reports there has been no change in protective factors since their last session.     A safety and risk management plan has not been developed at this time, however client was given the after-hours number / 911 should there be a change in any of these risk factors.     Appearance:   Appropriate    Eye Contact:   Good    Psychomotor Behavior: Agitated    Attitude:   Cooperative    Orientation:   All   Speech    Rate / Production: Pressured     Volume:  Normal    Mood:    Anxious  Depressed    Affect:    Appropriate    Thought Content:  Clear    Thought Form:  Coherent  Logical    Insight:    Good      Medication Review:   No changes to current psychiatric medication(s)     Medication Compliance:   Yes     Changes in Health Issues:   None reported     Chemical Use Review:   Substance Use: Chemical use reviewed, no active concerns identified      Tobacco Use: No current tobacco use.      Diagnosis:  1. Anxiety        Collateral Reports Completed:   Not Applicable    PLAN: (Client Tasks / Therapist Tasks / Other)  1.  Client will log activities of Achievement, Closeness, and Enjoyment (ACEs) using ACE rating scale and bring to next session.           2.  Writing for grief as needed    3.  Keep sleep log/reduce napping/regular sleep/wake time     4.  Client will call to make next appt        William Dickson, [facility]      5/8/2020                                                          ______________________________________________________________________    Treatment Plan    Client's Name: Gilma Pace  YOB: 1940    Date: 3/1/2019     DSM-V Diagnoses: Diagnoses: 296.32 (F33.1) Major Depressive Disorder, Recurrent Episode, Moderate _  300.02 (F41.1) Generalized Anxiety Disorder  Psychosocial & Contextual Factors: some stress with   WHODAS 2.0 (12 item)    DECLINES WHODAS TODAY             This questionnaire asks about difficulties due to health conditions. Health conditions  include  disease or illnesses, other health problems that may be short or long lasting,  injuries, mental health or emotional problems, and problems with alcohol or drugs.                     Think back over the past 30 days and answer these questions, thinking about how much  difficulty you had doing the following activities. For each question, please [place] only  one response.    Referral / Collaboration:  Referral to another professional/service is not indicated at this time..    Anticipated number of session or this episode of care: 18-24      MeasurableTreatment Goal(s) related to diagnosis / functional impairment(s)      Goal- Anxiety: Client will decrease anxiety    I will know I've met my goal when I am less anxious.      Objective #A (Client Action)    Status: cont- Date: 4/10/2020     Client will use cognitive strategies identified in therapy to challenge anxious thoughts.    Intervention(s)  Therapist will provide psychoeducation, behavioral activation, and cognitive restructuring.    Objective #B  Client will use at least 3 coping skills for anxiety management in the next 12 weeks.    Status: cont- Date: 4/10/2020     Intervention(s)  Therapist will provide psychoeducation, behavioral activation, and cognitive restructuring.      Objective #C  Client will identify three distraction and diversion activities and use those activities to decrease level of anxiety.  Status: cont- Date: 4/10/2020     Intervention(s)  Therapist will provide psychoeducation, behavioral activation, and cognitive restructuring.                  Client has reviewed and agreed to the above plan.      William Dickson, [facility]  March 1, 2019

## 2020-07-03 RX ORDER — OMEGA-3 FATTY ACIDS/FISH OIL 300-1000MG
400 CAPSULE ORAL DAILY PRN
COMMUNITY
Start: 2020-07-03 | End: 2020-10-30

## 2020-07-03 RX ORDER — NYSTATIN 100000 [USP'U]/G
POWDER TOPICAL 2 TIMES DAILY PRN
COMMUNITY
Start: 2020-07-03 | End: 2020-08-19

## 2020-07-03 NOTE — TELEPHONE ENCOUNTER
[facility] for dates of 6/21/2020 - 8/19/2020    Provider:  I have reviewed the C that was sent and the only issues I find are listed below.  Would you like us to add the items below to our medication list?  Thank you. Janis Howard R.N.    They have the following on the [facility] and we do NOT have any of these:    1. Nystatin 100,000 unit/gram topical powder - apply 1 nahun by topical route 2 times a days as needed    2. Advil liqui-gel 200 mg capsules  - take 2 capsules by mouth every day as needed    3. Oxygen-air delivery system device - take 4 liters by inhalation every day     I am holding the [facility] in my C folder on my desk - FYI. Janis Howard R.N.

## 2020-07-05 DIAGNOSIS — G25.0 ESSENTIAL TREMOR: ICD-10-CM

## 2020-07-06 DIAGNOSIS — Z53.9 DIAGNOSIS NOT YET DEFINED: Primary | ICD-10-CM

## 2020-07-06 PROCEDURE — G0180 MD CERTIFICATION HHA PATIENT: HCPCS | Performed by: FAMILY MEDICINE

## 2020-07-06 RX ORDER — PRIMIDONE 50 MG/1
TABLET ORAL
Qty: 45 TABLET | Refills: 3 | Status: SHIPPED | OUTPATIENT
Start: 2020-07-06 | End: 2021-06-01

## 2020-07-06 NOTE — TELEPHONE ENCOUNTER
Provider:  With the addition of the listed medication below the HHC reads as the our medication list does.  Please sign HHC and give to TC to fax to home care agency.  Thank you. Janis Howard R.N.    TC - Please fax HHC back to the home care agency.  Thank you. Janis Howard R.N.

## 2020-07-06 NOTE — TELEPHONE ENCOUNTER
[facility] faxed to [facility] at [phone] and to [employer].  ROSITA Doshi

## 2020-07-06 NOTE — TELEPHONE ENCOUNTER
Routing refill request to provider for review/approval because:  Drug not on the [facility] refill protocol   Shantelle Harris RN           
regular

## 2020-07-08 ENCOUNTER — DOCUMENTATION ONLY (OUTPATIENT)
Dept: CARE COORDINATION | Facility: CLINIC | Age: 80
End: 2020-07-08

## 2020-07-08 NOTE — PROGRESS NOTES
I do not remember this patient ever being diagnosed with CONGESTIVE HEART FAILURE . I review(ed) her chart just now and do not see that anywhere. I would recommend(ed) a clinic appointment(s) for evaluation of the edema.  Gucci Wu MD

## 2020-07-08 NOTE — PROGRESS NOTES
[facility] now requests orders and shares plan of care/discharge summaries for some patients through [employer].  Please REPLY TO THIS MESSAGE OR ROUTE BACK TO THE AUTHOR in order to give authorization for orders when needed.  This is considered a verbal order, you will still receive a faxed copy of orders for signature.  Thank you for your assistance in improving collaboration for our patients.    UPDATE Patient has low O2 saturations today. On 4 LPM she is ranging from 86% to 93%. With activity her levels are 82-85 % on 4 LPM.   BP 98/62, Pulse 94, Resp 16, Temp 98.2    Denies any increase in SOB however does appear dyspneic at rest. Weight increase overnight 3.4 lbs.  Edema +3 pitting in BLE.     Requesting recommendations for patient.  It does not appear that she is on any diuretics at this time but does have dx of CHF.      Please call if you have questions  Erika Gamble RN  [phone]    Weights  7/5 171.4 lbs  7/6 171.2 lbs  7/7 170 lbs  7/8 173.4

## 2020-07-13 ENCOUNTER — DOCUMENTATION ONLY (OUTPATIENT)
Dept: CARE COORDINATION | Facility: CLINIC | Age: 80
End: 2020-07-13

## 2020-07-13 NOTE — PROGRESS NOTES
[facility] now requests orders and shares plan of care/discharge summaries for some patients through [employer].  Please REPLY TO THIS MESSAGE OR ROUTE BACK TO THE AUTHOR in order to give authorization for orders when needed.  This is considered a verbal order, you will still receive a faxed copy of orders for signature.  Thank you for your assistance in improving collaboration for our patients.    ORDER  Skilled nursing 1w3, 1 PRN for edema, resp assessment due to low O2 levels and recent increase in edema.     Erika Gamble RN  [phone]

## 2020-07-16 ENCOUNTER — VIRTUAL VISIT (OUTPATIENT)
Dept: BEHAVIORAL HEALTH | Facility: CLINIC | Age: 80
End: 2020-07-16
Payer: MEDICARE

## 2020-07-16 DIAGNOSIS — F41.9 ANXIETY: Primary | ICD-10-CM

## 2020-07-16 PROCEDURE — 90834 PSYTX W PT 45 MINUTES: CPT | Performed by: SOCIAL WORKER

## 2020-07-16 NOTE — PROGRESS NOTES
"                                               Progress Note    Client Name: Gilma Pace  Date: 7/16/2020           Service Type: Individual  Video Visit: No        Session Start Time: 1230  Session End Time: 115     Session Length: 45    Session #: 13  Attendees: Client      The patient has been notified of the following:      \"We have found that certain health care needs can be provided without the need for a face to face visit.  This service lets us provide the care you need with a phone conversation.       I will have full access to your [facility] medical record during this entire phone call.   I will be taking notes for your medical record.      Since this is like an office visit, we will bill your insurance company for this service.       There are potential benefits and risks of telephone visits (e.g. limits to patient confidentiality) that differ from in-person visits.?  Confidentiality still applies for telephone services, and nobody will record the visit.  It is important to be in a quiet, private space that is free of distractions (including cell phone or other devices) during the visit.??      If during the course of the call I believe a telephone visit is not appropriate, you will not be charged for this service\"     Consent has been obtained for this service by care team member: Yes     Treatment Plan Last Reviewed: 7/16/2020   PHQ-9 / ARISTIDES-7 :     DATA  Interactive Complexity: No  Crisis: No       Progress Since Last Session (Related to Symptoms / Goals / Homework):   Symptoms: No change -    Homework: Did not complete       Episode of Care Goals: Minimal progress - CONTEMPLATION (Considering change and yet undecided); Intervened by assessing the negative and positive thinking (ambivalence) about behavior change     Current / Ongoing Stressors and Concerns:     Client notes that she has been \"doing better, stayed out of the hospital.\"  Reports that her mood is good and she is sleeping well, no " anxiety or stress.  Spends most of her time during the say watching game shows and she enjoys doing this.  Her family is doing well per her report and she is getting along well with .           Treatment Objective(s) Addressed in This Session:       Goal- Anxiety: Client will decrease anxiety    I will know I've met my goal when I am less anxious.      Objective #A (Client Action)      Client will use cognitive strategies identified in therapy to challenge anxious thoughts.      Objective #B    Client will use at least 3 coping skills for anxiety management in the next 12 weeks.     -behavioral activation: ACE    Objective #C    Client will identify three distraction and diversion activities and use those activities to decrease level of anxiety.       Intervention:   CBT: -     Reviewed 5 Stages of Grief (Lo Stephens MD) with client today in session:  1. Denial: This can t be happening to me.   2. Anger:  Why is this happening to me?   3. Bargaining:  Please make this not happen and in return I will do anything.   4. Depression/Mourning:  I am so sad that I cannot change what has happened.   5. Acceptance:  I have made peace both with what has happened as well as my inability to change it.   Reviewed these as phases that may not always follow a linear pattern (i.e. client may feel depressed one day and angry the next).  Identified strategies for managing grief and loss including journaling, exercise, and investing time and energy into healthy interpersonal relationships.          ASSESSMENT: Current Emotional / Mental Status (status of significant symptoms):   Risk status (Self / Other harm or suicidal ideation)   Client denies current fears or concerns for personal safety.   Client denies current or recent suicidal ideation or behaviors.   Client denies current or recent homicidal ideation or behaviors.   Client denies current or recent self injurious behavior or ideation.   Client denies other  safety concerns.   Client Client reports there has been no change in risk factors since their last session.     Client Client reports there has been no change in protective factors since their last session.     A safety and risk management plan has not been developed at this time, however client was given the after-hours number / 911 should there be a change in any of these risk factors.     Appearance:   Appropriate    Eye Contact:   Good    Psychomotor Behavior: Agitated    Attitude:   Cooperative    Orientation:   All   Speech    Rate / Production: Pressured     Volume:  Normal    Mood:    Anxious  Depressed    Affect:    Appropriate    Thought Content:  Clear    Thought Form:  Coherent  Logical    Insight:    Good      Medication Review:   No changes to current psychiatric medication(s)     Medication Compliance:   Yes     Changes in Health Issues:   None reported     Chemical Use Review:   Substance Use: Chemical use reviewed, no active concerns identified      Tobacco Use: No current tobacco use.      Diagnosis:  1. Anxiety        Collateral Reports Completed:   Not Applicable    PLAN: (Client Tasks / Therapist Tasks / Other)  1.  Client will log activities of Achievement, Closeness, and Enjoyment (ACEs) using ACE rating scale and bring to next session.           2.  Writing for grief as needed    3.  Keep sleep log/reduce napping/regular sleep/wake time     4.  Client will call to make next appt        William Dickson, [facility]      5/8/2020                                                          ______________________________________________________________________    Treatment Plan    Client's Name: Gilma Pace  YOB: 1940    Date: 3/1/2019     DSM-V Diagnoses: Diagnoses: 296.32 (F33.1) Major Depressive Disorder, Recurrent Episode, Moderate _  300.02 (F41.1) Generalized Anxiety Disorder  Psychosocial & Contextual Factors: some stress with   WHODAS 2.0 (12 item)    DECLINES WHODAS  TODAY            This questionnaire asks about difficulties due to health conditions. Health conditions  include  disease or illnesses, other health problems that may be short or long lasting,  injuries, mental health or emotional problems, and problems with alcohol or drugs.                     Think back over the past 30 days and answer these questions, thinking about how much  difficulty you had doing the following activities. For each question, please [place] only  one response.    Referral / Collaboration:  Referral to another professional/service is not indicated at this time..    Anticipated number of session or this episode of care: 18-24      MeasurableTreatment Goal(s) related to diagnosis / functional impairment(s)      Goal- Anxiety: Client will decrease anxiety    I will know I've met my goal when I am less anxious.      Objective #A (Client Action)    Status: cont- Date: 7/16/2020     Client will use cognitive strategies identified in therapy to challenge anxious thoughts.    Intervention(s)  Therapist will provide psychoeducation, behavioral activation, and cognitive restructuring.    Objective #B  Client will use at least 3 coping skills for anxiety management in the next 12 weeks.    Status: cont- Date: 7/16/2020     Intervention(s)  Therapist will provide psychoeducation, behavioral activation, and cognitive restructuring.      Objective #C  Client will identify three distraction and diversion activities and use those activities to decrease level of anxiety.  Status: cont- Date: 7/16/2020     Intervention(s)  Therapist will provide psychoeducation, behavioral activation, and cognitive restructuring.                  Client has reviewed and agreed to the above plan.      William Dickson, [facility] Center[facility]  March 1, 2019

## 2020-07-18 DIAGNOSIS — Z23 NEED FOR PROPHYLACTIC VACCINATION AND INOCULATION AGAINST INFLUENZA: ICD-10-CM

## 2020-07-18 DIAGNOSIS — R29.898 WEAKNESS OF BOTH LOWER EXTREMITIES: ICD-10-CM

## 2020-07-20 RX ORDER — GABAPENTIN 600 MG/1
TABLET ORAL
Qty: 180 TABLET | Refills: 0 | Status: SHIPPED | OUTPATIENT
Start: 2020-07-20 | End: 2020-10-19

## 2020-07-21 ENCOUNTER — TELEPHONE (OUTPATIENT)
Dept: FAMILY MEDICINE | Facility: CLINIC | Age: 80
End: 2020-07-21

## 2020-07-21 DIAGNOSIS — J96.90 RESPIRATORY FAILURE, UNSPECIFIED CHRONICITY, UNSPECIFIED WHETHER WITH HYPOXIA OR HYPERCAPNIA (H): Primary | ICD-10-CM

## 2020-07-21 NOTE — TELEPHONE ENCOUNTER
Pt has out of town  for her sister on 2020. Pt has been running on contiinuous O2 at 4 liters  at rest and most portable devices do not accommodate this. Will you please send RX to [facility] for RT to go into pts home to determine which device concentrator will be best for pt. Pulsed unit she has is not being used as her SAT levels drop too low. Current concentrator cannot refill as it is a different device.     [facility] typically does not send PT to home but said they would in this case    [facility] fax is [phone] can you please maureen urgent due to time constraints of .     Estela Patrick OTRL T [phone]

## 2020-07-21 NOTE — TELEPHONE ENCOUNTER
Please clarify if this should be an occupational therapy or a physical therapy evaluation and send back to me.   Gucci Wu MD s

## 2020-07-22 NOTE — TELEPHONE ENCOUNTER
I faxed the Respiratory Therapy referral/order to [facility] @ fax #[phone].  Tanisha Arevalo,

## 2020-07-24 ENCOUNTER — OFFICE VISIT (OUTPATIENT)
Dept: FAMILY MEDICINE | Facility: CLINIC | Age: 80
End: 2020-07-24
Payer: MEDICARE

## 2020-07-24 ENCOUNTER — TELEPHONE (OUTPATIENT)
Dept: FAMILY MEDICINE | Facility: CLINIC | Age: 80
End: 2020-07-24

## 2020-07-24 VITALS
HEART RATE: 92 BPM | BODY MASS INDEX: 31.35 KG/M2 | TEMPERATURE: 98.2 F | SYSTOLIC BLOOD PRESSURE: 102 MMHG | DIASTOLIC BLOOD PRESSURE: 62 MMHG | WEIGHT: 177 LBS | OXYGEN SATURATION: 97 %

## 2020-07-24 DIAGNOSIS — Z01.818 PREOP GENERAL PHYSICAL EXAM: Primary | ICD-10-CM

## 2020-07-24 DIAGNOSIS — J96.90 RESPIRATORY FAILURE, UNSPECIFIED CHRONICITY, UNSPECIFIED WHETHER WITH HYPOXIA OR HYPERCAPNIA (H): Primary | ICD-10-CM

## 2020-07-24 PROCEDURE — 93000 ELECTROCARDIOGRAM COMPLETE: CPT | Performed by: FAMILY MEDICINE

## 2020-07-24 PROCEDURE — 99215 OFFICE O/P EST HI 40 MIN: CPT | Performed by: FAMILY MEDICINE

## 2020-07-24 ASSESSMENT — PAIN SCALES - GENERAL: PAINLEVEL: NO PAIN (0)

## 2020-07-24 NOTE — PROGRESS NOTES
[facility]  [address] [address]  [phone]  Dept: [phone]    PRE-OP EVALUATION:  Today's date: 2020    Gilma Pace (: 1940) presents for pre-operative evaluation assessment as requested by Dr. Thompson.  She requires evaluation and anesthesia risk assessment prior to undergoing surgery/procedure for treatment of teeth extaction .    Proposed Surgery/ Procedure: teeth extraction  Date of Surgery/ Procedure: to be determined  Time of Surgery/ Procedure: to be determined  Hospital/Surgical Facility: [facility]  Surgery Fax Number: [phone]  Primary Physician: Gucci Wu  Type of Anesthesia Anticipated: to be determined    Preoperative Questionnaire:   No - Have you ever had a heart attack or stroke?  No - Have you ever had surgery on your heart or blood vessels, such as a stent, coronary (heart) bypass, or surgery on an artery in the head, neck, heart, or legs?  No - Do you have chest pain when you are physically active?  yes - Do you have a history of heart failure? once years ago  No - Do you currently have a cold, bronchitis, or symptoms of other respiratory (head and chest) infections?  No - Do you have a cough, shortness of breath, or wheezing?  No - Do you or anyone in your family have a history of blood clots?  No - Do you or anyone in your family have a serious bleeding problem, such as long-lasting bleeding after surgeries or cuts?  No - Have you ever had anemia or been told to take iron pills?  No - Have you had any abnormal blood loss such as black, tarry or bloody stools, or abnormal vaginal bleeding?  No - Have you ever had a blood transfusion?  Yes - Are you willing to have a blood transfusion if it is medically needed before, during, or after your surgery?  No - Have you or anyone in your family ever had problems with anesthesia (sedation for surgery)?  Yes - Do you have sleep apnea, excessive snoring, or daytime  drowsiness? But doesn't use CPAP machine for the last few years used oxygen 24 hours a day(s)   No - Do you have any artifical heart valves or other implanted medical devices, such as a pacemaker, defibrillator, or continuous glucose monitor?  No - Do you have any artifical joints?  No - Are you allergic to latex?  No - Is there any chance that you may be pregnant?    Patient has a Health Care Directive or Living Will:  NO    HPI:     HPI related to upcoming procedure: the patient has had multiple problems as of late with her teeth breaking. It is planned to pull her remaining teeth and then fit her with a full set of dentures 3 month(s) after her procedure.       COPD - Patient has a longstanding history of moderate-severe COPD . Patient has been doing well overall noting dyspnea on exertion and continues on medication regimen consisting of  umeclidinium and albuterol inhalers without adverse reactions or side effects.      MEDICAL HISTORY:     Patient Active Problem List    Diagnosis Date Noted     Restless leg syndrome 02/07/2020     Priority: Medium     Continuous leakage of urine 02/07/2020     Priority: Medium     Sepsis (H) 01/15/2020     Priority: Medium     Major depressive disorder, recurrent episode, moderate (H) 08/12/2019     Priority: Medium     Morbid obesity (H) 08/27/2018     Priority: Medium     Hypothyroidism, unspecified type 08/27/2018     Priority: Medium     Myopathy 01/10/2018     Priority: Medium     Chronic respiratory failure with hypoxia (H) 11/28/2017     Priority: Medium     Obesity (BMI 35.0-39.9 without comorbidity) 11/28/2017     Priority: Medium     Acute respiratory failure (H) 11/25/2017     Priority: Medium     Mild concentric left ventricular hypertrophy (LVH) 11/24/2017     Priority: Medium     Leg weakness 11/24/2017     Priority: Medium     Pneumonia 11/24/2017     Priority: Medium     Overview:   Right lung base       Respiratory failure (H) 11/24/2017     Priority: Medium      Overview:   Hypoxic       COPD (chronic obstructive pulmonary disease) (H) 09/28/2017     Priority: Medium     Hypertension goal BP (blood pressure) < 140/90 09/28/2017     Priority: Medium     Postoperative hypothyroidism 09/28/2017     Priority: Medium     Mitral valve disorders(424.0) 09/28/2017     Priority: Medium     Osteoporosis 09/28/2017     Priority: Medium     Folate deficiency 06/09/2017     Priority: Medium     Overview:   Formatting of this note may be different from the original.  Hemoglobin (g/dl)   Date Value   06/05/2017 15.6   10/31/2016 13.8   11/04/2015 11.3 (L)     Iron (mcg/dl)   Date Value   06/05/2017 49 (L)     TIBC, Calculated (mcg/dl)   Date Value   06/05/2017 319     % Saturation, calc. (%)   Date Value   06/05/2017 15     Vitamin B12 (pg/ml)   Date Value   06/05/2017 253     Folate (ng/ml)   Date Value   06/05/2017 4.5 (L)     Ferritin (ng/ml)   Date Value   06/05/2017 29   11/04/2015 4 (L)        Other abnormal glucose 06/09/2017     Priority: Medium     Overview:   Formatting of this note may be different from the original.    Glucose (mg/dl)   Date Value   06/05/2017 92   10/31/2016 78   11/04/2015 110     Hgb A1c (%)   Date Value   06/05/2017 6.2 (H)        Recurrent falls 06/01/2017     Priority: Medium     Chronic low back pain without sciatica 10/31/2016     Priority: Medium     Chronic low back pain 02/26/2015     Priority: Medium     Overview:   Has been told inoperable by several surgeons .   Oct '16 referred to  pain clinic        Mitral regurgitation 02/11/2014     Priority: Medium     Overview:   Needs dental prophylaxis        Smoker 02/11/2014     Priority: Medium     Overview:   Heavy smoke hx quit '13    Uses e cigs occ        Compression fracture of thoracic vertebra (H) 06/13/2011     Priority: Medium     Overview:   T  11  compression  fx  on  xrays  from  6/13/2011  . Ongoing pain  x  3  months   Get  eval  and  treat  with  spine  specialists  as discussed   Initially  treated  in [place]  .  Pain  is  controlled   with  advil  as needed  . Reviewed   with  patient        Anxiety 10/11/2006     Priority: Medium     Essential hypertension 10/11/2006     Priority: Medium     Overview:   Formatting of this note may be different from the original.  Potassium (mmol/L)   Date Value   06/05/2017 4.7   10/31/2016 4.4   11/04/2015 4.8     Creatinine (mg/dl)   Date Value   06/05/2017 0.85   10/31/2016 0.91   11/04/2015 1.05 (H)        Hypothyroidism 10/11/2006     Priority: Medium     Overview:   Formatting of this note may be different from the original.  S/p thyroidectomy ( benign thyroid mass)    TSH, Sensitive   Date Value   06/05/2017 1.30 uIU/ml   09/22/2014 15.137 uIU/ml (H)   06/24/2013 2.45 MICROINTER. UNITS/ML     TSH, with Reflex (uIU/ml)   Date Value   10/31/2016 3.79   11/04/2015 3.102   02/26/2015 2.164     T4, Free (ng/dl)   Date Value   06/05/2017 1.4   11/05/2014 1.1        Mixed hyperlipidemia 10/11/2006     Priority: Medium      Past Medical History:   Diagnosis Date     Anxiety 10/11/2006    Last visit with Mental health specialist in 2002.      Chronic low back pain without sciatica 10/31/2016     Compression fracture of thoracic vertebra (H) 6/13/2011    Overview:  T  11  compression  fx  on  xrays  from  6/13/2011  . Ongoing pain  x  3  months   Get  eval  and  treat  with  spine  specialists  as discussed  Initially  treated  in [place]  .  Pain  is  controlled   with  advil  as needed  . Reviewed   with  patient      Congestive heart failure (H) 10/11/2006    Overview:  Epic      COPD (chronic obstructive pulmonary disease) (H) 9/28/2017     Folate deficiency 6/9/2017    Overview:  Formatting of this note may be different from the original. Hemoglobin (g/dl)  Date Value  06/05/2017 15.6  10/31/2016 13.8  11/04/2015 11.3 (L)   Iron (mcg/dl)  Date Value  06/05/2017 49 (L)   TIBC, Calculated (mcg/dl)  Date Value  06/05/2017 319   % Saturation, calc. (%)  Date  Value  06/05/2017 15   Vitamin B12 (pg/ml)  Date Value  06/05/2017 253   Folate (ng/ml)  Date Value  06/05/20     Hypertension goal BP (blood pressure) < 140/90 9/28/2017     Mitral regurgitation 2/11/2014    Overview:  Needs dental prophylaxis      Mixed hyperlipidemia 10/11/2006     Osteoporosis 9/28/2017     Postoperative hypothyroidism 9/28/2017     Past Surgical History:   Procedure Laterality Date     APPENDECTOMY OPEN      71 years      BIOPSY MUSCLE DIAGNOSTIC (LOCATION) Right 3/19/2018    Procedure: BIOPSY MUSCLE DIAGNOSTIC (LOCATION);  right vastus lateralis muscle biopsy;  Surgeon: Melvin Agosto MD;  Location: [facility] OR     CATARACT IOL, RT/LT       Current Outpatient Medications   Medication Sig Dispense Refill     acetaminophen (TYLENOL) 500 MG tablet Take 2 tablets (1,000 mg) by mouth every 8 hours as needed for mild pain       albuterol (PROVENTIL HFA) 108 (90 Base) MCG/ACT inhaler Inhale 2 puffs into the lungs every 6 hours 18 g 2     alendronate (FOSAMAX) 70 MG tablet Take 1 tablet (70 mg) by mouth every 7 days 12 tablet 3     amLODIPine (NORVASC) 10 MG tablet TAKE 1 TABLET BY MOUTH ONCE DAILY 90 tablet 3     ARIPiprazole (ABILIFY) 5 MG tablet TAKE 1 TABLET BY MOUTH AT BEDTIME 90 tablet 0     busPIRone (BUSPAR) 15 MG tablet Take 1 tablet by mouth twice daily 180 tablet 0     DULoxetine (CYMBALTA) 60 MG capsule TAKE 2 CAPSULES BY MOUTH ONCE DAILY 180 capsule 2     folic acid (FOLVITE) 1 MG tablet Take 1 tablet by mouth once daily 90 tablet 1     gabapentin (NEURONTIN) 600 MG tablet Take 1 tablet by mouth twice daily 180 tablet 0     ibuprofen (ADVIL) 200 MG capsule Take 2 capsules (400 mg) by mouth daily as needed for fever       levothyroxine (SYNTHROID/LEVOTHROID) 100 MCG tablet Take 1 tablet (100 mcg) by mouth daily 90 tablet 3     lisinopril (ZESTRIL) 40 MG tablet Take 1 tablet by mouth once daily 90 tablet 0     nystatin (MYCOSTATIN) 941282 UNIT/GM external powder 2 times daily as needed        order for DME Equipment being ordered: Oxygen -  4 liters a day       pramipexole (MIRAPEX) 0.25 MG tablet Take 1 tablet (0.25 mg) by mouth At Bedtime 90 tablet 3     primidone (MYSOLINE) 50 MG tablet TAKE 1/2 (ONE-HALF) TABLET BY MOUTH AT BEDTIME 45 tablet 3     rosuvastatin (CRESTOR) 20 MG tablet Take 1 tablet (20 mg) by mouth At Bedtime 90 tablet 3     traZODone (DESYREL) 100 MG tablet TAKE 1 TABLET BY MOUTH AT BEDTIME 90 tablet 0     umeclidinium (INCRUSE ELLIPTA) 62.5 MCG/INH inhaler Inhale 1 puff into the lungs daily 3 Inhaler 3     umeclidinium   OTC products: None, except as noted above    Allergies   Allergen Reactions     Codeine Unknown     nausea     Sulfa Drugs GI Disturbance     GI upset     Tetracyclines GI Disturbance     Gi upset      Latex Allergy: NO    Social History     Tobacco Use     Smoking status: Former Smoker     Last attempt to quit:      Years since quittin.5     Smokeless tobacco: Never Used   Substance Use Topics     Alcohol use: Yes     Comment: Rarely     History   Drug Use No       REVIEW OF SYSTEMS:   CONSTITUTIONAL: NEGATIVE for fever, chills, change in weight  INTEGUMENTARY/SKIN: NEGATIVE for worrisome rashes, moles or lesions  EYES: NEGATIVE for vision changes or irritation  ENT/MOUTH: NEGATIVE for ear, mouth and throat problems  RESP: NEGATIVE for significant cough or SOB  BREAST: NEGATIVE for masses, tenderness or discharge  CV: NEGATIVE for chest pain, palpitations or peripheral edema  GI: NEGATIVE for nausea, abdominal pain, heartburn, or change in bowel habits  : NEGATIVE for frequency, dysuria, or hematuria  MUSCULOSKELETAL: NEGATIVE for significant arthralgias or myalgia  NEURO: NEGATIVE for weakness, dizziness or paresthesias  ENDOCRINE: NEGATIVE for temperature intolerance, skin/hair changes  HEME: NEGATIVE for bleeding problems  PSYCHIATRIC: NEGATIVE for changes in mood or affect    EXAM:   There were no vitals taken for this visit.    GENERAL APPEARANCE:  healthy, alert and no distress     EYES: EOMI,  PERRL     HENT: ear canals and TM's normal and nose and mouth without ulcers or lesions     NECK: no adenopathy, no asymmetry, masses, or scars and thyroid normal to palpation     RESP: lungs clear to auscultation - no rales, rhonchi or wheezes     CV: regular rates and rhythm, normal S1 S2, no S3 or S4 and no murmur, click or rub     ABDOMEN:  soft, nontender, no HSM or masses and bowel sounds normal     MS: extremities normal- no gross deformities noted, no evidence of inflammation in joints, FROM in all extremities.     SKIN: no suspicious lesions or rashes     NEURO: Normal strength and tone, sensory exam grossly normal, mentation intact and speech normal     PSYCH: mentation appears normal. and affect normal/bright     LYMPHATICS: No cervical adenopathy  EXTREMITIES: + 2 edema bilateral(ly)     DIAGNOSTICS:   No labs  required for low risk surgery (cataract, skin procedure, breast biopsy, etc)  EKG: appears normal, NSR, normal axis, normal intervals, no acute ST/T changes c/w ischemia, no LVH by voltage criteria    Recent Labs   Lab Test 09/11/19  1058      POTASSIUM 3.6   CR 0.70       Ref Range & Units  1mo ago    WBC  4.3 - 10.8 K/uL  11.9High       RBC  4.20 - 5.40 M/uL  4.02Low       HEMOGLOBIN  12.0 - 16.0 gm/dL  11.4Low       HEMATOCRIT  36.0 - 48.0 %  38.1     MCV  80 - 100 fl  95     MCH  27 - 33 pg  28     MCHC  33 - 36 gm/dL  30Low       RDW  11.5 - 14.5 %  14.2     PLATELET COUNT  150 - 400 K/UL  259     MPV  6.5 - 12  10.6     PMN %   %  77.4     IG%  <=1.0 %  0.3     LYMPH %   %  15.3     MONO %   %  6.3     EOS %   %  0.4     BASO %   %  0.3     PMN ABSOLUTE  1.80 - 7.80 K/uL  9.21High       IG ABSOLUTE   K/uL  0.04     LYMPH ABSOLUTE  1.00 - 4.00 K/uL  1.82     MONO ABSOLUTE  0.00 - 1.00 K/uL  0.75     EOS ABSOLUTE  0.00 - 0.45 K/uL  0.05     BASO ABSOLUTE  0.00 - 0.20 K/uL  0.04     NUCL RBC %  0.0 - 0.0 /100 WBC  0.0     NUCL RBC ABSOLUTE   0.00 - 0.00 K/uL  0.00     Resulting Agency   [facility]    Specimen Collected: 06/17/20  8:08 PM  Last Resulted: 06/17/20  8:29 PM    Received From: [facility]   Result Received: 06/18/20  3:44 PM          IMPRESSION:   Reason for surgery/procedure: multiple dental fractures      The proposed surgical procedure is considered LOW risk.        The patient has the following additional risks for perioperative complications:  Oxygen dependent lung disease      ICD-10-CM    1. Preop general physical exam  Z01.818        RECOMMENDATIONS:       Pulmonary Risk  Incentive spirometry post op  Respiratory Therapy (Respiratory Care IP Consult)  post op  NG tube decompression if abdominal distension or significant vomiting       Obstructive Sleep Apnea (or suspected sleep apnea)  Hospital staff are advised to monitor for sleep related oxygen desaturations due to suspicion of AYDEN      --Patient is to take all scheduled medications on the day of surgery EXCEPT for modifications listed below.    Anticoagulant or Antiplatelet Medication Use  NSAIDS: Ibuprofen (Motrin):         Stop one day prior to surgery        APPROVAL GIVEN to proceed with proposed procedure, without further diagnostic evaluation       Signed Electronically by: Gucci Wu MD    Copy of this evaluation report is provided to requesting physician.    [facility] Preop Guidelines    Revised Cardiac Risk Index

## 2020-07-24 NOTE — TELEPHONE ENCOUNTER
Karen from [facility] is calling requesting a new order faxed to them.  The Liter flow is not on the order that they received.  They need a new order that states home fill system and liter flow. Fax order to [phone].  Any questions, call customer service @[phone].    Tanisha Arevalo,      Patient was signed out to me by Dr. Arianne Wharton at the end of her shift. Sign-out included relevant details of history, physical, and work-up to date. Chart has been reviewed, new test results have been noted, and patient has been re-evaluated. Summary Findings:    Results for orders placed or performed during the hospital encounter of 01/15/18   Urinalysis & Reflex Micro with Culture if Indicated   Result Value    COLOR YELLOW    APPEARANCE HAZY    GLUCOSE(URINE) NEGATIVE    BILIRUBIN NEGATIVE    KETONES NEGATIVE    SPECIFIC GRAVITY 1.012    BLOOD LARGE (A)    pH 6.5    PROTEIN(URINE) 100 (A)    UROBILINOGEN 0.2    NITRITE POSITIVE (A)     Comment: Culture indicated, results to follow.    LEUKOCYTE ESTERASE LARGE (A)     Comment: Culture indicated, results to follow.    SPECIMEN TYPE URINE, CLEAN CATCH/MIDSTREAM    Squamous EPI'S 1 to 5    RBC 26 to 100    WBC >100 (H)    BACTERIA FEW (A)    Hyaline Casts 6 to 10    YEAST PRESENT   CBC & Auto Differential   Result Value    WBC 9.2    RBC 3.85 (L)    HGB 9.1 (L)    HCT 32.3 (L)    MCV 83.9    MCH 23.6 (L)    MCHC 28.2 (L)    RDW-CV 21.4 (H)        DIFF TYPE AUTO DIFF verified by manual smear review.    Neutrophil 72    LYMPH 10    MONO 6    EOSIN 12    BASO 0    Absolute Neutrophil 6.7    Absolute Lymph 0.9 (L)    Absolute Mono 0.5    Absolute Eos 1.1 (H)    Absolute Baso 0.0    Dante Cells MODERATE   Prothrombin Time   Result Value    PROTIME 14.8 (H)    INR 1.4     Comment: INR Therapeutic Range: 2.0 to 3.0 (2.5 to 3.5 recommended for recurrent thrombotic episodes and mechanical prosthetic heart valves.)    Calcium Ionized   Result Value    INSTRUMENT IONIZED CALCIUM 1.42 (H)    NORMALIZED CALCIUM 1.28     Comment: Differences between ionized calcium and ionized calcium normalized to pH 7.4 most often result from samples that have an abnormal pH due to delayed processing or exposure to air.   Basic Metabolic Panel   Result Value    Sodium 142     Potassium 3.4     Comment: Slight hemolysis, result may be falsely increased.    Chloride 109 (H)    Carbon Dioxide 24    Anion Gap 12    Glucose 119 (H)    BUN 36 (H)    Creatinine 1.53 (H)    GFR Estimate,  68     Comment: eGFR 60 - 89 mL/min/1.73m2 = Mild decrease in kidney function.    GFR Estimate, Non  59     Comment: eGFR 30-59 mL/min/1.73m2 = Moderate decrease in kidney function. Stage 3 CKD (chronic kidney disease) or moderate kidney disease.    BUN/Creatinine Ratio 24    CALCIUM 9.6   Hepatic Function Panel   Result Value    Albumin 2.2 (L)    TOTAL BILIRUBIN 0.3    DIRECT BILIRUBIN <0.1    ALK PHOSPHATASE 81    ALT/SGPT 23    AST/SGOT 22     Comment: Slight hemolysis, result may be falsely increased.    TOTAL PROTEIN 6.7   Magnesium Level   Result Value    MAGNESIUM 1.8     Comment: Slight hemolysis, result may be falsely increased.   Procalcitonin   Result Value    PROCALCITONIN 35.16 (H)     Comment: </= 0.5 ng/mL: Sepsis not likely. Localized bacterial infection possible.  0.6 - 2.0 ng/mL: Sepsis or other conditions possible.      > 2.0 ng/mL: Sepsis likely      > 9.9 ng/mL: Severe sepsis or bacterial shock likely.     Chem 8 Panel - Point of Care   Result Value    Sodium     Potassium POC 3.4    Chloride     CALCIUM IONIZED-POC 1.39 (H)    CO2 Total 23    GLUCOSE  (H)     Comment: Reference range is for a fasting sample.    BUN POC 33 (H)    HEMATOCRIT POC 30.0 (L)    Hemoglobin POC 10.2 (L)    ANION GAP POC 18    Creatinine POC 1.80 (H)    Estimated GFR  (POC) 56     Comment: eGFR 30-59 mL/min/1.73m2 = Moderate decrease in kidney function. Stage 3 CKD (chronic kidney disease) or moderate kidney disease.    Estimated GFR Non- (POC) 48     Comment: eGFR 30-59 mL/min/1.73m2 = Moderate decrease in kidney function. Stage 3 CKD (chronic kidney disease) or moderate kidney disease.   Lactic Acid Venous - Point of Care    Result Value    Lactic Acid Venous 2.9 (HH)   Troponin I - Point of Care   Result Value    Troponin I POC <0.10   Thyroid Stimulating Hormone   Result Value    TSH 2.744   Save for Possible XMatch   Result Value    CROSSMATCH  2018   Lactic Acid Venous - Point of Care   Result Value    Lactic Acid Venous 1.4   C Reactive Protein   Result Value    C-REACTIVE PROTEIN 19.0 (H)     Imaging Results          MRI Brain (No Result on File)                CT Head Brain (Final result)  Result time 01/15/18 17:53:09    Final result                 Impression:    IMPRESSION:    1. No acute intracranial pathology.  2. Nasogastric tube, partially imaged.  3. Chronic paranasal sinus disease.        //Location Code: [facility]               Narrative:    EXAM:  CT HEAD WO CONTRAST    CLINICAL INDICATION: 34 years-old Male, presenting history of ALTERED  MENTAL STATUS (AMS).    COMPARISON:  None available.    TECHNIQUE:  Routine noncontrast head CT spanning cranial vertex through  foramen magnum. Coronal and sagittal reformats.    FINDINGS:  No acute intracranial hemorrhage, mass effect, midline shift, or  pathologic extra-axial fluid collection identified.  Size and configuration  of the ventricles and sulci are considered within normal limits for age.  Gray-white matter differentiation is preserved.  No CT evidence for  evolving infarct in a major intracranial vascular territory, although MRI  is more sensitive for detection of acute ischemia.  There is chronic  mucosal thickening throughout the paranasal sinuses. No acute sinusitis is  identified. A nasogastric tube is partially imaged.                                     XR Chest AP or PA (Final result)  Result time 01/15/18 16:40:29    Final result                 Impression:    IMPRESSION:      1.  Improvement in the previously demonstrated right infrahilar opacity. No  new areas of consolidation.  2.  Lines and tubes as described.    I have reviewed the images and  agree with the Resident interpretation.               Narrative:    XR CHEST AP OR PA 1/15/2018 4:05 PM    HISTORY: Infection workup.    COMPARISON:  Chest radiograph 1/6/2018.    TECHNIQUE:  Single, AP semiupright view of the chest.    FINDINGS:    Right-sided central venous catheter is unchanged. Enteric tube courses  below the diaphragm and outside the field-of-view. Monitoring devices  overlie the chest, presumably external correlate clinically.    Cardiomediastinal silhouette and pulmonary vasculature are within normal  limits. No focal airspace opacity consolidation. The previously  demonstrated right infrahilar opacity on 1/6/2018 is improved. Biapical  pleural thickening most prominent at the right lung apex. No evidence of  pneumothorax or sizable pleural effusion.                    Preliminary result                 Impression:    IMPRESSION:      1.  Interval improvement in the previously demonstrated right infrahilar  opacity. No new areas of consolidation.  2.  Lines and tubes as described.    I have reviewed the images and agree with the Resident interpretation.               Narrative:    XR CHEST AP OR PA 1/15/2018 4:05 PM    HISTORY: Infection workup.    COMPARISON:  Chest radiograph 1/6/2018.    TECHNIQUE:  Single, AP semiupright view of the chest.    FINDINGS:    Right-sided central venous catheter is unchanged. Enteric tube courses  below the diaphragm and outside the field-of-view. Monitoring devices  overlie the chest.    Cardiomediastinal silhouette and pulmonary vasculature are within normal  limits. No focal airspace opacity consolidation. The previously  demonstrated right infrahilar opacity on 1/6/2018 is improved. Biapical  pleural thickening most prominent at the right lung apex. No evidence of  pneumothorax or sizable pleural effusion.                              Vitals  Vitals:    01/15/18 2234 01/15/18 2240 01/15/18 2250 01/15/18 2330   BP: 143/90 125/61 103/50 96/51   Pulse:   74  74   Resp:    11   Temp:       TempSrc:       SpO2:   100% 90%   Weight:       Height:             ED Course    10:51 PM Per ED Nurse: Pt was brought for MRI. He became combative and began thrashing. Pt then stopped breathing, pulse ox dropped, and he became bradycardic to the 40's. Pt did not receive imaging due to his VS abnormalities. He was placed on 2L O2. If MRI is still needed, pt will require sedation (likely MAC), and radiology needs to be consulted to discuss.    11:02 PM: Spoke with Dr. Faye regarding the pt's experience in MRI (he was the referring provider for the study). He agrees that the patient would need sedation for the study and indicates that he also needs a neph tube change. Recommends admit to have procedures completed. Will need ICU care given norepi administration. Advises consulting the pulmonology group who follows the patient at [place] (Dr. Gayle is is primary pulmonologist).     11:10 PM: I paged  [facility] pulmonology group.    11:34 PM: I have not received a phone call yet. Awaiting a return call from Dr. Lorenz. Pt has been assigned a med in the MRICU.    11:37 PM I spoke with Dr. Juju Oliva, intensivist, regarding the patient's presentation and the Emergency department work up. I explained that the pt is altered, septic with gram positive bacteremia, but that he was sent for imaging due to altered mental status. Imaging could not be completed due to pt becoming agitated and hemodynamically unstable. Will plan to admit to arrange MRI with MAC and also neph tube change. Private pulm group sees the patient, but may not be able to cover tonight. Dr. Oliva is aware.    11:55 PM: I paged Dr. Lorenz again.     12:13 AM I spoke with Dr. Yessica Lorenz, pulmonologist, regarding the patient's presentation and the Emergency department work up.I informed him of pt's need for transfer to [facility] for imaging that could not be completed due to VS instability. Pt also needs a neph tube change. His  group will consult on the patient's care, but will not be able to see him until the morning. Advises requesting [facility] in house intesivist assistance overnight.    12:21 AM Updated Dr. Oliva on my conversation with Dr. Lorenz. He will see the patient and consult overnight.    Critical Care time spent on this patient outside of billable procedures:  None    Admit  Clinical Impression:  The primary encounter diagnosis was Sepsis, due to unspecified organism (CMS/[facility]). Diagnoses of Delirium and Complication of nephrostomy (CMS/[facility]) were also pertinent to this visit.        Pt to be admitted to Dr. Faye, with coverage by [facility] intensivist and Dr. Gayle pulmonology group for ICU cares. Pt admitted in guarded condition.     I have reviewed the information recorded by the scribe for accuracy and agree with its contents.    ____________________________________________________________________    Carolina Jimenez acting as a scribe for Dr. Yeimi Chirinos.    Yeimi Chirinos MD  Dictation # [id number]  Scribe: Carolina Chirinos MD  01/16/18 2249

## 2020-07-24 NOTE — TELEPHONE ENCOUNTER
This RN pended DME order for below location.    PCP to add how many liters per minute.    Zoila Whittaker BSN, RN

## 2020-07-24 NOTE — NURSING NOTE
"Chief Complaint   Patient presents with     Pre-Op Exam     Health Maintenance       Initial /62   Pulse 92   Temp 98.2  F (36.8  C) (Tympanic)   Wt 80.3 kg (177 lb)   SpO2 97%   BMI 31.35 kg/m   Estimated body mass index is 31.35 kg/m  as calculated from the following:    Height as of 2/7/20: 1.6 m (5' 3\").    Weight as of this encounter: 80.3 kg (177 lb).  Medication Reconciliation: complete  Pebbles Posadas CMA  "

## 2020-07-24 NOTE — TELEPHONE ENCOUNTER
Please fax the order.     Also please have the team RB contact the Respiratory therapist involved. The patient needs to start wearing her CPAP at night again and will need the oxygen to flow into her machine. Can they make that happen?  Her RT is Ginny at [phone]  Gucci Wu MD

## 2020-07-27 NOTE — TELEPHONE ENCOUNTER
Team RB is team RN.     I called Ginny, respiratory therapist at [facility].   Her voicemail says she is out of the office until 7/29/20 so I left a detailed voicemail with the request below from Dr. Gucci Wu.   Asked her to call me back and gave her my direct number.       Zoila KURTZN, RN

## 2020-07-27 NOTE — PROGRESS NOTES
I faxed the Pre Op & EKG to [facility] @ fax#[phone].  Tanisha Arevalo,

## 2020-07-28 NOTE — TELEPHONE ENCOUNTER
Ginny from [facility] called me and left a detailed voicemail.  She said that yes, they can have her oxygen flow into her CPAP machine and will work with patient to have this done.     Routing to PCP as SCOTT.  Zoila KURTZN, RN

## 2020-08-01 DIAGNOSIS — F32.1 MODERATE MAJOR DEPRESSION (H): ICD-10-CM

## 2020-08-03 RX ORDER — ARIPIPRAZOLE 5 MG/1
TABLET ORAL
Qty: 90 TABLET | Refills: 0 | Status: SHIPPED | OUTPATIENT
Start: 2020-08-03 | End: 2020-11-09

## 2020-08-03 NOTE — TELEPHONE ENCOUNTER
Routing refill request to provider for review/approval because:  Labs not current:  Cbc  Kaia Us BSN, RN

## 2020-08-04 ENCOUNTER — TELEPHONE (OUTPATIENT)
Dept: FAMILY MEDICINE | Facility: CLINIC | Age: 80
End: 2020-08-04

## 2020-08-04 NOTE — TELEPHONE ENCOUNTER
[facility] utilizes an encounter to take the place of a direct phone call to your office. Please take a moment to review the below request. Please reply or route message to author of this encounter.  Message will act as a verbal OK of orders requested below. Thank you.    ORDER    Health Nurse 1 time visit for HA supervision and recertification    Health Aide 1 x a week for 2 weeks    Client was discharged from her skilled HC episode today.  She will continue with private pay HA services only.  Of note, client has had a weight gain of approx 7lbs in two weeks.  She was snacking on foods high in Na and has now stopped this.  Edema is present to the BLE.  She will be picking up her compression stockings tomorrow.  Writer educated on wearing these stockings as directed, elevation of BLE and following the low Na diet. Writer educated patient to report onset of worsening SOB and to follow up with PCP next week if swelling does not improve.  Client discharged today from Medicare as she is no longer homebound [place].  No other concerns present.  Breathing has been WNL and no s/s of UTI present.      Char Antonio RN Case Manager  [phone]  [email or web address]

## 2020-08-06 ENCOUNTER — VIRTUAL VISIT (OUTPATIENT)
Dept: BEHAVIORAL HEALTH | Facility: CLINIC | Age: 80
End: 2020-08-06
Payer: MEDICARE

## 2020-08-06 DIAGNOSIS — Z53.9 ERRONEOUS ENCOUNTER--DISREGARD: Primary | ICD-10-CM

## 2020-08-19 DIAGNOSIS — B37.2 CANDIDAL INTERTRIGO: Primary | ICD-10-CM

## 2020-08-19 RX ORDER — NYSTATIN 100000 [USP'U]/G
POWDER TOPICAL
Qty: 60 G | Refills: 11 | Status: SHIPPED | OUTPATIENT
Start: 2020-08-19

## 2020-08-19 NOTE — TELEPHONE ENCOUNTER
Routing refill request to provider for review/approval because:  This Prescription(s) is on the patient's medication list as historical.  RN unable to fill.  Thank you. Janis Howard R.N.

## 2020-08-21 ENCOUNTER — TELEPHONE (OUTPATIENT)
Dept: CARE COORDINATION | Facility: CLINIC | Age: 80
End: 2020-08-21

## 2020-08-21 NOTE — TELEPHONE ENCOUNTER
[facility] now requests orders and shares plan of care/discharge summaries for some patients through [employer].  Please REPLY TO THIS MESSAGE OR ROUTE BACK TO THE AUTHOR in order to give authorization for orders when needed.  This is considered a verbal order, you will still receive a faxed copy of orders for signature.  Thank you for your assistance in improving collaboration for our patients.    ORDER    Pt to continue with Private Pay HHA and SN Q 60 days for recertifcation and/or PRN visit for supervisory needs.     HHA 1 w9  SN 1 Q 60 days and 3 PRN    Thank you!    Eduarda Solis RN  [email or web address]  [phone]

## 2020-08-27 DIAGNOSIS — R29.898 WEAKNESS OF BOTH LOWER EXTREMITIES: ICD-10-CM

## 2020-08-27 DIAGNOSIS — Z23 NEED FOR PROPHYLACTIC VACCINATION AND INOCULATION AGAINST INFLUENZA: ICD-10-CM

## 2020-08-27 RX ORDER — DULOXETIN HYDROCHLORIDE 60 MG/1
CAPSULE, DELAYED RELEASE ORAL
Qty: 180 CAPSULE | Refills: 0 | Status: SHIPPED | OUTPATIENT
Start: 2020-08-27 | End: 2020-12-04

## 2020-09-15 DIAGNOSIS — Z53.9 DIAGNOSIS NOT YET DEFINED: Primary | ICD-10-CM

## 2020-09-15 PROCEDURE — G0179 MD RECERTIFICATION HHA PT: HCPCS | Performed by: FAMILY MEDICINE

## 2020-09-18 ENCOUNTER — DOCUMENTATION ONLY (OUTPATIENT)
Dept: CARE COORDINATION | Facility: CLINIC | Age: 80
End: 2020-09-18

## 2020-09-18 NOTE — PROGRESS NOTES
[facility] now requests orders and shares plan of care/discharge summaries for some patients through [employer].  Please REPLY TO THIS MESSAGE OR ROUTE BACK TO THE AUTHOR in order to give authorization for orders when needed.  This is considered a verbal order, you will still receive a faxed copy of orders for signature.  Thank you for your assistance in improving collaboration for our patients.    UPDATE  Patient continues to have breakdown under bilateral breasts associated with moisture.  Redness has worsened over the last week. Any recommednations for powders or creams to assist with this?     Erika Gamble RN  [phone]

## 2020-09-19 DIAGNOSIS — F33.1 MAJOR DEPRESSIVE DISORDER, RECURRENT EPISODE, MODERATE (H): ICD-10-CM

## 2020-09-21 RX ORDER — BUSPIRONE HYDROCHLORIDE 15 MG/1
TABLET ORAL
Qty: 180 TABLET | Refills: 0 | Status: SHIPPED | OUTPATIENT
Start: 2020-09-21 | End: 2020-12-21

## 2020-09-21 NOTE — PROGRESS NOTES
I prescribed nystatin powder on 8-19-20. She can apply that bid to tid. Please let me know if she needs a new prescription(s).   Gucci Wu MD

## 2020-09-21 NOTE — TELEPHONE ENCOUNTER
Next 5 appointments (look out 90 days)    Oct 12, 2020  2:15 PM CDT  [facility] Wu [facility] [address] [facility] [id number] [phone]        Rx refilled.    Zoila Whittaker BSN, RN

## 2020-09-27 ENCOUNTER — TRANSFERRED RECORDS (OUTPATIENT)
Dept: HEALTH INFORMATION MANAGEMENT | Facility: CLINIC | Age: 80
End: 2020-09-27

## 2020-09-30 ENCOUNTER — TELEPHONE (OUTPATIENT)
Dept: FAMILY MEDICINE | Facility: CLINIC | Age: 80
End: 2020-09-30

## 2020-09-30 NOTE — TELEPHONE ENCOUNTER
Reason for Call:  Home Health Care    Ebony with [facility] called regarding (reason for call): verbal orders    Orders are needed for this patient. Skilled nursing and PT orders    PT: eval and treat    OT:     Skilled Nursing: eval and treat    Pt Provider: Jazmin    Phone Number Homecare Nurse can be reached at: [phone]     Can we leave a detailed message on this number? YES    Best Time: any    Call taken on 9/30/2020 at 4:14 PM by Grace De Jesus

## 2020-10-03 DIAGNOSIS — G47.00 INSOMNIA, UNCONTROLLED: ICD-10-CM

## 2020-10-05 RX ORDER — TRAZODONE HYDROCHLORIDE 100 MG/1
TABLET ORAL
Qty: 90 TABLET | Refills: 1 | Status: SHIPPED | OUTPATIENT
Start: 2020-10-05 | End: 2021-04-12

## 2020-10-12 ENCOUNTER — OFFICE VISIT (OUTPATIENT)
Dept: FAMILY MEDICINE | Facility: CLINIC | Age: 80
End: 2020-10-12
Payer: MEDICARE

## 2020-10-12 VITALS
HEART RATE: 77 BPM | SYSTOLIC BLOOD PRESSURE: 119 MMHG | TEMPERATURE: 97.3 F | BODY MASS INDEX: 33.3 KG/M2 | WEIGHT: 188 LBS | DIASTOLIC BLOOD PRESSURE: 74 MMHG | OXYGEN SATURATION: 100 %

## 2020-10-12 DIAGNOSIS — J96.11 CHRONIC RESPIRATORY FAILURE WITH HYPOXIA (H): Primary | ICD-10-CM

## 2020-10-12 DIAGNOSIS — J18.9 PNEUMONIA DUE TO INFECTIOUS ORGANISM, UNSPECIFIED LATERALITY, UNSPECIFIED PART OF LUNG: ICD-10-CM

## 2020-10-12 DIAGNOSIS — E66.01 MORBID OBESITY (H): ICD-10-CM

## 2020-10-12 PROCEDURE — 99214 OFFICE O/P EST MOD 30 MIN: CPT | Performed by: FAMILY MEDICINE

## 2020-10-12 ASSESSMENT — PAIN SCALES - GENERAL: PAINLEVEL: NO PAIN (0)

## 2020-10-12 NOTE — PROGRESS NOTES
"Subjective     Gilma Pace is a 80 year old female who presents to clinic today for the following health issues:    [facility]           Hospital Follow-up Visit:    Hospital/Nursing Home/IP Rehab Facility: [facility]  Date of Admission: 9/27/20  Date of Discharge: 9/29/20  Reason(s) for Admission: didn't know what her name was or who her  was, O2 was low. Pneumonia      Was your hospitalization related to COVID-19? No   Problems taking medications regularly:  None  Medication changes since discharge: None  Problems adhering to non-medication therapy:  None    Summary of hospitalization:  [facility] discharge summary reviewed  Diagnostic Tests/Treatments reviewed.  Follow up needed: none  Other Healthcare Providers Involved in Patient s Care:         Hospice  Update since discharge: improved. Post Discharge Medication Reconciliation: discharge medications reconciled, continue medications without change.  Plan of care communicated with patient, family and other healthcare provider                  Objective    There were no vitals taken for this visit.  There is no height or weight on file to calculate BMI.  Physical Exam     No results found for this or any previous visit (from the past 24 hour(s)).        Assessment & Plan     (J96.11) Chronic respiratory failure with hypoxia (H)  (primary encounter diagnosis)  Comment:   Plan:     (E66.01) Morbid obesity (H)  Comment:   Plan:     (J18.9) Pneumonia due to infectious organism, unspecified laterality, unspecified part of lung  Comment:   Plan:        BMI:   Estimated body mass index is 33.3 kg/m  as calculated from the following:    Height as of 2/7/20: 1.6 m (5' 3\").    Weight as of this encounter: 85.3 kg (188 lb).          See Patient Instructions    No follow-ups on file.    Gucci Wu MD  [facility] " [place]  --------------------------------------------------------------------------------------------------------------------------------------  Subjective:  Gilma is a 80 year old female who presents today for follow-up on a recent hospitalization at [facility]. She is here today with her . She was admitted to the hospital on 9-27-20 and discharged on 9-29. The patient went to the hospital for symptoms of confusion. She was diagnosed with pneumonia and hypoxia. The patient was treated with antibiotic(s) and supplementary oxygen. She was asked to follow up today specifically to check up on her well being and to discuss her ongoing needs at [facility]. At this time the patient reports some improvement of the original symptoms. In addition the patient reports the following new symptoms:none.     [facility] is coming to her home  3 times a week.   She was at 5 lpm of oxygen for a while but is back to 4 lpm      She sleeps in a recliner and stays there all day most days.   She has difficulty getting in and out of a regular bed.    She lives in a townhouse with her   She uses a ramp in the garage to get up to the main floor.   She needs a lot of help with jeremie in addition to using her walker.     Fully adjustable bed to raise head of the bed as she would be eating there.  She would need a portable table to use the bed  She can use the bathroom and the tub on the main floor.    The oxygen compressor is in the sunroom.  Each tank (hey have 2 tanks) of oxygen only lasts about 2 hours so she can only be gone from the house for 4  maximum using both of her portable tanks.          Current Outpatient Medications:      acetaminophen (TYLENOL) 500 MG tablet, Take 2 tablets (1,000 mg) by mouth every 8 hours as needed for mild pain, Disp: , Rfl:      albuterol (PROVENTIL HFA) 108 (90 Base) MCG/ACT inhaler, Inhale 2 puffs into the lungs every 6 hours, Disp: 18 g, Rfl: 2     alendronate (FOSAMAX) 70 MG  tablet, Take 1 tablet (70 mg) by mouth every 7 days, Disp: 12 tablet, Rfl: 3     amLODIPine (NORVASC) 10 MG tablet, TAKE 1 TABLET BY MOUTH ONCE DAILY, Disp: 90 tablet, Rfl: 3     ARIPiprazole (ABILIFY) 5 MG tablet, TAKE 1 TABLET BY MOUTH AT BEDTIME, Disp: 90 tablet, Rfl: 0     busPIRone (BUSPAR) 15 MG tablet, Take 1 tablet by mouth twice daily, Disp: 180 tablet, Rfl: 0     DULoxetine (CYMBALTA) 60 MG capsule, Take 2 capsules by mouth once daily, Disp: 180 capsule, Rfl: 0     folic acid (FOLVITE) 1 MG tablet, Take 1 tablet by mouth once daily, Disp: 90 tablet, Rfl: 1     gabapentin (NEURONTIN) 600 MG tablet, Take 1 tablet by mouth twice daily, Disp: 180 tablet, Rfl: 0     ibuprofen (ADVIL) 200 MG capsule, Take 2 capsules (400 mg) by mouth daily as needed for fever, Disp: , Rfl:      levothyroxine (SYNTHROID/LEVOTHROID) 100 MCG tablet, Take 1 tablet (100 mcg) by mouth daily, Disp: 90 tablet, Rfl: 3     lisinopril (ZESTRIL) 40 MG tablet, Take 1 tablet by mouth once daily, Disp: 90 tablet, Rfl: 0     NYSTOP 108191 UNIT/GM powder, APPLY  POWDER TOPICALLY TWICE DAILY, Disp: 60 g, Rfl: 11     order for DME, [facility] home oxygen:  Equipment being ordered: Oxygen, home fill system,  4 LPM, Disp: 1 each, Rfl: 0     order for DME, Equipment being ordered: Oxygen -  4 liters a day, Disp: , Rfl:      pramipexole (MIRAPEX) 0.25 MG tablet, Take 1 tablet (0.25 mg) by mouth At Bedtime, Disp: 90 tablet, Rfl: 3     primidone (MYSOLINE) 50 MG tablet, TAKE 1/2 (ONE-HALF) TABLET BY MOUTH AT BEDTIME, Disp: 45 tablet, Rfl: 3     rosuvastatin (CRESTOR) 20 MG tablet, Take 1 tablet (20 mg) by mouth At Bedtime, Disp: 90 tablet, Rfl: 3     traZODone (DESYREL) 100 MG tablet, TAKE 1 TABLET BY MOUTH AT BEDTIME, Disp: 90 tablet, Rfl: 1     umeclidinium (INCRUSE ELLIPTA) 62.5 MCG/INH inhaler, Inhale 1 puff into the lungs daily, Disp: 3 Inhaler, Rfl: 3    Past Medical History:   Diagnosis Date     Anxiety 10/11/2006    Last visit with Mental health  specialist in 2002.      Chronic low back pain without sciatica 10/31/2016     Compression fracture of thoracic vertebra (H) 6/13/2011    Overview:  T  11  compression  fx  on  xrays  from  6/13/2011  . Ongoing pain  x  3  months   Get  eval  and  treat  with  spine  specialists  as discussed  Initially  treated  in [place]  .  Pain  is  controlled   with  advil  as needed  . Reviewed   with  patient      Congestive heart failure (H) 10/11/2006    Overview:  Epic      COPD (chronic obstructive pulmonary disease) (H) 9/28/2017     Folate deficiency 6/9/2017    Overview:  Formatting of this note may be different from the original. Hemoglobin (g/dl)  Date Value  06/05/2017 15.6  10/31/2016 13.8  11/04/2015 11.3 (L)   Iron (mcg/dl)  Date Value  06/05/2017 49 (L)   TIBC, Calculated (mcg/dl)  Date Value  06/05/2017 319   % Saturation, calc. (%)  Date Value  06/05/2017 15   Vitamin B12 (pg/ml)  Date Value  06/05/2017 253   Folate (ng/ml)  Date Value  06/05/20     Hypertension goal BP (blood pressure) < 140/90 9/28/2017     Mitral regurgitation 2/11/2014    Overview:  Needs dental prophylaxis      Mixed hyperlipidemia 10/11/2006     Osteoporosis 9/28/2017     Postoperative hypothyroidism 9/28/2017       OBJECTIVE:  /74   Pulse 77   Temp 97.3  F (36.3  C) (Tympanic)   Wt 85.3 kg (188 lb)   SpO2 100%   BMI 33.30 kg/m    GENERAL APPEARANCE: healthy, alert and no distress  EYES: EOMI,  PERRL  HENT: ear canals and TM's normal and nose and mouth without ulcers or lesions  RESP: lungs clear to auscultation - no rales, rhonchi or wheezes  RESP: lungs clear to auscultation - no rales, rhonchi or wheezes and rales R lower posterior and L lower posterior  CV: regular rates and rhythm, normal S1 S2, no S3 or S4 and no murmur, click or rub -  ABDOMEN:  soft, nontender, no HSM or masses and bowel sounds normal    ASSESSMENT:  80 year old female who recently underwent hospitalization for pneumonia, COPD exacerbation and hypoxia  which appear to have improved.    Rationales for Hospital Bed  Gilma Pace requires for alleviation of pain, positioning of the body in ways not feasible with an ordinary bed due to inoperable Chronic low back pain M54.5 that is expected to last at least 1 month  Gilma Pace requires head of bed elevated more than 30 degrees most of the time due to CHF I509,  COPD   J44.9   Gilma Pace requires a bed height different than a fixed height hospital bed in order to safely transfer to chair, wheelchair or standing position due to Chronic low back pain M54.5, Myopathy G72.9, leg weakness R29.898  Gilma Pace does not  requires traction that can only be attached to a hospital bed  Gilma Pace requires frequent changes in body position, or has an immediate need for change in body position due to Chronic low back pain M54.5, Myopathy G72.9, leg weakness R29.898  Detailed Order  OK for full electric hospital bed with   rails Length of need 99 months/lifetime          Plan: At this time we will continue the present management and have the patient return to clinic as needed. Gilma  voiced understanding to informations discussed today.        I spoke to RN involved in her case       Masha answered the [phone]     She reports that  Erika Gamble RN is her .  [phone]  I called and left a voice mail for her regarding the needs of the patient including:  A fully adjustable hospital bed   A portable table to use with the hospital bed  A portable oxygen compressor

## 2020-10-12 NOTE — NURSING NOTE
"Chief Complaint   Patient presents with     Clinic Care Coordination - Face To Face     Health Maintenance     fall risk, PHQ9     Hospital F/U       Initial /74   Pulse 77   Temp 97.3  F (36.3  C) (Tympanic)   Wt 85.3 kg (188 lb)   SpO2 100%   BMI 33.30 kg/m   Estimated body mass index is 33.3 kg/m  as calculated from the following:    Height as of 2/7/20: 1.6 m (5' 3\").    Weight as of this encounter: 85.3 kg (188 lb).  Medication Reconciliation: complete  Pebbles Posadas, MAGALI  "

## 2020-10-13 ENCOUNTER — TELEPHONE (OUTPATIENT)
Dept: FAMILY MEDICINE | Facility: CLINIC | Age: 80
End: 2020-10-13

## 2020-10-13 DIAGNOSIS — R29.898 DEFICIENCIES OF LIMBS: ICD-10-CM

## 2020-10-13 DIAGNOSIS — G72.9 MYOPATHY, UNSPECIFIED: ICD-10-CM

## 2020-10-13 DIAGNOSIS — M54.50 CHRONIC BILATERAL LOW BACK PAIN WITHOUT SCIATICA: ICD-10-CM

## 2020-10-13 DIAGNOSIS — M54.50 CHRONIC LOW BACK PAIN WITHOUT SCIATICA, UNSPECIFIED BACK PAIN LATERALITY: ICD-10-CM

## 2020-10-13 DIAGNOSIS — J44.9 CHRONIC OBSTRUCTIVE PULMONARY DISEASE, UNSPECIFIED COPD TYPE (H): Primary | ICD-10-CM

## 2020-10-13 DIAGNOSIS — G89.29 CHRONIC BILATERAL LOW BACK PAIN WITHOUT SCIATICA: ICD-10-CM

## 2020-10-13 DIAGNOSIS — G89.29 CHRONIC LOW BACK PAIN WITHOUT SCIATICA, UNSPECIFIED BACK PAIN LATERALITY: ICD-10-CM

## 2020-10-13 ASSESSMENT — PATIENT HEALTH QUESTIONNAIRE - PHQ9
5. POOR APPETITE OR OVEREATING: SEVERAL DAYS
SUM OF ALL RESPONSES TO PHQ QUESTIONS 1-9: 4

## 2020-10-13 ASSESSMENT — ANXIETY QUESTIONNAIRES
6. BECOMING EASILY ANNOYED OR IRRITABLE: NOT AT ALL
1. FEELING NERVOUS, ANXIOUS, OR ON EDGE: SEVERAL DAYS
IF YOU CHECKED OFF ANY PROBLEMS ON THIS QUESTIONNAIRE, HOW DIFFICULT HAVE THESE PROBLEMS MADE IT FOR YOU TO DO YOUR WORK, TAKE CARE OF THINGS AT HOME, OR GET ALONG WITH OTHER PEOPLE: SOMEWHAT DIFFICULT
3. WORRYING TOO MUCH ABOUT DIFFERENT THINGS: NOT AT ALL
5. BEING SO RESTLESS THAT IT IS HARD TO SIT STILL: SEVERAL DAYS
GAD7 TOTAL SCORE: 3
2. NOT BEING ABLE TO STOP OR CONTROL WORRYING: NOT AT ALL
7. FEELING AFRAID AS IF SOMETHING AWFUL MIGHT HAPPEN: NOT AT ALL

## 2020-10-14 ENCOUNTER — TELEPHONE (OUTPATIENT)
Dept: FAMILY MEDICINE | Facility: CLINIC | Age: 80
End: 2020-10-14

## 2020-10-14 ASSESSMENT — ANXIETY QUESTIONNAIRES: GAD7 TOTAL SCORE: 3

## 2020-10-14 NOTE — TELEPHONE ENCOUNTER
[facility] now requests orders and shares plan of care/discharge summaries for some patients through [employer].  Please REPLY TO THIS MESSAGE OR ROUTE BACK TO THE AUTHOR in order to give authorization for orders when needed.  This is considered a verbal order, you will still receive a faxed copy of orders for signature.  Thank you for your assistance in improving collaboration for our patients.    ORDER    Home PT eval completed. Requesting okay for PT 1xwx1, 0xwx1, 1xwx1 for transfer training on/off scooter and to reinstruct on LE HEP.     Thank you,   Isa Ceron, PT  [phone]

## 2020-10-14 NOTE — TELEPHONE ENCOUNTER
Calling to follow up as home care RN will be going out of town and needs these orders ASAP so a colleague can assist patient in her absence. Brittney Green TC/Pt Rep

## 2020-10-17 DIAGNOSIS — Z23 NEED FOR PROPHYLACTIC VACCINATION AND INOCULATION AGAINST INFLUENZA: ICD-10-CM

## 2020-10-17 DIAGNOSIS — R29.898 WEAKNESS OF BOTH LOWER EXTREMITIES: ICD-10-CM

## 2020-10-19 RX ORDER — GABAPENTIN 600 MG/1
TABLET ORAL
Qty: 180 TABLET | Refills: 1 | Status: SHIPPED | OUTPATIENT
Start: 2020-10-19 | End: 2021-05-21

## 2020-10-19 NOTE — TELEPHONE ENCOUNTER
DMR order with OV notes F2Face faxed to [facility] medicaFax number [phone].  ROSITA Doshi

## 2020-10-20 ENCOUNTER — TELEPHONE (OUTPATIENT)
Dept: FAMILY MEDICINE | Facility: CLINIC | Age: 80
End: 2020-10-20

## 2020-10-20 NOTE — TELEPHONE ENCOUNTER
[facility] now requests orders and shares plan of care/discharge summaries for some patients through [employer].  Please REPLY TO THIS MESSAGE OR ROUTE BACK TO THE AUTHOR in order to give authorization for orders when needed.  This is considered a verbal order, you will still receive a faxed copy of orders for signature.  Thank you for your assistance in improving collaboration for our patients.    ORDER    Requesting okay for HHA 1xwx1, 2xwx3 to assist pateint with personal cares.    Thank you,   Isa Ceron, PT  [phone]

## 2020-10-20 NOTE — TELEPHONE ENCOUNTER
Ok for HHA 1xwx1, 2xwx3 to assist pateint with personal cares for this patient per [facility] standing order policy.  To provider to cosign.  (original orders for HHA placed 8/21/2020).  Thank you. Janis Howard R.N.

## 2020-10-30 ENCOUNTER — MEDICAL CORRESPONDENCE (OUTPATIENT)
Dept: HEALTH INFORMATION MANAGEMENT | Facility: CLINIC | Age: 80
End: 2020-10-30

## 2020-10-30 ENCOUNTER — TELEPHONE (OUTPATIENT)
Dept: FAMILY MEDICINE | Facility: CLINIC | Age: 80
End: 2020-10-30

## 2020-10-30 DIAGNOSIS — Z53.9 DIAGNOSIS NOT YET DEFINED: Primary | ICD-10-CM

## 2020-10-30 PROCEDURE — G0180 MD CERTIFICATION HHA PATIENT: HCPCS | Performed by: FAMILY MEDICINE

## 2020-10-30 NOTE — TELEPHONE ENCOUNTER
Reason for Call:  Form, our goal is to have forms completed with 72 hours, however, some forms may require a visit or additional information.    Type of letter, form or note:  Home Health Certification    Who is the form from?: Home care    Where did the form come from: form was faxed in    What clinic location was the form placed at?: [place]    Where the form was placed: Given to MA/RN    What number is listed as a contact on the form?: [facility] [phone]       Additional comments: I placed the [facility] forms in the [facility] orange folder for RN review    Call taken on 10/30/2020 at 1:39 PM by Tanisha Arevalo

## 2020-10-30 NOTE — TELEPHONE ENCOUNTER
This RN reconciled medication list from [facility] form against our [facility] medication list and there are no discrepancies.       Form put in PCP's box to sign.  Please route this message back to TC along with form.     Zoila KURTZN, RN

## 2020-11-01 DIAGNOSIS — R29.898 WEAKNESS OF BOTH LOWER EXTREMITIES: ICD-10-CM

## 2020-11-01 DIAGNOSIS — Z23 NEED FOR PROPHYLACTIC VACCINATION AND INOCULATION AGAINST INFLUENZA: ICD-10-CM

## 2020-11-02 RX ORDER — LEVOTHYROXINE SODIUM 100 UG/1
TABLET ORAL
Qty: 90 TABLET | Refills: 0 | Status: SHIPPED | OUTPATIENT
Start: 2020-11-02 | End: 2021-02-04

## 2020-11-02 NOTE — TELEPHONE ENCOUNTER
Routing refill request to provider for review/approval because:  Labs not current:  TSH    TSH   Date Value Ref Range Status   09/11/2019 5.42 (H) 0.40 - 4.00 mU/L Final     Zoila Whittaker BSN, RN

## 2020-11-06 ENCOUNTER — TELEPHONE (OUTPATIENT)
Dept: FAMILY MEDICINE | Facility: CLINIC | Age: 80
End: 2020-11-06

## 2020-11-06 NOTE — TELEPHONE ENCOUNTER
Reason for Call:  Form, our goal is to have forms completed with 72 hours, however, some forms may require a visit or additional information.    Type of letter, form or note:  Home Health Certification    Who is the form from?: Home care    Where did the form come from: form was faxed in    What clinic location was the form placed at?: [place]    Where the form was placed: Given to MA/RN    What number is listed as a contact on the form?: #[phone]       Additional comments: [facility] forms have been placed in the team's folder for the RN to review.    Call taken on 11/6/2020 at 4:19 PM by Helene Garcia

## 2020-11-06 NOTE — TELEPHONE ENCOUNTER
Reason for Call:  Form, our goal is to have forms completed with 72 hours, however, some forms may require a visit or additional information.    Type of letter, form or note:  Home Health Certification    Who is the form from?: Home care    Where did the form come from: form was faxed in    What clinic location was the form placed at?: [place]    Where the form was placed: Given to MA/RN    What number is listed as a contact on the form?: [phone]       Additional comments: Placed in RN [facility] folder    Call taken on 11/6/2020 at 12:01 PM by Rajani Amaro

## 2020-11-06 NOTE — TELEPHONE ENCOUNTER
The [facility] from 10/30/20 was for [facility], this one is for [facility].Rajani Amaro MA/ROSITA

## 2020-11-08 DIAGNOSIS — F32.1 MODERATE MAJOR DEPRESSION (H): ICD-10-CM

## 2020-11-09 DIAGNOSIS — Z53.9 DIAGNOSIS NOT YET DEFINED: Primary | ICD-10-CM

## 2020-11-09 PROCEDURE — G0180 MD CERTIFICATION HHA PATIENT: HCPCS | Performed by: FAMILY MEDICINE

## 2020-11-09 RX ORDER — OMEGA-3 FATTY ACIDS/FISH OIL 300-1000MG
400 CAPSULE ORAL DAILY PRN
COMMUNITY
Start: 2020-11-09 | End: 2021-04-08

## 2020-11-09 RX ORDER — ARIPIPRAZOLE 5 MG/1
TABLET ORAL
Qty: 90 TABLET | Refills: 0 | Status: SHIPPED | OUTPATIENT
Start: 2020-11-09 | End: 2021-02-10

## 2020-11-09 NOTE — TELEPHONE ENCOUNTER
Faxed [facility] to [facility] @ [phone] and to stat scanning.Rajani Amaro MA/ROSITA

## 2020-11-09 NOTE — TELEPHONE ENCOUNTER
Patient is in the process of discharging from [facility] care and starting [facility] care.     This RN reconciled medication list from [facility] form against our [facility] medication list and there are no discrepancies.   Form put in PCP's box to sign.  Please route this message back to TC along with form.     Zoila KURTZN, RN

## 2020-11-09 NOTE — TELEPHONE ENCOUNTER
Routing refill request to provider for review/approval because:  Labs not current:  Lipids, CBC, A1C    Zoila KURTZN, RN

## 2020-11-11 ENCOUNTER — DOCUMENTATION ONLY (OUTPATIENT)
Dept: CARE COORDINATION | Facility: CLINIC | Age: 80
End: 2020-11-11

## 2020-11-11 DIAGNOSIS — J96.11 CHRONIC RESPIRATORY FAILURE WITH HYPOXIA (H): ICD-10-CM

## 2020-11-11 DIAGNOSIS — J44.9 CHRONIC OBSTRUCTIVE PULMONARY DISEASE, UNSPECIFIED COPD TYPE (H): Primary | ICD-10-CM

## 2020-11-11 NOTE — PROGRESS NOTES
[facility] now requests orders and shares plan of care/discharge summaries for some patients through [employer].  Please REPLY TO THIS MESSAGE OR ROUTE BACK TO THE AUTHOR in order to give authorization for orders when needed.  This is considered a verbal order, you will still receive a faxed copy of orders for signature.  Thank you for your assistance in improving collaboration for our patients.    ORDER   Patient no longer has her pulse portable O2.  Requesting a new order be sent to [facility].  She believes with her breathing techniques she has learned with OT and that her O2 levels have improved with mobility, she would be able to use this.      Can you please place a new referral/order for this?      Erika Gamble RN

## 2020-11-12 ENCOUNTER — MEDICAL CORRESPONDENCE (OUTPATIENT)
Dept: HEALTH INFORMATION MANAGEMENT | Facility: CLINIC | Age: 80
End: 2020-11-12

## 2020-11-12 NOTE — PROGRESS NOTES
Erika,  I pended the order. Can you check that it looks correct and let me know what the lpm should be.   Gucci Wu MD

## 2020-11-18 ENCOUNTER — TELEPHONE (OUTPATIENT)
Dept: FAMILY MEDICINE | Facility: CLINIC | Age: 80
End: 2020-11-18

## 2020-11-18 NOTE — TELEPHONE ENCOUNTER
Reason for Call: Request for an order or referral:    Order or referral being requested: verbal orders: home health aide visits 1 x a week for bathing ending 12-6.      Date needed: as soon as possible    Has the patient been seen by the PCP for this problem? Not applicable    Additional comments: none    Phone number Patient can be reached at:  Other phone number:  [email or web address]    Best Time:  any    Can we leave a detailed message on this number?  YES    Call taken on 11/18/2020 at 11:23 AM by Helen Marley

## 2020-11-18 NOTE — TELEPHONE ENCOUNTER
Returned call to RN and left voicemail approving requested orders.  Advised to call clinic back if any questions.     Zoila KURTZN, RN

## 2020-11-21 DIAGNOSIS — E78.00 ELEVATED LDL CHOLESTEROL LEVEL: ICD-10-CM

## 2020-11-23 RX ORDER — ROSUVASTATIN CALCIUM 20 MG/1
TABLET, COATED ORAL
Qty: 90 TABLET | Refills: 0 | Status: SHIPPED | OUTPATIENT
Start: 2020-11-23 | End: 2021-02-17

## 2020-11-23 NOTE — TELEPHONE ENCOUNTER
Routing refill request to provider for review/approval because:  Labs not current:  Lipids    Zoila Whittaker BSN, RN

## 2020-12-04 DIAGNOSIS — Z23 NEED FOR PROPHYLACTIC VACCINATION AND INOCULATION AGAINST INFLUENZA: ICD-10-CM

## 2020-12-04 DIAGNOSIS — R29.898 WEAKNESS OF BOTH LOWER EXTREMITIES: ICD-10-CM

## 2020-12-04 RX ORDER — DULOXETIN HYDROCHLORIDE 60 MG/1
CAPSULE, DELAYED RELEASE ORAL
Qty: 180 CAPSULE | Refills: 1 | Status: SHIPPED | OUTPATIENT
Start: 2020-12-04 | End: 2021-07-06

## 2020-12-08 ENCOUNTER — DOCUMENTATION ONLY (OUTPATIENT)
Dept: CARE COORDINATION | Facility: CLINIC | Age: 80
End: 2020-12-08

## 2020-12-08 NOTE — PROGRESS NOTES
[facility] now requests orders and shares plan of care/discharge summaries for some patients through [employer].  Please REPLY TO THIS MESSAGE OR ROUTE BACK TO THE AUTHOR in order to give authorization for orders when needed.  This is considered a verbal order, you will still receive a faxed copy of orders for signature.  Thank you for your assistance in improving collaboration for our patients.    ORDER  [facility] home health aide for bathing and personal cares 1w9  HN for for recertifications q 60 days    Patient has discharge from Medicare services and is going to continue to privately pay for home health aide to see her.      Erika Gamble RN  [phone]

## 2020-12-23 ENCOUNTER — TELEPHONE (OUTPATIENT)
Dept: FAMILY MEDICINE | Facility: CLINIC | Age: 80
End: 2020-12-23

## 2020-12-23 NOTE — TELEPHONE ENCOUNTER
Reason for Call:  Form, our goal is to have forms completed with 72 hours, however, some forms may require a visit or additional information.    Type of letter, form or note:  Home Health Certification    Who is the form from?: Home care    Where did the form come from: form was faxed in    What clinic location was the form placed at?: [place]    Where the form was placed: Given to MA/RN    What number is listed as a contact on the form?: [phone]       Additional comments: Placed in [facility] basket in [facility]    Call taken on 12/23/2020 at 1:26 PM by Rajani Amaro

## 2020-12-27 ENCOUNTER — HEALTH MAINTENANCE LETTER (OUTPATIENT)
Age: 80
End: 2020-12-27

## 2020-12-28 DIAGNOSIS — Z53.9 DIAGNOSIS NOT YET DEFINED: Primary | ICD-10-CM

## 2020-12-28 PROCEDURE — G0179 MD RECERTIFICATION HHA PT: HCPCS | Performed by: FAMILY MEDICINE

## 2020-12-28 NOTE — TELEPHONE ENCOUNTER
I faxed the completed and signed C forms to [facility] @fax #[phone].  Sent to STAT scanning.  Tanisha Arevalo,

## 2020-12-30 DIAGNOSIS — E53.8 FOLATE DEFICIENCY: ICD-10-CM

## 2020-12-31 RX ORDER — FOLIC ACID 1 MG/1
TABLET ORAL
Qty: 90 TABLET | Refills: 0 | Status: SHIPPED | OUTPATIENT
Start: 2020-12-31 | End: 2021-04-12

## 2021-01-01 ENCOUNTER — MYC MEDICAL ADVICE (OUTPATIENT)
Dept: FAMILY MEDICINE | Facility: CLINIC | Age: 81
End: 2021-01-01

## 2021-01-01 ENCOUNTER — MEDICAL CORRESPONDENCE (OUTPATIENT)
Dept: HEALTH INFORMATION MANAGEMENT | Facility: CLINIC | Age: 81
End: 2021-01-01

## 2021-01-01 ENCOUNTER — TELEPHONE (OUTPATIENT)
Dept: FAMILY MEDICINE | Facility: CLINIC | Age: 81
End: 2021-01-01

## 2021-01-01 ENCOUNTER — TRANSFERRED RECORDS (OUTPATIENT)
Dept: HEALTH INFORMATION MANAGEMENT | Facility: CLINIC | Age: 81
End: 2021-01-01

## 2021-01-01 ENCOUNTER — LAB (OUTPATIENT)
Dept: LAB | Facility: OTHER | Age: 81
End: 2021-01-01
Payer: MEDICARE

## 2021-01-01 ENCOUNTER — MYC REFILL (OUTPATIENT)
Dept: FAMILY MEDICINE | Facility: CLINIC | Age: 81
End: 2021-01-01

## 2021-01-01 ENCOUNTER — TELEPHONE (OUTPATIENT)
Dept: FAMILY MEDICINE | Facility: CLINIC | Age: 81
End: 2021-01-01
Payer: MEDICARE

## 2021-01-01 ENCOUNTER — HEALTH MAINTENANCE LETTER (OUTPATIENT)
Age: 81
End: 2021-01-01

## 2021-01-01 ENCOUNTER — OFFICE VISIT (OUTPATIENT)
Dept: FAMILY MEDICINE | Facility: CLINIC | Age: 81
End: 2021-01-01
Payer: MEDICARE

## 2021-01-01 ENCOUNTER — TRANSFERRED RECORDS (OUTPATIENT)
Dept: HEALTH INFORMATION MANAGEMENT | Facility: CLINIC | Age: 81
End: 2021-01-01
Payer: MEDICARE

## 2021-01-01 ENCOUNTER — MEDICAL CORRESPONDENCE (OUTPATIENT)
Dept: HEALTH INFORMATION MANAGEMENT | Facility: CLINIC | Age: 81
End: 2021-01-01
Payer: MEDICARE

## 2021-01-01 ENCOUNTER — MYC MEDICAL ADVICE (OUTPATIENT)
Dept: FAMILY MEDICINE | Facility: CLINIC | Age: 81
End: 2021-01-01
Payer: MEDICARE

## 2021-01-01 ENCOUNTER — DOCUMENTATION ONLY (OUTPATIENT)
Dept: CARE COORDINATION | Facility: CLINIC | Age: 81
End: 2021-01-01
Payer: MEDICARE

## 2021-01-01 ENCOUNTER — LAB (OUTPATIENT)
Dept: LAB | Facility: CLINIC | Age: 81
End: 2021-01-01
Payer: MEDICARE

## 2021-01-01 VITALS
WEIGHT: 158 LBS | BODY MASS INDEX: 31.91 KG/M2 | OXYGEN SATURATION: 98 % | DIASTOLIC BLOOD PRESSURE: 108 MMHG | HEART RATE: 94 BPM | SYSTOLIC BLOOD PRESSURE: 159 MMHG | TEMPERATURE: 97.1 F

## 2021-01-01 VITALS
TEMPERATURE: 96.8 F | HEART RATE: 80 BPM | DIASTOLIC BLOOD PRESSURE: 83 MMHG | BODY MASS INDEX: 28.68 KG/M2 | OXYGEN SATURATION: 94 % | SYSTOLIC BLOOD PRESSURE: 139 MMHG | WEIGHT: 142 LBS

## 2021-01-01 DIAGNOSIS — G47.00 INSOMNIA, UNSPECIFIED TYPE: Primary | ICD-10-CM

## 2021-01-01 DIAGNOSIS — N39.0 RECURRENT UTI: ICD-10-CM

## 2021-01-01 DIAGNOSIS — E66.01 MORBID OBESITY (H): ICD-10-CM

## 2021-01-01 DIAGNOSIS — F33.1 MAJOR DEPRESSIVE DISORDER, RECURRENT EPISODE, MODERATE (H): ICD-10-CM

## 2021-01-01 DIAGNOSIS — R30.0 DYSURIA: Primary | ICD-10-CM

## 2021-01-01 DIAGNOSIS — R29.898 WEAKNESS OF BOTH LOWER EXTREMITIES: ICD-10-CM

## 2021-01-01 DIAGNOSIS — Z23 HIGH PRIORITY FOR 2019-NCOV VACCINE: ICD-10-CM

## 2021-01-01 DIAGNOSIS — Z87.440 HISTORY OF RECURRENT UTIS: Primary | ICD-10-CM

## 2021-01-01 DIAGNOSIS — Z23 NEED FOR PROPHYLACTIC VACCINATION AND INOCULATION AGAINST INFLUENZA: ICD-10-CM

## 2021-01-01 DIAGNOSIS — N39.0 RECURRENT UTI: Primary | ICD-10-CM

## 2021-01-01 DIAGNOSIS — Z53.9 DIAGNOSIS NOT YET DEFINED: Primary | ICD-10-CM

## 2021-01-01 DIAGNOSIS — Z78.0 ASYMPTOMATIC MENOPAUSAL STATE: Primary | ICD-10-CM

## 2021-01-01 DIAGNOSIS — E53.8 FOLATE DEFICIENCY: ICD-10-CM

## 2021-01-01 DIAGNOSIS — R30.0 DYSURIA: ICD-10-CM

## 2021-01-01 DIAGNOSIS — R35.0 URINARY FREQUENCY: Primary | ICD-10-CM

## 2021-01-01 DIAGNOSIS — G25.0 ESSENTIAL TREMOR: ICD-10-CM

## 2021-01-01 DIAGNOSIS — I26.99 OTHER PULMONARY EMBOLISM WITHOUT ACUTE COR PULMONALE, UNSPECIFIED CHRONICITY (H): Primary | ICD-10-CM

## 2021-01-01 DIAGNOSIS — F32.1 MODERATE MAJOR DEPRESSION (H): ICD-10-CM

## 2021-01-01 DIAGNOSIS — N39.0 URINARY TRACT INFECTION WITHOUT HEMATURIA, SITE UNSPECIFIED: Primary | ICD-10-CM

## 2021-01-01 DIAGNOSIS — J44.9 CHRONIC OBSTRUCTIVE PULMONARY DISEASE, UNSPECIFIED COPD TYPE (H): ICD-10-CM

## 2021-01-01 DIAGNOSIS — G47.00 INSOMNIA, UNCONTROLLED: ICD-10-CM

## 2021-01-01 DIAGNOSIS — E78.00 ELEVATED LDL CHOLESTEROL LEVEL: ICD-10-CM

## 2021-01-01 DIAGNOSIS — N39.0 URINARY TRACT INFECTION WITHOUT HEMATURIA, SITE UNSPECIFIED: ICD-10-CM

## 2021-01-01 LAB
ALBUMIN UR-MCNC: 100 MG/DL
ALT SERPL-CCNC: 13 IU/L (ref 8–45)
APPEARANCE UR: ABNORMAL
APPEARANCE UR: ABNORMAL
APPEARANCE UR: CLEAR
AST SERPL-CCNC: 31 IU/L (ref 2–40)
BACTERIA #/AREA URNS HPF: ABNORMAL /HPF
BACTERIA UR CULT: ABNORMAL
BILIRUB UR QL STRIP: ABNORMAL
BILIRUB UR QL STRIP: ABNORMAL
BILIRUB UR QL STRIP: NEGATIVE
COLOR UR AUTO: ABNORMAL
COLOR UR AUTO: YELLOW
COLOR UR AUTO: YELLOW
CREATININE (EXTERNAL): 0.72 MG/DL (ref 0.57–1.11)
GFR ESTIMATED (EXTERNAL): >60 ML/MIN/1.73M2
GFR ESTIMATED (IF AFRICAN AMERICAN) (EXTERNAL): >60 ML/MIN/1.73M2
GLUCOSE (EXTERNAL): 66 MG/DL (ref 65–100)
GLUCOSE UR STRIP-MCNC: NEGATIVE MG/DL
GRAN CASTS #/AREA URNS LPF: ABNORMAL /LPF
HGB UR QL STRIP: ABNORMAL
HGB UR QL STRIP: ABNORMAL
HGB UR QL STRIP: NEGATIVE
HYALINE CASTS #/AREA URNS LPF: ABNORMAL /LPF
KETONES UR STRIP-MCNC: 15 MG/DL
KETONES UR STRIP-MCNC: ABNORMAL MG/DL
KETONES UR STRIP-MCNC: NEGATIVE MG/DL
LEUKOCYTE ESTERASE UR QL STRIP: ABNORMAL
LEUKOCYTE ESTERASE UR QL STRIP: ABNORMAL
LEUKOCYTE ESTERASE UR QL STRIP: NEGATIVE
MUCOUS THREADS #/AREA URNS LPF: PRESENT /LPF
NITRATE UR QL: NEGATIVE
NITRATE UR QL: POSITIVE
NITRATE UR QL: POSITIVE
PH UR STRIP: 6 [PH] (ref 5–7)
PH UR STRIP: 6 [PH] (ref 5–7)
PH UR STRIP: 6.5 [PH] (ref 5–7)
POTASSIUM (EXTERNAL): 3.9 MMOL/L (ref 3.5–5)
RBC #/AREA URNS AUTO: ABNORMAL /HPF
SP GR UR STRIP: 1.02 (ref 1–1.03)
SP GR UR STRIP: >=1.03 (ref 1–1.03)
SP GR UR STRIP: >=1.03 (ref 1–1.03)
SQUAMOUS #/AREA URNS AUTO: ABNORMAL /LPF
SQUAMOUS #/AREA URNS AUTO: ABNORMAL /LPF
TSH SERPL-ACNC: 0.6 UIU/ML (ref 0.35–4.94)
UROBILINOGEN UR STRIP-ACNC: 0.2 E.U./DL
UROBILINOGEN UR STRIP-ACNC: 1 E.U./DL
UROBILINOGEN UR STRIP-ACNC: 2 E.U./DL
WBC #/AREA URNS AUTO: ABNORMAL /HPF

## 2021-01-01 PROCEDURE — 87186 SC STD MICRODIL/AGAR DIL: CPT

## 2021-01-01 PROCEDURE — 81001 URINALYSIS AUTO W/SCOPE: CPT | Performed by: FAMILY MEDICINE

## 2021-01-01 PROCEDURE — G0180 MD CERTIFICATION HHA PATIENT: HCPCS | Performed by: FAMILY MEDICINE

## 2021-01-01 PROCEDURE — 87086 URINE CULTURE/COLONY COUNT: CPT

## 2021-01-01 PROCEDURE — 0004A COVID-19,PF,PFIZER (12+ YRS): CPT | Performed by: FAMILY MEDICINE

## 2021-01-01 PROCEDURE — 87088 URINE BACTERIA CULTURE: CPT

## 2021-01-01 PROCEDURE — 91300 COVID-19,PF,PFIZER (12+ YRS): CPT | Performed by: FAMILY MEDICINE

## 2021-01-01 PROCEDURE — G0179 MD RECERTIFICATION HHA PT: HCPCS | Performed by: FAMILY MEDICINE

## 2021-01-01 PROCEDURE — 87086 URINE CULTURE/COLONY COUNT: CPT | Performed by: FAMILY MEDICINE

## 2021-01-01 PROCEDURE — 99214 OFFICE O/P EST MOD 30 MIN: CPT | Performed by: FAMILY MEDICINE

## 2021-01-01 PROCEDURE — 81001 URINALYSIS AUTO W/SCOPE: CPT

## 2021-01-01 RX ORDER — NITROFURANTOIN 25; 75 MG/1; MG/1
100 CAPSULE ORAL 2 TIMES DAILY
Qty: 30 CAPSULE | Refills: 5 | Status: SHIPPED | OUTPATIENT
Start: 2021-01-01 | End: 2021-01-01

## 2021-01-01 RX ORDER — ARIPIPRAZOLE 5 MG/1
5 TABLET ORAL AT BEDTIME
Qty: 30 TABLET | Refills: 1 | Status: SHIPPED | OUTPATIENT
Start: 2021-01-01 | End: 2022-01-01

## 2021-01-01 RX ORDER — LEVOFLOXACIN 500 MG/1
500 TABLET, FILM COATED ORAL DAILY
Qty: 5 TABLET | Refills: 0 | Status: SHIPPED | OUTPATIENT
Start: 2021-01-01 | End: 2022-01-01

## 2021-01-01 RX ORDER — LEVOTHYROXINE SODIUM 100 UG/1
100 TABLET ORAL DAILY
Qty: 30 TABLET | Refills: 0 | Status: SHIPPED | OUTPATIENT
Start: 2021-01-01 | End: 2021-01-01

## 2021-01-01 RX ORDER — ARIPIPRAZOLE 2 MG/1
5 TABLET ORAL AT BEDTIME
Qty: 90 TABLET | Refills: 1 | Status: SHIPPED | OUTPATIENT
Start: 2021-01-01 | End: 2021-01-01

## 2021-01-01 RX ORDER — ARIPIPRAZOLE 2 MG/1
2 TABLET ORAL AT BEDTIME
Qty: 90 TABLET | Refills: 1 | Status: SHIPPED | OUTPATIENT
Start: 2021-01-01 | End: 2021-01-01

## 2021-01-01 RX ORDER — PRIMIDONE 50 MG/1
TABLET ORAL
Qty: 45 TABLET | Refills: 3 | Status: SHIPPED | OUTPATIENT
Start: 2021-01-01

## 2021-01-01 RX ORDER — DULOXETIN HYDROCHLORIDE 60 MG/1
CAPSULE, DELAYED RELEASE ORAL
Qty: 180 CAPSULE | Refills: 0 | Status: SHIPPED | OUTPATIENT
Start: 2021-01-01 | End: 2022-01-01

## 2021-01-01 RX ORDER — ROSUVASTATIN CALCIUM 20 MG/1
20 TABLET, COATED ORAL AT BEDTIME
Qty: 90 TABLET | Refills: 3 | Status: SHIPPED | OUTPATIENT
Start: 2021-01-01

## 2021-01-01 RX ORDER — BUSPIRONE HYDROCHLORIDE 15 MG/1
15 TABLET ORAL 2 TIMES DAILY
Qty: 180 TABLET | Refills: 3 | Status: SHIPPED | OUTPATIENT
Start: 2021-01-01

## 2021-01-01 RX ORDER — QUETIAPINE FUMARATE 25 MG/1
TABLET, FILM COATED ORAL
Qty: 60 TABLET | Refills: 0 | OUTPATIENT
Start: 2021-01-01

## 2021-01-01 RX ORDER — FOLIC ACID 1 MG/1
1000 TABLET ORAL DAILY
Qty: 90 TABLET | Refills: 2 | Status: SHIPPED | OUTPATIENT
Start: 2021-01-01

## 2021-01-01 RX ORDER — LEVOFLOXACIN 750 MG/1
750 TABLET, FILM COATED ORAL DAILY
Qty: 5 TABLET | Refills: 0 | Status: SHIPPED | OUTPATIENT
Start: 2021-01-01 | End: 2022-01-01

## 2021-01-01 RX ORDER — NITROFURANTOIN 25; 75 MG/1; MG/1
100 CAPSULE ORAL 2 TIMES DAILY
Qty: 14 CAPSULE | Refills: 0 | Status: SHIPPED | OUTPATIENT
Start: 2021-01-01 | End: 2021-01-01

## 2021-01-01 RX ORDER — LEVOTHYROXINE SODIUM 100 UG/1
100 TABLET ORAL DAILY
Qty: 90 TABLET | Refills: 3 | Status: SHIPPED | OUTPATIENT
Start: 2021-01-01

## 2021-01-01 RX ORDER — ALBUTEROL SULFATE 90 UG/1
2 AEROSOL, METERED RESPIRATORY (INHALATION) EVERY 6 HOURS
Qty: 18 G | Refills: 2 | Status: SHIPPED | OUTPATIENT
Start: 2021-01-01 | End: 2022-01-01

## 2021-01-01 RX ORDER — CIPROFLOXACIN 500 MG/1
500 TABLET, FILM COATED ORAL 2 TIMES DAILY
Qty: 10 TABLET | Refills: 0 | Status: SHIPPED | OUTPATIENT
Start: 2021-01-01 | End: 2021-01-01

## 2021-01-01 RX ORDER — QUETIAPINE FUMARATE 25 MG/1
25 TABLET, FILM COATED ORAL 2 TIMES DAILY
Qty: 60 TABLET | Refills: 0 | Status: SHIPPED | OUTPATIENT
Start: 2021-01-01 | End: 2021-01-01

## 2021-01-01 RX ORDER — NITROFURANTOIN 25; 75 MG/1; MG/1
100 CAPSULE ORAL DAILY
Qty: 90 CAPSULE | Refills: 1 | Status: SHIPPED | OUTPATIENT
Start: 2021-01-01

## 2021-01-01 RX ORDER — QUETIAPINE FUMARATE 25 MG/1
25 TABLET, FILM COATED ORAL 2 TIMES DAILY
Qty: 180 TABLET | Refills: 3 | Status: SHIPPED | OUTPATIENT
Start: 2021-01-01 | End: 2022-01-01

## 2021-01-01 ASSESSMENT — PAIN SCALES - GENERAL
PAINLEVEL: NO PAIN (0)
PAINLEVEL: SEVERE PAIN (7)

## 2021-01-01 NOTE — PROGRESS NOTES
I have reviewed the message from Home Care/Hospice and I authorize these orders.  Gucci Wu MD       
[facility] now requests orders and shares plan of care/discharge summaries for some patients through [facility].  Please REPLY TO THIS MESSAGE in order to give authorization for orders when needed.  This is considered a verbal order, you will still receive a faxed copy of orders for signature.  Thank you for your assistance in improving collaboration for our patients.    ORDER  SN 2w2, 1w1, 2 PRN  PT eval and treat  PT eval and treat  HHA for bathing and personal care    FYI, Patient is currently not taking lasix.    MD SUMMARY/PLAN OF CARE  SITUATION Pt alert and oriented. VS stable. lungs cta. SOB with actvity and reports no cough. Pts  present at SOC in home in [place]. Pt denies pain at visit. Reports poor appetite eating small meals a day. Pt reports no concerns with BM, last BM yesterday. Pt takes fibe rif needed. Incontinent of urine and wears incontinent product. BS present x4. No edema in LE. Pt states she is not taking lasix, MD updated. Pt dependent on ADLs and IADLs.  helps with both. Pt ambulates with wheeled walker, unsteady and feels very weak and needs to sit in walker multiple times when walking throughout home. Pt had multiple falls in the past few weeks with no injury. Denies any s/s of depression, score a 0 on the pHQ2. Currently taking cymbalta and buspar.   BACKGROUND Pt was seen in clinic on 11/15/17 after having 4 falls in the last week. Pt referred to homecare.   ANALYSIS Pt at risk for hospitalization d/t high fall risk and hx of previous falls  RECOMMENDATION SN for medication management, pain, and physical and mental health assessment and HSE. PT for LE strengthening and conditioning. OT for home safety eval and endurance. HHA for personal cares and bathing.    Demetria Restrepo RN  [phone]  [email or web address]  
Acceptable shape position of pinnae; no pits or tags; external auditory canal size and shape acceptable. Tympanic membranes clear (deferrable).

## 2021-01-12 ENCOUNTER — MEDICAL CORRESPONDENCE (OUTPATIENT)
Dept: HEALTH INFORMATION MANAGEMENT | Facility: CLINIC | Age: 81
End: 2021-01-12

## 2021-01-21 ENCOUNTER — TRANSFERRED RECORDS (OUTPATIENT)
Dept: HEALTH INFORMATION MANAGEMENT | Facility: CLINIC | Age: 81
End: 2021-01-21

## 2021-01-21 LAB
ALT SERPL-CCNC: 6 IU/L (ref 8–45)
AST SERPL-CCNC: 13 IU/L (ref 2–40)
INR PPP: 1.1

## 2021-01-22 LAB
EJECTION FRACTION: >65 %
GLUCOSE SERPL-MCNC: 92 MG/DL (ref 65–100)

## 2021-01-23 LAB
CREAT SERPL-MCNC: 0.64 MG/DL (ref 0.57–1.11)
GFR SERPL CREATININE-BSD FRML MDRD: >60 ML/MIN/1.73M2
POTASSIUM SERPL-SCNC: 4.8 MMOL/L (ref 3.5–5)

## 2021-01-25 ENCOUNTER — TELEPHONE (OUTPATIENT)
Dept: FAMILY MEDICINE | Facility: CLINIC | Age: 81
End: 2021-01-25

## 2021-01-25 LAB — EJECTION FRACTION: >65 %

## 2021-01-25 NOTE — TELEPHONE ENCOUNTER
I called the number listed   I spoke to Dr Barber   The patient was discharged on cefuroxime  1 of 2 positive blood cultures came back positive.     She recommend(ed) repeat blood cultures     Hiral her daughter has enma instructed to have the patient follow up in clinic

## 2021-01-26 ENCOUNTER — TELEPHONE (OUTPATIENT)
Dept: FAMILY MEDICINE | Facility: CLINIC | Age: 81
End: 2021-01-26

## 2021-01-26 DIAGNOSIS — I25.2 HX OF MYOCARDIAL INFARCTION: ICD-10-CM

## 2021-01-26 DIAGNOSIS — I25.83 CORONARY ARTERY DISEASE DUE TO LIPID RICH PLAQUE: ICD-10-CM

## 2021-01-26 DIAGNOSIS — I25.10 CORONARY ARTERY DISEASE DUE TO LIPID RICH PLAQUE: ICD-10-CM

## 2021-01-26 DIAGNOSIS — J96.11 CHRONIC RESPIRATORY FAILURE WITH HYPOXIA (H): ICD-10-CM

## 2021-01-26 DIAGNOSIS — I25.2 HX OF NON-ST ELEVATION MYOCARDIAL INFARCTION (NSTEMI): ICD-10-CM

## 2021-01-26 DIAGNOSIS — J44.9 CHRONIC OBSTRUCTIVE PULMONARY DISEASE, UNSPECIFIED COPD TYPE (H): Primary | ICD-10-CM

## 2021-01-26 NOTE — TELEPHONE ENCOUNTER
Patient was seen at [facility] in heart care. She had a mild heart attack and has a uti with some other virus. They got a call from [facility] and they said her primary needs to order some tests. Please call as soon as possible. Ok to leave a detailed message.

## 2021-01-26 NOTE — TELEPHONE ENCOUNTER
See Consent to Communicate form giving authorization to discuss PHI with spouse, Esperanza Pace, signed on 9/28/17.    Esperanza states pt is not her normal self, still confused but a lot less confused than when she was in hospital. Pt is currently awake at home, but spouse states she can't do things like operate her phone. He states she has improved compared to her inpatient state. She can ambulate independently to the bathroom.    RN scheduled an appointment for pt tomorrow. RN advised if pt has fever or any new or worsening sx she should not wait until appointment tomorrow and will require reevaluation tonight. Call back to triage RN as needed with any questions.    Next 5 appointments (look out 90 days)    Jan 27, 2021  1:00 PM  Office Visit with Gucci Wu MD  [facility] ([facility] [address] [address] [address] [phone]        Pt's spouse states he believes pt would benefit from the following orders: 1. Care Coordination Referral, 2. Home Care order for home safety, PT, OT, and for medication set up, and 3. [facility] order for continuous pulse oximeter for pt to wear at night. See pending orders.    MADDI Franklin, RN

## 2021-01-26 NOTE — CONFIDENTIAL NOTE
According to yesterday's encounter, patient is supposed to have repeat blood cultures?    Routing to provider to advise.  Zoila KURTZN, RN

## 2021-01-27 ENCOUNTER — PATIENT OUTREACH (OUTPATIENT)
Dept: CARE COORDINATION | Facility: CLINIC | Age: 81
End: 2021-01-27

## 2021-01-27 ENCOUNTER — PATIENT OUTREACH (OUTPATIENT)
Dept: NURSING | Facility: CLINIC | Age: 81
End: 2021-01-27
Payer: MEDICARE

## 2021-01-27 ENCOUNTER — MYC MEDICAL ADVICE (OUTPATIENT)
Dept: CARE COORDINATION | Facility: CLINIC | Age: 81
End: 2021-01-27

## 2021-01-27 ENCOUNTER — OFFICE VISIT (OUTPATIENT)
Dept: FAMILY MEDICINE | Facility: CLINIC | Age: 81
End: 2021-01-27
Payer: MEDICARE

## 2021-01-27 VITALS
RESPIRATION RATE: 20 BRPM | TEMPERATURE: 97.3 F | SYSTOLIC BLOOD PRESSURE: 138 MMHG | OXYGEN SATURATION: 100 % | HEART RATE: 60 BPM | BODY MASS INDEX: 34.54 KG/M2 | DIASTOLIC BLOOD PRESSURE: 84 MMHG | WEIGHT: 195 LBS

## 2021-01-27 DIAGNOSIS — J96.11 CHRONIC RESPIRATORY FAILURE WITH HYPOXIA (H): ICD-10-CM

## 2021-01-27 DIAGNOSIS — I25.10 CORONARY ARTERY DISEASE INVOLVING NATIVE CORONARY ARTERY OF NATIVE HEART WITHOUT ANGINA PECTORIS: ICD-10-CM

## 2021-01-27 DIAGNOSIS — I10 HYPERTENSION GOAL BP (BLOOD PRESSURE) < 140/90: Primary | ICD-10-CM

## 2021-01-27 DIAGNOSIS — N39.0 URINARY TRACT INFECTION WITHOUT HEMATURIA, SITE UNSPECIFIED: ICD-10-CM

## 2021-01-27 DIAGNOSIS — J44.9 CHRONIC OBSTRUCTIVE PULMONARY DISEASE, UNSPECIFIED COPD TYPE (H): ICD-10-CM

## 2021-01-27 DIAGNOSIS — J96.01 ACUTE RESPIRATORY FAILURE WITH HYPOXIA (H): ICD-10-CM

## 2021-01-27 LAB
ANION GAP SERPL CALCULATED.3IONS-SCNC: 5 MMOL/L (ref 3–14)
BUN SERPL-MCNC: 12 MG/DL (ref 7–30)
CALCIUM SERPL-MCNC: 8.6 MG/DL (ref 8.5–10.1)
CHLORIDE SERPL-SCNC: 98 MMOL/L (ref 94–109)
CO2 SERPL-SCNC: 34 MMOL/L (ref 20–32)
CREAT SERPL-MCNC: 0.65 MG/DL (ref 0.52–1.04)
GFR SERPL CREATININE-BSD FRML MDRD: 84 ML/MIN/{1.73_M2}
GLUCOSE SERPL-MCNC: 75 MG/DL (ref 70–99)
POTASSIUM SERPL-SCNC: 4.5 MMOL/L (ref 3.4–5.3)
SODIUM SERPL-SCNC: 137 MMOL/L (ref 133–144)

## 2021-01-27 PROCEDURE — 99214 OFFICE O/P EST MOD 30 MIN: CPT | Performed by: FAMILY MEDICINE

## 2021-01-27 PROCEDURE — 80048 BASIC METABOLIC PNL TOTAL CA: CPT | Performed by: FAMILY MEDICINE

## 2021-01-27 PROCEDURE — 87040 BLOOD CULTURE FOR BACTERIA: CPT | Performed by: FAMILY MEDICINE

## 2021-01-27 PROCEDURE — 36415 COLL VENOUS BLD VENIPUNCTURE: CPT | Performed by: FAMILY MEDICINE

## 2021-01-27 RX ORDER — ASPIRIN 81 MG/1
81 TABLET, CHEWABLE ORAL
COMMUNITY
Start: 2021-01-24

## 2021-01-27 RX ORDER — CEFUROXIME AXETIL 250 MG/1
250 TABLET ORAL
COMMUNITY
Start: 2021-01-23 | End: 2021-01-28

## 2021-01-27 RX ORDER — METOPROLOL SUCCINATE 50 MG/1
50 TABLET, EXTENDED RELEASE ORAL DAILY
COMMUNITY
Start: 2021-01-24 | End: 2021-02-17

## 2021-01-27 RX ORDER — FUROSEMIDE 20 MG
10 TABLET ORAL DAILY
COMMUNITY
Start: 2021-01-24 | End: 2021-02-10

## 2021-01-27 NOTE — PROGRESS NOTES
Clinic Care Coordination Contact  Care Team Conversations    Patient was referred to care coordination, patient has an appointment with [facility] 1/29 at patients husbands request due to his work schedule.     CHW contacted CC RN about apnea monitor and home care orders.     1. CC RN contacted patients PCP who will review and sign the pending home care orders. Home care generally contacts patient within 24-48 hours for home care start of care.   2. Patient may call their insurance to inquire on  medicare covered DME store locations for apnea monitor.     Plan:   -CHW stated she will call patient/family with this information.   -[facility]CRN will contact patient Friday for scheduled phone visit.     Emily Olivares, RN, BSN, PHN Care Coordinator  [place], [place], and [facility]   Phone: [phone]

## 2021-01-27 NOTE — PROGRESS NOTES
Assessment & Plan     Hypertension goal BP (blood pressure) < 140/90    - Basic metabolic panel    Urinary tract infection without hematuria, site unspecified    - Basic metabolic panel  - Blood culture    Coronary artery disease involving native coronary artery of native heart without angina pectoris    - Basic metabolic panel    Chronic respiratory failure with hypoxia (H)    - Apnea Monitor Order for DME - ONLY FOR DME    Chronic obstructive pulmonary disease, unspecified COPD type (H)    - Apnea Monitor Order for DME - ONLY FOR DME    Acute respiratory failure with hypoxia (H)    - Apnea Monitor Order for DME - ONLY FOR DME    Review of external notes as documented above   Assessment requiring an independent historian(s) - family -           25 minutes spent on the date of the encounter doing chart review, history and exam, documentation and further activities as noted above                   No follow-ups on file.    Gucci Wu MD  [facility] [place]     Gilma is a 80 year old who presents to clinic today for the following health issues  accompanied by her spouse:    [facility]         Hospital Follow-up Visit:    Hospital/Nursing Home/ Rehab Facility: [facility]  Date of Admission: 1/21/21  Date of Discharge: 1/23/21  Reason(s) for Admission: UTI, NSTEMI      Was your hospitalization related to COVID-19? No   Problems taking medications regularly:  Little confused with the changes they made with medications at the hospital  Medication changes since discharge: None  Problems adhering to non-medication therapy:  None    Summary of hospitalization:  CareEverywhere information obtained and reviewed  Diagnostic Tests/Treatments reviewed.  Follow up needed: Cr, potassium and blood cultures   Other Healthcare Providers Involved in Patient s Care:         Homecare  Update since discharge: improved.       Post Discharge Medication Reconciliation: discharge medications reconciled,  continue medications without change.  Plan of care communicated with patient and family                  Review of Systems         Objective    /84   Pulse 60   Temp 97.3  F (36.3  C) (Tympanic)   Resp 20   Wt 88.5 kg (195 lb)   SpO2 100%   BMI 34.54 kg/m    Body mass index is 34.54 kg/m .  Physical Exam           ----- Ambulatory Services Attestations for Billing on Time -----   --------------------------------------------------------------------------------------------------------------------------------------  Subjective:  Gilma is a 80 year old female who presents today for follow-up on a recent hospitalization at [facility]. She was admitted to the hospital on January 21st and discharged on the 23rd. The patient went to the hospital for symptoms as follows:  Her  woke up at midnight and she was fine. In the am she was still asleep until 9 am and he checked on her. Her O2 mask was off and her O2 sat was 70 and she would not respond. He then called the EMS. She remembers waking up in the ambulance.  She was diagnosed with a UTI and a CAD . The patient was treated with antibiotic(s) . She was asked to follow up today specifically to check up on her blood cultures . At this time the patient reports some improvement of the original symptoms. In addition the patient reports the following new symptoms: none.  She  Has a tough time with her medications      She has 1 more day(s) of antibiotic(s)     Blood cultures           Current Outpatient Medications:      acetaminophen (TYLENOL) 500 MG tablet, Take 2 tablets (1,000 mg) by mouth every 8 hours as needed for mild pain, Disp: , Rfl:      alendronate (FOSAMAX) 70 MG tablet, Take 1 tablet (70 mg) by mouth every 7 days, Disp: 12 tablet, Rfl: 3     ARIPiprazole (ABILIFY) 5 MG tablet, TAKE 1 TABLET BY MOUTH AT BEDTIME, Disp: 90 tablet, Rfl: 0     aspirin (ASA) 81 MG chewable tablet, Take 81 mg by mouth, Disp: , Rfl:      busPIRone (BUSPAR) 15 MG  tablet, Take 1 tablet by mouth twice daily, Disp: 180 tablet, Rfl: 0     cefuroxime (CEFTIN) 250 MG tablet, Take 250 mg by mouth, Disp: , Rfl:      DULoxetine (CYMBALTA) 60 MG capsule, Take 2 capsules by mouth once daily, Disp: 180 capsule, Rfl: 1     folic acid (FOLVITE) 1 MG tablet, Take 1 tablet by mouth once daily, Disp: 90 tablet, Rfl: 0     furosemide (LASIX) 20 MG tablet, Take 10 mg by mouth daily, Disp: , Rfl:      gabapentin (NEURONTIN) 600 MG tablet, Take 1 tablet by mouth twice daily, Disp: 180 tablet, Rfl: 1     ibuprofen (ADVIL/MOTRIN) 200 MG capsule, Take 2 capsules (400 mg) by mouth daily as needed, Disp: , Rfl:      levothyroxine (SYNTHROID/LEVOTHROID) 100 MCG tablet, Take 1 tablet by mouth once daily, Disp: 90 tablet, Rfl: 0     lisinopril (ZESTRIL) 40 MG tablet, Take 1 tablet by mouth once daily, Disp: 90 tablet, Rfl: 1     metoprolol succinate ER (TOPROL-XL) 50 MG 24 hr tablet, Take 50 mg by mouth daily, Disp: , Rfl:      NYSTOP [id number] UNIT/GM powder, APPLY  POWDER TOPICALLY TWICE DAILY, Disp: 60 g, Rfl: 11     order for DME, [facility] home oxygen:  Equipment being ordered: Oxygen, home fill system,  4 LPM, Disp: 1 each, Rfl: 0     order for DME, Equipment being ordered: Oxygen -  4 liters a day, Disp: , Rfl:      pramipexole (MIRAPEX) 0.25 MG tablet, Take 1 tablet (0.25 mg) by mouth At Bedtime, Disp: 90 tablet, Rfl: 3     primidone (MYSOLINE) 50 MG tablet, TAKE 1/2 (ONE-HALF) TABLET BY MOUTH AT BEDTIME, Disp: 45 tablet, Rfl: 3     rosuvastatin (CRESTOR) 20 MG tablet, TAKE 1 TABLET BY MOUTH AT BEDTIME, Disp: 90 tablet, Rfl: 0     traZODone (DESYREL) 100 MG tablet, TAKE 1 TABLET BY MOUTH AT BEDTIME, Disp: 90 tablet, Rfl: 1     umeclidinium (INCRUSE ELLIPTA) 62.5 MCG/INH inhaler, Inhale 1 puff into the lungs daily, Disp: 3 Inhaler, Rfl: 3     albuterol (PROVENTIL HFA) 108 (90 Base) MCG/ACT inhaler, Inhale 2 puffs into the lungs every 6 hours (Patient not taking: Reported on 1/27/2021), Disp: 18 g, Rfl:  2     amLODIPine (NORVASC) 10 MG tablet, TAKE 1 TABLET BY MOUTH ONCE DAILY (Patient not taking: Reported on 1/27/2021), Disp: 90 tablet, Rfl: 3    Past Medical History:   Diagnosis Date     Anxiety 10/11/2006    Last visit with Mental health specialist in 2002.      Chronic low back pain without sciatica 10/31/2016     Compression fracture of thoracic vertebra (H) 6/13/2011    Overview:  T  11  compression  fx  on  xrays  from  6/13/2011  . Ongoing pain  x  3  months   Get  eval  and  treat  with  spine  specialists  as discussed  Initially  treated  in [place]  .  Pain  is  controlled   with  advil  as needed  . Reviewed   with  patient      Congestive heart failure (H) 10/11/2006    Overview:  Epic      COPD (chronic obstructive pulmonary disease) (H) 9/28/2017     Folate deficiency 6/9/2017    Overview:  Formatting of this note may be different from the original. Hemoglobin (g/dl)  Date Value  06/05/2017 15.6  10/31/2016 13.8  11/04/2015 11.3 (L)   Iron (mcg/dl)  Date Value  06/05/2017 49 (L)   TIBC, Calculated (mcg/dl)  Date Value  06/05/2017 319   % Saturation, calc. (%)  Date Value  06/05/2017 15   Vitamin B12 (pg/ml)  Date Value  06/05/2017 253   Folate (ng/ml)  Date Value  06/05/20     Hypertension goal BP (blood pressure) < 140/90 9/28/2017     Mitral regurgitation 2/11/2014    Overview:  Needs dental prophylaxis      Mixed hyperlipidemia 10/11/2006     Osteoporosis 9/28/2017     Postoperative hypothyroidism 9/28/2017       OBJECTIVE:  /84   Pulse 60   Temp 97.3  F (36.3  C) (Tympanic)   Resp 20   Wt 88.5 kg (195 lb)   SpO2 100%   BMI 34.54 kg/m    GENERAL APPEARANCE: healthy, alert and no distress  EYES: EOMI,  PERRL  HENT: ear canals and TM's normal and nose and mouth without ulcers or lesions  RESP: lungs clear to auscultation - no rales, rhonchi or wheezes  CV: regular rates and rhythm, normal S1 S2, no S3 or S4 and no murmur, click or rub -  ABDOMEN:  soft, nontender, no HSM or masses and  bowel sounds normal  WXT: 1 + edema bilateral(ly)   ASSESSMENT:  80 year old female who recently underwent hospitalization for UTI  which appear to have improved.      Plan: Blood cultures taken  BMP ordered  At this time we will continue the present management and have the patient return to clinic as needed. Gilma  voiced understanding to informations discussed today.    DME order for apnea monitor

## 2021-01-27 NOTE — PROGRESS NOTES
Clinic Care Coordination Contact  Community Health Worker Initial Outreach    CHW Initial Information Gathering:  Preferred Hospital: Other([facility])  Preferred Urgent Care: (No preference)  Current living arrangement:: I live in a private home with spouse  Type of residence:: [facility]  Community Resources: Housekeeping/Chore Agency(Someome comes in and helps patient shower and wash hair.)  Supplies used at home:: Oxygen Tubing/Supplies(24 hour oxygen)  Equipment Currently Used at Home: hospital bed, grab bar, tub/shower, grab bar, toilet, wheelchair, power, wheelchair, manual, walker, rolling  Informal Support system:: Family, Spouse, Children  Transportation means:: Family, Other( and Children take patient to appointments)  CHW Additional Questions  If ED/Hospital discharge, follow-up appointment scheduled as recommended?: N/A  Medication changes made following ED/Hospital discharge?: N/A  MyChart active?: Yes  Patient sent Social Determinants of Health questionnaire?: Yes    Patient accepts CC: Yes. Patient scheduled for assessment with CC RN on 01/29/21 at 9:30 am. Patient noted desire to discuss Patients  is looking for support to help with organizing  medications and helping with exercising. He said they have had this in the past but it only last a couple of weeks. 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 .You will be speaking with [facility] patients . [phone]     Paula MARTÍNEZ Community Health Worker  Clinic Care Coordination  [facility] Clin[facility] : [place], [place] & [place]  Phone: [phone]    Reason for Referral: Care Transition: Inpatient to outpatient, Complex Medical Concerns/Education: Chronic diagnosis hypoxia and New diagnosis NSTEMI (see 1/21/21 encounter), and Patient/Caregiver Support: Home Safety and Resources for Support.  Additional pertinent details: See recent 1/21/21 hospital admission. Spouse  states pt is not compliant with exercises prescribed at hospital, needs medication set up, home safety assessment, and would like pt to wear a continuous pulse oximeter at night due to tendency to remove her O2 in her sleep when ill.

## 2021-01-27 NOTE — PATIENT INSTRUCTIONS
Paula from care coordination will let you know if the home care can help with getting the apnea alarm.    If not you can go to     [employer] [employer]  [address]  Medical supply store in [place], [place]  Address: [address] , [place] [address]  Hours:   Open ? Closes 5:30PM  Phone: [phone]  Check insurance info  Suggest an edit  Add missing information  Add website

## 2021-01-28 NOTE — RESULT ENCOUNTER NOTE
Gilma,  I have reviewed the results of the laboratory tests that we recently ordered. All of the laboratory that are back so far are normal or considered normal for you. There are still some labs pending and I will let you know those results when they   are available.  Sincerely,   Gucci Wu MD

## 2021-01-29 ENCOUNTER — DOCUMENTATION ONLY (OUTPATIENT)
Dept: CARE COORDINATION | Facility: CLINIC | Age: 81
End: 2021-01-29

## 2021-01-29 ENCOUNTER — PATIENT OUTREACH (OUTPATIENT)
Dept: CARE COORDINATION | Facility: CLINIC | Age: 81
End: 2021-01-29

## 2021-01-29 ASSESSMENT — ACTIVITIES OF DAILY LIVING (ADL): DEPENDENT_IADLS:: TRANSPORTATION;SHOPPING;CLEANING

## 2021-01-29 NOTE — LETTER
[facility]  Complex Care Plan  About Me:    Patient Name:  Gilma Pace    YOB: 1940  Age:         80 year old   Paola MRN:    [id number] Telephone Information:  Home Phone [phone]   Mobile [phone]       Address:  [address] [facility] [address] Email address:  [email or web address]      Emergency Contact(s)    Name Relationship Lgl Grd Work Phone Home Phone Mobile Phone   1. LA PACE Spouse No NONE [phone]   2. RAMOS INIGUEZ Daughter No NONE [phone]           Primary language:  English     needed? No   [facility] [employer]:  [phone] op. 1  Other communication barriers: None  Preferred Method of Communication:  Mail  Current living arrangement: I live in a private home with spouse  Mobility Status/ Medical Equipment:      Health Maintenance  Health Maintenance Reviewed: Up to date    My Access Plan  Medical Emergency 911   Primary Clinic Line [facility] [phone]   24 Hour Appointment Line [phone] or  [phone]) (toll-free)   24 Hour Nurse Line [phone] (toll-free)   Preferred Urgent Care (No preference)   Preferred Hospital Other([facility])   Preferred Pharmacy [facility] [address] [place], [place] - [address] [facility]     Behavioral Health Crisis Line The National Suicide Prevention Lifeline at 1-332.302.4945 or 911             My Care Team Members  Patient Care Team       Relationship Specialty Notifications Start End    Gucci Wu MD PCP - General Family Practice  12/7/17     Phone: [phone] Fax: [phone] [address] [facility] [address]    Gucci Wu MD Assigned PCP   9/8/17     Phone: [phone] Fax: [phone] [address] [facility] [address]    [facility] ([facility]  6/20/20     Phone: [phone]         Kenneth Cornell  MD BLANCA Assigned Surgical Provider   10/23/20     Phone: [phone] Fax: [phone]         [address] [facility] [address]    Emily Olivares, RN Lead Care Coordinator Primary Care - CC Admissions 1/29/21     Phone: [phone] Fax: [phone]        Paula Pace MA Community Health Worker Primary Care - CC Admissions 1/29/21     [phone]            My Care Plans  Self Management and Treatment Plan  Goals and (Comments)  Goals        General    Medical (pt-stated)     Notes - Note edited  1/29/2021 11:38 AM by Stefanie Enrique, DAVID    Goal Statement: I would like to obtain a sleep apnea machine in the next 30-90 days.  Date Goal set: 1/29/21  Barriers: resources, financial  Strengths: motivated  Date to Achieve By: 30-90 days  Patient expressed understanding of goal: yes  Action steps to achieve this goal:  1. My spouse will call Medicare to determine what stores my Apnea Monitor may be obtained from.  2. I will speak with the Community Healthcare Worker at outreach telephone calls to update her on the progress.        Other (pt-stated)     Notes - Note created  1/29/2021 11:32 AM by Stefanie Enrique, DAVID    Goal Statement: I would like to obtain information about increasing assistance in the home in the next 30-60 days.  Date Goal set: 1/29/21  Barriers: resources, financial  Strengths: motivated  Date to Achieve By: 30-60 days  Patient expressed understanding of goal: yes  Action steps to achieve this goal:  1. My spouse & and I will speak with the [facility] SW Monday 2/1 @ 11:00 to see if we qualify for any [facility] services for assistance in the home.  2. If we are unable to qualify for county assistance, we would like a list of private pay agencies that may be less expensive than what we are currently paying.               Action Plans on File:         COPD  Depression          Advance Care Plans/Directives Type:        My Medical and Care Information  Problem List   Patient Active Problem List    Diagnosis     COPD (chronic obstructive pulmonary disease) (H)     Hypertension goal BP (blood pressure) < 140/90     Postoperative hypothyroidism     Mitral valve disorders(424.0)     Osteoporosis     Anxiety     Chronic low back pain     Chronic low back pain without sciatica     Compression fracture of thoracic vertebra (H)     Essential hypertension     Folate deficiency     Hypothyroidism     Mitral regurgitation     Mixed hyperlipidemia     Other abnormal glucose     Recurrent falls     Smoker     Mild concentric left ventricular hypertrophy (LVH)     Myopathy     Acute respiratory failure (H)     Chronic respiratory failure with hypoxia (H)     Leg weakness     Obesity (BMI 35.0-39.9 without comorbidity)     Pneumonia     Respiratory failure (H)     Morbid obesity (H)     Hypothyroidism, unspecified type     Major depressive disorder, recurrent episode, moderate (H)     Sepsis (H)     Restless leg syndrome     Continuous leakage of urine      Current Medications and Allergies:  See printed Medication Report.    Care Coordination Start Date: 1/29/2021   Frequency of Care Coordination:     Form Last Updated: 01/29/2021

## 2021-01-29 NOTE — LETTER
[place] CARE COORDINATION    January 29, 2021    Gilma Pace  [address]  [facility] [address]      Dear Gilma,    I am a clinic care coordinator who works with Gucci Wu MD at [facility]. I wanted to thank you for spending the time to talk with me.  Below is a description of clinic care coordination and how I can further assist you.      The clinic care coordination team is made up of a registered nurse,  and community health worker who understand the health care system. The goal of clinic care coordination is to help you manage your health and improve access to the health care system in the most efficient manner. The team can assist you in meeting your health care goals by providing education, coordinating services, strengthening the communication among your providers and supporting you with any resource needs.    Please feel free to contact the Community Health Worker Paulamillie Pace at [phone] with any questions or concerns. We are focused on providing you with the highest-quality healthcare experience possible and that all starts with you.     Sincerely,     Stefanie Enrique RN  Clinic Care Coordination    Enclosed: I have enclosed a copy of the Complex Care Plan. This has helpful information and goals that we have talked about. Please keep this in an easy to access place to use as needed.

## 2021-01-29 NOTE — PROGRESS NOTES
[facility] now requests orders and shares plan of care/discharge summaries for some patients through [employer].  Please REPLY TO THIS MESSAGE OR ROUTE BACK TO THE AUTHOR in order to give authorization for orders when needed.  This is considered a verbal order, you will still receive a faxed copy of orders for signature.  Thank you for your assistance in improving collaboration for our patients.    ORDER    MD SUMMARY/PLAN OF CARE  SAMMY TO MD  SITUATION Patient alert and oriented x 5.  VSS, lung sounds CTA.  Pain reported at 3 of 10 in right leg at the hamstring.  Pain reported in right leg intermittently in the hamstring and goes into the hip. No pain during assessment, using acetaminophen. PT to eval to address ROM exercises that the aide can perform with patient.  Voiding without difficulty and reports finishing her abx as ordered.  Bowel movements this AM with no cocnerns.  Education provided on keeping skin clean and dry to prevent breakdown or redness. All skin assessed CDI.  Education on how to apply compression stockings and Deke VU.  Patient reports that they feel tight up at the knee and instructed her that if it feels too tight to remove them. O2 levels dropped with 40 feet of ambulation to 85 % on 4 LPM, with pursed lip breathing patient was able to increase O2 to above 90% consistently.  At rest O2 levels at 97% on RA.  BACKGROUND Marce Pace is a 80 y.o. female with a history of COPD on chronic O2 (4L NC) , HTN, HLD, obesity who was brought in by EMS for unresponsiveness and hypoxia. Patient was found to have UTI. Hypoxia likely due to not wearing O2 while she slept. Also has elevated troponin. PMH includes peripheral neuropathy, Obesity, COPD with hypoxia, HTN, Leg weakness  ANALYSIS Patient requiring the assistance of home care for successful transition to home setting as patient is very high falls risk, depenent on CG for ADL and IADL assistance.  RECOMMENDATION SN  2w2, 1w2 for medication teaching, O2 precautions, Resp/cardio assessment. 2 PRN, PT and OT eval and treat for home equipment needs including apnea monitor, home safety and HEP, SW assess for community resources, HHA 2w4 for bathing and personal needs.    Erika Gamble RN   [phone]

## 2021-01-29 NOTE — PROGRESS NOTES
Clinic Care Coordination Contact  OUTREACH    Referral Information:  Referral Source: Care Team    Primary Diagnosis: Respiratory Disorders - other    Chief Complaint   Patient presents with     Clinic Care Coordination - Initial        Clinic Utilization  Difficulty keeping appointments:: No  Compliance Concerns: No  No-Show Concerns: No  No PCP office visit in Past Year: No  Utilization    Last refreshed: 1/29/2021 12:19 PM: Hospital Admissions 0           Last refreshed: 1/29/2021 12:19 PM: ED Visits 0           Last refreshed: 1/29/2021 12:19 PM: No Show Count (past year) 0              Current as of: 1/29/2021 12:19 PM              Clinical Concerns:  Current Medical Concerns:     Pneumonia, resp failure with hypoxia, bronchitis, COPD, peripheral neuropathy,  Chronic low back pain.  Current Behavioral Concerns:     Education Provided to patient: yes   Pain  Pain (GOAL):: No  Health Maintenance Reviewed: Up to date  Clinical Pathway: None    Medication Management:  Patient completes MSU independently     Functional Status:  Dependent IADLs:: Transportation, Shopping, Cleaning  Bed or wheelchair confined:: No  Fallen 2 or more times in the past year?: No  Any fall with injury in the past year?: No    Living Situation:  Current living arrangement:: I live in a private home with spouse  Type of residence:: [facility]    Lifestyle & Psychosocial Needs:        Diet:: No added salt  Inadequate nutrition (GOAL):: No  Tube Feeding: No  Inadequate activity/exercise (GOAL):: No  Significant changes in sleep pattern (GOAL): No  Transportation means:: Regular car     Advent or spiritual beliefs that impact treatment:: No  Mental health DX:: No  Mental health management concern (GOAL):: No  Informal Support system:: Family, Spouse, Children   Socioeconomic History     Marital status:      Spouse name: Not on file     Number of children: 2     Years of education: 14     Highest education level: Not on file    Occupational History     Occupation: RETIRED       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  Admitted - for UTI, hypoxia, NSTEMI.   found her in the morning unresponsive without her oxygen on.  EMS arrived and O2 was in the 70's.  Wears 4 Liters O2 cont at home.  21 f/u PCP visit patient was able to obtain home care orders.  Spouse also obtained an order for Apnea Monitor with resources how to obtain this.  Home care to start today 21 @ 11:30.  Spouse able to manage all grocery shopping and Rx .   Currently patient is paying out of pocket $40/week for [facility] to assist 1 time per week for shower/bathing.  Patient in need of compression sock application (spouse/patient unable to apply), ROM exercises, etc.  They are stating they are unable to afford an increase to this price.  Spouse also stating he had to pay $9,000 for patients teeth extraction and dentures.  He is currently working as a  for extra money.  Unclear if couple would qualify for [facility] assistance.  Appointment made for couple to speak with [facility] SW  @ 11:00 to identify options/resources.  Patients Enrolled for Goals as listed below.       Resources and Interventions:  Current Resources:   List of home care services:: (SOC to be completed today)  Community Resources: Housekeeping/Chore Agency(Someome comes in and helps patient shower and wash hair.)  Supplies used at home:: Oxygen Tubing/Supplies(24 hour oxygen)  Equipment Currently Used at Home: hospital bed, grab bar, tub/shower,  grab bar, toilet, wheelchair, power, wheelchair, manual, walker, rolling  Employment Status: retired)   )    Advance Care Plan/Directive  Advanced Care Plan/Directive Status: Declined Further Information    Referrals Placed: None     Goals:   Goals        General    Medical (pt-stated)     Notes - Note edited  1/29/2021 11:38 AM by Stefanie Enrique RN    Goal Statement: I would like to obtain a sleep apnea machine in the next 30-90 days.  Date Goal set: 1/29/21  Barriers: resources, financial  Strengths: motivated  Date to Achieve By: 30-90 days  Patient expressed understanding of goal: yes  Action steps to achieve this goal:  1. My spouse will call Medicare to determine what stores my Apnea Monitor may be obtained from.  2. I will speak with the Community Healthcare Worker at outreach telephone calls to update her on the progress.        Other (pt-stated)     Notes - Note created  1/29/2021 11:32 AM by Stefanie Enrique RN    Goal Statement: I would like to obtain information about increasing assistance in the home in the next 30-60 days.  Date Goal set: 1/29/21  Barriers: resources, financial  Strengths: motivated  Date to Achieve By: 30-60 days  Patient expressed understanding of goal: yes  Action steps to achieve this goal:  1. My spouse & and I will speak with the [facility] Monday 2/1 @ 11:00 to see if we qualify for any [facility] services for assistance in the home.  2. If we are unable to qualify for [facility] assistance, we would like a list of private pay agencies that may be less expensive than what we are currently paying.              Patient/Caregiver understanding: Patient and Spouse stated an understanding.       Future Appointments              In 3 days Leidy, An [facility]          Plan: DELEGATION: NONE.  FOLLOW STANDARD WORK.

## 2021-02-01 ENCOUNTER — PATIENT OUTREACH (OUTPATIENT)
Dept: NURSING | Facility: CLINIC | Age: 81
End: 2021-02-01
Payer: MEDICARE

## 2021-02-01 NOTE — PROGRESS NOTES
Clinic Care Coordination Contact    Clinic Care Coordination Contact  OUTREACH    Referral Information:       Primary Diagnosis: Respiratory Disorders - other    Chief Complaint   Patient presents with     Clinic Care Coordination - Initial     Tian ARAUJO        [place] Utilization: [facility]; [employer]; [facility] Utilization  Difficulty keeping appointments:: No  Compliance Concerns: No  No-Show Concerns: No  No PCP office visit in Past Year: No  Utilization    Last refreshed: 1/31/2021  5:36 AM: Hospital Admissions 0           Last refreshed: 1/31/2021  5:36 AM: ED Visits 0           Last refreshed: 1/31/2021  5:36 AM: No Show Count (past year) 0              Current as of: 1/31/2021  5:36 AM              Clinical Concerns:  Current Medical Concerns:    Patient Active Problem List   Diagnosis     COPD (chronic obstructive pulmonary disease) (H)     Hypertension goal BP (blood pressure) < 140/90     Postoperative hypothyroidism     Mitral valve disorders(424.0)     Osteoporosis     Anxiety     Chronic low back pain     Chronic low back pain without sciatica     Compression fracture of thoracic vertebra (H)     Essential hypertension     Folate deficiency     Hypothyroidism     Mitral regurgitation     Mixed hyperlipidemia     Other abnormal glucose     Recurrent falls     Smoker     Mild concentric left ventricular hypertrophy (LVH)     Myopathy     Acute respiratory failure (H)     Chronic respiratory failure with hypoxia (H)     Leg weakness     Obesity (BMI 35.0-39.9 without comorbidity)     Pneumonia     Respiratory failure (H)     Morbid obesity (H)     Hypothyroidism, unspecified type     Major depressive disorder, recurrent episode, moderate (H)     Sepsis (H)     Restless leg syndrome     Continuous leakage of urine         Current Behavioral Concerns: Anxiety; Major Depressive disorder  Education Provided to patient: Introduced self and role to patient and   "  Pain  Pain (GOAL):: No  Health Maintenance Reviewed: Due/Overdue   Health Maintenance Due   Topic Date Due     DEXA  1940     COVID-19 Vaccine (1 of 2) 06/16/1957     HEPATITIS C SCREENING  06/16/1959     ZOSTER IMMUNIZATION (1 of 2) 06/16/1991     MEDICARE ANNUAL WELLNESS VISIT  09/18/2020       Clinical Pathway: None    Medication Management:  Did not discuss medications during this conversation.      Functional Status:       Living Situation:       Lifestyle & Psychosocial Needs: CC contacted patient and spouse, as scheduled. Introduced self and role. Explained purpose of the call was to discuss ongoing services, per discussion with Stefanie Enrique, RNCC. Patient and  stated that after hospitalizations, patient typically gets home care services, which did not happen this time around. Chart reviewed. MD put in home care referral on 1/26. Patient confirmed that an RN visited last week to do the initial intake. She stated that she believes she will have RN, OT, and HHA services.  said that he hopes they will be seeing her for the next 6 weeks so she can keep getting better. He reports that is his main concern and reason for bringing up needing more services to the RNCC. CC explained that they will continue to work on goals with patient as long as she is making progress with them. Patient said \"I understand that\".  stated that they are currently paying for someone to come clean their home once every other week. They are also paying for someone to help patient with bathing once a week.  reports that these two services can get rather expensive. He is also needing to get patient a new set of dentures and has other dental bills to pay. He is working part time to pay for these expenses. Discussed their income to see if they would qualify for MA.  said they bring in about $3000 combined from Social Security each month and his part time job brings in another $1200/month. Patient " and  are over income for MA.         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  Date Goal set: 21  Barriers: resources, financial  Strengths: motivated  Date to Achieve By: 30-90 days  Patient expressed understanding of goal: yes  Action steps to achieve this goal:  1. My spouse will call Medicare to determine what stores my Apnea Monitor may be obtained from.  2. I will speak with the Community Healthcare Worker at outreach telephone calls to update her on the progress.        Other (pt-stated)     Notes - Note created  2021 11:32 AM by Stefanie Enrique RN    Goal Statement: I would like to obtain information about increasing assistance in the home in the next 30-60 days.  Date Goal set: 21  Barriers: resources, financial  Strengths: motivated  Date to Achieve By: 30-60 days  Patient expressed understanding of goal: yes  Action steps to achieve this goal:  1. My spouse & and I will speak with the [facility]  @  11:00 to see if we qualify for any [facility] services for assistance in the home.  2. If we are unable to qualify for [facility] assistance, we would like a list of private pay agencies that may be less expensive than what we are currently paying.              Patient/Caregiver understanding: Yes    Outreach Frequency: monthly      Plan: Patient has been enrolled in Care Coordination by RNCC. D/t income, patient and  do not qualify for MA, therefore, cannot get [facility] funded home care services ongoing. CC will contact patient and  in one month to check in on home care services and progress.     ALEISHA Hargrove  Primary Care Clinic- Social Work Care Coordinator  [facility] [place]  Ph: [phone]  2/1/2021 11:35 AM

## 2021-02-01 NOTE — RESULT ENCOUNTER NOTE
Gilma,  I have reviewed the results of the laboratory tests that we recently ordered. All of the lab work performed was normal or considered normal for you.  Sincerely,   Gucci Wu MD

## 2021-02-02 ENCOUNTER — DOCUMENTATION ONLY (OUTPATIENT)
Dept: CARE COORDINATION | Facility: CLINIC | Age: 81
End: 2021-02-02

## 2021-02-02 LAB
BACTERIA SPEC CULT: NO GROWTH
SPECIMEN SOURCE: NORMAL

## 2021-02-03 ENCOUNTER — IMMUNIZATION (OUTPATIENT)
Dept: PEDIATRICS | Facility: CLINIC | Age: 81
End: 2021-02-03
Payer: MEDICARE

## 2021-02-03 PROCEDURE — 91300 PR COVID VAC PFIZER DIL RECON 30 MCG/0.3 ML IM: CPT

## 2021-02-03 PROCEDURE — 0001A PR COVID VAC PFIZER DIL RECON 30 MCG/0.3 ML IM: CPT

## 2021-02-04 DIAGNOSIS — Z23 NEED FOR PROPHYLACTIC VACCINATION AND INOCULATION AGAINST INFLUENZA: ICD-10-CM

## 2021-02-04 DIAGNOSIS — R29.898 WEAKNESS OF BOTH LOWER EXTREMITIES: ICD-10-CM

## 2021-02-04 RX ORDER — LEVOTHYROXINE SODIUM 100 UG/1
TABLET ORAL
Qty: 90 TABLET | Refills: 0 | Status: SHIPPED | OUTPATIENT
Start: 2021-02-04 | End: 2021-06-12

## 2021-02-04 NOTE — TELEPHONE ENCOUNTER
Routing refill request to provider for review/approval because:  Labs not current:  TSH    Zoila Whittaker BSN, RN

## 2021-02-10 ENCOUNTER — MYC MEDICAL ADVICE (OUTPATIENT)
Dept: FAMILY MEDICINE | Facility: CLINIC | Age: 81
End: 2021-02-10

## 2021-02-10 DIAGNOSIS — I10 HYPERTENSION GOAL BP (BLOOD PRESSURE) < 140/90: Primary | ICD-10-CM

## 2021-02-10 DIAGNOSIS — F32.1 MODERATE MAJOR DEPRESSION (H): ICD-10-CM

## 2021-02-10 RX ORDER — FUROSEMIDE 20 MG
10 TABLET ORAL DAILY
Qty: 45 TABLET | Refills: 3 | Status: SHIPPED | OUTPATIENT
Start: 2021-02-10 | End: 2021-05-21

## 2021-02-10 RX ORDER — ARIPIPRAZOLE 5 MG/1
TABLET ORAL
Qty: 90 TABLET | Refills: 0 | Status: SHIPPED | OUTPATIENT
Start: 2021-02-10 | End: 2021-06-12

## 2021-02-17 ENCOUNTER — MYC MEDICAL ADVICE (OUTPATIENT)
Dept: FAMILY MEDICINE | Facility: CLINIC | Age: 81
End: 2021-02-17

## 2021-02-17 DIAGNOSIS — E78.00 ELEVATED LDL CHOLESTEROL LEVEL: ICD-10-CM

## 2021-02-17 DIAGNOSIS — I10 ESSENTIAL HYPERTENSION: Primary | ICD-10-CM

## 2021-02-17 RX ORDER — METOPROLOL SUCCINATE 50 MG/1
50 TABLET, EXTENDED RELEASE ORAL DAILY
Qty: 90 TABLET | Refills: 0 | Status: SHIPPED | OUTPATIENT
Start: 2021-02-17 | End: 2021-05-17

## 2021-02-17 RX ORDER — ROSUVASTATIN CALCIUM 20 MG/1
TABLET, COATED ORAL
Qty: 90 TABLET | Refills: 0 | Status: SHIPPED | OUTPATIENT
Start: 2021-02-17 | End: 2021-06-01

## 2021-02-17 NOTE — TELEPHONE ENCOUNTER
Routing refill request to provider for review/approval because:  Labs not current:  LDL      Tamy Choudhury RN, BSN, PHN

## 2021-02-24 ENCOUNTER — IMMUNIZATION (OUTPATIENT)
Dept: PEDIATRICS | Facility: CLINIC | Age: 81
End: 2021-02-24
Attending: INTERNAL MEDICINE
Payer: MEDICARE

## 2021-02-24 PROCEDURE — 91300 PR COVID VAC PFIZER DIL RECON 30 MCG/0.3 ML IM: CPT

## 2021-02-24 PROCEDURE — 0002A PR COVID VAC PFIZER DIL RECON 30 MCG/0.3 ML IM: CPT

## 2021-03-02 ENCOUNTER — E-VISIT (OUTPATIENT)
Dept: FAMILY MEDICINE | Facility: CLINIC | Age: 81
End: 2021-03-02

## 2021-03-02 ENCOUNTER — MYC MEDICAL ADVICE (OUTPATIENT)
Dept: FAMILY MEDICINE | Facility: CLINIC | Age: 81
End: 2021-03-02

## 2021-03-02 ENCOUNTER — PATIENT OUTREACH (OUTPATIENT)
Dept: NURSING | Facility: CLINIC | Age: 81
End: 2021-03-02
Payer: MEDICARE

## 2021-03-02 DIAGNOSIS — N39.0 URINARY TRACT INFECTION WITHOUT HEMATURIA, SITE UNSPECIFIED: Primary | ICD-10-CM

## 2021-03-02 DIAGNOSIS — R30.0 DYSURIA: Primary | ICD-10-CM

## 2021-03-02 DIAGNOSIS — N39.0 ACUTE UTI (URINARY TRACT INFECTION): ICD-10-CM

## 2021-03-02 PROCEDURE — 99421 OL DIG E/M SVC 5-10 MIN: CPT | Performed by: FAMILY MEDICINE

## 2021-03-02 NOTE — PATIENT INSTRUCTIONS
Dear Gilma Pace    After reviewing your responses, I've been able to diagnose you with a urinary tract infection, which is a common infection of the bladder with bacteria.  This is not a sexually transmitted infection, though urinating immediately after intercourse can help prevent infections.  Drinking lots of fluids is also helpful to clear your current infection and prevent the next one.      I have sent a prescription for antibiotics to your pharmacy to treat this infection.    It is important that you take all of your prescribed medication even if your symptoms are improving after a few doses.  Taking all of your medicine helps prevent the symptoms from returning.     If your symptoms worsen, you develop pain in your back or stomach, develop fevers, or are not improving in 5 days, please contact your primary care provider for an appointment or visit any of our convenient Walk-in or Urgent Care Centers to be seen, which can be found on our website here.    Thanks again for choosing us as your health care partner,    Gucci Wu MD

## 2021-03-02 NOTE — PROGRESS NOTES
Clinic Care Coordination Contact    Follow Up Progress Note      Assessment: SWCC contacted patient/patient's  to follow up on home care/services.  stated that patient is still getting home care services paid for under Medicare. No plans at this point to look in to other home care options for ongoing care. Plans to keep the services he currently has. He is now considering moving in to a senior apartment/assisted living for more care for patient. He has not made a final decision on this yet.     Goals addressed this encounter:   Goals Addressed                 This Visit's Progress       Patient Stated      COMPLETED: Other (pt-stated)   100%     Goal Statement: I would like to obtain information about increasing assistance in the home in the next 30-60 days.  Date Goal set: 1/29/21  Barriers: resources, financial  Strengths: motivated  Date to Achieve By: 30-60 days  Patient expressed understanding of goal: yes  Action steps to achieve this goal:  1. My spouse & and I will speak with the [facility] SW Monday 2/1 @ 11:00 to see if we qualify for any [facility] services for assistance in the home.  2. If we are unable to qualify for [facility] assistance, we would like a list of private pay agencies that may be less expensive than what we are currently paying.               Intervention/Education provided during outreach: Open ended questions     Outreach Frequency: monthly    Plan:   RNCC remains involved. D/t /patient no longer needing home care resources or information at this time, per discussion, SWCC will have RNCC notify SWCC when/if senior living or assisted living options are requested by . No further [facility] follow up    ALEISHA Hargrove  Primary Care Clinic- Social Work Care Coordinator  [facility]  Ph: [phone]  3/2/2021 11:13 AM

## 2021-03-03 ENCOUNTER — TELEPHONE (OUTPATIENT)
Dept: FAMILY MEDICINE | Facility: CLINIC | Age: 81
End: 2021-03-03

## 2021-03-03 DIAGNOSIS — R30.0 DYSURIA: ICD-10-CM

## 2021-03-03 LAB
ALBUMIN UR-MCNC: 100 MG/DL
APPEARANCE UR: CLEAR
BACTERIA #/AREA URNS HPF: ABNORMAL /HPF
BILIRUB UR QL STRIP: ABNORMAL
COLOR UR AUTO: YELLOW
GLUCOSE UR STRIP-MCNC: NEGATIVE MG/DL
HGB UR QL STRIP: NEGATIVE
KETONES UR STRIP-MCNC: 15 MG/DL
LEUKOCYTE ESTERASE UR QL STRIP: ABNORMAL
NITRATE UR QL: NEGATIVE
NON-SQ EPI CELLS #/AREA URNS LPF: ABNORMAL /LPF
PH UR STRIP: 7.5 PH (ref 5–7)
RBC #/AREA URNS AUTO: ABNORMAL /HPF
SOURCE: ABNORMAL
SP GR UR STRIP: 1.02 (ref 1–1.03)
UROBILINOGEN UR STRIP-ACNC: 2 EU/DL (ref 0.2–1)
WBC #/AREA URNS AUTO: ABNORMAL /HPF

## 2021-03-03 PROCEDURE — 87186 SC STD MICRODIL/AGAR DIL: CPT | Performed by: FAMILY MEDICINE

## 2021-03-03 PROCEDURE — 87088 URINE BACTERIA CULTURE: CPT | Performed by: FAMILY MEDICINE

## 2021-03-03 PROCEDURE — 87086 URINE CULTURE/COLONY COUNT: CPT | Performed by: FAMILY MEDICINE

## 2021-03-03 PROCEDURE — 81001 URINALYSIS AUTO W/SCOPE: CPT | Performed by: FAMILY MEDICINE

## 2021-03-03 RX ORDER — NITROFURANTOIN 25; 75 MG/1; MG/1
100 CAPSULE ORAL 2 TIMES DAILY
Qty: 14 CAPSULE | Refills: 0 | Status: SHIPPED | OUTPATIENT
Start: 2021-03-03 | End: 2021-03-10

## 2021-03-03 NOTE — TELEPHONE ENCOUNTER
It appears provider did not send the antibiotic to the pharmacy.    Routing to provider to advise.  Zoila Whittaker BSN, RN

## 2021-03-03 NOTE — TELEPHONE ENCOUNTER
Pt s daughter is calling back in regards to this.  They still dont have the med.  Pt is miserable and declining.          Linda MARTÍNEZ, Patient Care

## 2021-03-03 NOTE — TELEPHONE ENCOUNTER
Pt daughter calling, the prescription has still not been called in.  Discussed message with Dr. Wu who advises pt was to leave a urine sample at lab and then he will do prescription.    Pt daughter notified and sent to scheduling for help with scheduling lab appointment.  Kaia KURTZN, RN

## 2021-03-03 NOTE — TELEPHONE ENCOUNTER
Pt calling stating that pharmacy does not have the medication she was prescribed yesterday for a UTI,    Linda MARTÍNEZ, Patient Care

## 2021-03-04 ENCOUNTER — TELEPHONE (OUTPATIENT)
Dept: FAMILY MEDICINE | Facility: CLINIC | Age: 81
End: 2021-03-04

## 2021-03-04 ENCOUNTER — TRANSFERRED RECORDS (OUTPATIENT)
Dept: HEALTH INFORMATION MANAGEMENT | Facility: CLINIC | Age: 81
End: 2021-03-04

## 2021-03-04 LAB
CREAT SERPL-MCNC: 0.81 MG/DL (ref 0.57–1.11)
GFR SERPL CREATININE-BSD FRML MDRD: >60 ML/MIN/1.73M2
GLUCOSE SERPL-MCNC: 79 MG/DL (ref 65–100)
INR PPP: 1.1
POTASSIUM SERPL-SCNC: 3.8 MMOL/L (ref 3.5–5)

## 2021-03-04 NOTE — TELEPHONE ENCOUNTER
Nurse is calling, need urgent orders for nurse to se patient at 1:00 today. She has a UTI and is on antibiotics, she was setting p her own medications, and has missed a couple of doses of the antibiotic. She is confused and found today without her oxygen. The nurse visit today would be to set up patient's medications.    Also, orders for nursing 2 times a week for 3 weeks and OT 1 time a week for 3 weeks.    Rajani Amaro MA/TC

## 2021-03-04 NOTE — TELEPHONE ENCOUNTER
Returned call to DAVID Barron and approved visit for today with patient to go over below items.   Also approved requested nursing and OT orders.     Routing to PCP as FYI and to co-sign RN documentation.     Zoila KURTZN, RN

## 2021-03-05 LAB
BACTERIA SPEC CULT: ABNORMAL
Lab: ABNORMAL
SPECIMEN SOURCE: ABNORMAL

## 2021-03-16 ENCOUNTER — PATIENT OUTREACH (OUTPATIENT)
Dept: NURSING | Facility: CLINIC | Age: 81
End: 2021-03-16
Payer: MEDICARE

## 2021-03-16 ASSESSMENT — ACTIVITIES OF DAILY LIVING (ADL): DEPENDENT_IADLS:: TRANSPORTATION;SHOPPING;CLEANING

## 2021-03-16 NOTE — PROGRESS NOTES
Clinic Care Coordination Contact    Follow Up Progress Note      Assessment: patient reports she was seen at [facility] 3/4-3/6 for a fall and diagnosed with UTI. She was sent home with antibiotic for cefdinir and home care. Home care has already graduated patient. She completed her antibiotic today. She was to make PCP follow up appointment. This has not been made yet. Patient does have a pending appointment for 4/2/21 with PCP. Patient will call her daughter because she will reschedule appointment and drive patient to clinic. Overall patient denies symptoms of confusion, near falls, or falls, or UTI symptoms. Reports she is good about water intake per day. She did recently have weekly showers increased to 2x per week. Patient privately paying for HHA. New goal created below. Patient will call her daughter to boost her mood as well.     Goals addressed this encounter:   Goals Addressed                 This Visit's Progress       General      Medical (pt-stated)        Goal Statement: I want to stay out of the hospital  Date Goal set: 3/16/21  Barriers: recent hospitalization   Strengths: motivated, family support  Date to Achieve By: 6/16/21  Patient expressed understanding of goal: yes  Action steps to achieve this goal:  1. I will take my medications as prescribed.   2. I will use my walker at all times  3. I will make my hospital follow up appointment with Dr. Gucci Wu in the next 1-2 days  4. I will prevent a UTI by getting 4-6 cups of caffeine free beverages per day, I will shower 2x per week and practice good hygiene.   5. I will call my doctor or make an appointment for evaluation with any new or worsening symptoms.                Intervention/Education provided during outreach: Discussed patients plan of care. CC RN asked open ended questions and provided support and encouragement as needed. Patient will reach out to clinic sooner than planned if there are any new or worsening symptoms. Patient verbalized  understanding.      Outreach Frequency: monthly    Plan:   Care Coordinator will follow up in 1 month.  Emily Olivares RN, BSN, PHN Care Coordinator  [place], [place], and [facility]   Phone: [phone]

## 2021-03-27 DIAGNOSIS — G25.81 RESTLESS LEGS SYNDROME (RLS): ICD-10-CM

## 2021-03-29 ENCOUNTER — TELEPHONE (OUTPATIENT)
Dept: FAMILY MEDICINE | Facility: CLINIC | Age: 81
End: 2021-03-29

## 2021-03-29 RX ORDER — PRAMIPEXOLE DIHYDROCHLORIDE 0.25 MG/1
TABLET ORAL
Qty: 90 TABLET | Refills: 3 | Status: SHIPPED | OUTPATIENT
Start: 2021-03-29 | End: 2021-01-01

## 2021-03-29 NOTE — TELEPHONE ENCOUNTER
Medicare recertification: Patient alert and oriented, VS 97.6 P 67 R 18 /72 SPO2 94%@4l n/c at baseline,Today's weight 202#. Lung sounds  clear but dim,  breathing even and nonlabored.  Heartrate regular patient reports fatigue and limited endurance +2 BLE wearing compression stockings, reports spouse finally able to apply them,  Reports urinary urgency and incontinence, continent of bowel with only occasional struggles with constipation. Patient reports 1/10 pain with slight stiff neck and , states occasional headache, using acetaminophen.  Sleeps in recliner chair in living room due to fear of falling, fear of changing her routine and convenience. Spouse has been looking at home oxygen monitors to notify him if she has removed her oxygen during the night.   New difficulty swallowing from recent hospitalization: Swallow eval completed 3/23/21 with the following clinical impressions: functional oropharyngeal swallow. Patient demonstrating slow mastication and manipulation secondary to recent teeth removal, however mastication is adequate for current diet. Patient demonstrated no Sx of aspiraiton and reports no further difficulty swallowing. Patient observed with hard solid and thin liquids with single and consecutive swallows. No further skilled speech therapy intervention indicated at this time.   Reports fair appetite, but has snacks at chairside throughout the day.  No skin issues. Ambulates with wheeled walker in home and wheelchair for long distances. Needs assistance with all ADLs.   Medications reconciled and spouse has been filling pill boxes, but continue to find errors, corrected today. Education needed on medication management.     Request SN 1w1 and 2w3 with 3 PRN for medication management, cardiac and respiratory assessment. HHA 1w3 for bathing and personal cares. VAN hernandez for community resources, patient will need ongoing private pay bathing assistance after this current episode.     Please call  with any questions  Thank you,  Verónica Vale RN    [facility] now requests orders and shares plan of care/discharge summaries for some patients through [facility].  Please REPLY TO THIS MESSAGE OR ROUTE BACK TO THE AUTHOR in order to give authorization for orders when needed.  This is considered a verbal order, you will still receive a faxed copy of orders for signature.  Thank you for your assistance in improving collaboration for our patients.

## 2021-03-29 NOTE — TELEPHONE ENCOUNTER
Routing refill request to provider for review/approval because:  Labs not current:  No results found for: WBC  No results found for: RBC  No results found for: HGB  No results found for: HCT  No results found for: MCV  No results found for: MCH  No results found for: MCHC  No results found for: RDW  No results found for: PLT    Next 5 appointments (look out 90 days)    Apr 02, 2021  3:30 PM  Marilia Hewitt with Gucci Wu MD  [facility] [id number] [facility] [id number] [phone]        Shantelle Harris RN

## 2021-04-02 ENCOUNTER — OFFICE VISIT (OUTPATIENT)
Dept: FAMILY MEDICINE | Facility: CLINIC | Age: 81
End: 2021-04-02
Payer: MEDICARE

## 2021-04-02 VITALS
HEART RATE: 87 BPM | DIASTOLIC BLOOD PRESSURE: 92 MMHG | WEIGHT: 204 LBS | HEIGHT: 59 IN | BODY MASS INDEX: 41.12 KG/M2 | SYSTOLIC BLOOD PRESSURE: 183 MMHG | TEMPERATURE: 98.1 F

## 2021-04-02 DIAGNOSIS — N39.0 RECURRENT UTI (URINARY TRACT INFECTION): Primary | ICD-10-CM

## 2021-04-02 PROCEDURE — 99213 OFFICE O/P EST LOW 20 MIN: CPT | Performed by: FAMILY MEDICINE

## 2021-04-02 ASSESSMENT — ANXIETY QUESTIONNAIRES
2. NOT BEING ABLE TO STOP OR CONTROL WORRYING: SEVERAL DAYS
IF YOU CHECKED OFF ANY PROBLEMS ON THIS QUESTIONNAIRE, HOW DIFFICULT HAVE THESE PROBLEMS MADE IT FOR YOU TO DO YOUR WORK, TAKE CARE OF THINGS AT HOME, OR GET ALONG WITH OTHER PEOPLE: VERY DIFFICULT
5. BEING SO RESTLESS THAT IT IS HARD TO SIT STILL: MORE THAN HALF THE DAYS
3. WORRYING TOO MUCH ABOUT DIFFERENT THINGS: SEVERAL DAYS
6. BECOMING EASILY ANNOYED OR IRRITABLE: SEVERAL DAYS
7. FEELING AFRAID AS IF SOMETHING AWFUL MIGHT HAPPEN: NOT AT ALL
1. FEELING NERVOUS, ANXIOUS, OR ON EDGE: MORE THAN HALF THE DAYS
GAD7 TOTAL SCORE: 8

## 2021-04-02 ASSESSMENT — PATIENT HEALTH QUESTIONNAIRE - PHQ9
5. POOR APPETITE OR OVEREATING: SEVERAL DAYS
SUM OF ALL RESPONSES TO PHQ QUESTIONS 1-9: 8

## 2021-04-02 ASSESSMENT — MIFFLIN-ST. JEOR: SCORE: 1305.97

## 2021-04-02 NOTE — PROGRESS NOTES
SUBJECTIVE:   Gilma Pace is a 80 year old female who presents today for concerns overrecurrent urinary tract infections. She has had about 4 of these in the last year. She has been to the hospital 3 times for that. The last one was pretty serious as she hallucinated and forgot peoples names.   She does not get the typical dysuria symptom(s) .   She and her  are hoping for a way to prevent her from getting so sick with these infections.       Past Medical History:   Diagnosis Date     Anxiety 10/11/2006    Last visit with Mental health specialist in 2002.      Chronic low back pain without sciatica 10/31/2016     Compression fracture of thoracic vertebra (H) 6/13/2011    Overview:  T  11  compression  fx  on  xrays  from  6/13/2011  . Ongoing pain  x  3  months   Get  eval  and  treat  with  spine  specialists  as discussed  Initially  treated  in [place]  .  Pain  is  controlled   with  advil  as needed  . Reviewed   with  patient      Congestive heart failure (H) 10/11/2006    Overview:  Epic      COPD (chronic obstructive pulmonary disease) (H) 9/28/2017     Folate deficiency 6/9/2017    Overview:  Formatting of this note may be different from the original. Hemoglobin (g/dl)  Date Value  06/05/2017 15.6  10/31/2016 13.8  11/04/2015 11.3 (L)   Iron (mcg/dl)  Date Value  06/05/2017 49 (L)   TIBC, Calculated (mcg/dl)  Date Value  06/05/2017 319   % Saturation, calc. (%)  Date Value  06/05/2017 15   Vitamin B12 (pg/ml)  Date Value  06/05/2017 253   Folate (ng/ml)  Date Value  06/05/20     Hypertension goal BP (blood pressure) < 140/90 9/28/2017     Mitral regurgitation 2/11/2014    Overview:  Needs dental prophylaxis      Mixed hyperlipidemia 10/11/2006     Osteoporosis 9/28/2017     Postoperative hypothyroidism 9/28/2017     Current Outpatient Medications   Medication Sig Dispense Refill     acetaminophen (TYLENOL) 500 MG tablet Take 2 tablets (1,000 mg) by mouth every 8 hours as needed for mild pain        alendronate (FOSAMAX) 70 MG tablet Take 1 tablet (70 mg) by mouth every 7 days 12 tablet 3     amLODIPine (NORVASC) 10 MG tablet TAKE 1 TABLET BY MOUTH ONCE DAILY 90 tablet 3     ARIPiprazole (ABILIFY) 5 MG tablet TAKE 1 TABLET BY MOUTH AT BEDTIME 90 tablet 0     aspirin (ASA) 81 MG chewable tablet Take 81 mg by mouth       busPIRone (BUSPAR) 15 MG tablet Take 1 tablet by mouth twice daily 180 tablet 0     DULoxetine (CYMBALTA) 60 MG capsule Take 2 capsules by mouth once daily 180 capsule 1     folic acid (FOLVITE) 1 MG tablet Take 1 tablet by mouth once daily 90 tablet 0     furosemide (LASIX) 20 MG tablet Take 0.5 tablets (10 mg) by mouth daily 45 tablet 3     gabapentin (NEURONTIN) 600 MG tablet Take 1 tablet by mouth twice daily 180 tablet 1     ibuprofen (ADVIL/MOTRIN) 200 MG capsule Take 2 capsules (400 mg) by mouth daily as needed       levothyroxine (SYNTHROID/LEVOTHROID) 100 MCG tablet Take 1 tablet by mouth once daily 90 tablet 0     lisinopril (ZESTRIL) 40 MG tablet Take 1 tablet by mouth once daily 90 tablet 1     metoprolol succinate ER (TOPROL-XL) 50 MG 24 hr tablet Take 1 tablet (50 mg) by mouth daily 90 tablet 0     NYSTOP [id number] UNIT/GM powder APPLY  POWDER TOPICALLY TWICE DAILY 60 g 11     order for DME [facility] home oxygen:  Equipment being ordered: Oxygen, home fill system,  4 LPM 1 each 0     order for DME Equipment being ordered: Oxygen -  4 liters a day       pramipexole (MIRAPEX) 0.25 MG tablet TAKE 1 TABLET BY MOUTH AT BEDTIME 90 tablet 3     primidone (MYSOLINE) 50 MG tablet TAKE 1/2 (ONE-HALF) TABLET BY MOUTH AT BEDTIME 45 tablet 3     rosuvastatin (CRESTOR) 20 MG tablet TAKE 1 TABLET BY MOUTH AT BEDTIME 90 tablet 0     traZODone (DESYREL) 100 MG tablet TAKE 1 TABLET BY MOUTH AT BEDTIME 90 tablet 1     umeclidinium (INCRUSE ELLIPTA) 62.5 MCG/INH inhaler Inhale 1 puff into the lungs daily 3 Inhaler 3     albuterol (PROVENTIL HFA) 108 (90 Base) MCG/ACT inhaler Inhale 2 puffs into the lungs  "every 6 hours (Patient not taking: Reported on 2021) 18 g 2     Social History     Tobacco Use     Smoking status: Former Smoker     Quit date:      Years since quittin.2     Smokeless tobacco: Never Used   Substance Use Topics     Alcohol use: Yes     Comment: Rarely       ROS:       OBJECTIVE:  BP (!) 183/92   Pulse 87   Temp 98.1  F (36.7  C) (Oral)   Ht 1.499 m (4' 11\")   Wt 92.5 kg (204 lb)   BMI 41.20 kg/m    GENERAL APPEARANCE: healthy, alert and no distress      No results found for any visits on 21.     ASSESSMENT:   Recurrent uncomplicated urinary tract infection.    PLAN:  We discussed prophylactic daily antibiotic(s) or having a care plan in place  She opted for the latter    I placed standing orders for a urinalysis and urine culture to be performed.     I gave the patient 4 labled sterile urine cups, a handle and 4 wipes and instructions on how to collect the samples.     As per ordered above  Drink plenty of fluids.  Prevention and treatment of UTI's discussed.Signs and symptoms of pyelonephritis mentioned.  Follow up with primary care physician if not improving    "

## 2021-04-03 DIAGNOSIS — F33.1 MAJOR DEPRESSIVE DISORDER, RECURRENT EPISODE, MODERATE (H): ICD-10-CM

## 2021-04-03 ASSESSMENT — ANXIETY QUESTIONNAIRES: GAD7 TOTAL SCORE: 8

## 2021-04-06 RX ORDER — BUSPIRONE HYDROCHLORIDE 15 MG/1
TABLET ORAL
Qty: 180 TABLET | Refills: 0 | Status: SHIPPED | OUTPATIENT
Start: 2021-04-06 | End: 2021-07-16

## 2021-04-06 NOTE — TELEPHONE ENCOUNTER
"Requested Prescriptions   Pending Prescriptions Disp Refills    busPIRone (BUSPAR) 15 MG tablet [Pharmacy Med Name: busPIRone HCl 15 MG Oral Tablet] 180 tablet 0     Sig: Take 1 tablet by mouth twice daily       Atypical Antidepressants Protocol Failed - 4/3/2021  5:03 PM        Failed - Patient has PHQ-9 score less than 5 in past 6 months.     Please review last PHQ-9 score.           Passed - Medication active on med list        Passed - Patient is age 18 or older        Passed - No active pregnancy on record        Passed - No positive pregnancy test in past 12 mos        Passed - Recent (6 mo) or future (30 days) visit within the authorizing provider's specialty     Patient had office visit in the last 6 months or has a visit in the next 30 days with authorizing provider or within the authorizing provider's specialty.  See \"Patient Info\" tab in inbasket, or \"Choose Columns\" in Meds & Orders section of the refill encounter.               Earnestine Preciado RN    "

## 2021-04-08 ENCOUNTER — TELEPHONE (OUTPATIENT)
Dept: FAMILY MEDICINE | Facility: CLINIC | Age: 81
End: 2021-04-08

## 2021-04-08 NOTE — TELEPHONE ENCOUNTER
This RN reconciled medication list from [facility] form against our [facility] medication list and there is 1 discrepancy.        1.  Epic medication list has amlodipine 5 mg daily listed but it is not on the [facility] form.  Can RN add this?         Zoila KURTZN, RN

## 2021-04-08 NOTE — TELEPHONE ENCOUNTER
Reason for Call:  Form, our goal is to have forms completed with 72 hours, however, some forms may require a visit or additional information.    Type of letter, form or note:  Home Health Certification    Who is the form from?: Home care    Where did the form come from: form was faxed in    What clinic location was the form placed at?: [place]    Where the form was placed: Given to MA/RN    What number is listed as a contact on the form?: [facility] - [phone]       Additional comments:     Call taken on 4/8/2021 at 9:55 AM by Brittney Green

## 2021-04-09 ENCOUNTER — TELEPHONE (OUTPATIENT)
Dept: FAMILY MEDICINE | Facility: CLINIC | Age: 81
End: 2021-04-09

## 2021-04-09 DIAGNOSIS — Z53.9 DIAGNOSIS NOT YET DEFINED: Primary | ICD-10-CM

## 2021-04-09 PROCEDURE — 99207 PR MD RECERTIFICATION HHA PT: CPT | Performed by: FAMILY MEDICINE

## 2021-04-09 NOTE — TELEPHONE ENCOUNTER
Patient is experiencing some redness in her groin and under her breasts. Can we increase showers to twice weekly through current order period.   Patient will resume private pay for bathing at the end of this Medicare order period.    [facility] 2w2 for bathing and personal cares    Thank you,  Verónica

## 2021-04-11 DIAGNOSIS — E53.8 FOLATE DEFICIENCY: ICD-10-CM

## 2021-04-11 DIAGNOSIS — G47.00 INSOMNIA, UNCONTROLLED: ICD-10-CM

## 2021-04-11 DIAGNOSIS — M81.0 OSTEOPOROSIS, UNSPECIFIED OSTEOPOROSIS TYPE, UNSPECIFIED PATHOLOGICAL FRACTURE PRESENCE: ICD-10-CM

## 2021-04-12 RX ORDER — TRAZODONE HYDROCHLORIDE 100 MG/1
TABLET ORAL
Qty: 90 TABLET | Refills: 2 | Status: SHIPPED | OUTPATIENT
Start: 2021-04-12 | End: 2021-05-21

## 2021-04-12 RX ORDER — FOLIC ACID 1 MG/1
TABLET ORAL
Qty: 90 TABLET | Refills: 2 | Status: SHIPPED | OUTPATIENT
Start: 2021-04-12 | End: 2021-01-01

## 2021-04-12 RX ORDER — ALENDRONATE SODIUM 70 MG/1
TABLET ORAL
Qty: 12 TABLET | Refills: 0 | Status: SHIPPED | OUTPATIENT
Start: 2021-04-12 | End: 2021-07-16

## 2021-04-12 NOTE — TELEPHONE ENCOUNTER
Routing refill request to provider for review/approval because:  DEXA not current.  Kaia Us BSN, RN

## 2021-04-20 ENCOUNTER — PATIENT OUTREACH (OUTPATIENT)
Dept: CARE COORDINATION | Facility: CLINIC | Age: 81
End: 2021-04-20

## 2021-04-20 NOTE — PROGRESS NOTES
Clinic Care Coordination Contact  [facility]/Voicemail       Clinical Data: Care Coordinator Outreach  Outreach attempted x 1.  Left message on patient's voicemail with call back information and requested return call.  Plan:  Care Coordinator will try to reach patient again in 10 business days.    Emily Olivares RN, BSN, PHN Care Coordinator  [place], [place], and [place]   Phone: [phone]

## 2021-04-26 ENCOUNTER — MEDICAL CORRESPONDENCE (OUTPATIENT)
Dept: HEALTH INFORMATION MANAGEMENT | Facility: CLINIC | Age: 81
End: 2021-04-26

## 2021-04-28 ENCOUNTER — TRANSFERRED RECORDS (OUTPATIENT)
Dept: HEALTH INFORMATION MANAGEMENT | Facility: CLINIC | Age: 81
End: 2021-04-28

## 2021-04-29 LAB
EJECTION FRACTION: >65 %
EJECTION FRACTION: NORMAL %

## 2021-05-06 ENCOUNTER — PATIENT OUTREACH (OUTPATIENT)
Dept: CARE COORDINATION | Facility: CLINIC | Age: 81
End: 2021-05-06

## 2021-05-06 NOTE — PROGRESS NOTES
Clinic Care Coordination Contact    Situation: Patient chart reviewed by care coordinator.    Background: patient enrolled in care coordination and is due for a monthly CC RN check in.     Assessment: patient is currently inpatient at [facility]. Admitted 4/28 for altered mental status and UTI.    Plan/Recommendations: CC RN will monitor chart for discharge planning in 2-3 business days and follow up per care coordination workflow.     Emily Olivares, RN, BSN, PHN Care Coordinator  [place], [place], and [place]   Phone: [phone]

## 2021-05-11 NOTE — PROGRESS NOTES
Clinic Care Coordination Contact     Situation: Patient chart reviewed by care coordinator.     Background: patient enrolled in care coordination and is due for a monthly CC RN check in.      Assessment: patient is currently inpatient at [facility]. Admitted 4/28 for altered mental status and UTI.     Plan/Recommendations: CC RN will monitor chart for discharge planning in 2-3 business days and follow up per care coordination workflow.      Emily Olivares, RN, BSN, PHN Care Coordinator  [place], [place], and [place]   Phone: [phone]

## 2021-05-13 ENCOUNTER — PATIENT OUTREACH (OUTPATIENT)
Dept: CARE COORDINATION | Facility: CLINIC | Age: 81
End: 2021-05-13

## 2021-05-13 NOTE — PROGRESS NOTES
Clinic Care Coordination Contact  [facility]/Voicemail       Clinical Data: [facility] hospital follow up call    Outreach attempted x 1.  Left message on patient's voicemail with call back information and requested return call.  Plan: Care Coordinator will try to reach patient again in 1-2 business days.    Emily Olivares, RN, BSN, PHN Care Coordinator  [place], [place], and [place]   Phone: [phone]

## 2021-05-14 ENCOUNTER — TELEPHONE (OUTPATIENT)
Dept: FAMILY MEDICINE | Facility: CLINIC | Age: 81
End: 2021-05-14

## 2021-05-14 NOTE — TELEPHONE ENCOUNTER
This RN reconciled medication list from [facility] form against our [facility] medication list and there are discrepancies.  They are:        1.  Amlodipine is on [facility] list but not on [facility] form. This medication has not been refilled in 1.5 years and patient is on lisinopril.  Ok to remove?       Zoila KURTZN, RN

## 2021-05-14 NOTE — PROGRESS NOTES
Clinic Care Coordination Contact  [facility]/Voicemail       Clinical Data: post hospital follow up call.     Patient was at [facility] from 4/28 to 5/12 for Acute metabolic encephalopathy 2/2 UTI and likely underlying dementia   Patient discharged home with home care. Per chart review, PCP approved home care orders.   Patient has upcoming cardiology appointment 5/26 at [facility]  and PCP follow up appointment 5/17.    Outreach attempted x 2.  Left message on patient's voicemail with call back information and requested return call.  Plan: patient enrolled in Care Coordination. CC RN    will try to reach patient again in 1 month.    Emily Olivares, RN, BSN, PHN Care Coordinator  [place], [place], and [place]   Phone: [phone]

## 2021-05-14 NOTE — TELEPHONE ENCOUNTER
Reason for Call:  Form, our goal is to have forms completed with 72 hours, however, some forms may require a visit or additional information.    Type of letter, form or note:  Home Health Certification    Who is the form from?: Home care    Where did the form come from: form was faxed in    What clinic location was the form placed at?: [place]    Where the form was placed: Given to MA/RN    What number is listed as a contact on the form?: [phone]       Additional comments:     Call taken on 5/14/2021 at 2:02 PM by Brittney Green

## 2021-05-15 ENCOUNTER — MEDICAL CORRESPONDENCE (OUTPATIENT)
Dept: HEALTH INFORMATION MANAGEMENT | Facility: CLINIC | Age: 81
End: 2021-05-15

## 2021-05-15 ENCOUNTER — TELEPHONE (OUTPATIENT)
Dept: CARE COORDINATION | Facility: CLINIC | Age: 81
End: 2021-05-15

## 2021-05-15 LAB
ANION GAP SERPL CALCULATED.3IONS-SCNC: 3 MMOL/L (ref 3–14)
BUN SERPL-MCNC: 17 MG/DL (ref 7–30)
CALCIUM SERPL-MCNC: 9.1 MG/DL (ref 8.5–10.1)
CHLORIDE SERPL-SCNC: 102 MMOL/L (ref 94–109)
CO2 SERPL-SCNC: 35 MMOL/L (ref 20–32)
CREAT SERPL-MCNC: 0.72 MG/DL (ref 0.52–1.04)
GFR SERPL CREATININE-BSD FRML MDRD: 80 ML/MIN/{1.73_M2}
GLUCOSE SERPL-MCNC: 70 MG/DL (ref 70–99)
POTASSIUM SERPL-SCNC: 4 MMOL/L (ref 3.4–5.3)
SODIUM SERPL-SCNC: 140 MMOL/L (ref 133–144)

## 2021-05-15 PROCEDURE — 80048 BASIC METABOLIC PNL TOTAL CA: CPT

## 2021-05-15 NOTE — TELEPHONE ENCOUNTER
[facility] now requests orders and shares plan of care/discharge summaries for some patients through [employer].  Please REPLY TO THIS MESSAGE OR ROUTE BACK TO THE AUTHOR in order to give authorization for orders when needed.  This is considered a verbal order, you will still receive a faxed copy of orders for signature.  Thank you for your assistance in improving collaboration for our patients.        Orders:  SN 2w2, 1w2   HHA 2w4    Per MDCR guidelines, need to notify you of Medications interactions of Level 2 or higher, please see medication interaction listed, will continue with meds as ordered unless directed differently. (Recently started Quetiapine in hospital, also per hospital MD recommend tapering off in future once hallucinations improve).  primidone and quetiapine interaction: Concurrent use meds will result in decreased systemic concentrations of quetiapine and may lead to therapeutic failure.    Thank you!  Eduarda Solis RN  [email or web address]  [phone]

## 2021-05-17 ENCOUNTER — OFFICE VISIT (OUTPATIENT)
Dept: FAMILY MEDICINE | Facility: CLINIC | Age: 81
End: 2021-05-17
Payer: MEDICARE

## 2021-05-17 VITALS
WEIGHT: 184 LBS | HEART RATE: 87 BPM | TEMPERATURE: 97.9 F | OXYGEN SATURATION: 97 % | BODY MASS INDEX: 37.16 KG/M2 | SYSTOLIC BLOOD PRESSURE: 120 MMHG | DIASTOLIC BLOOD PRESSURE: 68 MMHG

## 2021-05-17 DIAGNOSIS — Z53.9 DIAGNOSIS NOT YET DEFINED: Primary | ICD-10-CM

## 2021-05-17 DIAGNOSIS — I10 ESSENTIAL HYPERTENSION: ICD-10-CM

## 2021-05-17 DIAGNOSIS — F03.90 DEMENTIA WITHOUT BEHAVIORAL DISTURBANCE, UNSPECIFIED DEMENTIA TYPE: Primary | ICD-10-CM

## 2021-05-17 DIAGNOSIS — R41.3 CONFABULATION: ICD-10-CM

## 2021-05-17 PROCEDURE — 99214 OFFICE O/P EST MOD 30 MIN: CPT | Performed by: FAMILY MEDICINE

## 2021-05-17 RX ORDER — METOPROLOL SUCCINATE 25 MG/1
25 TABLET, EXTENDED RELEASE ORAL DAILY
Qty: 90 TABLET | Refills: 3 | Status: SHIPPED | OUTPATIENT
Start: 2021-05-17

## 2021-05-17 ASSESSMENT — PAIN SCALES - GENERAL: PAINLEVEL: NO PAIN (0)

## 2021-05-17 NOTE — PROGRESS NOTES
"    Assessment & Plan     Essential hypertension    - metoprolol succinate ER (TOPROL-XL) 25 MG 24 hr tablet; Take 1 tablet (25 mg) by mouth daily    Dementia without behavioral disturbance, unspecified dementia type (H)    - NEUROPSYCHOLOGY REFERRAL    Confabulation    - NEUROPSYCHOLOGY REFERRAL    Review of prior external note(s) from - CareEverywhere information from [facility] reviewed         BMI:   Estimated body mass index is 37.16 kg/m  as calculated from the following:    Height as of 4/2/21: 1.499 m (4' 11\").    Weight as of this encounter: 83.5 kg (184 lb).           No follow-ups on file.    Gucci Wu MD  [facility]    Mary Jane Gutierrez is a 80 year old who presents for the following health issues     HPI       Hospital Follow-up Visit:    Hospital/Nursing Home/ Rehab Facility: [facility]  Date of Admission: 4/28/21  Date of Discharge: 5/12/21  Reason(s) for Admission: Altered mental status      Was your hospitalization related to COVID-19? No   Problems taking medications regularly:  None  Medication changes since discharge: None  Problems adhering to non-medication therapy:  None    Summary of hospitalization:  CareEverywhere information obtained and reviewed  Diagnostic Tests/Treatments reviewed.  Follow up needed: none  Other Healthcare Providers Involved in Patient s Care:         Homecare  Update since discharge: improved. Post Discharge Medication Reconciliation: discharge medications reconciled and changed, per note/orders.  Plan of care communicated with patient and family              Review of Systems         Objective    There were no vitals taken for this visit.  There is no height or weight on file to calculate BMI.  Physical Exam                 --------------------------------------------------------------------------------------------------------------------------------------  Acute metabolic encephalopathy 2/2 UTI  Likely underlying dementia  Agitation  - S/p " cipro for UTI  - Psych consulted  - Started on seroquel and melatonin  - Consider weaning seroquel as delirium resolves   - Recommend neuro-pysch testing after hospitalization    DANYELL improved  - Discontinue lasix and lisinopril  - Consider restarting lasix if having lower extremity edema     HTN  - Continue metoprolol  - Discontinued lasix, lisinopril,     Acute on chronic respiratory failure. Home 4L  - Back to baseline  - Continue pta inhalers    A flutter  - Continue metoprolol. On ASA due to fall risk    Elevated troponin likely demand ischemia  - Cardiology was consulted    Compression fractures t5 and t8  - Neurosurg consulted  - Brace not recommended due to respiratory failure    Goals of care  - Palliative care following  - DNR confirmed   - Not ready for hospice    --------------------------------------------------------------------------------------------------------------------------------------  Subjective:  Gilma is a 80 year old female who presents today for follow-up on a recent hospitalization at [facility]. She was admitted to the hospital on 4-28 and discharged on 5-12. The patient went to the hospital for symptoms of altered mental status. She was diagnosed with acute metabolic encephalopathy secondary to a UTI. The patient was treated with antibiotic(s), seroquel and adjustment in her blood pressure medications. She was asked to follow up today specifically to check up on her blood pressure, pedal edema. . At this time the patient reports a lot of improvement of the original symptoms. In addition the patient reports the following new symptoms:none.       Her  reports that she does not hallucinate  However , she has thoughts that are hard to convince her that they are not true.  For example she wanted her  to get the present that she wrapped out of the bathroom  He only found a box of baby wipes in there but she would not relent that she had left a wrapped present in the  bathroom.    She has a hard time telling what time of the day it is or what day of the week it is.     She is able to remember things like an upcoming family events and to take her medication.       Current Outpatient Medications:      acetaminophen (TYLENOL) 500 MG tablet, Take 2 tablets (1,000 mg) by mouth every 8 hours as needed for mild pain, Disp: , Rfl:      albuterol (PROVENTIL HFA) 108 (90 Base) MCG/ACT inhaler, Inhale 2 puffs into the lungs every 6 hours, Disp: 18 g, Rfl: 2     alendronate (FOSAMAX) 70 MG tablet, Take 1 tablet by mouth once a week, Disp: 12 tablet, Rfl: 0     ARIPiprazole (ABILIFY) 5 MG tablet, TAKE 1 TABLET BY MOUTH AT BEDTIME, Disp: 90 tablet, Rfl: 0     aspirin (ASA) 81 MG chewable tablet, Take 81 mg by mouth, Disp: , Rfl:      busPIRone (BUSPAR) 15 MG tablet, Take 1 tablet by mouth twice daily, Disp: 180 tablet, Rfl: 0     folic acid (FOLVITE) 1 MG tablet, Take 1 tablet by mouth once daily, Disp: 90 tablet, Rfl: 2     levothyroxine (SYNTHROID/LEVOTHROID) 100 MCG tablet, Take 1 tablet by mouth once daily, Disp: 90 tablet, Rfl: 0     metoprolol succinate ER (TOPROL-XL) 50 MG 24 hr tablet, Take 1 tablet (50 mg) by mouth daily (Patient taking differently: Take 25 mg by mouth daily Take 1/2 tablet 2 times daily), Disp: 90 tablet, Rfl: 0     NYSTOP [id number] UNIT/GM powder, APPLY  POWDER TOPICALLY TWICE DAILY, Disp: 60 g, Rfl: 11     order for DME, [facility] home oxygen:  Equipment being ordered: Oxygen, home fill system,  4 LPM, Disp: 1 each, Rfl: 0     order for DME, Equipment being ordered: Oxygen -  4 liters a day, Disp: , Rfl:      pramipexole (MIRAPEX) 0.25 MG tablet, TAKE 1 TABLET BY MOUTH AT BEDTIME, Disp: 90 tablet, Rfl: 3     primidone (MYSOLINE) 50 MG tablet, TAKE 1/2 (ONE-HALF) TABLET BY MOUTH AT BEDTIME, Disp: 45 tablet, Rfl: 3     traZODone (DESYREL) 100 MG tablet, TAKE 1 TABLET BY MOUTH AT BEDTIME, Disp: 90 tablet, Rfl: 2     umeclidinium (INCRUSE ELLIPTA) 62.5 MCG/INH inhaler,  Inhale 1 puff into the lungs daily, Disp: 3 Inhaler, Rfl: 3     DULoxetine (CYMBALTA) 60 MG capsule, Take 2 capsules by mouth once daily, Disp: 180 capsule, Rfl: 1     furosemide (LASIX) 20 MG tablet, Take 0.5 tablets (10 mg) by mouth daily (Patient not taking: Reported on 5/17/2021), Disp: 45 tablet, Rfl: 3     gabapentin (NEURONTIN) 600 MG tablet, Take 1 tablet by mouth twice daily (Patient not taking: Reported on 5/17/2021), Disp: 180 tablet, Rfl: 1     lisinopril (ZESTRIL) 40 MG tablet, Take 1 tablet by mouth once daily (Patient not taking: Reported on 5/17/2021), Disp: 90 tablet, Rfl: 1     rosuvastatin (CRESTOR) 20 MG tablet, TAKE 1 TABLET BY MOUTH AT BEDTIME (Patient not taking: Reported on 5/17/2021), Disp: 90 tablet, Rfl: 0    Past Medical History:   Diagnosis Date     Anxiety 10/11/2006    Last visit with Mental health specialist in 2002.      Chronic low back pain without sciatica 10/31/2016     Compression fracture of thoracic vertebra (H) 6/13/2011    Overview:  T  11  compression  fx  on  xrays  from  6/13/2011  . Ongoing pain  x  3  months   Get  eval  and  treat  with  spine  specialists  as discussed  Initially  treated  in [place]  .  Pain  is  controlled   with  advil  as needed  . Reviewed   with  patient      Congestive heart failure (H) 10/11/2006    Overview:  Epic      COPD (chronic obstructive pulmonary disease) (H) 9/28/2017     Folate deficiency 6/9/2017    Overview:  Formatting of this note may be different from the original. Hemoglobin (g/dl)  Date Value  06/05/2017 15.6  10/31/2016 13.8  11/04/2015 11.3 (L)   Iron (mcg/dl)  Date Value  06/05/2017 49 (L)   TIBC, Calculated (mcg/dl)  Date Value  06/05/2017 319   % Saturation, calc. (%)  Date Value  06/05/2017 15   Vitamin B12 (pg/ml)  Date Value  06/05/2017 253   Folate (ng/ml)  Date Value  06/05/20     Hypertension goal BP (blood pressure) < 140/90 9/28/2017     Mitral regurgitation 2/11/2014    Overview:  Needs dental prophylaxis       Mixed hyperlipidemia 10/11/2006     Osteoporosis 9/28/2017     Postoperative hypothyroidism 9/28/2017       OBJECTIVE:  /79   Pulse 87   Temp 97.9  F (36.6  C) (Tympanic)   Wt 83.5 kg (184 lb)   SpO2 97%   BMI 37.16 kg/m    GENERAL APPEARANCE: healthy, alert and no distress  EYES: EOMI,  PERRL  HENT: ear canals and TM's normal and nose and mouth without ulcers or lesions  RESP: lungs clear to auscultation - no rales, rhonchi or wheezes  CV: regular rates and rhythm, normal S1 S2, no S3 or S4 and no murmur, click or rub -  ABDOMEN:  soft, nontender, no HSM or masses and bowel sounds normal  EXTREMITIES: trace edema bilateral(ly) in ankles    ASSESSMENT:  80 year old female who recently underwent hospitalization for UTI, encephalopathy which appear to have improved.      Plan: At this time we will continue the present management  Switch the metoprolol 12.5 mg bid to metoprolol XR 25 mg once a day(s)   We will get her evaluated by neuropsych as recommend(ed) by the hospitalists     and have the patient return to clinic as needed. Gilma  voiced understanding to informations discussed today.

## 2021-05-17 NOTE — NURSING NOTE
"Chief Complaint   Patient presents with     Hospital F/U       Initial /79   Pulse 87   Temp 97.9  F (36.6  C) (Tympanic)   Wt 83.5 kg (184 lb)   SpO2 97%   BMI 37.16 kg/m   Estimated body mass index is 37.16 kg/m  as calculated from the following:    Height as of 4/2/21: 1.499 m (4' 11\").    Weight as of this encounter: 83.5 kg (184 lb).  Medication Reconciliation: complete  Pebbles Posadas CMA  "

## 2021-05-17 NOTE — PATIENT INSTRUCTIONS
Your provider has referred you to:    N: [facility] - Andrei Lopez [phone]   [email or web address]

## 2021-05-18 ENCOUNTER — MEDICAL CORRESPONDENCE (OUTPATIENT)
Dept: HEALTH INFORMATION MANAGEMENT | Facility: CLINIC | Age: 81
End: 2021-05-18

## 2021-05-21 ENCOUNTER — TELEPHONE (OUTPATIENT)
Dept: FAMILY MEDICINE | Facility: CLINIC | Age: 81
End: 2021-05-21

## 2021-05-21 DIAGNOSIS — I10 HYPERTENSION GOAL BP (BLOOD PRESSURE) < 140/90: ICD-10-CM

## 2021-05-21 DIAGNOSIS — G47.00 INSOMNIA, UNCONTROLLED: ICD-10-CM

## 2021-05-21 DIAGNOSIS — Z53.9 DIAGNOSIS NOT YET DEFINED: Primary | ICD-10-CM

## 2021-05-21 PROCEDURE — G0180 MD CERTIFICATION HHA PATIENT: HCPCS | Performed by: FAMILY MEDICINE

## 2021-05-21 RX ORDER — TRAZODONE HYDROCHLORIDE 100 MG/1
150 TABLET ORAL AT BEDTIME
Qty: 90 TABLET | Refills: 2 | COMMUNITY
Start: 2021-05-21 | End: 2021-06-01

## 2021-05-21 RX ORDER — QUETIAPINE FUMARATE 25 MG/1
25 TABLET, FILM COATED ORAL 2 TIMES DAILY
COMMUNITY
Start: 2021-05-21 | End: 2021-06-12

## 2021-05-21 NOTE — TELEPHONE ENCOUNTER
Reason for Call:  Form, our goal is to have forms completed with 72 hours, however, some forms may require a visit or additional information.    Type of letter, form or note:  Home Health Certification    Who is the form from?: Home care    Where did the form come from: form was faxed in    What clinic location was the form placed at?: [place]    Where the form was placed: Given to MA/RN    What number is listed as a contact on the form?: [facility] [phone]       Additional comments: I placed the [facility] forms in the [facility] folder for RN review    Call taken on 5/21/2021 at 2:12 PM by Tanisha Arevalo

## 2021-05-21 NOTE — TELEPHONE ENCOUNTER
This RN removed lisinopril and furosemide off Epic medication list per 5/17/21 office notes.     1.  [facility] states patient is taking 150 mg of trazodone nightly instead of 100 mg.  Ok to update this on Epic medication list?    2. Gabapentin on Epic medication list but not on [facility].  Ok to remove?    3.  Seroquel 25 mg twice daily added in the hospital per 5/17/21 office notes.  Ok to add to Epic list?    4.  Melatonin 5 mg nightly on [facility] but not on Epic medication list.  Ok to add?        Zoila Whittaker BSN, RN

## 2021-05-22 DIAGNOSIS — M81.0 OSTEOPOROSIS, UNSPECIFIED OSTEOPOROSIS TYPE, UNSPECIFIED PATHOLOGICAL FRACTURE PRESENCE: ICD-10-CM

## 2021-05-24 DIAGNOSIS — N39.0 RECURRENT UTI (URINARY TRACT INFECTION): ICD-10-CM

## 2021-05-24 LAB
ALBUMIN UR-MCNC: 100 MG/DL
APPEARANCE UR: CLEAR
BACTERIA #/AREA URNS HPF: ABNORMAL /HPF
BILIRUB UR QL STRIP: ABNORMAL
CAOX CRY #/AREA URNS HPF: ABNORMAL /HPF
COLOR UR AUTO: YELLOW
GLUCOSE UR STRIP-MCNC: NEGATIVE MG/DL
HGB UR QL STRIP: NEGATIVE
KETONES UR STRIP-MCNC: 15 MG/DL
LEUKOCYTE ESTERASE UR QL STRIP: NEGATIVE
NITRATE UR QL: NEGATIVE
NON-SQ EPI CELLS #/AREA URNS LPF: ABNORMAL /LPF
PH UR STRIP: 6 PH (ref 5–7)
RBC #/AREA URNS AUTO: ABNORMAL /HPF
SOURCE: ABNORMAL
SP GR UR STRIP: >1.03 (ref 1–1.03)
UROBILINOGEN UR STRIP-ACNC: 2 EU/DL (ref 0.2–1)
WBC #/AREA URNS AUTO: ABNORMAL /HPF

## 2021-05-24 PROCEDURE — 81001 URINALYSIS AUTO W/SCOPE: CPT | Performed by: FAMILY MEDICINE

## 2021-05-24 PROCEDURE — 87186 SC STD MICRODIL/AGAR DIL: CPT | Performed by: FAMILY MEDICINE

## 2021-05-24 PROCEDURE — 87088 URINE BACTERIA CULTURE: CPT | Performed by: FAMILY MEDICINE

## 2021-05-24 PROCEDURE — 87086 URINE CULTURE/COLONY COUNT: CPT | Performed by: FAMILY MEDICINE

## 2021-05-24 RX ORDER — ALENDRONATE SODIUM 70 MG/1
TABLET ORAL
Qty: 12 TABLET | Refills: 0 | OUTPATIENT
Start: 2021-05-24

## 2021-05-24 NOTE — TELEPHONE ENCOUNTER
Duplicate: alendronate (FOSAMAX) 70 MG tablet 12 tablet 0 4/12/2021.  Thank you. Janis Howard R.N.

## 2021-05-26 ENCOUNTER — TRANSFERRED RECORDS (OUTPATIENT)
Dept: HEALTH INFORMATION MANAGEMENT | Facility: CLINIC | Age: 81
End: 2021-05-26

## 2021-05-27 ENCOUNTER — MEDICAL CORRESPONDENCE (OUTPATIENT)
Dept: HEALTH INFORMATION MANAGEMENT | Facility: CLINIC | Age: 81
End: 2021-05-27

## 2021-05-27 DIAGNOSIS — N39.0 URINARY TRACT INFECTION WITHOUT HEMATURIA, SITE UNSPECIFIED: Primary | ICD-10-CM

## 2021-05-27 LAB
BACTERIA SPEC CULT: ABNORMAL
BACTERIA SPEC CULT: ABNORMAL
Lab: ABNORMAL
SPECIMEN SOURCE: ABNORMAL

## 2021-05-27 RX ORDER — NITROFURANTOIN 25; 75 MG/1; MG/1
100 CAPSULE ORAL 2 TIMES DAILY
Qty: 14 CAPSULE | Refills: 0 | Status: SHIPPED | OUTPATIENT
Start: 2021-05-27 | End: 2021-05-29

## 2021-05-29 ENCOUNTER — TELEPHONE (OUTPATIENT)
Dept: FAMILY MEDICINE | Facility: CLINIC | Age: 81
End: 2021-05-29

## 2021-05-29 ENCOUNTER — TELEPHONE (OUTPATIENT)
Dept: NURSING | Facility: CLINIC | Age: 81
End: 2021-05-29

## 2021-05-29 DIAGNOSIS — G25.0 ESSENTIAL TREMOR: ICD-10-CM

## 2021-05-29 DIAGNOSIS — E78.00 ELEVATED LDL CHOLESTEROL LEVEL: ICD-10-CM

## 2021-05-29 DIAGNOSIS — G47.00 INSOMNIA, UNCONTROLLED: ICD-10-CM

## 2021-05-29 DIAGNOSIS — N39.0 URINARY TRACT INFECTION WITHOUT HEMATURIA, SITE UNSPECIFIED: ICD-10-CM

## 2021-05-29 RX ORDER — NITROFURANTOIN 25; 75 MG/1; MG/1
100 CAPSULE ORAL 2 TIMES DAILY
Qty: 12 CAPSULE | Refills: 0 | Status: SHIPPED | OUTPATIENT
Start: 2021-05-29 | End: 2021-06-24

## 2021-05-29 NOTE — TELEPHONE ENCOUNTER
Reason for call:  Medication   If this is a refill request, has the caller requested the refill from the pharmacy already? Yes  Will the patient be using a [facility]? No  Name of the pharmacy and phone number for the current request: [facility] - [address] [place] [address], [phone]    Name of the medication requested: nitroFURantoin macrocrystal-monohydrate (MACROBID) 100 MG capsule    Other request: Needs refill as soon as possible for UTI    Phone number to reach patient:  Cell number on file:    Telephone Information:   Mobile [phone]       Best Time:  ANY    Can we leave a detailed message on this number?  YES    Travel screening: Not Applicable'

## 2021-05-29 NOTE — TELEPHONE ENCOUNTER
Pt took 2 doses of her Macrobid, and then lost the Rx.   Dr Wu wrote for the Rx initially for UTI as follows:    nitroFURantoin macrocrystal-monohydrate (MACROBID) 100 MG capsule  100 mg, 2 TIMES DAILY 0 ordered         Summary: Take 1 capsule (100 mg) by mouth 2 times daily, Disp-14 capsule, R-0, E-Prescribe   Dose, Route, Frequency: 100 mg, Oral, 2 TIMES DAILY  Start: 5/27/2021  Ord/Sold: 5/27/2021 (O)  Report  Adh:   Taking:   Long-term:   Pharmacy: [facility] [address] [place] [id number] [facility] Dose History  Change       Patient Sig: Take 1 capsule (100 mg) by mouth 2 times daily       Ordered on: 5/27/2021       Authorized by: PALMA WU       Dispense: 14 capsule        Pharmacy calling to get authorization for a replacement Rx.    Page sent to Dr Vinicio Sloan - On Call.  Diana Culver RN 05/29/21 2:51 PM  [facility] Nurse Advisor

## 2021-05-29 NOTE — TELEPHONE ENCOUNTER
Dr Sloan gave verbal orders, read back, to replace the remainder of the pt's Macrobid.  Rx sent to pt's pharmacy.  Diana Culver RN 05/29/21 2:53 PM  [facility] Nurse Advisor

## 2021-06-01 RX ORDER — TRAZODONE HYDROCHLORIDE 100 MG/1
TABLET ORAL
Qty: 90 TABLET | Refills: 0 | Status: SHIPPED | OUTPATIENT
Start: 2021-06-01 | End: 2021-06-12

## 2021-06-01 RX ORDER — ROSUVASTATIN CALCIUM 20 MG/1
TABLET, COATED ORAL
Qty: 90 TABLET | Refills: 0 | Status: SHIPPED | OUTPATIENT
Start: 2021-06-01 | End: 2021-06-12

## 2021-06-01 RX ORDER — PRIMIDONE 50 MG/1
TABLET ORAL
Qty: 45 TABLET | Refills: 0 | Status: SHIPPED | OUTPATIENT
Start: 2021-06-01 | End: 2021-01-01

## 2021-06-01 NOTE — TELEPHONE ENCOUNTER
Routing refill request to provider for review/approval because:  Drug not on the [facility] refill protocol   LDL Cholesterol Calculated   Date Value Ref Range Status   09/11/2019 164 (H) <100 mg/dL Final     Comment:     Above desirable:  100-129 mg/dl  Borderline High:  130-159 mg/dL  High:             160-189 mg/dL  Very high:       >189 mg/dl       Trazodone is in epic as historical and is a different dose than refill request          Johanna KURTZN, RN

## 2021-06-01 NOTE — TELEPHONE ENCOUNTER
Left message on voicemail that her clinic received message 5/29/21 regarding needing replacement prescription for a med.  Can see that this was already addressed on 5/29/21 and the med was sent to her requested pharmacy.  Will not be calling her back again since was addressed.  Natalie Melgar RN

## 2021-06-11 ENCOUNTER — TELEPHONE (OUTPATIENT)
Dept: FAMILY MEDICINE | Facility: CLINIC | Age: 81
End: 2021-06-11

## 2021-06-11 NOTE — TELEPHONE ENCOUNTER
Reason for Call:  Other call back    Detailed comments:     PT'S MEDICARE EPISODE FINISHED. PT WOULD LIKE TO CONTINUE WITH PRIVATE PAY 2X WEEKLY HOMEHEALTH AID FOR BATHING & PERS. CARE. S.N WANTS 60 DAYS FOR RE-CERTIFICATION . PLEASE CALL GARRY [phone]    Phone Number Patient can be reached at: [phone] (GARRY)    Best Time: ANYTIME    Can we leave a detailed message on this number? YES    Call taken on 6/11/2021 at 5:03 PM by Maikel Ledesma

## 2021-06-12 ENCOUNTER — MYC MEDICAL ADVICE (OUTPATIENT)
Dept: FAMILY MEDICINE | Facility: CLINIC | Age: 81
End: 2021-06-12

## 2021-06-12 ENCOUNTER — NURSE TRIAGE (OUTPATIENT)
Dept: NURSING | Facility: CLINIC | Age: 81
End: 2021-06-12

## 2021-06-12 ENCOUNTER — MYC REFILL (OUTPATIENT)
Dept: FAMILY MEDICINE | Facility: CLINIC | Age: 81
End: 2021-06-12

## 2021-06-12 DIAGNOSIS — G47.00 INSOMNIA, UNCONTROLLED: ICD-10-CM

## 2021-06-12 DIAGNOSIS — F32.1 MODERATE MAJOR DEPRESSION (H): ICD-10-CM

## 2021-06-12 DIAGNOSIS — R29.898 WEAKNESS OF BOTH LOWER EXTREMITIES: ICD-10-CM

## 2021-06-12 DIAGNOSIS — N95.2 VAGINAL ATROPHY: Primary | ICD-10-CM

## 2021-06-12 DIAGNOSIS — Z23 NEED FOR PROPHYLACTIC VACCINATION AND INOCULATION AGAINST INFLUENZA: ICD-10-CM

## 2021-06-12 DIAGNOSIS — N39.0 URINARY TRACT INFECTION WITHOUT HEMATURIA, SITE UNSPECIFIED: ICD-10-CM

## 2021-06-12 DIAGNOSIS — E78.00 ELEVATED LDL CHOLESTEROL LEVEL: ICD-10-CM

## 2021-06-12 DIAGNOSIS — G25.0 ESSENTIAL TREMOR: ICD-10-CM

## 2021-06-12 RX ORDER — ROSUVASTATIN CALCIUM 20 MG/1
20 TABLET, COATED ORAL AT BEDTIME
Qty: 30 TABLET | Refills: 0 | Status: SHIPPED | OUTPATIENT
Start: 2021-06-12 | End: 2021-01-01

## 2021-06-12 RX ORDER — ARIPIPRAZOLE 5 MG/1
5 TABLET ORAL AT BEDTIME
Qty: 30 TABLET | Refills: 0 | Status: SHIPPED | OUTPATIENT
Start: 2021-06-12 | End: 2021-07-06

## 2021-06-12 RX ORDER — LEVOTHYROXINE SODIUM 100 UG/1
100 TABLET ORAL DAILY
Qty: 30 TABLET | Refills: 0 | Status: SHIPPED | OUTPATIENT
Start: 2021-06-12 | End: 2021-07-06

## 2021-06-12 RX ORDER — QUETIAPINE FUMARATE 25 MG/1
25 TABLET, FILM COATED ORAL 2 TIMES DAILY
Qty: 60 TABLET | Refills: 0 | Status: SHIPPED | OUTPATIENT
Start: 2021-06-12 | End: 2021-01-01

## 2021-06-12 RX ORDER — TRAZODONE HYDROCHLORIDE 100 MG/1
100 TABLET ORAL AT BEDTIME
Qty: 30 TABLET | Refills: 0 | Status: SHIPPED | OUTPATIENT
Start: 2021-06-12 | End: 2021-01-01

## 2021-06-12 NOTE — TELEPHONE ENCOUNTER
Daughter Aicha is calling and states that Brenda needs her medications filled.  Today is needing Crestor 20 mg and Trazodone 100 mg Seroquel 25 mg and Aripiprazole 5 mg and Levothyroxine 100 mcg.  The only medication that she got was Primidone.  Daughter is requesting to have on call MD scruggs.  HALINA paged on call MD Demetria Gunter at 10:48 am.  MD Gunter states that she will send the prescriptions to [facility] in [place].      COVID 19 Nurse Triage Plan/Patient Instructions    Please be aware that novel coronavirus (COVID-19) may be circulating in the community. If you develop symptoms such as fever, cough, or SOB or if you have concerns about the presence of another infection including coronavirus (COVID-19), please contact your health care provider or visit [email or web address].     Disposition/Instructions    Home care recommended. Follow home care protocol based instructions.    Thank you for taking steps to prevent the spread of this virus.  o Limit your contact with others.  o Wear a simple mask to cover your cough.  o Wash your hands well and often.    Resources    [facility]: About COVID-19: [email or web address]/    CDC: What to Do If You're Sick: www.cdc.gov/coronavirus/2019-ncov/about/steps-when-sick.html    CDC: Ending Home Isolation: www.cdc.gov/coronavirus/2019-ncov/hcp/disposition-in-home-patients.html     CDC: Caring for Someone: www.cdc.gov/coronavirus/2019-ncov/if-you-are-sick/care-for-someone.html     [facility]: Interim Guidance for Hospital Discharge to Home: www.health.[facility].[place]/diseases/coronavirus/hcp/hospdischarge.pdf    [facility] clinical trials (COVID-19 research studies): clinicalaffairs.[facility]-clinical-trials     Below are the COVID-19 hotlines at the [place] Department of Health ([facility]). Interpreters are available.   o For health questions: Call [phone] or 1-549.877.7684 (7 a.m. to 7 p.m.)  o For questions about schools and childcare: Call  "[phone] or [phone] (7 a.m. to 7 p.m.)                     Reason for Disposition    [1] Caller has URGENT medication question about med that PCP or specialist prescribed AND [2] triager unable to answer question    Additional Information    Negative: MORE THAN A DOUBLE DOSE of a prescription or over-the-counter (OTC) drug    Negative: [1] DOUBLE DOSE (an extra dose or lesser amount) of over-the-counter (OTC) drug AND [2] any symptoms (e.g., dizziness, nausea, pain, sleepiness)    Negative: [1] DOUBLE DOSE (an extra dose or lesser amount) of prescription drug AND [2] any symptoms (e.g., dizziness, nausea, pain, sleepiness)    Negative: Took another person's prescription drug    Negative: [1] DOUBLE DOSE (an extra dose or lesser amount) of prescription drug AND [2] NO symptoms (Exception: a double dose of antibiotics)    Negative: Diabetes drug error or overdose (e.g., took wrong type of insulin or took extra dose)    Negative: [1] Request for URGENT new prescription or refill of \"essential\" medication (i.e., likelihood of harm to patient if not taken) AND [2] triager unable to fill per unit policy    Negative: [1] Prescription not at pharmacy AND [2] was prescribed by PCP recently    Negative: [1] Pharmacy calling with prescription questions AND [2] triager unable to answer question    Protocols used: MEDICATION QUESTION CALL-A-AH      "

## 2021-06-12 NOTE — PROGRESS NOTES
Spouse waited too long to requet refills. Given 30 day supply, follow-up plan not clear in last visit.

## 2021-06-14 ENCOUNTER — TELEPHONE (OUTPATIENT)
Dept: FAMILY MEDICINE | Facility: CLINIC | Age: 81
End: 2021-06-14

## 2021-06-14 ENCOUNTER — MEDICAL CORRESPONDENCE (OUTPATIENT)
Dept: HEALTH INFORMATION MANAGEMENT | Facility: CLINIC | Age: 81
End: 2021-06-14

## 2021-06-14 ENCOUNTER — PATIENT OUTREACH (OUTPATIENT)
Dept: CARE COORDINATION | Facility: CLINIC | Age: 81
End: 2021-06-14

## 2021-06-14 RX ORDER — ARIPIPRAZOLE 5 MG/1
TABLET ORAL
Qty: 90 TABLET | Refills: 0 | OUTPATIENT
Start: 2021-06-14

## 2021-06-14 RX ORDER — ROSUVASTATIN CALCIUM 20 MG/1
20 TABLET, COATED ORAL AT BEDTIME
Qty: 90 TABLET | Refills: 0 | OUTPATIENT
Start: 2021-06-14

## 2021-06-14 RX ORDER — TRAZODONE HYDROCHLORIDE 100 MG/1
100 TABLET ORAL AT BEDTIME
Qty: 90 TABLET | Refills: 0 | OUTPATIENT
Start: 2021-06-14

## 2021-06-14 RX ORDER — ARIPIPRAZOLE 5 MG/1
5 TABLET ORAL AT BEDTIME
Qty: 90 TABLET | Refills: 0 | OUTPATIENT
Start: 2021-06-14

## 2021-06-14 RX ORDER — LEVOTHYROXINE SODIUM 100 UG/1
100 TABLET ORAL DAILY
Qty: 90 TABLET | Refills: 0 | OUTPATIENT
Start: 2021-06-14

## 2021-06-14 RX ORDER — NITROFURANTOIN 25; 75 MG/1; MG/1
CAPSULE ORAL
Qty: 14 CAPSULE | Refills: 0 | OUTPATIENT
Start: 2021-06-14

## 2021-06-14 RX ORDER — TRAZODONE HYDROCHLORIDE 100 MG/1
100 TABLET ORAL AT BEDTIME
Qty: 30 TABLET | Refills: 0 | OUTPATIENT
Start: 2021-06-14

## 2021-06-14 RX ORDER — PRIMIDONE 50 MG/1
TABLET ORAL
Qty: 45 TABLET | Refills: 0 | OUTPATIENT
Start: 2021-06-14

## 2021-06-14 RX ORDER — LEVOTHYROXINE SODIUM 100 UG/1
TABLET ORAL
Qty: 90 TABLET | Refills: 0 | OUTPATIENT
Start: 2021-06-14

## 2021-06-14 RX ORDER — QUETIAPINE FUMARATE 25 MG/1
25 TABLET, FILM COATED ORAL 2 TIMES DAILY
OUTPATIENT
Start: 2021-06-14

## 2021-06-14 NOTE — LETTER
Dear Gilma,    I have been attempting to reach you since our last contact. I would like to continue to work with you and provide any additional support you may need on achieving your health care related goals. I would appreciate if you would give me a call at [phone] to let me know if you would like to continue working together. I know that there are many things that can affect our ability to communicate and I hope we can continue to work together.    All of us at the [facility] are invested in your health and are here to assist you in meeting your goals.     Sincerely,  Emily Olivares, RN, BSN, PHN Care Coordinator  [place], [place], and [place]   Phone: [phone]

## 2021-06-14 NOTE — PROGRESS NOTES
Clinic Care Coordination Contact  [facility]/Voicemail       Clinical Data: Care Coordinator Outreach  Outreach attempted x 3. See 5/13/21 out reach encounter.   Left message on patient's voicemail with call back information and requested return call.  Plan: Care Coordinator will send unable to contact letter with care coordinator contact information via [employer]. Care Coordinator will postpone message for 10-14 business days. If no call back a dis enrollment letter will be sent.  Complex care letter sent via [employer]    Emily Olivares RN, BSN, PHN Care Coordinator  [place], [place], and [place]   Phone: [phone]

## 2021-06-14 NOTE — TELEPHONE ENCOUNTER
Left detailed message on DAVID Villafuerte voicemail with Dr. Gucci winters for orders as per her request below.  Natalie Melgar RN

## 2021-06-14 NOTE — TELEPHONE ENCOUNTER
Prior Authorization Retail Medication Request    Medication/Dose: PREMARIN 0.625 MG/GM vaginal cream  ICD code (if different than what is on RX):    Previously Tried and Failed:    Rationale:      Insurance Name:  [employer]  Insurance ID:        Pharmacy Information (if different than what is on RX)  Name:  [id number]   Phone:  [phone]

## 2021-06-14 NOTE — LETTER
[facility]  Complex Care Plan  About Me:    Patient Name:  Gilma Pace    YOB: 1940  Age:         80 year old   Paola MRN:    [id number] Telephone Information:  Home Phone [phone]   Mobile [phone]       Address:  [address] [facility] [address] Email address:  [email or web address]      Emergency Contact(s)    Name Relationship Lgl Grd Work Phone Home Phone Mobile Phone   1. LA PACE Spouse No NONE [phone]   2. RAMOS INIGUEZ Daughter No NONE [phone]           Primary language:  English     needed? No   [place] [facility]:  [phone] op. 1  Other communication barriers:    Preferred Method of Communication:  Mail  Current living arrangement:    Mobility Status/ Medical Equipment:      Health Maintenance  Health Maintenance Reviewed:      My Access Plan  Medical Emergency 911   Primary Clinic Line [facility] - [phone]   24 Hour Appointment Line [phone] or  [phone] ([phone]) (toll-free)   24 Hour Nurse Line [phone] (toll-free)   Preferred Urgent Care     Preferred Hospital     Preferred Pharmacy [facility] [address] [facility] [address] [address] Behavioral Health Crisis Line The National Suicide Prevention Lifeline at 1-727.259.9431 or 911             My Care Team Members  Patient Care Team       Relationship Specialty Notifications Start End    Gucci Wu MD PCP - General Family Practice  12/7/17     Phone: [phone] Fax: [phone] [address] [facility] [address]    Gucci Wu MD Assigned PCP   9/8/17     Phone: [phone] Fax: [phone] [address] [facility] [address]    [facility] ([facility] ([place])  6/20/20     Phone: [phone]         Kenneth Cornell MD Assigned Surgical Provider   10/23/20     Phone: [phone] Fax: [phone]          [address] [facility] [address]    Emily Olivares, RN Lead Care Coordinator Primary Care - [facility] Admissions 1/29/21     Phone: [phone] Fax: [phone]        Paula Pace MA Community Health Worker Primary Care - [facility] Admissions 1/29/21     [phone]            My Care Plans  Self Management and Treatment Plan  Goals and (Comments)  Goals        General    Medical (pt-stated)     Notes - Note edited  3/16/2021 11:15 AM by Emily Olivares RN    Goal Statement: I want to stay out of the hospital  Date Goal set: 3/16/21  Barriers: recent hospitalization   Strengths: motivated, family support  Date to Achieve By: 6/16/21  Patient expressed understanding of goal: yes  Action steps to achieve this goal:  1. I will take my medications as prescribed.   2. I will use my walker at all times  3. I will make my hospital follow up appointment with Dr. Gucci Wu in the next 1-2 days  4. I will prevent a UTI by getting 4-6 cups of caffeine free beverages per day, I will shower 2x per week and practice good hygiene.   5. I will call my doctor or make an appointment for evaluation with any new or worsening symptoms.                Action Plans on File:         COPD  Depression          Advance Care Plans/Directives Type:        My Medical and Care Information  Problem List   Patient Active Problem List   Diagnosis     COPD (chronic obstructive pulmonary disease) (H)     Hypertension goal BP (blood pressure) < 140/90     Postoperative hypothyroidism     Mitral valve disorders(424.0)     Osteoporosis     Anxiety     Chronic low back pain     Chronic low back pain without sciatica     Compression fracture of thoracic vertebra (H)     Essential hypertension     Folate deficiency     Hypothyroidism     Mitral regurgitation     Mixed hyperlipidemia     Other abnormal glucose     Recurrent falls     Smoker     Mild concentric left ventricular hypertrophy (LVH)     Myopathy     Acute respiratory failure (H)      Chronic respiratory failure with hypoxia (H)     Leg weakness     Obesity (BMI 35.0-39.9 without comorbidity)     Pneumonia     Respiratory failure (H)     Morbid obesity (H)     Hypothyroidism, unspecified type     Major depressive disorder, recurrent episode, moderate (H)     Sepsis (H)     Restless leg syndrome     Continuous leakage of urine      Current Medications and Allergies:  See mychart.    Care Coordination Start Date: 1/29/2021   Frequency of Care Coordination: monthly   Form Last Updated: 06/14/2021

## 2021-06-15 NOTE — TELEPHONE ENCOUNTER
Central Prior Authorization Team   Phone: [phone]    PA Initiation    Medication: PREMARIN 0.625 MG/GM vaginal cream  Insurance Company: [employer] - Phone [phone] Fax [phone]  Pharmacy Filling the Rx: [facility] [address] [place] [address] [facility]  Filling Pharmacy Phone: [phone]  Filling Pharmacy Fax:    Start Date: 6/15/2021

## 2021-06-24 DIAGNOSIS — R82.90 ABNORMAL URINALYSIS: Primary | ICD-10-CM

## 2021-06-24 DIAGNOSIS — N39.0 URINARY TRACT INFECTION WITHOUT HEMATURIA, SITE UNSPECIFIED: ICD-10-CM

## 2021-06-24 DIAGNOSIS — N39.0 RECURRENT UTI (URINARY TRACT INFECTION): ICD-10-CM

## 2021-06-24 LAB
ALBUMIN UR-MCNC: 30 MG/DL
APPEARANCE UR: CLEAR
BACTERIA #/AREA URNS HPF: ABNORMAL /HPF
BILIRUB UR QL STRIP: NEGATIVE
CAOX CRY #/AREA URNS HPF: ABNORMAL /HPF
COLOR UR AUTO: YELLOW
GLUCOSE UR STRIP-MCNC: NEGATIVE MG/DL
HGB UR QL STRIP: ABNORMAL
KETONES UR STRIP-MCNC: NEGATIVE MG/DL
LEUKOCYTE ESTERASE UR QL STRIP: ABNORMAL
NITRATE UR QL: NEGATIVE
NON-SQ EPI CELLS #/AREA URNS LPF: ABNORMAL /LPF
PH UR STRIP: 6 PH (ref 5–7)
RBC #/AREA URNS AUTO: ABNORMAL /HPF
SOURCE: ABNORMAL
SP GR UR STRIP: 1.01 (ref 1–1.03)
UROBILINOGEN UR STRIP-ACNC: 1 EU/DL (ref 0.2–1)
WBC #/AREA URNS AUTO: ABNORMAL /HPF

## 2021-06-24 PROCEDURE — 87088 URINE BACTERIA CULTURE: CPT | Performed by: FAMILY MEDICINE

## 2021-06-24 PROCEDURE — 81001 URINALYSIS AUTO W/SCOPE: CPT | Performed by: FAMILY MEDICINE

## 2021-06-24 PROCEDURE — 87186 SC STD MICRODIL/AGAR DIL: CPT | Performed by: FAMILY MEDICINE

## 2021-06-24 PROCEDURE — 87086 URINE CULTURE/COLONY COUNT: CPT | Performed by: FAMILY MEDICINE

## 2021-06-24 RX ORDER — NITROFURANTOIN 25; 75 MG/1; MG/1
100 CAPSULE ORAL 2 TIMES DAILY
Qty: 14 CAPSULE | Refills: 0 | Status: SHIPPED | OUTPATIENT
Start: 2021-06-24 | End: 2021-01-01

## 2021-06-27 LAB
BACTERIA SPEC CULT: ABNORMAL
BACTERIA SPEC CULT: ABNORMAL
Lab: ABNORMAL
SPECIMEN SOURCE: ABNORMAL

## 2021-07-01 ENCOUNTER — MEDICAL CORRESPONDENCE (OUTPATIENT)
Dept: HEALTH INFORMATION MANAGEMENT | Facility: CLINIC | Age: 81
End: 2021-07-01

## 2021-07-02 ENCOUNTER — MEDICAL CORRESPONDENCE (OUTPATIENT)
Dept: HEALTH INFORMATION MANAGEMENT | Facility: CLINIC | Age: 81
End: 2021-07-02

## 2021-07-02 ENCOUNTER — PATIENT OUTREACH (OUTPATIENT)
Dept: CARE COORDINATION | Facility: CLINIC | Age: 81
End: 2021-07-02

## 2021-07-02 NOTE — LETTER
Dear Gilma,    I have been unsuccessful in reaching you since our last contact. At this time the Care Coordination team will make no further attempts to reach you, however this does not change your ability to continue receiving care from your providers at your primary care clinic. If you need additional support from a care coordinator in the future please contact JOHNSON Mitchell at [phone].    All of us at the [facility] are invested in your health and are here to assist you in meeting your goals.     Sincerely,  Emily Olivares RN, BSN, PHN Care Coordinator  [place], [place], and [place]   Phone: [phone]

## 2021-07-02 NOTE — PROGRESS NOTES
Clinic Care Coordination Contact    Situation: Patient chart reviewed by care coordinator.    Background: see 6/16/21 patient out reach encounter.    Assessment: patient has not reached out to CC RN.    Plan/Recommendations: patient will be dis enrolled from care coordination due to unable to contact.     Emily Olivares, RN, BSN, PHN Care Coordinator  [place], [place], and [place]   Phone: [phone]

## 2021-07-04 ENCOUNTER — OFFICE VISIT (OUTPATIENT)
Dept: URGENT CARE | Facility: URGENT CARE | Age: 81
End: 2021-07-04
Payer: MEDICARE

## 2021-07-04 ENCOUNTER — NURSE TRIAGE (OUTPATIENT)
Dept: NURSING | Facility: CLINIC | Age: 81
End: 2021-07-04

## 2021-07-04 VITALS
SYSTOLIC BLOOD PRESSURE: 110 MMHG | HEART RATE: 82 BPM | DIASTOLIC BLOOD PRESSURE: 75 MMHG | TEMPERATURE: 98 F | OXYGEN SATURATION: 97 %

## 2021-07-04 DIAGNOSIS — R30.0 DYSURIA: Primary | ICD-10-CM

## 2021-07-04 DIAGNOSIS — Z23 NEED FOR PROPHYLACTIC VACCINATION AND INOCULATION AGAINST INFLUENZA: ICD-10-CM

## 2021-07-04 DIAGNOSIS — N39.0 RECURRENT UTI (URINARY TRACT INFECTION): ICD-10-CM

## 2021-07-04 DIAGNOSIS — F32.1 MODERATE MAJOR DEPRESSION (H): ICD-10-CM

## 2021-07-04 DIAGNOSIS — R29.898 WEAKNESS OF BOTH LOWER EXTREMITIES: ICD-10-CM

## 2021-07-04 LAB
ALBUMIN UR-MCNC: 100 MG/DL
APPEARANCE UR: CLEAR
BACTERIA #/AREA URNS HPF: ABNORMAL /HPF
BILIRUB UR QL STRIP: ABNORMAL
COLOR UR AUTO: YELLOW
GLUCOSE UR STRIP-MCNC: NEGATIVE MG/DL
HGB UR QL STRIP: NEGATIVE
KETONES UR STRIP-MCNC: NEGATIVE MG/DL
LEUKOCYTE ESTERASE UR QL STRIP: ABNORMAL
NITRATE UR QL: NEGATIVE
NON-SQ EPI CELLS #/AREA URNS LPF: ABNORMAL /LPF
PH UR STRIP: 6 PH (ref 5–7)
RBC #/AREA URNS AUTO: ABNORMAL /HPF
SOURCE: ABNORMAL
SP GR UR STRIP: >1.03 (ref 1–1.03)
UROBILINOGEN UR STRIP-ACNC: 2 EU/DL (ref 0.2–1)
WBC #/AREA URNS AUTO: ABNORMAL /HPF

## 2021-07-04 PROCEDURE — 99214 OFFICE O/P EST MOD 30 MIN: CPT | Mod: GW | Performed by: NURSE PRACTITIONER

## 2021-07-04 PROCEDURE — 81001 URINALYSIS AUTO W/SCOPE: CPT | Performed by: NURSE PRACTITIONER

## 2021-07-04 RX ORDER — CEFDINIR 300 MG/1
300 CAPSULE ORAL 2 TIMES DAILY
Qty: 20 CAPSULE | Refills: 0 | Status: SHIPPED | OUTPATIENT
Start: 2021-07-04 | End: 2021-07-14

## 2021-07-04 ASSESSMENT — ENCOUNTER SYMPTOMS
COUGH: 0
RHINORRHEA: 0
HEADACHES: 0
NAUSEA: 0
CHILLS: 0
SORE THROAT: 0
DIARRHEA: 0
SHORTNESS OF BREATH: 0
FEVER: 0
VOMITING: 0

## 2021-07-04 NOTE — PATIENT INSTRUCTIONS

## 2021-07-04 NOTE — PROGRESS NOTES
SUBJECTIVE:   Gilma Pace is a 81 year old female presenting with a chief complaint of   Chief Complaint   Patient presents with     Urinary Problem     Ongoing urinary problem, has had frequent UTIs so has a standing order for the tests, if the patient has a UTI and it is not taken care of she becomes disoriented and they want to stay ahead of that situation, the patient just completed an antibiotic three days ago for another UTI, frequency, altered mental status, no fevers or chills, retention and dark urine, no burning when she urinated, no blood in the urine, no flank pain        She is an established patient of [facility].    UTI    Onset of symptoms was 2 day(s).  Course of illness is worsening  Severity moderate  Current and associated symptoms  odor in urine  Treatment and measures tried None  Predisposing factors include history of frequent UTI's  Patient denies rigors, flank pain, temperature > 101 degrees F. and vomiting            Review of Systems   Constitutional: Negative for chills and fever.   HENT: Negative for congestion, ear pain, rhinorrhea and sore throat.    Respiratory: Negative for cough and shortness of breath.    Gastrointestinal: Negative for diarrhea, nausea and vomiting.   Genitourinary:        Odor in urine   Neurological: Negative for headaches.   All other systems reviewed and are negative.      Past Medical History:   Diagnosis Date     Anxiety 10/11/2006    Last visit with Mental health specialist in 2002.      Chronic low back pain without sciatica 10/31/2016     Compression fracture of thoracic vertebra (H) 6/13/2011    Overview:  T  11  compression  fx  on  xrays  from  6/13/2011  . Ongoing pain  x  3  months   Get  eval  and  treat  with  spine  specialists  as discussed  Initially  treated  in [place]  .  Pain  is  controlled   with  advil  as needed  . Reviewed   with  patient      Congestive heart failure (H) 10/11/2006    Overview:  Epic      COPD (chronic obstructive  pulmonary disease) (H) 9/28/2017     Folate deficiency 6/9/2017    Overview:  Formatting of this note may be different from the original. Hemoglobin (g/dl)  Date Value  06/05/2017 15.6  10/31/2016 13.8  11/04/2015 11.3 (L)   Iron (mcg/dl)  Date Value  06/05/2017 49 (L)   TIBC, Calculated (mcg/dl)  Date Value  06/05/2017 319   % Saturation, calc. (%)  Date Value  06/05/2017 15   Vitamin B12 (pg/ml)  Date Value  06/05/2017 253   Folate (ng/ml)  Date Value  06/05/20     Hypertension goal BP (blood pressure) < 140/90 9/28/2017     Mitral regurgitation 2/11/2014    Overview:  Needs dental prophylaxis      Mixed hyperlipidemia 10/11/2006     Osteoporosis 9/28/2017     Postoperative hypothyroidism 9/28/2017     Family History   Problem Relation Age of Onset     Breast Cancer Maternal Grandmother      Other Cancer Other         pancreatic and liver     Current Outpatient Medications   Medication Sig Dispense Refill     albuterol (PROVENTIL HFA) 108 (90 Base) MCG/ACT inhaler Inhale 2 puffs into the lungs every 6 hours 18 g 2     ARIPiprazole (ABILIFY) 5 MG tablet Take 1 tablet (5 mg) by mouth At Bedtime 30 tablet 0     busPIRone (BUSPAR) 15 MG tablet Take 1 tablet by mouth twice daily 180 tablet 0     cefdinir (OMNICEF) 300 MG capsule Take 1 capsule (300 mg) by mouth 2 times daily for 10 days 20 capsule 0     conjugated estrogens (PREMARIN) 0.625 MG/GM vaginal cream Place 0.5 g vaginally twice a week 30 g 3     DULoxetine (CYMBALTA) 60 MG capsule Take 2 capsules by mouth once daily 180 capsule 1     folic acid (FOLVITE) 1 MG tablet Take 1 tablet by mouth once daily 90 tablet 2     levothyroxine (SYNTHROID/LEVOTHROID) 100 MCG tablet Take 1 tablet (100 mcg) by mouth daily 30 tablet 0     metoprolol succinate ER (TOPROL-XL) 25 MG 24 hr tablet Take 1 tablet (25 mg) by mouth daily 90 tablet 3     NYSTOP 203358 UNIT/GM powder APPLY  POWDER TOPICALLY TWICE DAILY 60 g 11     order for DME [facility] home oxygen:  Equipment being  ordered: Oxygen, home fill system,  4 LPM 1 each 0     order for DME Equipment being ordered: Oxygen -  4 liters a day       pramipexole (MIRAPEX) 0.25 MG tablet TAKE 1 TABLET BY MOUTH AT BEDTIME 90 tablet 3     primidone (MYSOLINE) 50 MG tablet TAKE 1/2 (ONE-HALF) TABLET BY MOUTH AT BEDTIME 45 tablet 0     QUEtiapine (SEROQUEL) 25 MG tablet Take 1 tablet (25 mg) by mouth 2 times daily 60 tablet 0     rosuvastatin (CRESTOR) 20 MG tablet Take 1 tablet (20 mg) by mouth At Bedtime 30 tablet 0     traZODone (DESYREL) 100 MG tablet Take 1 tablet (100 mg) by mouth At Bedtime 30 tablet 0     umeclidinium (INCRUSE ELLIPTA) 62.5 MCG/INH inhaler Inhale 1 puff into the lungs daily 3 Inhaler 3     acetaminophen (TYLENOL) 500 MG tablet Take 2 tablets (1,000 mg) by mouth every 8 hours as needed for mild pain       alendronate (FOSAMAX) 70 MG tablet Take 1 tablet by mouth once a week (Patient not taking: Reported on 2021) 12 tablet 0     aspirin (ASA) 81 MG chewable tablet Take 81 mg by mouth       melatonin 5 MG tablet Take 1 tablet (5 mg) by mouth At Bedtime       nitroFURantoin macrocrystal-monohydrate (MACROBID) 100 MG capsule Take 1 capsule (100 mg) by mouth 2 times daily (Patient not taking: Reported on 2021) 14 capsule 0     Social History     Tobacco Use     Smoking status: Former Smoker     Quit date:      Years since quittin.5     Smokeless tobacco: Never Used   Substance Use Topics     Alcohol use: Yes     Comment: Rarely       OBJECTIVE  /75 (BP Location: Left arm, Patient Position: Sitting, Cuff Size: Adult Large)   Pulse 82   Temp 98  F (36.7  C) (Tympanic)   SpO2 97%   Breastfeeding No     Physical Exam  Vitals signs and nursing note reviewed.   Constitutional:       General: She is not in acute distress.     Appearance: She is well-developed. She is not diaphoretic.   HENT:      Head: Normocephalic and atraumatic.      Right Ear: External ear normal.      Left Ear: External ear normal.    Eyes:      Conjunctiva/sclera: Conjunctivae normal.   Neck:      Musculoskeletal: Normal range of motion and neck supple.   Pulmonary:      Effort: Pulmonary effort is normal. No respiratory distress.      Breath sounds: Normal breath sounds.   Lymphadenopathy:      Cervical: No cervical adenopathy.   Skin:     General: Skin is warm and dry.   Neurological:      General: No focal deficit present.      Mental Status: She is alert.      Cranial Nerves: No cranial nerve deficit.   Psychiatric:         Mood and Affect: Mood normal.       Results for orders placed or performed in visit on 07/04/21   *UA reflex to Microscopic and Culture ([facility] (except [place] and [place])     Status: Abnormal    Specimen: Midstream Urine   Result Value Ref Range    Color Urine Yellow     Appearance Urine Clear     Glucose Urine Negative NEG^Negative mg/dL    Bilirubin Urine Small (A) NEG^Negative    Ketones Urine Negative NEG^Negative mg/dL    Specific Gravity Urine >1.030 1.003 - 1.035    Blood Urine Negative NEG^Negative    pH Urine 6.0 5.0 - 7.0 pH    Protein Albumin Urine 100 (A) NEG^Negative mg/dL    Urobilinogen Urine 2.0 (H) 0.2 - 1.0 EU/dL    Nitrite Urine Negative NEG^Negative    Leukocyte Esterase Urine Trace (A) NEG^Negative    Source Midstream Urine    Urine Microscopic     Status: Abnormal   Result Value Ref Range    WBC Urine 5-10 (A) OTO5^0 - 5 /HPF    RBC Urine O - 2 OTO2^O - 2 /HPF    Squamous Epithelial /LPF Urine Few FEW^Few /LPF    Bacteria Urine Few (A) NEG^Negative /HPF         ASSESSMENT:      ICD-10-CM    1. Dysuria  R30.0 *UA reflex to Microscopic and Culture ([facility] (except [place] and [place])     Urine Microscopic   2. Recurrent UTI (urinary tract infection)  N39.0 cefdinir (OMNICEF) 300 MG capsule          PLAN:   As per ordered above.  Drink plenty of fluids.  Prevention and treatment of UTI's discussed. Follow up with primary care physician if not  improving.  Advised about symptoms which might herald more serious problems.                  Patient Instructions     Patient Education     Bladder Infection, Female (Adult)     Urine normally doesn't have any germs (bacteria) in it. But bacteria can get into the urinary tract from the skin around the rectum. Or they can travel in the blood from other parts of the body. Once they are in your urinary tract, they can cause infection in these areas:    The urethra (urethritis)    The bladder (cystitis)    The kidneys (pyelonephritis)  The most common place for an infection is in the bladder. This is called a bladder infection. This is one of the most common infections in women. Most bladder infections are easily treated. They are not serious unless the infection spreads to the kidney.  The terms bladder infection, UTI, and cystitis are often used to describe the same thing. But they are not always the same. Cystitis is an inflammation of the bladder. The most common cause of cystitis is an infection.  Symptoms  The infection causes inflammation in the urethra and bladder. This causes many of the symptoms. The most common symptoms of a bladder infection are:    Pain or burning when urinating    Having to urinate more often than normal    Urgent need to urinate    Only a small amount of urine comes out    Blood in urine    Belly (abdominal) discomfort. This is often in the lower belly above the pubic bone.    Cloudy urine    Strong- or bad-smelling urine    Unable to urinate (urinary retention)    Unable to hold urine in (urinary incontinence)    Fever    Loss of appetite    Confusion (in older adults)  Causes  Bladder infections are not contagious. You can't get one from someone else, from a toilet seat, or from sharing a bath.  The most common cause of bladder infections is bacteria from the bowels. The bacteria get onto the skin around the opening of the urethra. From there, they can get into the urine. Then they  travel up to the bladder, causing inflammation and infection. This often happens because of:    Wiping incorrectly after urinating. Always wipe from front to back.    Bowel incontinence    Pregnancy    Procedures such as having a catheter put in    Older age    Not emptying your bladder. This can give bacteria a chance to grow in your urine.    Fluid loss (dehydration)    Constipation    Having sex    Using a diaphragm for birth control   Treatment  Bladder infections are diagnosed by a urine test and urine culture. They are treated with antibiotics. They often clear up quickly without problems. Treatment helps prevent a more serious kidney infection.  Medicines  Medicines can help in the treatment of a bladder infection:    Take antibiotics until they are used up, even if you feel better. It's important to finish them to make sure the infection has cleared.    You can use acetaminophen or ibuprofen for pain, fever, or discomfort, unless another medicine was prescribed. If you have long-term (chronic) liver or kidney disease, talk with your healthcare provider before using these medicines. Also talk with your provider if you've ever had a stomach ulcer or GI (gastrointestinal) bleeding, or are taking blood-thinner medicines.    If you are given phenazopydridine to reduce burning with urination, it will make your urine a bright orange color. This can stain clothing.  Care and prevention  These self-care steps can help prevent future infections:    Drink plenty of fluids. This helps to prevent dehydration and flush out your bladder. Do this unless you must restrict fluids for other health reasons, or your healthcare provider told you not to.    Clean yourself correctly after going to the bathroom. Wipe from front to back after using the toilet. This helps prevent the spread of bacteria.    Urinate more often. Don't try to hold urine in for a long time.    Wear loose-fitting clothes and cotton underwear. Don't wear  tight-fitting pants.    Improve your diet and prevent constipation. Eat more fresh fruits and vegetables, and fiber. Eat less junk foods and fatty foods.    Don't have sex until your symptoms are gone.    Don't have caffeine, alcohol, and spicy foods. These can irritate your bladder.    Urinate right after you have sex to flush out your bladder.    If you use birth control pills and have frequent bladder infections, discuss it with your healthcare provider.  Follow-up care  Call your healthcare provider if all symptoms are not gone after 3 days of treatment. This is especially important if you have repeat infections.  If a culture was done, you will be told if your treatment needs to be changed. If directed, you can call to find out the results.  If X-rays were done, you will be told if the results will affect your treatment.  Call 911  Call 911 if any of the following occur:    Trouble breathing    Hard to wake up or confusion    Fainting (loss of consciousness)    Fast heart rate  When to get medical advice  Call your healthcare provider right away if any of these occur:    Fever of 100.4 F (38.0 C) or higher, or as directed by your healthcare provider    Symptoms are not better after 3 days of treatment    Back or belly pain that gets worse    Repeated vomiting, or unable to keep medicine down    Weakness or dizziness    Vaginal discharge    Pain, redness, or swelling in the outer vaginal area (labia)  [employer] last reviewed this educational content on 11/1/2019 2000-2021 [employer]. All rights reserved. This information is not intended as a substitute for professional medical care. Always follow your healthcare professional's instructions.

## 2021-07-05 ENCOUNTER — MYC REFILL (OUTPATIENT)
Dept: FAMILY MEDICINE | Facility: CLINIC | Age: 81
End: 2021-07-05

## 2021-07-05 DIAGNOSIS — Z23 NEED FOR PROPHYLACTIC VACCINATION AND INOCULATION AGAINST INFLUENZA: ICD-10-CM

## 2021-07-05 DIAGNOSIS — R29.898 WEAKNESS OF BOTH LOWER EXTREMITIES: ICD-10-CM

## 2021-07-05 DIAGNOSIS — F32.1 MODERATE MAJOR DEPRESSION (H): ICD-10-CM

## 2021-07-05 RX ORDER — LEVOTHYROXINE SODIUM 100 UG/1
100 TABLET ORAL DAILY
Qty: 30 TABLET | Refills: 0 | Status: CANCELLED | OUTPATIENT
Start: 2021-07-05

## 2021-07-05 RX ORDER — ARIPIPRAZOLE 5 MG/1
5 TABLET ORAL AT BEDTIME
Qty: 30 TABLET | Refills: 0 | Status: CANCELLED | OUTPATIENT
Start: 2021-07-05

## 2021-07-06 RX ORDER — LEVOTHYROXINE SODIUM 100 UG/1
TABLET ORAL
Qty: 30 TABLET | Refills: 0 | Status: SHIPPED | OUTPATIENT
Start: 2021-07-06 | End: 2021-01-01

## 2021-07-06 RX ORDER — ARIPIPRAZOLE 5 MG/1
TABLET ORAL
Qty: 30 TABLET | Refills: 0 | Status: SHIPPED | OUTPATIENT
Start: 2021-07-06 | End: 2021-07-16

## 2021-07-06 RX ORDER — DULOXETIN HYDROCHLORIDE 60 MG/1
CAPSULE, DELAYED RELEASE ORAL
Qty: 180 CAPSULE | Refills: 0 | Status: SHIPPED | OUTPATIENT
Start: 2021-07-06 | End: 2021-01-01

## 2021-07-06 NOTE — TELEPHONE ENCOUNTER
Prescription approved per [facility] Refill Protocol.    Jackelyn Magdaleno RN  [facility]

## 2021-07-06 NOTE — TELEPHONE ENCOUNTER
Euthrox and Abilify refill requests  Last OV: 5/17/21 Dr. Gucci Wu for dementia  Routing refill request to provider for review/approval because:  Labs not current:  Needing tsh, lipid panel, cbc. A1c

## 2021-07-06 NOTE — TELEPHONE ENCOUNTER
This is a Dr. Gucci Wu patient.  Refill requests have already been sent to Dr. Gucci Wu for advisement.  lorena

## 2021-07-07 ENCOUNTER — TRANSFERRED RECORDS (OUTPATIENT)
Dept: HEALTH INFORMATION MANAGEMENT | Facility: CLINIC | Age: 81
End: 2021-07-07

## 2021-07-08 ENCOUNTER — MEDICAL CORRESPONDENCE (OUTPATIENT)
Dept: HEALTH INFORMATION MANAGEMENT | Facility: CLINIC | Age: 81
End: 2021-07-08

## 2021-07-08 ENCOUNTER — DOCUMENTATION ONLY (OUTPATIENT)
Dept: OTHER | Facility: CLINIC | Age: 81
End: 2021-07-08

## 2021-07-08 ENCOUNTER — TELEPHONE (OUTPATIENT)
Dept: FAMILY MEDICINE | Facility: CLINIC | Age: 81
End: 2021-07-08

## 2021-07-08 NOTE — TELEPHONE ENCOUNTER
I don't know when Hospice will be done providing their care so we could contact them to get a time frame.   Gucci Wu MD

## 2021-07-08 NOTE — TELEPHONE ENCOUNTER
[facility] is calling needing an order to maintain the O2 and Hospital bed in the home. She will need it for medicare. When she has she will attempt to call [facility].    She also is needing a time frame on this getting done.    The family has decided to discharge from hospice noting that it is not needed.    Johanna Bass RN, [facility] Triage

## 2021-07-09 NOTE — TELEPHONE ENCOUNTER
Spoke to [facility], needed orders below to get equipment in place, then patient can be discharged from Hospice.  Potentially Monday, July 12. [facility] will also need Verbal Orders regarding the equipment and will have additional questions to provide equipment.  , will have [facility] reach out today to follow up.    Will await response.   Shantelle Harris RN

## 2021-07-12 NOTE — TELEPHONE ENCOUNTER
Please advise if forms have been received, if not please obtain for provider signature, if needed.   Shantelle Harris RN

## 2021-07-13 ENCOUNTER — NURSE TRIAGE (OUTPATIENT)
Dept: NURSING | Facility: CLINIC | Age: 81
End: 2021-07-13

## 2021-07-13 ENCOUNTER — TELEPHONE (OUTPATIENT)
Dept: FAMILY MEDICINE | Facility: CLINIC | Age: 81
End: 2021-07-13

## 2021-07-13 NOTE — TELEPHONE ENCOUNTER
Clinic Action Needed: FYI, home care orders.     FNA Triage Call  Presenting Problem:    Caller: Noy HERNANDEZ, [facility]  Phone #: [phone]    Verbal orders requested for:    Private pay home health aide  Frequency 3x/week  With 1 non-billable skilled nurse visit for 60 day recertification    Pt has discharged from hospice as of 7/12/21    FNA gave verbal orders for above per [facility] protocol.    Amanda Patiño RN/[place] Nurse Advisor              Reason for Disposition    Nursing judgment or information in reference    Protocols used: NO GUIDELINE [id number]

## 2021-07-13 NOTE — TELEPHONE ENCOUNTER
.Reason for call:  Order   Order or referral being requested: home health aid, skilled nursing  Reason for request: per pts request  Date needed: as soon as possible  Has the patient been seen by the PCP for this problem? YES    Additional comments: home health aid 3x a week, skilled nursing every 60 days for recertification supervision. Call [phone] [place] [facility]    Phone number to reach patient:  Home number on file [phone] (home)    Best Time:  anytime    Can we leave a detailed message on this number?  YES    Travel screening: Negative

## 2021-07-13 NOTE — TELEPHONE ENCOUNTER
[facility] is closed.  Will try to call again tomorrow.  Grace VIRGEN - [place]

## 2021-07-14 NOTE — TELEPHONE ENCOUNTER
Called [facility].  Spoke with Stephanie [facility] nurse and she will call me back on my direct line after she speaks with the  .  Grace VIRGEN - [place]

## 2021-07-15 ENCOUNTER — MEDICAL CORRESPONDENCE (OUTPATIENT)
Dept: HEALTH INFORMATION MANAGEMENT | Facility: CLINIC | Age: 81
End: 2021-07-15

## 2021-07-16 ENCOUNTER — MYC REFILL (OUTPATIENT)
Dept: FAMILY MEDICINE | Facility: CLINIC | Age: 81
End: 2021-07-16

## 2021-07-16 ENCOUNTER — MYC MEDICAL ADVICE (OUTPATIENT)
Dept: FAMILY MEDICINE | Facility: CLINIC | Age: 81
End: 2021-07-16

## 2021-07-16 ENCOUNTER — TELEPHONE (OUTPATIENT)
Dept: FAMILY MEDICINE | Facility: CLINIC | Age: 81
End: 2021-07-16

## 2021-07-16 DIAGNOSIS — M81.0 OSTEOPOROSIS, UNSPECIFIED OSTEOPOROSIS TYPE, UNSPECIFIED PATHOLOGICAL FRACTURE PRESENCE: ICD-10-CM

## 2021-07-16 DIAGNOSIS — F33.1 MAJOR DEPRESSIVE DISORDER, RECURRENT EPISODE, MODERATE (H): ICD-10-CM

## 2021-07-16 DIAGNOSIS — N39.0 RECURRENT UTI: Primary | ICD-10-CM

## 2021-07-16 DIAGNOSIS — F32.1 MODERATE MAJOR DEPRESSION (H): ICD-10-CM

## 2021-07-16 DIAGNOSIS — Z53.9 DIAGNOSIS NOT YET DEFINED: Primary | ICD-10-CM

## 2021-07-16 PROCEDURE — G0179 MD RECERTIFICATION HHA PT: HCPCS | Performed by: FAMILY MEDICINE

## 2021-07-16 NOTE — TELEPHONE ENCOUNTER
Reason for Call:  Form, our goal is to have forms completed with 72 hours, however, some forms may require a visit or additional information.    Type of letter, form or note:  Home Health Certification    Who is the form from?: Home care    Where did the form come from: form was faxed in    What clinic location was the form placed at?: [place]    Where the form was placed: Given to MA/RN    What number is listed as a contact on the form?: [facility] [phone]       Additional comments: I placed the [facility] forms in Dr. Wu's folder to sign     Call taken on 7/16/2021 at 4:34 PM by Tanisha Arevalo

## 2021-07-16 NOTE — TELEPHONE ENCOUNTER
Routing refill request to provider for review/approval because:  PHQ 3/3/2020 10/13/2020 4/2/2021   PHQ-9 Total Score 1 4 8   Q9: Thoughts of better off dead/self-harm past 2 weeks Not at all Not at all Not at all     No Dexa on record.     Shantelle Harris RN

## 2021-07-16 NOTE — TELEPHONE ENCOUNTER
Routing refill request to provider for review/approval because:  Labs not current:    Lab Results   Component Value Date    CHOL 251 09/11/2019     Lab Results   Component Value Date    HDL 58 09/11/2019     Lab Results   Component Value Date     09/11/2019     Lab Results   Component Value Date    TRIG 146 09/11/2019     No results found for: CHOLHDLRATIO    No results found for: A1C    Lab Results   Component Value Date    WBC 14.5 02/03/2020     No results found for: RBC  No results found for: HGB  No results found for: HCT  No results found for: MCV  No results found for: MCH  No results found for: MCHC  No results found for: RDW  No results found for: PLT  Shantelle Harris RN

## 2021-07-18 ENCOUNTER — OFFICE VISIT (OUTPATIENT)
Dept: URGENT CARE | Facility: URGENT CARE | Age: 81
End: 2021-07-18
Payer: MEDICARE

## 2021-07-18 VITALS
HEART RATE: 104 BPM | TEMPERATURE: 97.9 F | OXYGEN SATURATION: 95 % | SYSTOLIC BLOOD PRESSURE: 111 MMHG | DIASTOLIC BLOOD PRESSURE: 72 MMHG

## 2021-07-18 DIAGNOSIS — N39.0 RECURRENT UTI: ICD-10-CM

## 2021-07-18 DIAGNOSIS — R30.0 DYSURIA: Primary | ICD-10-CM

## 2021-07-18 LAB
ALBUMIN UR-MCNC: 100 MG/DL
APPEARANCE UR: CLEAR
BACTERIA #/AREA URNS HPF: ABNORMAL /HPF
BILIRUB UR QL STRIP: ABNORMAL
COLOR UR AUTO: YELLOW
GLUCOSE UR STRIP-MCNC: NEGATIVE MG/DL
HGB UR QL STRIP: NEGATIVE
KETONES UR STRIP-MCNC: ABNORMAL MG/DL
LEUKOCYTE ESTERASE UR QL STRIP: NEGATIVE
NITRATE UR QL: NEGATIVE
PH UR STRIP: 5.5 [PH] (ref 5–7)
RBC #/AREA URNS AUTO: ABNORMAL /HPF
SP GR UR STRIP: >=1.03 (ref 1–1.03)
SQUAMOUS #/AREA URNS AUTO: ABNORMAL /LPF
UROBILINOGEN UR STRIP-ACNC: 1 E.U./DL
WBC #/AREA URNS AUTO: ABNORMAL /HPF

## 2021-07-18 PROCEDURE — 99214 OFFICE O/P EST MOD 30 MIN: CPT | Performed by: FAMILY MEDICINE

## 2021-07-18 PROCEDURE — 81001 URINALYSIS AUTO W/SCOPE: CPT | Performed by: FAMILY MEDICINE

## 2021-07-18 PROCEDURE — 87086 URINE CULTURE/COLONY COUNT: CPT | Performed by: FAMILY MEDICINE

## 2021-07-18 RX ORDER — ARIPIPRAZOLE 5 MG/1
5 TABLET ORAL AT BEDTIME
Qty: 30 TABLET | Refills: 0 | Status: SHIPPED | OUTPATIENT
Start: 2021-07-18 | End: 2021-01-01

## 2021-07-18 RX ORDER — ALENDRONATE SODIUM 70 MG/1
70 TABLET ORAL
Qty: 12 TABLET | Refills: 3 | Status: SHIPPED | OUTPATIENT
Start: 2021-07-18

## 2021-07-18 RX ORDER — BUSPIRONE HYDROCHLORIDE 15 MG/1
15 TABLET ORAL 2 TIMES DAILY
Qty: 180 TABLET | Refills: 0 | Status: SHIPPED | OUTPATIENT
Start: 2021-07-18 | End: 2021-01-01

## 2021-07-18 RX ORDER — CEPHALEXIN 500 MG/1
500 CAPSULE ORAL 2 TIMES DAILY
Qty: 20 CAPSULE | Refills: 0 | Status: SHIPPED | OUTPATIENT
Start: 2021-07-18 | End: 2021-01-01

## 2021-07-18 NOTE — PROGRESS NOTES
Chief complaint: recurrent uti    Accompanied by daughter    Some dysuria and urgency today  Feeling a bit confused ( minimal) and tired which usually happens when she has a uti    Has had 3 uti's since end of May - the last one no culture was done, only presumptive treatment  And symptoms improved after antibiotic  Vaginal discharge or concerns: patient needs help with hygiene  Fever chills: None  Nausea vomiting: None  Flank Pain: None    Problem list and histories reviewed & adjusted, as indicated.  Additional history: as documented    Problem list, Medication list, Allergies, and Medical/Social/Surgical histories reviewed in [facility] and updated as appropriate.    ROS:  Constitutional, HEENT, cardiovascular, pulmonary, gi and gu systems are negative, except as otherwise noted.    OBJECTIVE:                                                    /72   Pulse 104   Temp 97.9  F (36.6  C) (Tympanic)   SpO2 95%   There is no height or weight on file to calculate BMI.  GENERAL: healthy, alert and no distress  Eyes: anicteric   Awake alert not in any acute cardiorespiratory distress  Psych: pleasant   Neurologic: No gross neurologic deficits  Skin: no obvious lesions or rashes  External genitalia- no obvious lesions, patient has trouble with hygiene and retained stool particles noted on anal area   MS: no gross musculoskeletal defects noted, no edema    Diagnostic Test Results:  Results for orders placed or performed in visit on 07/18/21 (from the past 24 hour(s))   UA macro with reflex to Microscopic and Culture - Clinc Collect    Specimen: Urine, Clean Catch   Result Value Ref Range    Color Urine Yellow Colorless, Straw, Light Yellow, Yellow    Appearance Urine Clear Clear    Glucose Urine Negative Negative mg/dL    Bilirubin Urine Small (A) Negative    Ketones Urine Trace (A) Negative mg/dL    Specific Gravity Urine >=1.030 1.003 - 1.035    Blood Urine Negative Negative    pH Urine 5.5 5.0 - 7.0    Protein Albumin  Urine 100  (A) Negative mg/dL    Urobilinogen Urine 1.0 0.2, 1.0 E.U./dL    Nitrite Urine Negative Negative    Leukocyte Esterase Urine Negative Negative   Urine Microscopic   Result Value Ref Range    Bacteria Urine Moderate (A) None Seen /HPF    RBC Urine 0-2 0-2 /HPF /HPF    WBC Urine 0-5 0-5 /HPF /HPF    Squamous Epithelials Urine Moderate (A) None Seen /LPF    Narrative    Urine Culture not indicated        ASSESSMENT/PLAN:                                                        ICD-10-CM    1. Dysuria  R30.0 UA macro with reflex to Microscopic and Culture - Clinc Collect     Urine Microscopic     Urine Culture Aerobic Bacterial - lab collect     cephALEXin (KEFLEX) 500 MG capsule   2. Recurrent UTI  N39.0 cephALEXin (KEFLEX) 500 MG capsule     Encourage fluid intake  Perineal hygiene recommended - they will work with PCA and home nursing staff support   Prescribed with keflex  Send for culture   Urinalysis normal - was recently on antibiotic, will check culture   Alarm signs or symptoms discussed, if present recommend go to ER   Recommend follow up with primary care provider , patient daughter is wondering about antibiotic uti prophylaxis.   Aware to go to ER or come in immediately if with any fever chills nausea vomiting or flank pain.  Adverse reactions of medications discussed.  Over the counter medications discussed.   Aware to come back in if with worsening symptoms or if no relief despite treatment plan  Patient voiced understanding and had no further questions.     MD Lisa Pollack MD  [facility] [place]

## 2021-07-19 RX ORDER — NITROFURANTOIN 25; 75 MG/1; MG/1
100 CAPSULE ORAL DAILY
Qty: 30 CAPSULE | Refills: 5 | Status: SHIPPED | OUTPATIENT
Start: 2021-07-19 | End: 2021-01-01

## 2021-07-20 ENCOUNTER — OFFICE VISIT (OUTPATIENT)
Dept: URGENT CARE | Facility: URGENT CARE | Age: 81
End: 2021-07-20
Payer: MEDICARE

## 2021-07-20 ENCOUNTER — NURSE TRIAGE (OUTPATIENT)
Dept: FAMILY MEDICINE | Facility: CLINIC | Age: 81
End: 2021-07-20

## 2021-07-20 ENCOUNTER — TELEPHONE (OUTPATIENT)
Dept: URGENT CARE | Facility: URGENT CARE | Age: 81
End: 2021-07-20

## 2021-07-20 VITALS
SYSTOLIC BLOOD PRESSURE: 188 MMHG | DIASTOLIC BLOOD PRESSURE: 102 MMHG | OXYGEN SATURATION: 98 % | HEART RATE: 69 BPM | TEMPERATURE: 98.4 F

## 2021-07-20 DIAGNOSIS — N39.0 RECURRENT UTI: ICD-10-CM

## 2021-07-20 DIAGNOSIS — R41.0 CONFUSION: Primary | ICD-10-CM

## 2021-07-20 DIAGNOSIS — R53.1 WEAKNESS: ICD-10-CM

## 2021-07-20 DIAGNOSIS — I16.0 HYPERTENSIVE URGENCY: ICD-10-CM

## 2021-07-20 LAB — BACTERIA UR CULT: NORMAL

## 2021-07-20 PROCEDURE — 99214 OFFICE O/P EST MOD 30 MIN: CPT | Performed by: NURSE PRACTITIONER

## 2021-07-20 NOTE — TELEPHONE ENCOUNTER
Called and notified patient's daughter of negative urine culture results and recommended further evaluation of increased confusion and HTN in emergency room. Patient's daughter declines at this time and wonders about leaving another urine sample. Discussed another UA not indicated at this time with her being treated for UTI and UA from 7/18 resulted. Recommended she stop Keflex. Patient's daughter declines further evaluation in emergency room and will f/u with PCP. Questions answered.

## 2021-07-20 NOTE — TELEPHONE ENCOUNTER
Daughter Aicha calling Brenda's UTI Symptoms have worsened since Urgent Care visit 07/18/21 including increasing AMS- eg. having to remind her to keep O2 on etc she is now 48 hours on atx given at Urgent Care for UTI- Culture still pending. Aicha states Brenda has frequent UTI's and work closely with Dr. Jazmin Blackman to keep out of hospital. Hiral is concerned Brenda is not on the right atx, what do you advise?   Contacting Dr. Wu for second level triage  Aicha verbalized that at any point if feels cannot keep patient safe d/t AMS or S&S of fever, please bring to emergency department now, do not wait for call back.     Call Aicha [phone] Ok to leave detailed information     Rosanna Hua RN, BSN, [facility]        Reason for Disposition    Patient sounds very sick or weak to the triager     Increased Alerted Mental Status    Additional Information    Negative: Shock suspected (e.g., cold/pale/clammy skin, too weak to stand, low BP, rapid pulse)    Negative: Sounds like a life-threatening emergency to the triager    Negative: Urinary tract infection suspected, but not taking antibiotics    Negative: [1] Unable to urinate (or only a few drops) > 4 hours AND     [2] bladder feels very full (e.g., palpable bladder or strong urge to urinate)    Negative: Passing pure blood or large blood clots (i.e., size > a dime)  (Exceptions: flecks, small strands, or pinkish-red color)    Negative: Fever > 103 F (39.4 C)    Protocols used: URINARY TRACT INFECTION ON ANTIBIOTIC FOLLOW-UP CALL - FEMALE-A-

## 2021-07-20 NOTE — TELEPHONE ENCOUNTER
There is a consent to communicate with Hiral Lowry ([facility]), Esperanza Pace (), Ebony Bey (daughter)     Hiral reports that the UTI is getting worse. Hiral can hold a conversation, but reports that she is a still shaky, asks which way to the bathroom, repeats questions repeat questions they have already answered and normal tasks that she can easily do, they have to assisted her with.    Nursing advice:  Hiral was advised to have her reevaluated in clinic today due to the cognitive changes presented.  I tried to get her appointment in clinic and there were none available at a location that worked for them. They will utilize urgent care. Hiral verbalizes good understanding, agrees with plan and states she needs no further support. Janis Howard R.N.

## 2021-07-20 NOTE — PATIENT INSTRUCTIONS
Increase fluid intake  Will wait for urine culture results which are in process and we'll notify if you should stop or change the Keflex antibiotic    Emergency room evaluation is recommended for increased confusion with elevated blood pressure    Patient Education     Bladder Infection, Female (Adult)     Urine normally doesn't have any germs (bacteria) in it. But bacteria can get into the urinary tract from the skin around the rectum. Or they can travel in the blood from other parts of the body. Once they are in your urinary tract, they can cause infection in these areas:    The urethra (urethritis)    The bladder (cystitis)    The kidneys (pyelonephritis)  The most common place for an infection is in the bladder. This is called a bladder infection. This is one of the most common infections in women. Most bladder infections are easily treated. They are not serious unless the infection spreads to the kidney.  The terms bladder infection, UTI, and cystitis are often used to describe the same thing. But they are not always the same. Cystitis is an inflammation of the bladder. The most common cause of cystitis is an infection.  Symptoms  The infection causes inflammation in the urethra and bladder. This causes many of the symptoms. The most common symptoms of a bladder infection are:    Pain or burning when urinating    Having to urinate more often than normal    Urgent need to urinate    Only a small amount of urine comes out    Blood in urine    Belly (abdominal) discomfort. This is often in the lower belly above the pubic bone.    Cloudy urine    Strong- or bad-smelling urine    Unable to urinate (urinary retention)    Unable to hold urine in (urinary incontinence)    Fever    Loss of appetite    Confusion (in older adults)  Causes  Bladder infections are not contagious. You can't get one from someone else, from a toilet seat, or from sharing a bath.  The most common cause of bladder infections is bacteria from the  bowels. The bacteria get onto the skin around the opening of the urethra. From there, they can get into the urine. Then they travel up to the bladder, causing inflammation and infection. This often happens because of:    Wiping incorrectly after urinating. Always wipe from front to back.    Bowel incontinence    Pregnancy    Procedures such as having a catheter put in    Older age    Not emptying your bladder. This can give bacteria a chance to grow in your urine.    Fluid loss (dehydration)    Constipation    Having sex    Using a diaphragm for birth control   Treatment  Bladder infections are diagnosed by a urine test and urine culture. They are treated with antibiotics. They often clear up quickly without problems. Treatment helps prevent a more serious kidney infection.  Medicines  Medicines can help in the treatment of a bladder infection:    Take antibiotics until they are used up, even if you feel better. It's important to finish them to make sure the infection has cleared.    You can use acetaminophen or ibuprofen for pain, fever, or discomfort, unless another medicine was prescribed. If you have long-term (chronic) liver or kidney disease, talk with your healthcare provider before using these medicines. Also talk with your provider if you've ever had a stomach ulcer or GI (gastrointestinal) bleeding, or are taking blood-thinner medicines.    If you are given phenazopydridine to reduce burning with urination, it will make your urine a bright orange color. This can stain clothing.  Care and prevention  These self-care steps can help prevent future infections:    Drink plenty of fluids. This helps to prevent dehydration and flush out your bladder. Do this unless you must restrict fluids for other health reasons, or your healthcare provider told you not to.    Clean yourself correctly after going to the bathroom. Wipe from front to back after using the toilet. This helps prevent the spread of  bacteria.    Urinate more often. Don't try to hold urine in for a long time.    Wear loose-fitting clothes and cotton underwear. Don't wear tight-fitting pants.    Improve your diet and prevent constipation. Eat more fresh fruits and vegetables, and fiber. Eat less junk foods and fatty foods.    Don't have sex until your symptoms are gone.    Don't have caffeine, alcohol, and spicy foods. These can irritate your bladder.    Urinate right after you have sex to flush out your bladder.    If you use birth control pills and have frequent bladder infections, discuss it with your healthcare provider.  Follow-up care  Call your healthcare provider if all symptoms are not gone after 3 days of treatment. This is especially important if you have repeat infections.  If a culture was done, you will be told if your treatment needs to be changed. If directed, you can call to find out the results.  If X-rays were done, you will be told if the results will affect your treatment.  Call 911  Call 911 if any of the following occur:    Trouble breathing    Hard to wake up or confusion    Fainting (loss of consciousness)    Fast heart rate  When to get medical advice  Call your healthcare provider right away if any of these occur:    Fever of 100.4 F (38.0 C) or higher, or as directed by your healthcare provider    Symptoms are not better after 3 days of treatment    Back or belly pain that gets worse    Repeated vomiting, or unable to keep medicine down    Weakness or dizziness    Vaginal discharge    Pain, redness, or swelling in the outer vaginal area (labia)  [employer] last reviewed this educational content on 11/1/2019 2000-2021 [employer]. All rights reserved. This information is not intended as a substitute for professional medical care. Always follow your healthcare professional's instructions.

## 2021-07-20 NOTE — PROGRESS NOTES
Assessment & Plan     Confusion    Recurrent UTI    Hypertensive urgency    Weakness       Urine culture in process, but previous UA was not indicative of UTI. Repeat UA not indicated currently since was just completed 2 days ago and urine cx in process while being treated with Keflex. Recommended further evaluation now in emergency room for weakness, confusion, hypertensive urgency and patient and daughter decline.    Follow-up with PCP if symptoms persist for 2 days, and sooner if symptoms worsen or new symptoms develop.     Discussed red flag symptoms which warrant immediate visit in emergency room    All questions were answered and patient verbalized understanding. AVS reviewed with patient.     Rea Urias, DNP, APRN, CNP 7/20/2021 7:30 PM  [facility]          Mary Jane Gutierrez is a 81 year old female who presents to clinic today with her daughter for the following health issues:  Chief Complaint   Patient presents with     UTI     Nurse triage said to get a UTI recheck.     Patient presents for evaluation of recheck for her UTI as directed by nurse triage. She was evaluated in urgent care for confusion, fatigue, dysuria, urgency and was prescribed Keflex with UA not showing UTI with recent abx use and culture is still in process. She has been treated for 3 UTIs since May and symptoms have resolved after treatment, but this time symptoms are worsening. After clearing this abx she will start macrobid daily for UTI prevention, which was prescribed by her PCP yesterday. She had neurologic testing earlier this month per Jane but had some delirium due to chronic UTIs. They have added extra showers to help reduce urinary tract infections. She has had a few hospitalizations for difficult to treat urinary tract infections. She has not been drinking much for fluids since she is not thirsty. Denies fever, chills, dysuria, hematuria, urgency, frequency, dizziness. She has been repeating  questions and feeling confused, asking where the bathroom is in the house, needing assistance getting dressed.     Problem list, Medication list, Allergies, and Medical history reviewed in EPIC.    ROS:  Review of systems negative except for noted above        Objective    BP (!) 188/102   Pulse 69   Temp 98.4  F (36.9  C) (Oral)   SpO2 98%   Physical Exam  Constitutional:       General: She is not in acute distress.     Appearance: She is not toxic-appearing or diaphoretic.   Cardiovascular:      Rate and Rhythm: Normal rate and regular rhythm.      Heart sounds: Normal heart sounds.   Pulmonary:      Effort: Pulmonary effort is normal. No respiratory distress.      Breath sounds: Normal breath sounds. No wheezing, rhonchi or rales.      Comments: On home oxygen  Abdominal:      General: Bowel sounds are normal. There is no distension.      Palpations: Abdomen is soft.      Tenderness: There is no abdominal tenderness. There is no right CVA tenderness, left CVA tenderness, guarding or rebound.   Lymphadenopathy:      Cervical: No cervical adenopathy.   Neurological:      General: No focal deficit present.      Mental Status: She is alert and oriented to person, place, and time.      Motor: Weakness present.      Comments: Seated in wheelchair

## 2021-08-05 NOTE — TELEPHONE ENCOUNTER
TC to please note if this was completed and close encounter.    Johanna Bass RN, [facility] Triage

## 2021-08-09 NOTE — TELEPHONE ENCOUNTER
Levothyroxine and seroquel refill requests  Has pending appointment Dr. Gucci Wu on 8/16/21.   OV Dr. Gucci Wu: 5/17/21  Routing refill request to provider for review/approval because:  Labs not current:   Recent Labs   Lab Test 09/11/19  1058   CHOL 251*   HDL 58   *   TRIG 146     Lab Results   Component Value Date    WBC 14.5 02/03/2020     No results found for: RBC  No results found for: HGB  No results found for: HCT  No components found for: MCT  No results found for: MCV  No results found for: MCH  No results found for: MCHC  No results found for: RDW  No results found for: PLT                                                                                       BP Readings from Last 3 Encounters:   07/20/21 (!) 188/102   07/18/21 111/72   07/04/21 110/75

## 2021-08-10 NOTE — TELEPHONE ENCOUNTER
To provider to advise on home care orders below.  Was hospitalized 8/2/21 - 8/5/21.  Has pending appointment Dr. Gucci Wu for 8/16/21  Natalie Melgar RN

## 2021-08-10 NOTE — TELEPHONE ENCOUNTER
Estela from [facility] is calling for verbal orders:    OT 1 time a week for 2 weeks  2 times a week for 3 weeks    Home Health Aid 2 times a week fir 1 week  3 times a week for 3 weeks    Skilled nursing for evaluation and medication management    PT eval and treat     for advanced care directive and community resources.    Rajani Amaro MA/TC

## 2021-08-10 NOTE — TELEPHONE ENCOUNTER
Called and left detailed message on voicemail for Estela OT with [facility] with home care order approvals.  Natalie Melgar RN

## 2021-08-13 NOTE — TELEPHONE ENCOUNTER
[facility]   OT Janak calling to report fall, in living room on right side and rolled onto her back, unwitnessed, granddaughter was home, unable to assist in standing, patient assisted herself onto her walker, is home now with 7/10 back pain, 8/10 Tuesday  no concerns, no noted injuries, will continue to monitor pain

## 2021-08-16 NOTE — TELEPHONE ENCOUNTER
I just review(ed) her hospitalization and she may need more time to recover to her baseline. Janis Howard sent me a message for further evaluation which I think was for a physical therapist. If so I think that is the best thing we can offer right now.  Gucci Wu MD

## 2021-08-16 NOTE — TELEPHONE ENCOUNTER
Dr. Gucci Wu:  Please approve the physical therapy orders 1x/week x 3weeks.     See also information below regarding patient changes in memory, flat affect today, hard time verbalizing.  Elevated BP per OT AssistantJanak.  ? Are you wanting to order a ua/uc?      Route back to DAVID braun.  Natalie Melgar RN

## 2021-08-16 NOTE — TELEPHONE ENCOUNTER
Janak, OT assistant from [facility] calling with today's appointment update.     She said patient has fallen twice over the weekend. (Three falls since start of home care). No injuries but says her back is sore which isn't necessarily new.  Patient doesn't remember the falls. She has a flat affect today and trouble with her memory today. Having a hard time verbalizing and Janak thinks this has worsened since 8/13/21.   There is no pattern with her falls (same room in the house, etc).   BP at 10 am today: 150/98.   BP at 1 pm today:  160/100.  Recent changes in her depression medications and one was stopped.   Patient drinking only about 16 oz of water daily and Janak has talked with the family about the importance of drinking more.     Hospitalization 8/2/21 for UTI, confusion and acute encephalopathy.     Routing to provider to advise.  Orders should be called to DAVID Sanches at [phone].    Zoila LINDA, RN

## 2021-08-16 NOTE — TELEPHONE ENCOUNTER
Provider:  Are you willing to give the requested orders as written below? Thank you. Janis Moss is requesting to see Marce 1 time a week for 3 weeks. These are new orders after eval .      Ok to leave a message with the provider's response as this is a confidential VM.

## 2021-08-17 NOTE — TELEPHONE ENCOUNTER
Yes.i thought I okayed those in one of the other 2 encounters from today  But if not yes please ok the physical therapy.  Gucci Wu MD

## 2021-08-17 NOTE — TELEPHONE ENCOUNTER
In reading the original note, they were not looking for OT/PT orders just a plan due to her symptoms.  This has been handled.   Thank you. Janis Howard R.N.

## 2021-08-17 NOTE — TELEPHONE ENCOUNTER
Reason for Call: Request for an order or referral:    Order or referral being requested: verbal orders  Add skilled nursing 2 x weekly for 8 weeks with 3 prns      Date needed: as soon as possible    Has the patient been seen by the PCP for this problem? YES    Additional comments:     Phone number Patient can be reached at:  Other phone number:   Verónica with [employer] [phone]    Best Time:  any    Can we leave a detailed message on this number?  YES    Call taken on 8/17/2021 at 10:50 AM by Isa Hicks

## 2021-08-17 NOTE — TELEPHONE ENCOUNTER
Yes, I ordered a urinalysis with reflex/     I have discussed this plan with the patient and her  that if she has a change they can call to get a urinalysis done.   Gucci Wu MD

## 2021-08-24 NOTE — TELEPHONE ENCOUNTER
Lets get her into clinic as soon as possible   And we will get a urinalysis at the appointment(s) so she should drink plenty of water that day/       Gucci Wu MD

## 2021-08-24 NOTE — TELEPHONE ENCOUNTER
"See message below regarding patient condition per home care RN.  I did call Noy RN with home care to get more information.  She states patient needs to get in and be seen by pcp for assessment; had had appointment last week but arrived late so was not seen.  ? Can we assist with rescheduling?    She states concerns as per below.   continues to be on oxygen 4l/NC continuously (long term)  States that she is using her albuterol inhaler 3 times a day which she hadn't had to before.  Continues to use the Ellipta inhaler daily as well.  States having increased shortness of breath with activity and increased anxiety. (see below)  Poor oral/liquid intake.  Patient has increased anxiety, confusion; hx of UTI's but is on nitrofurantoin 100mg daily preventatively.     had been started on hospice in June due to significant weight loss but went back to regular home care because \"it didn't work well with family\".    Please advise if:   1. able to add her into your schedule this week for appointment.  2.  Any changes in blood pressure meds  3.  Is there anything she can use for back pain; using acetaminophen, lidocaine patches and heating pad but minimal relief per patient.   4.  ? Check labwork for uti?  Natalie Melgar RN      "

## 2021-08-24 NOTE — TELEPHONE ENCOUNTER
Verónica notified of provider's message as written below. She was assisted in making an appointment as listed below.  She will inform the patient of this appointment. Verónica verbalized good understanding, had no further questions and needed no further support.Janis Howard R.N.  Next 5 appointments (look out 90 days)    Aug 26, 2021  2:45 PM  SHORT with Gucci Wu MD  [facility] ([facility] ) [address] [facility] [address] [phone]

## 2021-08-24 NOTE — TELEPHONE ENCOUNTER
Verónica with [facility] calling to report patient has elevated BP of 162/78- HR of 76. Oxygen stat of 97%. She states she has been using her rescue inhaler, which she never has; 3 to 4 times daily. She has increased shortness of breath with activity. Increased anxiety, confusion and hallucinations. Decrease in night sleep and increase in day sleep. Poor PO- 1 glass of water a day. Oral intake about 1 cup of food daily. She is currently 158 lbs. Increased sadness and depression due to her 's recent cancer diagnosis. Patient reporting ongoing back pain at a 9 out of 10 pain level. Partially relieved with Tylenol and lidocaine patches. Please contact Verónica to discuss any possible changes with her BP ritchie.   Verónica @ [phone]  Paxton Eubanks

## 2021-08-26 NOTE — NURSING NOTE
"Chief Complaint   Patient presents with     Back Pain     back pain x several weeks, across lower back     Recheck Medication     Hospital F/U     UTI       Initial BP (!) 159/108   Pulse 94   Temp 97.1  F (36.2  C) (Tympanic)   Wt 71.7 kg (158 lb)   SpO2 98%   BMI 31.91 kg/m   Estimated body mass index is 31.91 kg/m  as calculated from the following:    Height as of 4/2/21: 1.499 m (4' 11\").    Weight as of this encounter: 71.7 kg (158 lb).  Medication Reconciliation: complete  Pebbles Posadas CMA  "

## 2021-08-26 NOTE — PROGRESS NOTES
"    Assessment & Plan     Urinary frequency    - UA with Microscopic - lab collect; Future  - UA with Microscopic - lab collect  - Urine Microscopic Exam  - Urine Culture Aerobic Bacterial - lab collect; Future    Moderate major depression (H)    - ARIPiprazole (ABILIFY) 2 MG tablet; Take 1 tablet (2 mg) by mouth At Bedtime    Folate deficiency    - folic acid (FOLVITE) 1 MG tablet; Take 1 tablet (1,000 mcg) by mouth daily    Need for prophylactic vaccination and inoculation against influenza    - levothyroxine (EUTHYROX) 100 MCG tablet; Take 1 tablet (100 mcg) by mouth daily    Weakness of both lower extremities    - levothyroxine (EUTHYROX) 100 MCG tablet; Take 1 tablet (100 mcg) by mouth daily    Essential tremor    - primidone (MYSOLINE) 50 MG tablet; TAKE 1/2 (ONE-HALF) TABLET BY MOUTH AT BEDTIME    Insomnia, uncontrolled    - QUEtiapine (SEROQUEL) 25 MG tablet; Take 1 tablet (25 mg) by mouth 2 times daily    Elevated LDL cholesterol level    - rosuvastatin (CRESTOR) 20 MG tablet; Take 1 tablet (20 mg) by mouth At Bedtime    Morbid obesity (H)                 BMI:   Estimated body mass index is 31.91 kg/m  as calculated from the following:    Height as of 4/2/21: 1.499 m (4' 11\").    Weight as of this encounter: 71.7 kg (158 lb).           Return in about 6 months (around 2/26/2022) for depression and med recheck .    Gucci Wu MD  [facility]    Mary Jane Gutierrez is a 81 year old who presents for the following health issues     HPI       Hospital Follow-up Visit:    [facility]  Date of Admission: 8/02/21  Date of Discharge: 8/05/21  Reason(s) for Admission: Confused UTI      Was your hospitalization related to COVID-19? No   Problems taking medications regularly:  None  Medication changes since discharge: None  Problems adhering to non-medication therapy:  None    Summary of hospitalization:  CareEverywhere information obtained and " reviewed  Diagnostic Tests/Treatments reviewed.  Follow up needed: none  Other Healthcare Providers Involved in Patient s Care:         Homecare  Update since discharge: improved.   Plan of care communicated with patient and daughter Hiral            Review of Systems         Objective    BP (!) 159/108   Pulse 94   Temp 97.1  F (36.2  C) (Tympanic)   Wt 71.7 kg (158 lb)   SpO2 98%   BMI 31.91 kg/m    Body mass index is 31.91 kg/m .  Physical Exam                 --------------------------------------------------------------------------------------------------------------------------------------  Subjective:  Gilma is a 81 year old female who presents today for follow-up on a recent hospitalization at [facility]. She was admitted to the hospital on 8-2-21 and discharged on 8-5. The patient went to the hospital for symptoms of confusion. She was diagnosed with metabolic encephalopathy. The patient was treated with antibiotic(s) and some medications were discontinue . She was asked to follow up today specifically to check up on her general well being. At this time the patient reports a lot of improvement of the original symptoms. In addition the patient reports the following new symptoms:none.     STOP taking these medicines   pramipexole 0.25 mg tablet  Commonly known as: MIRAPEX    traZODone 150 mg tablet  Commonly known as: DESYREL     Her daughter reports that she has physically improved.   She has lost weight and is more active.   She was able to get up off after a tumble the other day(s).       Every once in a while she gets a little confused   But she can hold a conversation now     Her  has been recently diagnosed with rectal cancer and that has been stressful      Her lower back has been uncomfortable since she left the hospital.        Current Outpatient Medications:      acetaminophen (TYLENOL) 500 MG tablet, Take 2 tablets (1,000 mg) by mouth every 8 hours as needed for mild pain (Patient  not taking: Reported on 7/20/2021), Disp: , Rfl:      albuterol (PROVENTIL HFA) 108 (90 Base) MCG/ACT inhaler, Inhale 2 puffs into the lungs every 6 hours, Disp: 18 g, Rfl: 2     alendronate (FOSAMAX) 70 MG tablet, Take 1 tablet (70 mg) by mouth every 7 days, Disp: 12 tablet, Rfl: 3     ARIPiprazole (ABILIFY) 5 MG tablet, Take 1 tablet (5 mg) by mouth At Bedtime, Disp: 30 tablet, Rfl: 0     aspirin (ASA) 81 MG chewable tablet, Take 81 mg by mouth (Patient not taking: Reported on 7/20/2021), Disp: , Rfl:      busPIRone (BUSPAR) 15 MG tablet, Take 1 tablet (15 mg) by mouth 2 times daily, Disp: 180 tablet, Rfl: 0     conjugated estrogens (PREMARIN) 0.625 MG/GM vaginal cream, Place 0.5 g vaginally twice a week, Disp: 30 g, Rfl: 3     DULoxetine (CYMBALTA) 60 MG capsule, Take 2 capsules by mouth once daily, Disp: 180 capsule, Rfl: 0     folic acid (FOLVITE) 1 MG tablet, Take 1 tablet by mouth once daily, Disp: 90 tablet, Rfl: 2     levothyroxine (EUTHYROX) 100 MCG tablet, Take 1 tablet (100 mcg) by mouth daily, Disp: 30 tablet, Rfl: 0     melatonin 5 MG tablet, Take 1 tablet (5 mg) by mouth At Bedtime (Patient not taking: Reported on 7/20/2021), Disp: , Rfl:      metoprolol succinate ER (TOPROL-XL) 25 MG 24 hr tablet, Take 1 tablet (25 mg) by mouth daily, Disp: 90 tablet, Rfl: 3     nitroFURantoin macrocrystal-monohydrate (MACROBID) 100 MG capsule, Take 1 capsule (100 mg) by mouth daily (Patient not taking: Reported on 7/20/2021), Disp: 30 capsule, Rfl: 5     nitroFURantoin macrocrystal-monohydrate (MACROBID) 100 MG capsule, Take 1 capsule (100 mg) by mouth 2 times daily (Patient not taking: Reported on 7/18/2021), Disp: 14 capsule, Rfl: 0     NYSTOP [id number] UNIT/GM powder, APPLY  POWDER TOPICALLY TWICE DAILY, Disp: 60 g, Rfl: 11     order for DME, [facility] home oxygen:  Equipment being ordered: Oxygen, home fill system,  4 LPM, Disp: 1 each, Rfl: 0     order for DME, Equipment being ordered: Oxygen -  4 liters a day, Disp:  , Rfl:      pramipexole (MIRAPEX) 0.25 MG tablet, TAKE 1 TABLET BY MOUTH AT BEDTIME, Disp: 90 tablet, Rfl: 3     primidone (MYSOLINE) 50 MG tablet, TAKE 1/2 (ONE-HALF) TABLET BY MOUTH AT BEDTIME, Disp: 45 tablet, Rfl: 0     QUEtiapine (SEROQUEL) 25 MG tablet, Take 1 tablet (25 mg) by mouth 2 times daily, Disp: 60 tablet, Rfl: 0     rosuvastatin (CRESTOR) 20 MG tablet, Take 1 tablet (20 mg) by mouth At Bedtime, Disp: 30 tablet, Rfl: 0     traZODone (DESYREL) 100 MG tablet, Take 1 tablet (100 mg) by mouth At Bedtime, Disp: 30 tablet, Rfl: 0     umeclidinium (INCRUSE ELLIPTA) 62.5 MCG/INH inhaler, Inhale 1 puff into the lungs daily, Disp: 3 Inhaler, Rfl: 3    Past Medical History:   Diagnosis Date     Anxiety 10/11/2006    Last visit with Mental health specialist in 2002.      Chronic low back pain without sciatica 10/31/2016     Compression fracture of thoracic vertebra (H) 6/13/2011    Overview:  T  11  compression  fx  on  xrays  from  6/13/2011  . Ongoing pain  x  3  months   Get  eval  and  treat  with  spine  specialists  as discussed  Initially  treated  in [place]  .  Pain  is  controlled   with  advil  as needed  . Reviewed   with  patient      Congestive heart failure (H) 10/11/2006    Overview:  Epic      COPD (chronic obstructive pulmonary disease) (H) 9/28/2017     Folate deficiency 6/9/2017    Overview:  Formatting of this note may be different from the original. Hemoglobin (g/dl)  Date Value  06/05/2017 15.6  10/31/2016 13.8  11/04/2015 11.3 (L)   Iron (mcg/dl)  Date Value  06/05/2017 49 (L)   TIBC, Calculated (mcg/dl)  Date Value  06/05/2017 319   % Saturation, calc. (%)  Date Value  06/05/2017 15   Vitamin B12 (pg/ml)  Date Value  06/05/2017 253   Folate (ng/ml)  Date Value  06/05/20     Hypertension goal BP (blood pressure) < 140/90 9/28/2017     Mitral regurgitation 2/11/2014    Overview:  Needs dental prophylaxis      Mixed hyperlipidemia 10/11/2006     Osteoporosis 9/28/2017     Postoperative  hypothyroidism 9/28/2017       OBJECTIVE:  BP (!) 159/108   Pulse 94   Temp 97.1  F (36.2  C) (Tympanic)   Wt 71.7 kg (158 lb)   SpO2 98%   BMI 31.91 kg/m    GENERAL APPEARANCE: healthy, alert and no distress  EYES: EOMI,  PERRL  HENT: ear canals and TM's normal and nose and mouth without ulcers or lesions  RESP: lungs clear to auscultation - no rales, rhonchi or wheezes  CV: regular rates and rhythm, normal S1 S2, no S3 or S4 and no murmur, click or rub -  ABDOMEN:  soft, nontender, no HSM or masses and bowel sounds normal      Neuro exam of the lower extremities.   DTR's  Right knee 1+  Right ankle 1+  Left knee 1+  Left ankle1+  Strength was +5 globally in the lower extremities.  Back examination: There was no tenderness to palpation.  There was not CVA tenderness bilaterally.  Straight leg raise test was negative bilaterally.  There was not decreased active range of motion in hip flexion bilaterally          Results for orders placed or performed in visit on 08/26/21   UA with Microscopic - lab collect     Status: Abnormal   Result Value Ref Range    Color Urine Brown (A) Colorless, Straw, Light Yellow, Yellow    Appearance Urine Clear Clear    Glucose Urine Negative Negative mg/dL    Bilirubin Urine Moderate (A) Negative    Ketones Urine 15  (A) Negative mg/dL    Specific Gravity Urine 1.025 1.003 - 1.035    Blood Urine Negative Negative    pH Urine 6.5 5.0 - 7.0    Protein Albumin Urine 100  (A) Negative mg/dL    Urobilinogen Urine 2.0 (A) 0.2, 1.0 E.U./dL    Nitrite Urine Positive (A) Negative    Leukocyte Esterase Urine Negative Negative   Urine Microscopic Exam     Status: Abnormal   Result Value Ref Range    RBC Urine 0-2 0-2 /HPF /HPF    WBC Urine 5-10 (A) 0-5 /HPF /HPF    Squamous Epithelials Urine Moderate (A) None Seen /LPF    Mucus Urine Present (A) None Seen /LPF    Hyaline Casts Urine 25-50 (A) None Seen /LPF    Granular Casts Urine 0-2 (A) None Seen /LPF       ASSESSMENT:  81 year old female  who recently underwent hospitalization for confusion  which appear to have improved.  It may have been secondary to some of her medications.    Plan: At this time we will continue the present management and have the patient return to clinic as needed. Gilma  voiced understanding to informations discussed today.              ASSESSMENT/IMPRESSION:  musculoskeletal low back pain    PLAN:    The patient and I discussed the absolute importance of continuing to do range of motion exercises and strengthening exercises despite the immediate discomfort that it may cause.We discussed the use of non steroidal anti-inflammatory drugs and muscle relaxants to help them in this goal. Heat therapy in the morning to improve range of motion was emphasized. The patient was advised only to use ice at the end of the day for pain relief if needed.

## 2021-08-27 NOTE — TELEPHONE ENCOUNTER
Reason for Call:  Form, our goal is to have forms completed with 72 hours, however, some forms may require a visit or additional information.    Type of letter, form or note:  Home Health Certification    Who is the form from?: Home care    Where did the form come from: form was faxed in    What clinic location was the form placed at?: [place]    Where the form was placed: Given to physician    What number is listed as a contact on the form?: [phone]       Additional comments:     Call taken on 8/27/2021 at 2:34 PM by Brittney Green

## 2021-08-27 NOTE — TELEPHONE ENCOUNTER
Faxed completed HHC to [facility] and to stat scanning.Rajani Amaro MA/ROSITA

## 2021-09-02 NOTE — TELEPHONE ENCOUNTER
Called the patient's daughter Hiral and explained that the urine culture was not released and therefore not available.  I did call in the prescription for antibiotics as to not waste any more time before treatment.  I asked that they return if her symptoms do not resolve with the treatment.   Gucci Wu MD

## 2021-09-02 NOTE — TELEPHONE ENCOUNTER
"Patient saw Dr. Gucci Wu on 8/26/21; daughter states was with her.  Had done urine in lab for possible UTI.  Had thought Dr. Gucci Wu would be treating her with an antibiotic but no call yet.  Per home care she is starting to have some hallucinations, napping more during day and not sleeping then at night, has had a few more falls.  Home Care had thought would be treated for UTI.  Patient does have 24 hr supervision/care with family help at this time.  She states that home care had told Katarina (grand-daughter) that she (Brenda) is entering the \"end of life stage\" but Aicha (daughter) states she's been like this before and usually a UTI.  Please advise on 8/26/21 lab results.  [facility].  Natalie Melgar RN    "

## 2021-09-07 NOTE — TELEPHONE ENCOUNTER
Left detailed voicemail for Beatrice, Home Care Nurse, [facility].   Advise to call back if has questions, [phone].  Shantelle Harris RN

## 2021-09-07 NOTE — TELEPHONE ENCOUNTER
Patient has had a severity level 2 reaction with the Quetiapine and duloxetine while taking Cipro.  She only has 1 dose left for tonight and then she will be done taking the Cipro.      Also home care order's needed:    Continue HHA 2 times a week for 5 weeks.    FYI patient fell last night while daughter in law was trying to get her slipper on around .  The family does not feel it is needed to come in and see a physician.  She has a little bruise by her eye and her knee.

## 2021-09-09 NOTE — TELEPHONE ENCOUNTER
Provider:  Please see [employer] message from daughter-in-law. Please advise. Thank you. Janis Howard R.N.

## 2021-09-17 NOTE — TELEPHONE ENCOUNTER
"Requested Prescriptions   Pending Prescriptions Disp Refills     QUEtiapine (SEROQUEL) 25 MG tablet [Pharmacy Med Name: QUEtiapine Fumarate 25 MG Oral Tablet] 60 tablet 0     Sig: Take 1 tablet by mouth twice daily       Antipsychotic Medications Failed - 9/17/2021  1:10 PM        Failed - Blood pressure under 140/90 in past 12 months     BP Readings from Last 3 Encounters:   08/26/21 (!) 159/108   07/20/21 (!) 188/102   07/18/21 111/72                 Failed - Lipid panel on file within the past 12 months     Recent Labs   Lab Test 09/11/19  1058   CHOL 251*   TRIG 146   HDL 58   *   NHDL 193*               Failed - CBC on file in past 12 months     Recent Labs   Lab Test 02/03/20  0620   WBC 14.5*                 Passed - Patient is 12 years of age or older        Passed - Heart Rate on file within past 12 months     Pulse Readings from Last 3 Encounters:   08/26/21 94   07/20/21 69   07/18/21 104               Passed - A1c or Glucose on file in past 12 months     Recent Labs   Lab Test 05/15/21  1230   GLC 70       Please review patients last 3 weights. If a weight gain of >10 lbs exists, you may refill the prescription once after instructing the patient to schedule an appointment within the next 30 days.    Wt Readings from Last 3 Encounters:   08/26/21 71.7 kg (158 lb)   05/17/21 83.5 kg (184 lb)   04/02/21 92.5 kg (204 lb)             Passed - Medication is active on med list        Passed - Patient is not pregnant        Passed - No positve pregnancy test on file in past 12 months        Passed - Recent (6 mo) or future (30 days) visit within the authorizing provider's specialty     Patient had office visit in the last 6 months or has a visit in the next 30 days with authorizing provider or within the authorizing provider's specialty.  See \"Patient Info\" tab in inbasket, or \"Choose Columns\" in Meds & Orders section of the refill encounter.                 Zoila LINDA, RN    "

## 2021-09-24 NOTE — TELEPHONE ENCOUNTER
This RN reconciled medication list from [facility] form against our [facility] medication list and there is 1 discrepancy.        1.  [facility] form has Advil liqui-gel 200 mg Take 2 caps daily prn.  Ok for RN to add to [facility] medication list?         Zoila Whittaker BSN, RN        flecainide interacts with lexapro  Pharm just wants to verify we are aware of interaction and ok to fill med    Not sure if you can ok this or if it needs to wait for Dr Bennie Lozano on Monday

## 2021-10-07 NOTE — TELEPHONE ENCOUNTER
I called Simone at [phone] and LM reading the providers note as written.  [facility] number [phone] given if any further questions.  Tanisha Arevalo,  [facility]

## 2021-10-07 NOTE — TELEPHONE ENCOUNTER
Requesting verbal orders for OK late recertification,60 day cert period ends today, patient not available for recert. Would like a call back today so they can see patient tomorrow. OK to WARREN Matta

## 2021-10-11 NOTE — TELEPHONE ENCOUNTER
Kamilah HERNANDEZ is off tomorrow, please call [phone], and speak to the clinical supervisor.Rajani Amaro MA/ROSITA

## 2021-10-11 NOTE — TELEPHONE ENCOUNTER
I left the verbal orders on the  for the clinical supervisor at [phone]  -  is secure, for Gilma (I could not understand her name on ), per the instructions from Kamilah below.  I left a call back number of [phone].  Thank you. Janis Howard R.N.

## 2021-10-11 NOTE — TELEPHONE ENCOUNTER
Home Care is looking for continuing orders for-    Skilled nursing 1 time a week for 3 weeks, then every other week for 5 weeks.    Health aid 2 times a week for 8 weeks    Patient has been showing signs and symptoms of a bladder infection. Will be dropping off a urine sample today. Per patient's grandson, Levaquin that was prescribed last time, patient did not tolerate well.    Rajani Amaro MA/TC

## 2021-11-09 NOTE — TELEPHONE ENCOUNTER
[facility] is calling back and got the verbal orders-- they faxed the forms over to be faxed and they are waiting to receive them with a signature. I had her fax them again today    Thank you,   Zulma RIOS [facility]

## 2021-11-10 NOTE — TELEPHONE ENCOUNTER
Routing to Provider for approval of below requested orders.     Char HERNANDEZ from [facility] calling for verbal okay on orders below.   [phone]      Orders:  PT eval   OT eval  Home Health Aid 1 week x1. 2 weeks x2, and 1 week x1  Skilled nurse week 3, 1 week x1 and 3 PRN visits.

## 2021-11-15 NOTE — PROGRESS NOTES
[facility] now requests orders and shares plan of care/discharge summaries for some patients through [employer].  Please REPLY TO THIS MESSAGE OR ROUTE BACK TO THE AUTHOR in order to give authorization for orders when needed.  This is considered a verbal order, you will still receive a faxed copy of orders for signature.  Thank you for your assistance in improving collaboration for our patients.    ORDER  Physical therapy    2w2 starting week of 11/21/21, for strength and mobility traning

## 2021-11-22 NOTE — TELEPHONE ENCOUNTER
"Pt has upcoming appointment.    Requested Prescriptions   Pending Prescriptions Disp Refills    DULoxetine (CYMBALTA) 60 MG capsule [Pharmacy Med Name: DULoxetine HCl 60 MG Oral Capsule Delayed Release Particles] 180 capsule 0     Sig: Take 2 capsules by mouth once daily        Serotonin-Norepinephrine Reuptake Inhibitors  Failed - 11/22/2021  2:42 AM        Failed - Blood pressure under 140/90 in past 12 months       BP Readings from Last 3 Encounters:   08/26/21 (!) 159/108   07/20/21 (!) 188/102   07/18/21 111/72                 Passed - Recent (12 mo) or future (30 days) visit within the authorizing provider's specialty     Patient has had an office visit with the authorizing provider or a provider within the authorizing providers department within the previous 12 mos or has a future within next 30 days. See \"Patient Info\" tab in inbasket, or \"Choose Columns\" in Meds & Orders section of the refill encounter.              Passed - Medication is active on med list        Passed - Patient is age 18 or older        Passed - No active pregnancy on record        Passed - No positive pregnancy test in past 12 months              "

## 2021-11-22 NOTE — TELEPHONE ENCOUNTER
Reason for Call:  Form, our goal is to have forms completed with 72 hours, however, some forms may require a visit or additional information.    Type of letter, form or note:  Home Health Certification    Who is the form from?: Home care    Where did the form come from: Patient or family brought in       What clinic location was the form placed at?: [place]    Where the form was placed: Given to physician    What number is listed as a contact on the form?: [facility] [phone] (p)       Additional comments: placed in provider's box to complete and route back to TC.    Call taken on 11/22/2021 at 1:02 PM by Grace De Jesus

## 2021-12-01 NOTE — PROGRESS NOTES
"  Assessment & Plan     Major depressive disorder, recurrent episode, moderate (H)    - busPIRone (BUSPAR) 15 MG tablet; Take 1 tablet (15 mg) by mouth 2 times daily    Need for prophylactic vaccination and inoculation against influenza    - umeclidinium (INCRUSE ELLIPTA) 62.5 MCG/INH inhaler; Inhale 1 puff into the lungs daily    COPD     - umeclidinium (INCRUSE ELLIPTA) 62.5 MCG/INH inhaler; Inhale 1 puff into the lungs daily    Asymptomatic menopausal state    - DEXA HIP/PELVIS/SPINE - Future; Future    High priority for 2019-nCoV vaccine                 BMI:   Estimated body mass index is 28.68 kg/m  as calculated from the following:    Height as of 4/2/21: 1.499 m (4' 11\").    Weight as of this encounter: 64.4 kg (142 lb).           Return in about 2 months (around 2/1/2022) for reheck on depression, swelling.    Gucci Wu MD  [facility]   Brenda is a 81 year old who presents for the following health issues     HPI       Hospital Follow-up Visit:    Hospital/Nursing Home/ Rehab Facility: [facility] transitional care  Date of Admission: 10/11/21 Hospital, 10/15/21 Transitional Care  Date of Discharge: 10/15/21 Hospital, 11/9/21 Transitional Care  Reason(s) for Admission: Pulmonary Embolism      Was your hospitalization related to COVID-19? No   Problems taking medications regularly:  None  Medication changes since discharge: None  Problems adhering to non-medication therapy:  None    Summary of hospitalization:  CareEverywhere information obtained and reviewed  Diagnostic Tests/Treatments reviewed.  Follow up needed: none  Other Healthcare Providers Involved in Patient s Care:         None  Update since discharge: improved. Post Discharge Medication Reconciliation: .  Plan of care communicated with patient and family              Review of Systems         Objective    There were no vitals taken for this visit.  There is no height or weight on file " to calculate BMI.  Physical Exam              --------------------------------------------------------------------------------------------------------------------------------------    Subjective:  Gilma is a 81 year old female who presents today for follow-up on a recent hospitalization at [facility]. She was admitted to the hospital on 10-11 and discharged on 10-15. The patient went to the hospital for symptoms of shortness of breath . She was diagnosed with a pulmonary embolism. The patient was treated with anticoagulation . She was asked to follow up today specifically to check up on her general well being . At this time the patient and her daughter Hiral  reports a lot of improvement of the original symptoms. In addition the patient reports the following new symptoms:none.       She was at [facility]  for 3 weeks and was there until the 9th       The patient is at her  home now and her granddaughter Katarina and her boyfriend are living there too.      She has physical therapy at home     She is done with occupational therapy     She has a care giver helps with showers     A nurse checks her vitals and checks her medication      She saw Cardiology yesterday and was told to follow up in 1 year             Current Outpatient Medications:      acetaminophen (TYLENOL) 500 MG tablet, Take 1,000 mg by mouth every 8 hours as needed for mild pain , Disp: , Rfl:      albuterol (PROVENTIL HFA) 108 (90 Base) MCG/ACT inhaler, Inhale 2 puffs into the lungs every 6 hours, Disp: 18 g, Rfl: 2     alendronate (FOSAMAX) 70 MG tablet, Take 1 tablet (70 mg) by mouth every 7 days, Disp: 12 tablet, Rfl: 3     ARIPiprazole (ABILIFY) 2 MG tablet, Take 1 tablet (2 mg) by mouth At Bedtime, Disp: 90 tablet, Rfl: 1     aspirin (ASA) 81 MG chewable tablet, Take 81 mg by mouth , Disp: , Rfl:      busPIRone (BUSPAR) 15 MG tablet, Take 1 tablet (15 mg) by mouth 2 times daily, Disp: 180 tablet, Rfl: 0     conjugated estrogens  (PREMARIN) 0.625 MG/GM vaginal cream, Place 0.5 g vaginally twice a week, Disp: 30 g, Rfl: 3     DULoxetine (CYMBALTA) 60 MG capsule, Take 2 capsules by mouth once daily, Disp: 180 capsule, Rfl: 0     folic acid (FOLVITE) 1 MG tablet, Take 1 tablet (1,000 mcg) by mouth daily, Disp: 90 tablet, Rfl: 2     levofloxacin (LEVAQUIN) 750 MG tablet, Take 1 tablet (750 mg) by mouth daily, Disp: 5 tablet, Rfl: 0     levothyroxine (EUTHYROX) 100 MCG tablet, Take 1 tablet (100 mcg) by mouth daily, Disp: 90 tablet, Rfl: 3     melatonin 5 MG tablet, Take 1 tablet (5 mg) by mouth At Bedtime, Disp: 90 tablet, Rfl: 3     metoprolol succinate ER (TOPROL-XL) 25 MG 24 hr tablet, Take 1 tablet (25 mg) by mouth daily, Disp: 90 tablet, Rfl: 3     NYSTOP [id number] UNIT/GM powder, APPLY  POWDER TOPICALLY TWICE DAILY, Disp: 60 g, Rfl: 11     order for DME, [facility] home oxygen:  Equipment being ordered: Oxygen, home fill system,  4 LPM, Disp: 1 each, Rfl: 0     order for DME, Equipment being ordered: Oxygen -  4 liters a day, Disp: , Rfl:      primidone (MYSOLINE) 50 MG tablet, TAKE 1/2 (ONE-HALF) TABLET BY MOUTH AT BEDTIME, Disp: 45 tablet, Rfl: 3     QUEtiapine (SEROQUEL) 25 MG tablet, Take 1 tablet (25 mg) by mouth 2 times daily, Disp: 180 tablet, Rfl: 3     rosuvastatin (CRESTOR) 20 MG tablet, Take 1 tablet (20 mg) by mouth At Bedtime, Disp: 90 tablet, Rfl: 3     umeclidinium (INCRUSE ELLIPTA) 62.5 MCG/INH inhaler, Inhale 1 puff into the lungs daily, Disp: 3 Inhaler, Rfl: 3    Past Medical History:   Diagnosis Date     Anxiety 10/11/2006    Last visit with Mental health specialist in 2002.      Chronic low back pain without sciatica 10/31/2016     Compression fracture of thoracic vertebra (H) 6/13/2011    Overview:  T  11  compression  fx  on  xrays  from  6/13/2011  . Ongoing pain  x  3  months   Get  eval  and  treat  with  spine  specialists  as discussed  Initially  treated  in [place]  .  Pain  is  controlled   with  advil  as needed  .  Reviewed   with  patient      Congestive heart failure (H) 10/11/2006    Overview:  Epic      COPD (chronic obstructive pulmonary disease) (H) 9/28/2017     Folate deficiency 6/9/2017    Overview:  Formatting of this note may be different from the original. Hemoglobin (g/dl)  Date Value  06/05/2017 15.6  10/31/2016 13.8  11/04/2015 11.3 (L)   Iron (mcg/dl)  Date Value  06/05/2017 49 (L)   TIBC, Calculated (mcg/dl)  Date Value  06/05/2017 319   % Saturation, calc. (%)  Date Value  06/05/2017 15   Vitamin B12 (pg/ml)  Date Value  06/05/2017 253   Folate (ng/ml)  Date Value  06/05/20     Hypertension goal BP (blood pressure) < 140/90 9/28/2017     Mitral regurgitation 2/11/2014    Overview:  Needs dental prophylaxis      Mixed hyperlipidemia 10/11/2006     Osteoporosis 9/28/2017     Postoperative hypothyroidism 9/28/2017       OBJECTIVE:  /83   Pulse 80   Temp 96.8  F (36  C) (Tympanic)   Wt 64.4 kg (142 lb)   SpO2 94%   BMI 28.68 kg/m    GENERAL APPEARANCE: healthy, alert and no distress  EYES: EOMI,  PERRL  HENT: ear canals and TM's normal and nose and mouth without ulcers or lesions  RESP: lungs clear to auscultation - no rales, rhonchi or wheezes  CV: regular rates and rhythm, normal S1 S2, no S3 or S4 and no murmur, click or rub -  ABDOMEN:  soft, nontender, no HSM or masses and bowel sounds normal    EXTREMITIES: +1 edema to mid shin    ASSESSMENT:  81 year old female who recently underwent hospitalization for pulmonary embolism.    We discussed the edema.  She is currently not on a diuretic.  We discussed that we could help her fluid in her legs but that the diuretic would cause increased urination.  She declined that at this point      Plan: At this time we will continue the present management and have the patient return to clinic as needed. Gilma  voiced understanding to informations discussed today.    Follow up in 2-3 month(s) for a recheck on depression and edema

## 2021-12-01 NOTE — PATIENT INSTRUCTIONS
Please call our [facility] Scheduling Line at [phone] to schedule your:    Dexa Scan (bone density scan)

## 2021-12-01 NOTE — NURSING NOTE
"Chief Complaint   Patient presents with     Hospital F/U       Initial /83   Pulse 80   Temp 96.8  F (36  C) (Tympanic)   Wt 64.4 kg (142 lb)   SpO2 94%   BMI 28.68 kg/m   Estimated body mass index is 28.68 kg/m  as calculated from the following:    Height as of 4/2/21: 1.499 m (4' 11\").    Weight as of this encounter: 64.4 kg (142 lb).  Medication Reconciliation: complete  Pebbles Posadas, MAGALI  "

## 2021-12-03 NOTE — TELEPHONE ENCOUNTER
[facility] is calling requesting orders for patient--   Skilled nursing 1 time a week for 4, then dropping down to 1 time a week every other week  3 PRM     She will need a new referral to new agency ongoing [facility] care because [facility] is no longer providing that     They are also requesting  to help find new agency    Home Health aide 2 times a week for 4 weeks and then patient will be discharged.     Call back at [phone]    Traci LORENZANA,   [facility]

## 2021-12-06 NOTE — TELEPHONE ENCOUNTER
"Left message on RN voicemail with Dr. Gucci Wu approval for the home care orders as per below.  Requested she call clinic back; ask for RN at [phone].   Needing to know more information regarding \"need a new referral to new agency ongoing senior living care because [facility] is no longer providing that.\"  Needing to know more about type of home care patient needing and then need to place the referral for care coordination to assist with finding new agency.  Natalie Melgar, RN    "

## 2021-12-07 NOTE — TELEPHONE ENCOUNTER
Provider: This is a FYI.  Thank you. Janis Johnson (with [facility]) states that [facility] does not take long term home care patients. Brenad needs a long term home health aide for bathing and personal cares. Elizabeth has put in a referral already for a social workers and they are going to assess her and work with her to get an agency that can handle her cares long term.    A RN needs to visit her every 2 weeks to monitor/supvervise the home health aide and monitor her CHF.  This is a FYI.

## 2021-12-23 NOTE — TELEPHONE ENCOUNTER
[facility] calling with and FYI that the home care  discharged and the Health Care Directive was completed.  Lucy Boswell MA  [facility]  2nd Floor  Primary Care

## 2021-12-23 NOTE — TELEPHONE ENCOUNTER
Elizabeth, homecare RN from [facility].    1.  RN just saw patient today and patient is not doing very good.  Very emotional and down due to the recent death of her  of 40 years.  The holidays are bothering her. Patient's family and patient are requesting an increase in patient's antidepressant.    2.  Patient also has history of recurrent UTIs and when she was in the nursing home, they discontinued her UTI prophylactic medication.  Patient now having UTI symptoms so a UA was collected from patient but RN requesting for maintenance medication to be restarted (omnicef?).    Zoila KURTZN, RN

## 2021-12-24 NOTE — TELEPHONE ENCOUNTER
Left message on answering machine for patient/parent to call back.   [phone]  Shantelle Harris RN

## 2021-12-24 NOTE — TELEPHONE ENCOUNTER
We cannot go up on the duloxetine but we could increase the Abilify.  I will call in a prescription for that.   Should recheck her depression symptoms in a month approximately.    Regarding a possible(felicity) UTI I do not see and recent urinalysis or urine culture results. Please clarify if and where that was done.     Gucci Wu MD

## 2021-12-27 NOTE — TELEPHONE ENCOUNTER
Vidya from [facility] was given the information below.  She reports that she did not collect a urine. It was actually her son that was to do that and bring it in.  I do not have a consent to communicate with her son, but do for Hiral Lowry.  I left a message for her to inform her of he increase Abilify and ask if they ever collected a urine.    Left a message to return a call to [phone].  Janis Howard R.N.  Please see above.

## 2021-12-28 NOTE — TELEPHONE ENCOUNTER
Reason for Call:  Home Health Care    Bertha with [facility] called regarding (reason for call):   1) patient fell on 12/26/2021 in the bathroom - no injury to report  2) grandson who patient is living with states that patient was taking a UTI preventive prior to going into a TCU, was taken off of it at the TCU, she has not restarted this medication since she came back to live with him.  He is sure that she has a UTI and is bringing in a urine sample today to have it checked.  He is also wondering if she needs to be back on the UTI preventive medication.  Please advise.  Thank you      Call taken on 12/28/2021 at 1:43 PM by Grace De Jesus

## 2021-12-28 NOTE — TELEPHONE ENCOUNTER
Juan F (granddaughter Rico snell) lives with and takes care of Brenda.  He was to get a urine last week.  She does not know if this was done.  She will follow-up with them and ask them to get a urine as their request for antibiotics is pending because of this.  They will send us a quick MyChart when this is done. Aicha verbalizes good understanding, agrees with plan and states she needs no further support. FYI to Dr. Wu. Janis Howard R.N.

## 2021-12-29 NOTE — TELEPHONE ENCOUNTER
Aicha (daughter) notified of provider's message as written below. She will ensure that Brenda will get the treatments below. If she is not better or worsens in any way she is to be seen in clinic. Aicha verbalized good understanding, had no further questions and needed no further support.Janis Howard R.N.

## 2021-12-29 NOTE — TELEPHONE ENCOUNTER
ua reviewed, + nitrite and other positive testing to suggest UTI  Chart review reveals was just on macrobid early this month. Will treat with levaquin due to sulfa allergy and last UCx was resistant to ampicillin    macrobid was used prior for prophylaxis, pt should start that again after completing levaquin for current UTI.  Keep appt 2/4/2022 with pcp

## 2022-01-01 ENCOUNTER — LAB (OUTPATIENT)
Dept: LAB | Facility: OTHER | Age: 82
End: 2022-01-01
Payer: MEDICARE

## 2022-01-01 ENCOUNTER — E-VISIT (OUTPATIENT)
Dept: FAMILY MEDICINE | Facility: CLINIC | Age: 82
End: 2022-01-01
Payer: MEDICARE

## 2022-01-01 ENCOUNTER — PATIENT OUTREACH (OUTPATIENT)
Dept: NURSING | Facility: CLINIC | Age: 82
End: 2022-01-01
Payer: MEDICARE

## 2022-01-01 ENCOUNTER — HEALTH MAINTENANCE LETTER (OUTPATIENT)
Age: 82
End: 2022-01-01

## 2022-01-01 ENCOUNTER — ANCILLARY PROCEDURE (OUTPATIENT)
Dept: BONE DENSITY | Facility: CLINIC | Age: 82
End: 2022-01-01
Attending: FAMILY MEDICINE
Payer: MEDICARE

## 2022-01-01 ENCOUNTER — OFFICE VISIT (OUTPATIENT)
Dept: FAMILY MEDICINE | Facility: CLINIC | Age: 82
End: 2022-01-01
Payer: MEDICARE

## 2022-01-01 ENCOUNTER — MEDICAL CORRESPONDENCE (OUTPATIENT)
Dept: HEALTH INFORMATION MANAGEMENT | Facility: CLINIC | Age: 82
End: 2022-01-01
Payer: MEDICARE

## 2022-01-01 ENCOUNTER — VIRTUAL VISIT (OUTPATIENT)
Dept: FAMILY MEDICINE | Facility: CLINIC | Age: 82
End: 2022-01-01
Payer: MEDICARE

## 2022-01-01 ENCOUNTER — MYC MEDICAL ADVICE (OUTPATIENT)
Dept: FAMILY MEDICINE | Facility: CLINIC | Age: 82
End: 2022-01-01
Payer: MEDICARE

## 2022-01-01 ENCOUNTER — TELEPHONE (OUTPATIENT)
Dept: FAMILY MEDICINE | Facility: CLINIC | Age: 82
End: 2022-01-01
Payer: MEDICARE

## 2022-01-01 ENCOUNTER — PATIENT OUTREACH (OUTPATIENT)
Dept: CARE COORDINATION | Facility: CLINIC | Age: 82
End: 2022-01-01
Payer: MEDICARE

## 2022-01-01 ENCOUNTER — MYC REFILL (OUTPATIENT)
Dept: FAMILY MEDICINE | Facility: CLINIC | Age: 82
End: 2022-01-01
Payer: MEDICARE

## 2022-01-01 ENCOUNTER — PATIENT OUTREACH (OUTPATIENT)
Dept: NURSING | Facility: CLINIC | Age: 82
End: 2022-01-01
Attending: FAMILY MEDICINE
Payer: MEDICARE

## 2022-01-01 VITALS
HEART RATE: 87 BPM | OXYGEN SATURATION: 95 % | DIASTOLIC BLOOD PRESSURE: 92 MMHG | WEIGHT: 131 LBS | BODY MASS INDEX: 26.41 KG/M2 | SYSTOLIC BLOOD PRESSURE: 174 MMHG | HEIGHT: 59 IN | TEMPERATURE: 97.6 F

## 2022-01-01 VITALS
WEIGHT: 125 LBS | DIASTOLIC BLOOD PRESSURE: 90 MMHG | BODY MASS INDEX: 25.25 KG/M2 | TEMPERATURE: 97 F | SYSTOLIC BLOOD PRESSURE: 150 MMHG | HEART RATE: 77 BPM | RESPIRATION RATE: 16 BRPM | OXYGEN SATURATION: 94 %

## 2022-01-01 DIAGNOSIS — N39.0 RECURRENT UTI (URINARY TRACT INFECTION): ICD-10-CM

## 2022-01-01 DIAGNOSIS — R29.898 WEAKNESS OF BOTH LOWER EXTREMITIES: ICD-10-CM

## 2022-01-01 DIAGNOSIS — Z78.0 ASYMPTOMATIC MENOPAUSAL STATE: ICD-10-CM

## 2022-01-01 DIAGNOSIS — Z23 NEED FOR PROPHYLACTIC VACCINATION AND INOCULATION AGAINST INFLUENZA: ICD-10-CM

## 2022-01-01 DIAGNOSIS — N39.0 URINARY TRACT INFECTION WITHOUT HEMATURIA, SITE UNSPECIFIED: Primary | ICD-10-CM

## 2022-01-01 DIAGNOSIS — Z53.9 DIAGNOSIS NOT YET DEFINED: Primary | ICD-10-CM

## 2022-01-01 DIAGNOSIS — R30.0 DYSURIA: Primary | ICD-10-CM

## 2022-01-01 DIAGNOSIS — R35.0 URINARY FREQUENCY: ICD-10-CM

## 2022-01-01 DIAGNOSIS — F32.1 MODERATE MAJOR DEPRESSION (H): ICD-10-CM

## 2022-01-01 DIAGNOSIS — B96.89 UTI DUE TO KLEBSIELLA SPECIES: Primary | ICD-10-CM

## 2022-01-01 DIAGNOSIS — G89.29 CHRONIC LOW BACK PAIN WITHOUT SCIATICA, UNSPECIFIED BACK PAIN LATERALITY: ICD-10-CM

## 2022-01-01 DIAGNOSIS — N39.0 ACUTE UTI (URINARY TRACT INFECTION): ICD-10-CM

## 2022-01-01 DIAGNOSIS — R32 URINARY INCONTINENCE: ICD-10-CM

## 2022-01-01 DIAGNOSIS — F03.90 DEMENTIA WITHOUT BEHAVIORAL DISTURBANCE, UNSPECIFIED DEMENTIA TYPE: ICD-10-CM

## 2022-01-01 DIAGNOSIS — M54.50 CHRONIC LOW BACK PAIN WITHOUT SCIATICA, UNSPECIFIED BACK PAIN LATERALITY: ICD-10-CM

## 2022-01-01 DIAGNOSIS — J44.9 CHRONIC OBSTRUCTIVE PULMONARY DISEASE, UNSPECIFIED COPD TYPE (H): Primary | ICD-10-CM

## 2022-01-01 DIAGNOSIS — N39.0 UTI DUE TO KLEBSIELLA SPECIES: Primary | ICD-10-CM

## 2022-01-01 DIAGNOSIS — K21.9 GASTROESOPHAGEAL REFLUX DISEASE WITHOUT ESOPHAGITIS: Primary | ICD-10-CM

## 2022-01-01 DIAGNOSIS — E53.8 FOLATE DEFICIENCY: ICD-10-CM

## 2022-01-01 DIAGNOSIS — R82.90 ABNORMAL URINALYSIS: Primary | ICD-10-CM

## 2022-01-01 DIAGNOSIS — N39.0 RECURRENT UTI: ICD-10-CM

## 2022-01-01 DIAGNOSIS — B96.89 UTI DUE TO KLEBSIELLA SPECIES: ICD-10-CM

## 2022-01-01 DIAGNOSIS — N39.0 UTI DUE TO KLEBSIELLA SPECIES: ICD-10-CM

## 2022-01-01 DIAGNOSIS — Z87.440 PERSONAL HISTORY OF URINARY TRACT INFECTION: ICD-10-CM

## 2022-01-01 DIAGNOSIS — N39.0 RECURRENT UTI: Primary | ICD-10-CM

## 2022-01-01 DIAGNOSIS — E03.9 HYPOTHYROIDISM, UNSPECIFIED TYPE: ICD-10-CM

## 2022-01-01 DIAGNOSIS — Z87.440 PERSONAL HISTORY OF URINARY TRACT INFECTION: Primary | ICD-10-CM

## 2022-01-01 DIAGNOSIS — R32 URINARY INCONTINENCE, UNSPECIFIED TYPE: ICD-10-CM

## 2022-01-01 DIAGNOSIS — R41.3 CONFABULATION: ICD-10-CM

## 2022-01-01 DIAGNOSIS — J96.11 CHRONIC RESPIRATORY FAILURE WITH HYPOXIA (H): Primary | ICD-10-CM

## 2022-01-01 DIAGNOSIS — J44.9 CHRONIC OBSTRUCTIVE PULMONARY DISEASE, UNSPECIFIED COPD TYPE (H): ICD-10-CM

## 2022-01-01 DIAGNOSIS — N39.0 RECURRENT UTI (URINARY TRACT INFECTION): Primary | ICD-10-CM

## 2022-01-01 LAB
ALBUMIN UR-MCNC: 100 MG/DL
ALBUMIN UR-MCNC: 30 MG/DL
ALBUMIN UR-MCNC: 30 MG/DL
ALBUMIN UR-MCNC: >=300 MG/DL
ALBUMIN UR-MCNC: ABNORMAL MG/DL
AMORPH CRY #/AREA URNS HPF: ABNORMAL /HPF
APPEARANCE UR: ABNORMAL
APPEARANCE UR: CLEAR
BACTERIA #/AREA URNS HPF: ABNORMAL /HPF
BACTERIA UR CULT: ABNORMAL
BACTERIA UR CULT: ABNORMAL
BACTERIA UR CULT: NORMAL
BACTERIA UR CULT: NORMAL
BILIRUB UR QL STRIP: ABNORMAL
BILIRUB UR QL STRIP: NEGATIVE
CAOX CRY #/AREA URNS HPF: ABNORMAL /HPF
CAOX CRY #/AREA URNS HPF: ABNORMAL /HPF
COLOR UR AUTO: ABNORMAL
COLOR UR AUTO: YELLOW
GLUCOSE UR STRIP-MCNC: NEGATIVE MG/DL
HGB UR QL STRIP: ABNORMAL
HGB UR QL STRIP: NEGATIVE
KETONES UR STRIP-MCNC: ABNORMAL MG/DL
KETONES UR STRIP-MCNC: ABNORMAL MG/DL
KETONES UR STRIP-MCNC: NEGATIVE MG/DL
LEUKOCYTE ESTERASE UR QL STRIP: NEGATIVE
NITRATE UR QL: NEGATIVE
PH UR STRIP: 5.5 [PH] (ref 5–7)
PH UR STRIP: 6 [PH] (ref 5–7)
PH UR STRIP: 6.5 [PH] (ref 5–7)
PH UR STRIP: 6.5 [PH] (ref 5–7)
PH UR STRIP: 8.5 [PH] (ref 5–7)
RBC #/AREA URNS AUTO: ABNORMAL /HPF
SP GR UR STRIP: 1.02 (ref 1–1.03)
SP GR UR STRIP: 1.02 (ref 1–1.03)
SP GR UR STRIP: >=1.03 (ref 1–1.03)
SQUAMOUS #/AREA URNS AUTO: ABNORMAL /LPF
UROBILINOGEN UR STRIP-ACNC: 0.2 E.U./DL
UROBILINOGEN UR STRIP-ACNC: 0.2 E.U./DL
UROBILINOGEN UR STRIP-ACNC: 1 E.U./DL
UROBILINOGEN UR STRIP-ACNC: 2 E.U./DL
UROBILINOGEN UR STRIP-ACNC: 2 E.U./DL
WBC #/AREA URNS AUTO: ABNORMAL /HPF

## 2022-01-01 PROCEDURE — 87086 URINE CULTURE/COLONY COUNT: CPT

## 2022-01-01 PROCEDURE — 81001 URINALYSIS AUTO W/SCOPE: CPT

## 2022-01-01 PROCEDURE — 99213 OFFICE O/P EST LOW 20 MIN: CPT | Performed by: FAMILY MEDICINE

## 2022-01-01 PROCEDURE — 99421 OL DIG E/M SVC 5-10 MIN: CPT | Performed by: FAMILY MEDICINE

## 2022-01-01 PROCEDURE — 96127 BRIEF EMOTIONAL/BEHAV ASSMT: CPT | Performed by: FAMILY MEDICINE

## 2022-01-01 PROCEDURE — 99214 OFFICE O/P EST MOD 30 MIN: CPT | Mod: 95 | Performed by: FAMILY MEDICINE

## 2022-01-01 PROCEDURE — 87186 SC STD MICRODIL/AGAR DIL: CPT

## 2022-01-01 PROCEDURE — G0179 MD RECERTIFICATION HHA PT: HCPCS | Performed by: FAMILY MEDICINE

## 2022-01-01 PROCEDURE — 77080 DXA BONE DENSITY AXIAL: CPT | Performed by: RADIOLOGY

## 2022-01-01 PROCEDURE — 87088 URINE BACTERIA CULTURE: CPT

## 2022-01-01 PROCEDURE — 99214 OFFICE O/P EST MOD 30 MIN: CPT | Performed by: FAMILY MEDICINE

## 2022-01-01 RX ORDER — ALBUTEROL SULFATE 90 UG/1
AEROSOL, METERED RESPIRATORY (INHALATION)
Qty: 18 G | Refills: 1 | Status: SHIPPED | OUTPATIENT
Start: 2022-01-01

## 2022-01-01 RX ORDER — NITROFURANTOIN 25; 75 MG/1; MG/1
100 CAPSULE ORAL 2 TIMES DAILY
Status: CANCELLED | OUTPATIENT
Start: 2022-01-01

## 2022-01-01 RX ORDER — DULOXETIN HYDROCHLORIDE 60 MG/1
CAPSULE, DELAYED RELEASE ORAL
Qty: 180 CAPSULE | Refills: 0 | Status: SHIPPED | OUTPATIENT
Start: 2022-01-01

## 2022-01-01 RX ORDER — DULOXETIN HYDROCHLORIDE 60 MG/1
CAPSULE, DELAYED RELEASE ORAL
Qty: 180 CAPSULE | Refills: 0 | Status: SHIPPED | OUTPATIENT
Start: 2022-01-01 | End: 2022-01-01

## 2022-01-01 RX ORDER — ARIPIPRAZOLE 20 MG/1
20 TABLET ORAL AT BEDTIME
Qty: 90 TABLET | Refills: 1 | Status: SHIPPED | OUTPATIENT
Start: 2022-01-01

## 2022-01-01 RX ORDER — ARIPIPRAZOLE 2 MG/1
2 TABLET ORAL DAILY
Status: CANCELLED | OUTPATIENT
Start: 2022-01-01

## 2022-01-01 RX ORDER — OMEPRAZOLE 40 MG/1
40 CAPSULE, DELAYED RELEASE ORAL DAILY
Qty: 30 CAPSULE | Refills: 2 | Status: SHIPPED | OUTPATIENT
Start: 2022-01-01

## 2022-01-01 RX ORDER — DULOXETIN HYDROCHLORIDE 60 MG/1
CAPSULE, DELAYED RELEASE ORAL
Qty: 180 CAPSULE | Refills: 0 | Status: CANCELLED | OUTPATIENT
Start: 2022-01-01

## 2022-01-01 RX ORDER — ARIPIPRAZOLE 10 MG/1
10 TABLET ORAL AT BEDTIME
Qty: 30 TABLET | Refills: 1 | Status: SHIPPED | OUTPATIENT
Start: 2022-01-01 | End: 2022-01-01

## 2022-01-01 RX ORDER — ARIPIPRAZOLE 20 MG/1
20 TABLET ORAL AT BEDTIME
Qty: 30 TABLET | Refills: 1 | Status: SHIPPED | OUTPATIENT
Start: 2022-01-01 | End: 2022-01-01

## 2022-01-01 RX ORDER — NITROFURANTOIN 25; 75 MG/1; MG/1
100 CAPSULE ORAL 2 TIMES DAILY
Qty: 10 CAPSULE | Refills: 0 | Status: SHIPPED | OUTPATIENT
Start: 2022-01-01 | End: 2022-01-01

## 2022-01-01 RX ORDER — DULOXETIN HYDROCHLORIDE 60 MG/1
CAPSULE, DELAYED RELEASE ORAL
Qty: 180 CAPSULE | Refills: 0 | OUTPATIENT
Start: 2022-01-01

## 2022-01-01 ASSESSMENT — ANXIETY QUESTIONNAIRES
2. NOT BEING ABLE TO STOP OR CONTROL WORRYING: NEARLY EVERY DAY
1. FEELING NERVOUS, ANXIOUS, OR ON EDGE: MORE THAN HALF THE DAYS
GAD7 TOTAL SCORE: 7
6. BECOMING EASILY ANNOYED OR IRRITABLE: NOT AT ALL
GAD7 TOTAL SCORE: 8
3. WORRYING TOO MUCH ABOUT DIFFERENT THINGS: SEVERAL DAYS
7. FEELING AFRAID AS IF SOMETHING AWFUL MIGHT HAPPEN: SEVERAL DAYS
1. FEELING NERVOUS, ANXIOUS, OR ON EDGE: NEARLY EVERY DAY
2. NOT BEING ABLE TO STOP OR CONTROL WORRYING: MORE THAN HALF THE DAYS
7. FEELING AFRAID AS IF SOMETHING AWFUL MIGHT HAPPEN: SEVERAL DAYS
5. BEING SO RESTLESS THAT IT IS HARD TO SIT STILL: SEVERAL DAYS
GAD7 TOTAL SCORE: 8
GAD7 TOTAL SCORE: 16
2. NOT BEING ABLE TO STOP OR CONTROL WORRYING: MORE THAN HALF THE DAYS
5. BEING SO RESTLESS THAT IT IS HARD TO SIT STILL: NOT AT ALL
4. TROUBLE RELAXING: NOT AT ALL
6. BECOMING EASILY ANNOYED OR IRRITABLE: SEVERAL DAYS
GAD7 TOTAL SCORE: 7
5. BEING SO RESTLESS THAT IT IS HARD TO SIT STILL: SEVERAL DAYS
GAD7 TOTAL SCORE: 16
8. IF YOU CHECKED OFF ANY PROBLEMS, HOW DIFFICULT HAVE THESE MADE IT FOR YOU TO DO YOUR WORK, TAKE CARE OF THINGS AT HOME, OR GET ALONG WITH OTHER PEOPLE?: SOMEWHAT DIFFICULT
6. BECOMING EASILY ANNOYED OR IRRITABLE: MORE THAN HALF THE DAYS
4. TROUBLE RELAXING: MORE THAN HALF THE DAYS
3. WORRYING TOO MUCH ABOUT DIFFERENT THINGS: MORE THAN HALF THE DAYS
GAD7 TOTAL SCORE: 8
GAD7 TOTAL SCORE: 16
GAD7 TOTAL SCORE: 7
7. FEELING AFRAID AS IF SOMETHING AWFUL MIGHT HAPPEN: NOT AT ALL
GAD7 TOTAL SCORE: 7
3. WORRYING TOO MUCH ABOUT DIFFERENT THINGS: NEARLY EVERY DAY
1. FEELING NERVOUS, ANXIOUS, OR ON EDGE: MORE THAN HALF THE DAYS
7. FEELING AFRAID AS IF SOMETHING AWFUL MIGHT HAPPEN: NOT AT ALL
7. FEELING AFRAID AS IF SOMETHING AWFUL MIGHT HAPPEN: NOT AT ALL
4. TROUBLE RELAXING: NEARLY EVERY DAY
GAD7 TOTAL SCORE: 16
7. FEELING AFRAID AS IF SOMETHING AWFUL MIGHT HAPPEN: NOT AT ALL

## 2022-01-01 ASSESSMENT — PATIENT HEALTH QUESTIONNAIRE - PHQ9
SUM OF ALL RESPONSES TO PHQ QUESTIONS 1-9: 15
SUM OF ALL RESPONSES TO PHQ QUESTIONS 1-9: 13
SUM OF ALL RESPONSES TO PHQ QUESTIONS 1-9: 22
SUM OF ALL RESPONSES TO PHQ QUESTIONS 1-9: 22
SUM OF ALL RESPONSES TO PHQ QUESTIONS 1-9: 15
SUM OF ALL RESPONSES TO PHQ QUESTIONS 1-9: 13
10. IF YOU CHECKED OFF ANY PROBLEMS, HOW DIFFICULT HAVE THESE PROBLEMS MADE IT FOR YOU TO DO YOUR WORK, TAKE CARE OF THINGS AT HOME, OR GET ALONG WITH OTHER PEOPLE: SOMEWHAT DIFFICULT
10. IF YOU CHECKED OFF ANY PROBLEMS, HOW DIFFICULT HAVE THESE PROBLEMS MADE IT FOR YOU TO DO YOUR WORK, TAKE CARE OF THINGS AT HOME, OR GET ALONG WITH OTHER PEOPLE: VERY DIFFICULT
SUM OF ALL RESPONSES TO PHQ QUESTIONS 1-9: 22
SUM OF ALL RESPONSES TO PHQ QUESTIONS 1-9: 13
10. IF YOU CHECKED OFF ANY PROBLEMS, HOW DIFFICULT HAVE THESE PROBLEMS MADE IT FOR YOU TO DO YOUR WORK, TAKE CARE OF THINGS AT HOME, OR GET ALONG WITH OTHER PEOPLE: EXTREMELY DIFFICULT

## 2022-01-01 ASSESSMENT — ACTIVITIES OF DAILY LIVING (ADL): DEPENDENT_IADLS:: TRANSPORTATION;SHOPPING;CLEANING

## 2022-01-01 ASSESSMENT — PAIN SCALES - GENERAL: PAINLEVEL: NO PAIN (0)

## 2022-01-01 ASSESSMENT — MIFFLIN-ST. JEOR: SCORE: 969.84

## 2022-01-03 NOTE — TELEPHONE ENCOUNTER
Hilda HERNANDEZ from [facility] calling, requesting verbal orders, Home health aid services, for   2 x week for 4 weeks and 1 x week for 1 week.    Phone # [phone]    Paxton Daniel

## 2022-01-12 NOTE — LETTER
January 13, 2022      Gilma Pace  [address]  [place] [address]        Chhaya and Hiral,   I have reviewed the report of the bone density scan that we recently ordered. The results show that the bones tested are in the very early stage of osteoporosis.  I do not have a previous comparison to see if the Fosamax is helping or not so we should repeat this in 2 years and you should keep taking the Fosamax.  Please let me know if you have any questions.     Sincerely,   Gucci Wu MD       Resulted Orders   DEXA HIP/PELVIS/SPINE - Future    Narrative    HISTORY: Asymptomatic menopausal state    COMPARISON: None    Age[age] years.  Height: 59.0 inches  Weight: 142.0 pounds  Sex: Female  Ethnicity: White    Image quality: Adequate    Lumbar spine T-score in region of L1-L4 = -2.9     HIPS:  Mean total hip T-score: -2.8  Left femoral neck T-score = -2.5  Right femoral neck T-score= -2.5     FRAX:  10 year probability of major osteoporotic fracture: 27.3%  10 year probability of hip fracture: 9.0%  The 10 year probability of fracture may be lower than reported if the  patient has received treatment. FRAX data should be disregarded in  patient's taking bisphosphonates.    World Health Organization definition of osteoporosis and osteopenia  for  women:   Normal: T-score at or above -1.0  Low Bone Mass (Osteopenia): T-score between -1.0 and -2.5.   Osteoporosis: T-score at or below -2.5   T-scores are reported for postmenopausal women and men over 50 years  of age.      Impression    IMPRESSION:    Osteoporosis.    NAKITA HEATH MD         SYSTEM ID:  [id number]

## 2022-01-13 NOTE — RESULT ENCOUNTER NOTE
Chhaya and Hiral,  I have reviewed the report of the bone density scan that we recently ordered. The results show that the bones tested are in the very early stage of osteoporosis.  I do not have a previous comparison to see if the Fosamax is helping or not so we should repeat this in 2 years and you should keep taking the Fosamax.  Please let me know if you have any questions.    Sincerely,   Gucci Wu MD

## 2022-01-19 NOTE — TELEPHONE ENCOUNTER
Reason for Call:  Form, our goal is to have forms completed with 72 hours, however, some forms may require a visit or additional information.    Type of letter, form or note:  Home Health Certification    Who is the form from?: Home care    Where did the form come from: form was faxed in    What clinic location was the form placed at?: [place]    Where the form was placed: Given to physician    What number is listed as a contact on the form?: [facility] [phone]       Additional comments: placed in provider's box to complete and route back to TC.     Call taken on 1/19/2022 at 1:46 PM by Grace De Jesus

## 2022-01-20 NOTE — TELEPHONE ENCOUNTER
Faxed [facility] to [facility] and to stat scanning.Rajani Amaro MA/ROSITA

## 2022-02-02 NOTE — TELEPHONE ENCOUNTER
Would you like a care coordination/  referral put in so family can help find this for patient?  If so, please sign care coordination referral.    Zoila KURTZN, RN

## 2022-02-03 NOTE — TELEPHONE ENCOUNTER
Skilled Nursing    1x per week for 4 weeks  3 PRN visit  - patient is need of LONG term home care, that is not provided by [facility],   -patient is having hallucinations, severe depression, anxiety and frequent UTI    Family is requesting:  OT   -1x every 12 days for cognitive testing  Home Health Aide  - 2x per week for 4 weeks    She needs to be discharged to another home health agency and  needs to be involved to help family with other community resources      Elizabeth HERNANDEZ - [facility]  Phone: [phone]  - ok to leave message, secure voice mail      Richelle BRICENOO/

## 2022-02-03 NOTE — PROGRESS NOTES
Clinic Care Coordination Contact    Clinic Care Coordination Contact  OUTREACH    Referral Information:  Referral Source: PCP  SW/CC received referral to follow-up with pt's dtr, Hiral, regarding concerns she has regarding pt's [facility] services ending.  SW/CC talked with Hiral, who, has not seen pt in the past couple weeks due to having Covid.  Granddaughter and granddaughter's fiance reside with pt and are her 24 hour caregivers.  [facility] discontinuing pt's HHC services in two weeks.  [facility] informed dtr that they no longer provide longer term HHC services.  Dtr indicated that pt does not have funds to pay for private HHC.  Dtr indicated pt's spouse  in , so pt lives on her social security.  SW/CC discussed A [place] Assessment through [facility] acute [facility], to see if pt is eligible for ongoing home care services at home.  Losing twice weekly HHA is a concern of dtr's.  SW/CC provided dtr with [facility] Intake Number to initiate this assessment.  Dtr indicated that she thinks Juan F (granddaughter's fiance') initiated this process previously when pt's spouse was alive though they were over income.  SW/CC encouraged dtr to try again as pt's income has declined.  SW/CC also inquired regarding SNF options for pt and dtr indicated that they want to keep pt at home, if at all possible.  Pt had a stay at [facility] in the past and dtr was pleased with care there.      SW/CC discussed the role/benefits of Clinic Care Coordination.  Dtr would like to discuss this with granddaughter and fiance' before making decision if they would like pt to enroll.  Pt has PCP appt tomorrow and granddaughter/fiance' are bringing pt to appt.         Chief Complaint   Patient presents with     Clinic Care Coordination - Initial     SW/CC - Vince Penaloza        Universal Utilization:  Utilization    Hospital Admissions  0             ED Visits  0             No Show Count  (past year)  1                Current as of: 2/3/2022 10:06 AM          Clinical Concerns:  Current Medical Concerns:  None    Current Behavioral Concerns:  Per dtr, pt is alert though confused at times.  Dtr would like to see pt up and moving more at home  Education Provided to dtr:  Regarding Medicare Home Health Care Service being a temporary service.  [place]      Health Maintenance Reviewed:  Not reviewed during this contact due to focus being home care services      Medication Management:  Medication review status:  Not addressed this encounter        Living Situation:  Current living arrangement:: I live in a private home with granddaughter and granddaughter's fiance'.    Lifestyle & Psychosocial Needs:    Social Determinants of Health     Tobacco Use: Medium Risk     Smoking Tobacco Use: Former Smoker     Smokeless Tobacco Use: Never Used   Alcohol Use: Not on file   Financial Resource Strain: Not on file   Food Insecurity: Not on file   Transportation Needs: Not on file   Physical Activity: Not on file   Stress: Not on file   Social Connections: Not on file   Intimate Partner Violence: Not on file   Depression: Not at risk     PHQ-2 Score: 2   Housing Stability: Not on file     Informal Support system:: Family,children and grandchildren     Resources and Interventions:  Current Resources:  Pt will be followed by [facility] for two more weeks.   Skilled Home Care Services: Skilled Nursing,Home Health Aid,Occupational Therapy  Community Resources: Home Care      Employment Status: retired    Patient/Caregiver understanding:  Dtr understands       Future Appointments              Tomorrow Gucci Wu MD [facility]          Plan:  SW/CC to follow for return call from dtr regarding whether pt/family would like pt to enroll in Care Coordination.  SW/CC will reach out to dtr in 2 weeks, if dtr has not called SW/CC by then.    Vince Penaloza, SW/CC,  for Ebony Gomez, /CC  [phone]

## 2022-02-04 NOTE — TELEPHONE ENCOUNTER
Elizabeth, Home Care Nurse is informed of MD note below, as it is written. Verbalized good understanding.  Patient's symptoms became acutely worse, so [facility] stayed on.  Mood changes, hallucinations, paranoia.  Began sleep walking.  Thought her recliner was a toilet, while sleep walking in the middle of the night.  She will need Long Term Home Health Aid.  (family will not bath her).  Will need Long Term Home Care, specifically, HHA   With Skilled nurse visits, 1 time a month.  Will need another referral to do a different agency.    Patient has appointment today, suspect symptoms related to UTI.      Will discuss with provider to update on symptoms.     Please advise  Shantelle Harris RN

## 2022-02-04 NOTE — PROGRESS NOTES
SUBJECTIVE:   Gilma Pace is a 81 year old female who presents today for symptom of incontinence..   She is wearing an adult undergarment to bed.   She denies soaking through it except on a rare occasion   She has had multiple urinary tract infection's in the past.   She  denies hematuria, denies back pain,  denies nausea or vomiting,  denies fever or chills.    This patient does  have a history of frequent urinary tract infections.     Past Medical History:   Diagnosis Date     Anxiety 10/11/2006    Last visit with Mental health specialist in 2002.      Chronic low back pain without sciatica 10/31/2016     Compression fracture of thoracic vertebra (H) 6/13/2011    Overview:  T  11  compression  fx  on  xrays  from  6/13/2011  . Ongoing pain  x  3  months   Get  eval  and  treat  with  spine  specialists  as discussed  Initially  treated  in [place]  .  Pain  is  controlled   with  advil  as needed  . Reviewed   with  patient      Congestive heart failure (H) 10/11/2006    Overview:  Epic      COPD (chronic obstructive pulmonary disease) (H) 9/28/2017     Folate deficiency 6/9/2017    Overview:  Formatting of this note may be different from the original. Hemoglobin (g/dl)  Date Value  06/05/2017 15.6  10/31/2016 13.8  11/04/2015 11.3 (L)   Iron (mcg/dl)  Date Value  06/05/2017 49 (L)   TIBC, Calculated (mcg/dl)  Date Value  06/05/2017 319   % Saturation, calc. (%)  Date Value  06/05/2017 15   Vitamin B12 (pg/ml)  Date Value  06/05/2017 253   Folate (ng/ml)  Date Value  06/05/20     Hypertension goal BP (blood pressure) < 140/90 9/28/2017     Mitral regurgitation 2/11/2014    Overview:  Needs dental prophylaxis      Mixed hyperlipidemia 10/11/2006     Osteoporosis 9/28/2017     Postoperative hypothyroidism 9/28/2017     Current Outpatient Medications   Medication Sig Dispense Refill     acetaminophen (TYLENOL) 500 MG tablet Take 1,000 mg by mouth every 8 hours as needed for mild pain        albuterol (PROVENTIL  HFA) 108 (90 Base) MCG/ACT inhaler Inhale 2 puffs into the lungs every 6 hours 18 g 2     alendronate (FOSAMAX) 70 MG tablet Take 1 tablet (70 mg) by mouth every 7 days 12 tablet 3     apixaban ANTICOAGULANT (ELIQUIS) 5 MG tablet Take 1 tablet (5 mg) by mouth 2 times daily 60 tablet 5     ARIPiprazole (ABILIFY) 5 MG tablet Take 1 tablet (5 mg) by mouth At Bedtime 30 tablet 1     aspirin (ASA) 81 MG chewable tablet Take 81 mg by mouth        busPIRone (BUSPAR) 15 MG tablet Take 1 tablet (15 mg) by mouth 2 times daily 180 tablet 3     conjugated estrogens (PREMARIN) 0.625 MG/GM vaginal cream Place 0.5 g vaginally twice a week 30 g 3     DULoxetine (CYMBALTA) 60 MG capsule Take 2 capsules by mouth once daily 180 capsule 0     folic acid (FOLVITE) 1 MG tablet Take 1 tablet (1,000 mcg) by mouth daily 90 tablet 2     levofloxacin (LEVAQUIN) 750 MG tablet Take 1 tablet (750 mg) by mouth daily 5 tablet 0     levothyroxine (EUTHYROX) 100 MCG tablet Take 1 tablet (100 mcg) by mouth daily 90 tablet 3     melatonin 5 MG tablet Take 1 tablet (5 mg) by mouth At Bedtime 90 tablet 3     metoprolol succinate ER (TOPROL-XL) 25 MG 24 hr tablet Take 1 tablet (25 mg) by mouth daily 90 tablet 3     nitroFURantoin macrocrystal-monohydrate (MACROBID) 100 MG capsule Take 1 capsule (100 mg) by mouth daily Start after levaquin completed. 90 capsule 1     NYSTOP [id number] UNIT/GM powder APPLY  POWDER TOPICALLY TWICE DAILY 60 g 11     order for DME [facility] home oxygen:  Equipment being ordered: Oxygen, home fill system,  4 LPM 1 each 0     order for DME Equipment being ordered: Oxygen -  4 liters a day       primidone (MYSOLINE) 50 MG tablet TAKE 1/2 (ONE-HALF) TABLET BY MOUTH AT BEDTIME 45 tablet 3     QUEtiapine (SEROQUEL) 25 MG tablet Take 1 tablet (25 mg) by mouth 2 times daily 180 tablet 3     rosuvastatin (CRESTOR) 20 MG tablet Take 1 tablet (20 mg) by mouth At Bedtime 90 tablet 3     umeclidinium (INCRUSE ELLIPTA) 62.5 MCG/INH inhaler Inhale  "1 puff into the lungs daily 3 each 3     Social History     Tobacco Use     Smoking status: Former Smoker     Quit date: 2007     Years since quitting: 15.1     Smokeless tobacco: Never Used   Substance Use Topics     Alcohol use: Yes     Comment: Rarely           OBJECTIVE:  BP (!) 174/92   Pulse 87   Temp 97.6  F (36.4  C)   Ht 1.499 m (4' 11\")   Wt 59.4 kg (131 lb)   SpO2 95%   BMI 26.46 kg/m    GENERAL APPEARANCE: healthy, alert and no distress      No results found for any visits on 02/04/22.   The patient was unable to produce a urine sample in the laboratory today     ASSESSMENT:   Urinary incontinence in a patient with a history of frequent UTIs    PLAN:  Urinalysis and urine culture pending  As per ordered above  Drink plenty of fluids.  Prevention and treatment of UTI's discussed.Signs and symptoms of pyelonephritis mentioned.  Follow up with primary care physician if not improving  --------------------------------------------------------------------------------------------------------------------------------------    --------------------------------------------------------------------------------------------------------------------------------------  SUBJECTIVE:  Gilma Pace is a 81 year old female who presents for a follow up evaluation of depression. The patient has been on duloxetine 120 mg and abilify 5 mg    The patient reports that her symptoms have improved since starting this medication.   The patient reports that she is not having any side effects from her current medication.    She and her grand daughter that lives with her have concluded that she has starting seeing people that are not really there. These are usually people that she does not recognize and are in the house. They do not scare her. She is upset when she thinks that it is the [employer] delivery magnus and realizes that he is not really there.        Current Outpatient Medications   Medication     acetaminophen (TYLENOL) " 500 MG tablet     albuterol (PROVENTIL HFA) 108 (90 Base) MCG/ACT inhaler     alendronate (FOSAMAX) 70 MG tablet     apixaban ANTICOAGULANT (ELIQUIS) 5 MG tablet     ARIPiprazole (ABILIFY) 5 MG tablet     aspirin (ASA) 81 MG chewable tablet     busPIRone (BUSPAR) 15 MG tablet     conjugated estrogens (PREMARIN) 0.625 MG/GM vaginal cream     DULoxetine (CYMBALTA) 60 MG capsule     folic acid (FOLVITE) 1 MG tablet     levofloxacin (LEVAQUIN) 750 MG tablet     levothyroxine (EUTHYROX) 100 MCG tablet     melatonin 5 MG tablet     metoprolol succinate ER (TOPROL-XL) 25 MG 24 hr tablet     nitroFURantoin macrocrystal-monohydrate (MACROBID) 100 MG capsule     NYSTOP [id number] UNIT/GM powder     order for DME     order for DME     primidone (MYSOLINE) 50 MG tablet     QUEtiapine (SEROQUEL) 25 MG tablet     rosuvastatin (CRESTOR) 20 MG tablet     umeclidinium (INCRUSE ELLIPTA) 62.5 MCG/INH inhaler     No current facility-administered medications for this visit.           Current thoughts of suicide or homicide:No      PHQ-9 score:    PHQ 2/4/2022   PHQ-9 Total Score 22   Q9: Thoughts of better off dead/self-harm past 2 weeks Not at all                 PHQ-9 SCORE 10/13/2020 4/2/2021 2/4/2022   PHQ-9 Total Score MyChart - - 22 (Severe depression)   PHQ-9 Total Score 4 8 22            OBJECTIVE:  General: the patient had a calm and pleasant affect during the visit today.    ASSESSMENT: Chronic Depression and dementia with hallucinations    PLAN:  We will continue the patient on the same dose of her antidepressant and increase the abilify to 10 mg.    I asked the patient to return to clinic for appointment in 4 weeks for reevaluation.    The patient was advised of the potential side effects of the medication and was asked to call if any of them persist past 7 days of starting it or starting on a new dose.                   Answers for HPI/ROS submitted by the patient on 2/4/2022  If you checked off any problems, how difficult have  these problems made it for you to do your work, take care of things at home, or get along with other people?: Extremely difficult  PHQ9 TOTAL SCORE: 22  ARISTIDES 7 TOTAL SCORE: 16

## 2022-02-04 NOTE — TELEPHONE ENCOUNTER
I am unsure if I need to authorize these orders as she will be switching home care agencies but anyway please ok these requests with the RN form the current home agency.

## 2022-02-04 NOTE — TELEPHONE ENCOUNTER
Left message for Home Care NurseElizabeth to call back to discuss message below.   Shantelle Harris RN

## 2022-02-04 NOTE — TELEPHONE ENCOUNTER
Patient seen today and medication changed.  Care coordination already consulted to help with potential AL placement.  Gucci Wu MD

## 2022-02-10 NOTE — TELEPHONE ENCOUNTER
Reason for Call:  Form, our goal is to have forms completed with 72 hours, however, some forms may require a visit or additional information.    Type of letter, form or note:  Home Health Certification    Who is the form from?: Home care    Where did the form come from: form was faxed in    What clinic location was the form placed at?: [place]    Where the form was placed: Given to physician    What number is listed as a contact on the form?: [facility] [phone]       Additional comments: I placed the [facility] forms in the providers folder for review    Call taken on 2/10/2022 at 5:15 PM by Tanisha Arevalo

## 2022-02-22 NOTE — TELEPHONE ENCOUNTER
"Pt has appointment scheduled.    Requested Prescriptions   Pending Prescriptions Disp Refills    DULoxetine (CYMBALTA) 60 MG capsule [Pharmacy Med Name: DULoxetine HCl 60 MG Oral Capsule Delayed Release Particles] 180 capsule 0     Sig: Take 2 capsules by mouth once daily        Serotonin-Norepinephrine Reuptake Inhibitors  Failed - 2/21/2022  7:25 PM        Failed - Blood pressure under 140/90 in past 12 months       BP Readings from Last 3 Encounters:   02/04/22 (!) 174/92   12/01/21 139/83   08/26/21 (!) 159/108                 Passed - Recent (12 mo) or future (30 days) visit within the authorizing provider's specialty     Patient has had an office visit with the authorizing provider or a provider within the authorizing providers department within the previous 12 mos or has a future within next 30 days. See \"Patient Info\" tab in inbasket, or \"Choose Columns\" in Meds & Orders section of the refill encounter.              Passed - Medication is active on med list        Passed - Patient is age 18 or older        Passed - No active pregnancy on record        Passed - No positive pregnancy test in past 12 months              "

## 2022-03-04 NOTE — RESULT ENCOUNTER NOTE
Justin Dillard and Katarina,  I have reviewed the results of the laboratory tests that we recently ordered. All of the lab work performed was normal or considered normal for you. The Urine culture was negative. If things are not better please let us know.  Sincerely,   Gucci Wu MD

## 2022-03-23 NOTE — TELEPHONE ENCOUNTER
Routing refill request to provider for review/approval because:  Drug not active on patient's medication list.  Inhaler is but inhalation solution is not.  Thank you. Janis Howard R.N.

## 2022-03-28 NOTE — CONFIDENTIAL NOTE
urgent care ordered    I also called the lab and left a voicemail to make sure that the urine culture gets done

## 2022-04-11 NOTE — PROGRESS NOTES
SUBJECTIVE:  Gilma Pace is a 81 year old female who is seen as self referral for  left hand injury that occurred  1 days ago.   She reports mild discomfort after banging it.     The patients past medical, surgical, social and family histories were reviewed.  Social History     Socioeconomic History     Marital status:      Spouse name: None     Number of children: 2     Years of education: 14     Highest education level: None   Occupational History     Occupation: RETIRED  1999     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    Tobacco Use     Smoking status: Former Smoker     Packs/day: 0.00     Types: Cigarettes     Quit date: 1/1/2007     Years since quitting: 15.2     Smokeless tobacco: Never Used   Vaping Use     Vaping Use: Never used   Substance and Sexual Activity     Alcohol use: Not Currently     Comment: Rarely     Drug use: No     Sexual activity: Not Currently     Partners: Male     Birth control/protection: None   Other Topics Concern     Parent/sibling w/ CABG, MI or angioplasty before 65F 55M? No         REVIEW OF SYSTEMS:  CONSTITUTIONAL:NEGATIVE for fever, chills, change in weight      OBJECTIVE:  BP (!) 150/90   Pulse 77   Temp 97  F (36.1  C) (Tympanic)   Resp 16   Wt 56.7 kg (125 lb)   SpO2 94%   BMI 25.25 kg/m      HAND EXAM:  GENERAL APPEARANCE: healthy, alert and no distress    There was a protuberance over the dorsum of the fourth MCP that was purplish in color it was fluctuant and mobile  The remainder the exam was normal there was normal range of motion of the hand joints and there was no tenderness to bony palpation      ASSESSMENT/PLAN  He hematoma      PLAN:   Rest advised, Ice recommended for 72 hours, Compression with a elastic bandage advised, Elevation advised        --------------------------------------------------------------------------------------------------------------------------------------        SUBJECTIVE:  Gilma Pace is a 81 year old female  who presents for a follow up evaluation of depression. The patient was started on Duloxetine 120 mg daily.       The patient reports that her symptoms have improved since starting this medication.       The patient reports that she is not having any side effects from her current medication.      She is sleeping during the day   She is able to sleep at night but wakes up 1-2 times.     She still has hallucinations but they are not very common. Primarily they are about young children visiting the house. She denies that they are frightening            Current Outpatient Medications   Medication     acetaminophen (TYLENOL) 500 MG tablet     albuterol (PROAIR HFA/PROVENTIL HFA/VENTOLIN HFA) 108 (90 Base) MCG/ACT inhaler     alendronate (FOSAMAX) 70 MG tablet     amoxicillin-clavulanate (AUGMENTIN) 875-125 MG tablet     apixaban ANTICOAGULANT (ELIQUIS) 5 MG tablet     ARIPiprazole (ABILIFY) 10 MG tablet     aspirin (ASA) 81 MG chewable tablet     busPIRone (BUSPAR) 15 MG tablet     conjugated estrogens (PREMARIN) 0.625 MG/GM vaginal cream     DULoxetine (CYMBALTA) 60 MG capsule     folic acid (FOLVITE) 1 MG tablet     levothyroxine (EUTHYROX) 100 MCG tablet     melatonin 5 MG tablet     metoprolol succinate ER (TOPROL-XL) 25 MG 24 hr tablet     nitroFURantoin macrocrystal-monohydrate (MACROBID) 100 MG capsule     NYSTOP [id number] UNIT/GM powder     order for DME     order for DME     primidone (MYSOLINE) 50 MG tablet     rosuvastatin (CRESTOR) 20 MG tablet     umeclidinium (INCRUSE ELLIPTA) 62.5 MCG/INH inhaler     No current facility-administered medications for this visit.             PHQ-9 score:    PHQ 4/11/2022   PHQ-9 Total Score 13   Q9: Thoughts of better off dead/self-harm past 2 weeks Not at all                 PHQ-9 SCORE 4/2/2021 2/4/2022 4/11/2022   PHQ-9 Total Score MyChart - 22 (Severe depression) 13 (Moderate depression)   PHQ-9 Total Score 8 22 13            OBJECTIVE:  General: the patient had a calm and  "pleasant affect during the visit today.    ASSESSMENT: Chronic Depression which has worsened      PLAN:  We will continue the patient on the same dose of her antidepressant and increase the abilify to 20 mg      I asked the patient to return to clinic for appointment in 4 weeks for reevaluation.    Patient Instructions   Melatonin - The observation that circadian rhythm disturbances appear to be associated with depressed mood, impaired cognition, and behavioral and sleep disturbances has suggested a potential benefit for melatonin and/or light therapy in patients with dementia. Studies have had somewhat mixed results, but in the aggregate do not suggest convincing benefit for agitation [70-76]. Melatonin dose varied widely in published studies, ranging from 1.5 to 10 mg. Timing of administration was also variable.    ?Light therapy - Increasing bright-light exposure in the mornings may have benefits independent of melatonin and can lead to improvement in sleep disturbances as well. (See \"Sleep-wake disturbances and sleep disorders in patients with dementia\", section on 'Multicomponent behavioral therapy'.)    If not sleeping  better next time we will add trazodone at bedtime         "

## 2022-04-11 NOTE — PROGRESS NOTES
Answers for HPI/ROS submitted by the patient on 4/11/2022  If you checked off any problems, how difficult have these problems made it for you to do your work, take care of things at home, or get along with other people?: Somewhat difficult  PHQ9 TOTAL SCORE: 13  ARISTIDES 7 TOTAL SCORE: 7

## 2022-04-11 NOTE — PATIENT INSTRUCTIONS
"Melatonin - The observation that circadian rhythm disturbances appear to be associated with depressed mood, impaired cognition, and behavioral and sleep disturbances has suggested a potential benefit for melatonin and/or light therapy in patients with dementia. Studies have had somewhat mixed results, but in the aggregate do not suggest convincing benefit for agitation [70-76]. Melatonin dose varied widely in published studies, ranging from 1.5 to 10 mg. Timing of administration was also variable.    ?Light therapy - Increasing bright-light exposure in the mornings may have benefits independent of melatonin and can lead to improvement in sleep disturbances as well. (See \"Sleep-wake disturbances and sleep disorders in patients with dementia\", section on 'Multicomponent behavioral therapy'.)  "

## 2022-04-12 NOTE — PROGRESS NOTES
[facility]       Clinical Data: Care Coordinator Outreach  Outreach attempted x 1. Spoke briefly with daughter and attempted to call patient. Picked up and hung up phone.  Plan:  Care Coordinator will try to reach patient again in 1-2 business days.

## 2022-04-14 NOTE — PROGRESS NOTES
[facility]/Voicemail       Clinical Data: Care Coordinator Outreach  Outreach attempted x 2.  Left message on patient's voicemail with call back information and requested return call.  Plan:  Care Coordinator will try to reach patient again in 1-2 business days.    Grandson had left a VM don't see on consent to communicate. Will need to schedule SW assessment with someone on the consent.

## 2022-04-19 NOTE — PROGRESS NOTES
Community Health Worker Initial Outreach    CHW Initial Information Gathering:  Referral Source: PCP  Preferred Hospital: [facility]  [phone]  Preferred Urgent Care: [facility] [phone]  Current living arrangement:: I live in a private home with family  Type of residence:: Private home - stairs  Informal Support system:: Family  No PCP office visit in Past Year: No  Transportation means:: Regular car  CHW Additional Questions  MyChart active?: Yes  Patient sent Social Determinants of Health questionnaire?: Yes    Patient accepts CC: Yes. Patient scheduled for assessment with CCSW on 4/21/22 at 10 am. Patient noted desire to discuss PCA services. Lives with grandson who would like to become her PCA. .

## 2022-04-21 NOTE — PROGRESS NOTES
Clinic Care Coordination Contact    Clinic Care Coordination Contact  OUTREACH    Referral Information:  Referral Source: PCP    Services are provided by a care coordinator for people with complex needs such as; medical, social, or  financial issues.  The care coordinator works with the patient and their primary care provider to determine health goals, obtain resources, achieve outcomes, and develop care plans that help coordinate a patient's care.     REFERRAL REASON:       Provide additional details for Care Coordination to best meet Patient's current needs: Her Grandson is interested in becoming her PCA as he lives with her and cares for her at the present time.     Clinic Staff has discussed Care Coordination Referral with the Patient/Caregiver: yes    Chief Complaint   Patient presents with     Clinic Care Coordination - Initial     Tian ARAUJO        [place] Utilization: [employer]; [employer]; [facility] Utilization  Difficulty keeping appointments:: No  Compliance Concerns: No  No-Show Concerns: No  No PCP office visit in Past Year: No  Utilization    Hospital Admissions  0             ED Visits  0             No Show Count (past year)  1                Current as of: 4/20/2022  9:28 AM              Clinical Concerns:  Current Medical Concerns:    Patient Active Problem List   Diagnosis     COPD (chronic obstructive pulmonary disease) (H)     Hypertension goal BP (blood pressure) < 140/90     Postoperative hypothyroidism     Mitral valve disorders(424.0)     Osteoporosis     Anxiety     Chronic low back pain     Chronic low back pain without sciatica     Compression fracture of thoracic vertebra (H)     Essential hypertension     Folate deficiency     Hypothyroidism     Mitral regurgitation     Mixed hyperlipidemia     Other abnormal glucose     Recurrent falls     Smoker     Mild concentric left ventricular hypertrophy (LVH)     Myopathy     Acute respiratory failure (H)     Chronic  respiratory failure with hypoxia (H)     Leg weakness     Obesity (BMI 35.0-39.9 without comorbidity)     Pneumonia     Respiratory failure (H)     Morbid obesity (H)     Hypothyroidism, unspecified type     Major depressive disorder, recurrent episode, moderate (H)     Sepsis (H)     Restless leg syndrome     Continuous leakage of urine         Current Behavioral Concerns: Patient has dx of Major depressive disorder    Education Provided to patient: Introduced self and role to daughter and grandson   Pain  Pain (GOAL):: No  Health Maintenance Reviewed:    Clinical Pathway: None    Medication Management:  Did not review medications.     Functional Status:  Dependent IADLs:: Transportation, Shopping, Cleaning  Bed or wheelchair confined:: No    Living Situation:  Current living arrangement:: I live in a private home with family  Type of residence:: Private home - stairs    Lifestyle & Psychosocial Needs: [facility] contacted patient's daughter, Aicha. Consent to communicate is on file. [facility] introduced self and role. Discussed consult reason. She asked for patient's grandson, Juan F, to be conference called to discuss the consult reason as well. [facility] contacted Juan F with Aicha's permission. Juan F's number is [phone]. Discussed consult reason. Juan F took care of patient's  while he was on hospice and him, as well as family, is interested in having him take care of patient too. Aicha and Juan F wanted to know the process of getting patient PCA services paid through the [facility]. Discussed [place] and EW. Patient does not qualify for this due to being over income. [facility] also suggested contacting [facility] to get a M[place]hoices assessment to see if she would qualify for AC waiver. No further questions or concerns. Family will discuss this further and notify [facility]/care team if they have additional questions.      Social Determinants of Health     Tobacco Use: Medium Risk     Smoking Tobacco Use: Former  Smoker     Smokeless Tobacco Use: Never Used   Alcohol Use: Not on file   Financial Resource Strain: Not on file   Food Insecurity: Not on file   Transportation Needs: Not on file   Physical Activity: Not on file   Stress: Not on file   Social Connections: Not on file   Intimate Partner Violence: Not on file   Depression: At risk     PHQ-2 Score: 3   Housing Stability: Not on file     Diet:: No added salt  Inadequate nutrition (GOAL):: No  Tube Feeding: No  Inadequate activity/exercise (GOAL):: No  Significant changes in sleep pattern (GOAL): No  Transportation means:: Regular car     Latter-day or spiritual beliefs that impact treatment:: No  Mental health DX:: No  Mental health management concern (GOAL):: No  Informal Support system:: Family        Resources and Interventions:  Current Resources:      Community Resources: Home Care  Supplies Currently Used at Home: Oxygen Tubing/Supplies (24 hour oxygen)     Employment Status: retired              Referrals Placed: None       Goals: No goals were discussed. Patient's family will reach out if further needs arise. Was not enrolled.       Patient/Caregiver understanding: Yes       Future Appointments              In 4 weeks Gucci Wu MD [facility]          Plan: Patient was not enrolled in CC. Family will reach out if further needs arise    ALEISHA Hargrove  Primary Care Clinic- Social Work Care Coordinator  [facility] [place] [facility]  Ph: [phone]  4/21/2022 11:20 AM

## 2022-05-19 NOTE — PROGRESS NOTES
"Brenda is a 81 year old who is being evaluated via a billable video visit.      How would you like to obtain your AVS? MyChart  If the video visit is dropped, the invitation should be resent by: Text to cell phone: [phone] back up, Granddaughter Titus phone  Will anyone else be joining your video visit? No    Video Start Time: 5:50 PM    Assessment & Plan     Moderate major depression (H)    - ARIPiprazole (ABILIFY) 20 MG tablet; Take 1 tablet (20 mg) by mouth At Bedtime        Gastroesophageal reflux disease without esophagitis    - omeprazole (PRILOSEC) 40 MG DR capsule; Take 1 capsule (40 mg) by mouth daily             BMI:   Estimated body mass index is 25.25 kg/m  as calculated from the following:    Height as of 2/4/22: 1.499 m (4' 11\").    Weight as of 4/11/22: 56.7 kg (125 lb).           No follow-ups on file.    Gucci Wu MD  [facility]    Mary Jane Gutierrez is a 81 year old who presents for the following health issues     History of Present Illness       COPD:  She presents for follow up of COPD.  Overall, COPD symptoms are slightly worse since last visit. She has more than usual fatigue or shortness of breath with exertion and same as usual shortness of breath at rest.  She does not cough  and does not have change in sputum. No recent fever. She can walk the length of 1-2 rooms without stopping to rest. She can walk 1 flights of stairs without resting.The patient has had no ED, urgent care, or hospital admissions because of COPD since the last visit.     Mental Health Follow-up:  Patient presents to follow-up on Depression & Anxiety.Patient's depression since last visit has been:  Medium  The patient is not having other symptoms associated with depression.  Patient's anxiety since last visit has been:  Medium  The patient is not having other symptoms associated with anxiety.  Any significant life events: grief or loss  Patient is feeling anxious or having panic " attacks.  Patient has no concerns about alcohol or drug use.    She eats 2-3 servings of fruits and vegetables daily.She consumes 3 sweetened beverage(s) daily.She exercises with enough effort to increase her heart rate 9 or less minutes per day.  She exercises with enough effort to increase her heart rate 3 or less days per week.   She is taking medications regularly.    Today's PHQ-9         PHQ-9 Total Score: 15    PHQ-9 Q9 Thoughts of better off dead/self-harm past 2 weeks :   Not at all    How difficult have these problems made it for you to do your work, take care of things at home, or get along with other people: Very difficult  Today's ARISTIDES-7 Score: 8                Review of Systems         Objective           Vitals:  No vitals were obtained today due to virtual visit.    Physical Exam   GENERAL: Healthy, alert and no distress  EYES: Eyes grossly normal to inspection.  No discharge or erythema, or obvious scleral/conjunctival abnormalities.  RESP: No audible wheeze, cough, or visible cyanosis.  No visible retractions or increased work of breathing.    SKIN: Visible skin clear. No significant rash, abnormal pigmentation or lesions.  NEURO: Cranial nerves grossly intact.  Mentation and speech appropriate for age.  PSYCH: Mentation appears normal, affect normal/bright, judgement and insight intact, normal speech and appearance well-groomed.                Video-Visit Details    Type of service:  Video Visit    Video End Time:6:00 PM    Originating Location (pt. Location): Home    Distant Location (provider location):  [facility]     Platform used for Video Visit: OmniGuide         Answers for HPI/ROS submitted by the patient on 5/19/2022  If you checked off any problems, how difficult have these problems made it for you to do your work, take care of things at home, or get along with other people?: Very difficult  PHQ9 TOTAL SCORE:  15    --------------------------------------------------------------------------------------------------------------------------------------  SUBJECTIVE:  Gilma Pace is a 81 year old female who presents for a follow up evaluation of depression. The patient dose of Abilify increased from 10 to 20 mg at her last appointment approximately 1 month ago .    The patient reports that her symptoms havebeen stable  improved since starting this medication.     She struggles to sleep at night   She is taking a lot of naps   She gets up about 7:00 am      She is taking the melatonin at 8:30 or 9:00 pm and goes to bed 1-3 hours after that,     The patient reports that she is not having any side effects from her current medication.      She reports that she has had a lot of heartburn recently for the last 4 days.  When she eats it somewhat hurts in her chest.  Is vomited once or twice    She has tried Tums without success    Current Outpatient Medications   Medication     acetaminophen (TYLENOL) 500 MG tablet     albuterol (PROAIR HFA/PROVENTIL HFA/VENTOLIN HFA) 108 (90 Base) MCG/ACT inhaler     alendronate (FOSAMAX) 70 MG tablet     apixaban ANTICOAGULANT (ELIQUIS) 5 MG tablet     ARIPiprazole (ABILIFY) 20 MG tablet     aspirin (ASA) 81 MG chewable tablet     busPIRone (BUSPAR) 15 MG tablet     conjugated estrogens (PREMARIN) 0.625 MG/GM vaginal cream     DULoxetine (CYMBALTA) 60 MG capsule     folic acid (FOLVITE) 1 MG tablet     levothyroxine (EUTHYROX) 100 MCG tablet     melatonin 5 MG tablet     metoprolol succinate ER (TOPROL-XL) 25 MG 24 hr tablet     nitroFURantoin macrocrystal-monohydrate (MACROBID) 100 MG capsule     NYSTOP 319532 UNIT/GM powder     order for DME     order for DME     primidone (MYSOLINE) 50 MG tablet     rosuvastatin (CRESTOR) 20 MG tablet     umeclidinium (INCRUSE ELLIPTA) 62.5 MCG/INH inhaler     No current facility-administered medications for this visit.       Last PHQ-9 score on record= No  Value exists for the : HP#PHQ9    PHQ-9 score:    PHQ 5/19/2022   PHQ-9 Total Score 15   Q9: Thoughts of better off dead/self-harm past 2 weeks Not at all                 PHQ-9 SCORE 2/4/2022 4/11/2022 5/19/2022   PHQ-9 Total Score MyChart 22 (Severe depression) 13 (Moderate depression) 15 (Moderately severe depression)   PHQ-9 Total Score 22 13 15            OBJECTIVE:  General: the patient had a calm and jocular  affect during the visit today.    ASSESSMENT: Chronic Depression which has remained stable    PLAN:  We will continue the patient on the same dose of her abilify and have the patient return to clinic for appointment in 6 month(s). She was instructed to return earlier if her depression symptoms return.      (K21.9) Gastroesophageal reflux disease without esophagitis  (primary encounter diagnosis)  Comment:   Plan: omeprazole (PRILOSEC) 40 MG DR capsule        Recheck in 4-6 weeks

## 2022-06-02 NOTE — TELEPHONE ENCOUNTER
"Requested Prescriptions   Pending Prescriptions Disp Refills    DULoxetine (CYMBALTA) 60 MG capsule [Pharmacy Med Name: DULoxetine HCl 60 MG Oral Capsule Delayed Release Particles] 180 capsule 0     Sig: Take 2 capsules by mouth once daily        Serotonin-Norepinephrine Reuptake Inhibitors  Failed - 6/2/2022  1:31 PM        Failed - Blood pressure under 140/90 in past 12 months       BP Readings from Last 3 Encounters:   04/11/22 (!) 150/90   02/04/22 (!) 174/92   12/01/21 139/83                 Passed - Recent (12 mo) or future (30 days) visit within the authorizing provider's specialty     Patient has had an office visit with the authorizing provider or a provider within the authorizing providers department within the previous 12 mos or has a future within next 30 days. See \"Patient Info\" tab in inbasket, or \"Choose Columns\" in Meds & Orders section of the refill encounter.              Passed - Medication is active on med list        Passed - Patient is age 18 or older        Passed - No active pregnancy on record        Passed - No positive pregnancy test in past 12 months              "

## 2023-01-25 NOTE — TELEPHONE ENCOUNTER
Message: Reva Wu, refill request. Pt is out.    Drug: Eliquis 5mg tab  #: 60  Sig: Take 1 tab by mouth twice daily for PE  
Adequate: hears normal conversation without difficulty

## 2024-05-29 NOTE — TELEPHONE ENCOUNTER
Calling for 5/17 echo results   Gilma Pace would benefit from a hospital bed. For insurance coverage they require:  1. A F2F visit with specific documentation during the visit. All 6 questions below must be answered in detail in progrss note even if answer is no.   2. Detailed order. See below  3. Qualifying questions form/all 6 questions must be addressed in face to face visit to qualify for bed/see example below  DME vendor is [employer] number [phone]  Rationales for Hospital Bed  Gilma Pace requires for alleviation of pain, positioning of the body in ways not feasible with an ordinary bed due to inoperable Chronic low back pain M54.5 that is expected to last at least 1 month  Gilma Pace requires head of bed elevated more than 30 degrees most of the time due to CHF I509,  COPD   J44.9   Gilma Pace requires a bed height different than a fixed height hospital bed in order to safely transfer to chair, wheelchair or standing position due to Chronic low back pain M54.5, Myopathy G72.9, leg weakness R29.898  Gilma Pace does not  requires traction that can only be attached to a hospital bed  Gilma Pace requires frequent changes in body position, or has an immediate need for change in body position due to Chronic low back pain M54.5, Myopathy G72.9, leg weakness R29.898  Detailed Order  OK for full electric hospital bed with   rails Length of need 99 months/lifetime  Height/Weight  MD name  MD/PA/NP signature/NPI number/Date of order  Thank you for your assistance  Estela Patrick [facility] [phone]    [facility] now requests orders and shares plan of care/discharge summaries for some patients through [facility].  Please REPLY TO THIS MESSAGE OR ROUTE BACK TO THE AUTHOR in order to give authorization for orders when needed.  This is considered a verbal order, you will still receive a faxed copy of orders for signature.  Thank you for your assistance in improving collaboration for our  patients.    ORDER    Occupational therapy 1w1 on this cert then 2w4 on new cert         Estela Patrick OTR [phone]

## 2025-02-18 NOTE — TELEPHONE ENCOUNTER
Bed: JNED-B  Expected date: 2/18/25  Expected time: 6:50 AM  Means of arrival: Ambulance  Comments:  Waqarv  67 F weakness / fall   Prior Authorization Approval    Authorization Effective Date: 5/16/2021  Authorization Expiration Date: 6/15/2022  Medication: PREMARIN 0.625 MG/GM vaginal cream-APPROVED  Approved Dose/Quantity:   Reference #:     Insurance Company: [employer] - Phone [phone] Fax [phone]  Expected CoPay:       CoPay Card Available:      Foundation Assistance Needed:    Which Pharmacy is filling the prescription (Not needed for infusion/clinic administered): [facility] [address] [place] [id number] [facility]  Pharmacy Notified: Yes  Patient Notified: No    Pharmacy will notify patient when medication is ready.

## 2025-02-26 NOTE — TELEPHONE ENCOUNTER
Routing refill request to provider for review/approval because:  Medication is reported/historical  Kaia Us BSN, RN          
Patient/Caregiver provided printed discharge information.

## (undated) DEVICE — DRAPE BACK TABLE  44X90" 8377

## (undated) DEVICE — NDL 27GA 1.25" 305136

## (undated) DEVICE — BLADE KNIFE SURG 15 371115

## (undated) DEVICE — SOL NACL 0.9% IRRIG 500ML BOTTLE 2F7123

## (undated) DEVICE — LIGHT HANDLE X1 31140133

## (undated) DEVICE — SYR 10ML FINGER CONTROL W/O NDL 309695

## (undated) DEVICE — DRSG STERI STRIP 1/2X4" R1547

## (undated) DEVICE — PREP CHLORAPREP W/ORANGE TINT 10.5ML 260715

## (undated) DEVICE — PEN MARKING SKIN W/PAPER RULER 31145785

## (undated) DEVICE — SPONGE RAY-TEC 4X8" 7318

## (undated) DEVICE — LINEN ORTHO PACK 5446

## (undated) DEVICE — DRSG GAUZE 4X4" TRAY 6939

## (undated) DEVICE — SU MONOCRYL 4-0 PS-2 27" UND Y426H

## (undated) DEVICE — NDL COUNTER 20CT 31142493

## (undated) DEVICE — SPECIMEN CONTAINER URINE 90ML STERILE 75.1435.002

## (undated) DEVICE — BASIN SET SINGLE STERILE 13752-624

## (undated) DEVICE — TAPE MICROFOAM 4" 1528-4

## (undated) DEVICE — SU VICRYL 3-0 SH 27" UND J416H

## (undated) DEVICE — GLOVE PROTEXIS W/NEU-THERA 7.5  2D73TE75

## (undated) DEVICE — CUP AND LID 2PK 2OZ STERILE  SSK9006A

## (undated) RX ORDER — LIDOCAINE HYDROCHLORIDE 10 MG/ML
INJECTION, SOLUTION EPIDURAL; INFILTRATION; INTRACAUDAL; PERINEURAL
Status: DISPENSED
Start: 2018-03-19